# Patient Record
Sex: FEMALE | Race: BLACK OR AFRICAN AMERICAN | NOT HISPANIC OR LATINO | Employment: OTHER | URBAN - METROPOLITAN AREA
[De-identification: names, ages, dates, MRNs, and addresses within clinical notes are randomized per-mention and may not be internally consistent; named-entity substitution may affect disease eponyms.]

---

## 2023-12-01 ENCOUNTER — HOSPITAL ENCOUNTER (OUTPATIENT)
Facility: HOSPITAL | Age: 53
Setting detail: OBSERVATION
Discharge: HOME | End: 2023-12-03
Attending: EMERGENCY MEDICINE | Admitting: EMERGENCY MEDICINE
Payer: MEDICARE

## 2023-12-01 DIAGNOSIS — E11.649 HYPOGLYCEMIA ASSOCIATED WITH TYPE 2 DIABETES MELLITUS: Primary | ICD-10-CM

## 2023-12-01 DIAGNOSIS — E87.6 HYPOKALEMIA: ICD-10-CM

## 2023-12-01 PROBLEM — E16.2 HYPOGLYCEMIA: Status: ACTIVE | Noted: 2023-12-01

## 2023-12-01 LAB
ALBUMIN SERPL-MCNC: 4 G/DL (ref 3.5–5.2)
ALBUMIN/GLOB SERPL: 1.2 G/DL
ALP SERPL-CCNC: 86 U/L (ref 39–117)
ALT SERPL W P-5'-P-CCNC: 10 U/L (ref 1–33)
ANION GAP SERPL CALCULATED.3IONS-SCNC: 12 MMOL/L (ref 5–15)
AST SERPL-CCNC: 18 U/L (ref 1–32)
BASOPHILS # BLD AUTO: 0.1 10*3/MM3 (ref 0–0.2)
BASOPHILS NFR BLD AUTO: 1.3 % (ref 0–1.5)
BILIRUB SERPL-MCNC: 0.3 MG/DL (ref 0–1.2)
BUN SERPL-MCNC: 19 MG/DL (ref 6–20)
BUN/CREAT SERPL: 20.4 (ref 7–25)
CALCIUM SPEC-SCNC: 9.4 MG/DL (ref 8.6–10.5)
CHLORIDE SERPL-SCNC: 99 MMOL/L (ref 98–107)
CO2 SERPL-SCNC: 29 MMOL/L (ref 22–29)
CREAT SERPL-MCNC: 0.93 MG/DL (ref 0.57–1)
DEPRECATED RDW RBC AUTO: 44.6 FL (ref 37–54)
EGFRCR SERPLBLD CKD-EPI 2021: 72.7 ML/MIN/1.73
EOSINOPHIL # BLD AUTO: 0.4 10*3/MM3 (ref 0–0.4)
EOSINOPHIL NFR BLD AUTO: 3.6 % (ref 0.3–6.2)
ERYTHROCYTE [DISTWIDTH] IN BLOOD BY AUTOMATED COUNT: 13.8 % (ref 12.3–15.4)
GLOBULIN UR ELPH-MCNC: 3.4 GM/DL
GLUCOSE BLDC GLUCOMTR-MCNC: 100 MG/DL (ref 70–105)
GLUCOSE BLDC GLUCOMTR-MCNC: 108 MG/DL (ref 70–105)
GLUCOSE BLDC GLUCOMTR-MCNC: 90 MG/DL (ref 70–105)
GLUCOSE BLDC GLUCOMTR-MCNC: 98 MG/DL (ref 70–105)
GLUCOSE SERPL-MCNC: 95 MG/DL (ref 65–99)
HCT VFR BLD AUTO: 41.3 % (ref 34–46.6)
HGB BLD-MCNC: 13.7 G/DL (ref 12–15.9)
HOLD SPECIMEN: NORMAL
LYMPHOCYTES # BLD AUTO: 2.9 10*3/MM3 (ref 0.7–3.1)
LYMPHOCYTES NFR BLD AUTO: 27.6 % (ref 19.6–45.3)
MCH RBC QN AUTO: 29.3 PG (ref 26.6–33)
MCHC RBC AUTO-ENTMCNC: 33.2 G/DL (ref 31.5–35.7)
MCV RBC AUTO: 88.1 FL (ref 79–97)
MONOCYTES # BLD AUTO: 1 10*3/MM3 (ref 0.1–0.9)
MONOCYTES NFR BLD AUTO: 9.2 % (ref 5–12)
NEUTROPHILS NFR BLD AUTO: 58.3 % (ref 42.7–76)
NEUTROPHILS NFR BLD AUTO: 6 10*3/MM3 (ref 1.7–7)
NRBC BLD AUTO-RTO: 0.1 /100 WBC (ref 0–0.2)
PLATELET # BLD AUTO: 304 10*3/MM3 (ref 140–450)
PMV BLD AUTO: 8.3 FL (ref 6–12)
POTASSIUM SERPL-SCNC: 3.3 MMOL/L (ref 3.5–5.2)
PROT SERPL-MCNC: 7.4 G/DL (ref 6–8.5)
RBC # BLD AUTO: 4.69 10*6/MM3 (ref 3.77–5.28)
SODIUM SERPL-SCNC: 140 MMOL/L (ref 136–145)
WBC NRBC COR # BLD AUTO: 10.4 10*3/MM3 (ref 3.4–10.8)
WHOLE BLOOD HOLD COAG: NORMAL

## 2023-12-01 PROCEDURE — 85025 COMPLETE CBC W/AUTO DIFF WBC: CPT

## 2023-12-01 PROCEDURE — 99284 EMERGENCY DEPT VISIT MOD MDM: CPT

## 2023-12-01 PROCEDURE — 80053 COMPREHEN METABOLIC PANEL: CPT

## 2023-12-01 PROCEDURE — 82948 REAGENT STRIP/BLOOD GLUCOSE: CPT

## 2023-12-01 RX ORDER — SODIUM CHLORIDE 0.9 % (FLUSH) 0.9 %
10 SYRINGE (ML) INJECTION AS NEEDED
Status: DISCONTINUED | OUTPATIENT
Start: 2023-12-01 | End: 2023-12-03 | Stop reason: HOSPADM

## 2023-12-01 RX ORDER — ACETAMINOPHEN 325 MG/1
650 TABLET ORAL ONCE
Status: COMPLETED | OUTPATIENT
Start: 2023-12-01 | End: 2023-12-01

## 2023-12-01 RX ORDER — POTASSIUM CHLORIDE 20 MEQ/1
20 TABLET, EXTENDED RELEASE ORAL ONCE
Status: COMPLETED | OUTPATIENT
Start: 2023-12-01 | End: 2023-12-01

## 2023-12-01 RX ADMIN — POTASSIUM CHLORIDE 20 MEQ: 1500 TABLET, EXTENDED RELEASE ORAL at 23:10

## 2023-12-01 RX ADMIN — ACETAMINOPHEN 650 MG: 325 TABLET, FILM COATED ORAL at 22:10

## 2023-12-02 LAB
ANION GAP SERPL CALCULATED.3IONS-SCNC: 10 MMOL/L (ref 5–15)
BASOPHILS # BLD AUTO: 0.1 10*3/MM3 (ref 0–0.2)
BASOPHILS NFR BLD AUTO: 0.7 % (ref 0–1.5)
BUN SERPL-MCNC: 22 MG/DL (ref 6–20)
BUN/CREAT SERPL: 26.5 (ref 7–25)
CALCIUM SPEC-SCNC: 9.7 MG/DL (ref 8.6–10.5)
CHLORIDE SERPL-SCNC: 100 MMOL/L (ref 98–107)
CO2 SERPL-SCNC: 31 MMOL/L (ref 22–29)
CREAT SERPL-MCNC: 0.83 MG/DL (ref 0.57–1)
DEPRECATED RDW RBC AUTO: 42.4 FL (ref 37–54)
EGFRCR SERPLBLD CKD-EPI 2021: 83.4 ML/MIN/1.73
EOSINOPHIL # BLD AUTO: 0.4 10*3/MM3 (ref 0–0.4)
EOSINOPHIL NFR BLD AUTO: 3.6 % (ref 0.3–6.2)
ERYTHROCYTE [DISTWIDTH] IN BLOOD BY AUTOMATED COUNT: 13.7 % (ref 12.3–15.4)
GLUCOSE BLDC GLUCOMTR-MCNC: 103 MG/DL (ref 70–105)
GLUCOSE BLDC GLUCOMTR-MCNC: 106 MG/DL (ref 70–105)
GLUCOSE BLDC GLUCOMTR-MCNC: 117 MG/DL (ref 70–105)
GLUCOSE BLDC GLUCOMTR-MCNC: 93 MG/DL (ref 70–105)
GLUCOSE BLDC GLUCOMTR-MCNC: 98 MG/DL (ref 70–105)
GLUCOSE SERPL-MCNC: 88 MG/DL (ref 65–99)
HCT VFR BLD AUTO: 37.7 % (ref 34–46.6)
HGB BLD-MCNC: 12.5 G/DL (ref 12–15.9)
LYMPHOCYTES # BLD AUTO: 3.3 10*3/MM3 (ref 0.7–3.1)
LYMPHOCYTES NFR BLD AUTO: 31.6 % (ref 19.6–45.3)
MCH RBC QN AUTO: 29.6 PG (ref 26.6–33)
MCHC RBC AUTO-ENTMCNC: 33.1 G/DL (ref 31.5–35.7)
MCV RBC AUTO: 89.4 FL (ref 79–97)
MONOCYTES # BLD AUTO: 1.1 10*3/MM3 (ref 0.1–0.9)
MONOCYTES NFR BLD AUTO: 11 % (ref 5–12)
NEUTROPHILS NFR BLD AUTO: 5.5 10*3/MM3 (ref 1.7–7)
NEUTROPHILS NFR BLD AUTO: 53.1 % (ref 42.7–76)
NRBC BLD AUTO-RTO: 0.1 /100 WBC (ref 0–0.2)
PLATELET # BLD AUTO: 277 10*3/MM3 (ref 140–450)
PMV BLD AUTO: 8.5 FL (ref 6–12)
POTASSIUM SERPL-SCNC: 3.4 MMOL/L (ref 3.5–5.2)
POTASSIUM SERPL-SCNC: 3.6 MMOL/L (ref 3.5–5.2)
RBC # BLD AUTO: 4.22 10*6/MM3 (ref 3.77–5.28)
SODIUM SERPL-SCNC: 141 MMOL/L (ref 136–145)
T4 FREE SERPL-MCNC: 1.33 NG/DL (ref 0.93–1.7)
TSH SERPL DL<=0.05 MIU/L-ACNC: 1.82 UIU/ML (ref 0.27–4.2)
WBC NRBC COR # BLD AUTO: 10.4 10*3/MM3 (ref 3.4–10.8)

## 2023-12-02 PROCEDURE — 84443 ASSAY THYROID STIM HORMONE: CPT | Performed by: INTERNAL MEDICINE

## 2023-12-02 PROCEDURE — G0378 HOSPITAL OBSERVATION PER HR: HCPCS

## 2023-12-02 PROCEDURE — 80048 BASIC METABOLIC PNL TOTAL CA: CPT

## 2023-12-02 PROCEDURE — 84132 ASSAY OF SERUM POTASSIUM: CPT | Performed by: PHYSICIAN ASSISTANT

## 2023-12-02 PROCEDURE — 84439 ASSAY OF FREE THYROXINE: CPT | Performed by: INTERNAL MEDICINE

## 2023-12-02 PROCEDURE — 99204 OFFICE O/P NEW MOD 45 MIN: CPT | Performed by: INTERNAL MEDICINE

## 2023-12-02 PROCEDURE — 82948 REAGENT STRIP/BLOOD GLUCOSE: CPT

## 2023-12-02 PROCEDURE — 85025 COMPLETE CBC W/AUTO DIFF WBC: CPT

## 2023-12-02 RX ORDER — CETIRIZINE HYDROCHLORIDE 10 MG/1
10 TABLET ORAL DAILY
Status: DISCONTINUED | OUTPATIENT
Start: 2023-12-02 | End: 2023-12-03 | Stop reason: HOSPADM

## 2023-12-02 RX ORDER — SODIUM CHLORIDE 0.9 % (FLUSH) 0.9 %
10 SYRINGE (ML) INJECTION EVERY 12 HOURS SCHEDULED
Status: DISCONTINUED | OUTPATIENT
Start: 2023-12-02 | End: 2023-12-03 | Stop reason: HOSPADM

## 2023-12-02 RX ORDER — ATORVASTATIN CALCIUM 10 MG/1
5 TABLET, FILM COATED ORAL DAILY
COMMUNITY

## 2023-12-02 RX ORDER — BISACODYL 10 MG
10 SUPPOSITORY, RECTAL RECTAL DAILY PRN
Status: DISCONTINUED | OUTPATIENT
Start: 2023-12-02 | End: 2023-12-03 | Stop reason: HOSPADM

## 2023-12-02 RX ORDER — LOSARTAN POTASSIUM 25 MG/1
25 TABLET ORAL NIGHTLY
Status: DISCONTINUED | OUTPATIENT
Start: 2023-12-02 | End: 2023-12-03 | Stop reason: HOSPADM

## 2023-12-02 RX ORDER — OXYCODONE HYDROCHLORIDE 5 MG/1
15 TABLET ORAL 4 TIMES DAILY PRN
Status: DISCONTINUED | OUTPATIENT
Start: 2023-12-02 | End: 2023-12-03 | Stop reason: HOSPADM

## 2023-12-02 RX ORDER — OXYMETAZOLINE HYDROCHLORIDE 0.05 G/100ML
2 SPRAY NASAL 2 TIMES DAILY
Status: DISCONTINUED | OUTPATIENT
Start: 2023-12-02 | End: 2023-12-03 | Stop reason: HOSPADM

## 2023-12-02 RX ORDER — SODIUM CHLORIDE 0.9 % (FLUSH) 0.9 %
10 SYRINGE (ML) INJECTION AS NEEDED
Status: DISCONTINUED | OUTPATIENT
Start: 2023-12-02 | End: 2023-12-03 | Stop reason: HOSPADM

## 2023-12-02 RX ORDER — VENLAFAXINE 37.5 MG/1
37.5 TABLET ORAL 2 TIMES DAILY
COMMUNITY

## 2023-12-02 RX ORDER — ATORVASTATIN CALCIUM 10 MG/1
5 TABLET, FILM COATED ORAL DAILY
Status: DISCONTINUED | OUTPATIENT
Start: 2023-12-02 | End: 2023-12-03 | Stop reason: HOSPADM

## 2023-12-02 RX ORDER — AMOXICILLIN 250 MG
2 CAPSULE ORAL 2 TIMES DAILY
Status: DISCONTINUED | OUTPATIENT
Start: 2023-12-02 | End: 2023-12-03 | Stop reason: HOSPADM

## 2023-12-02 RX ORDER — ONDANSETRON 2 MG/ML
4 INJECTION INTRAMUSCULAR; INTRAVENOUS EVERY 6 HOURS PRN
Status: DISCONTINUED | OUTPATIENT
Start: 2023-12-02 | End: 2023-12-03 | Stop reason: HOSPADM

## 2023-12-02 RX ORDER — GABAPENTIN 800 MG/1
800 TABLET ORAL 3 TIMES DAILY
COMMUNITY

## 2023-12-02 RX ORDER — ASPIRIN 81 MG/1
81 TABLET ORAL DAILY
COMMUNITY

## 2023-12-02 RX ORDER — SODIUM CHLORIDE 9 MG/ML
40 INJECTION, SOLUTION INTRAVENOUS AS NEEDED
Status: DISCONTINUED | OUTPATIENT
Start: 2023-12-02 | End: 2023-12-03 | Stop reason: HOSPADM

## 2023-12-02 RX ORDER — DEXTROSE MONOHYDRATE 25 G/50ML
25 INJECTION, SOLUTION INTRAVENOUS
Status: DISCONTINUED | OUTPATIENT
Start: 2023-12-02 | End: 2023-12-03 | Stop reason: HOSPADM

## 2023-12-02 RX ORDER — COSYNTROPIN 0.25 MG/ML
0.25 INJECTION, POWDER, FOR SOLUTION INTRAMUSCULAR; INTRAVENOUS ONCE
Qty: 0.25 MG | Refills: 0 | Status: COMPLETED | OUTPATIENT
Start: 2023-12-03 | End: 2023-12-03

## 2023-12-02 RX ORDER — CELECOXIB 200 MG/1
200 CAPSULE ORAL DAILY
Status: DISCONTINUED | OUTPATIENT
Start: 2023-12-02 | End: 2023-12-03 | Stop reason: HOSPADM

## 2023-12-02 RX ORDER — IBUPROFEN 600 MG/1
1 TABLET ORAL
Status: DISCONTINUED | OUTPATIENT
Start: 2023-12-02 | End: 2023-12-03 | Stop reason: HOSPADM

## 2023-12-02 RX ORDER — CHOLECALCIFEROL (VITAMIN D3) 125 MCG
5 CAPSULE ORAL NIGHTLY PRN
Status: DISCONTINUED | OUTPATIENT
Start: 2023-12-02 | End: 2023-12-03 | Stop reason: HOSPADM

## 2023-12-02 RX ORDER — CYCLOBENZAPRINE HCL 10 MG
10 TABLET ORAL 2 TIMES DAILY PRN
COMMUNITY

## 2023-12-02 RX ORDER — HYDROXYCHLOROQUINE SULFATE 200 MG/1
400 TABLET, FILM COATED ORAL DAILY
COMMUNITY

## 2023-12-02 RX ORDER — CLOPIDOGREL BISULFATE 75 MG/1
75 TABLET ORAL DAILY
Status: DISCONTINUED | OUTPATIENT
Start: 2023-12-02 | End: 2023-12-03 | Stop reason: HOSPADM

## 2023-12-02 RX ORDER — CELECOXIB 200 MG/1
200 CAPSULE ORAL DAILY
COMMUNITY

## 2023-12-02 RX ORDER — HYDROCHLOROTHIAZIDE 25 MG/1
25 TABLET ORAL DAILY
COMMUNITY

## 2023-12-02 RX ORDER — HYDROCHLOROTHIAZIDE 25 MG/1
25 TABLET ORAL DAILY
Status: DISCONTINUED | OUTPATIENT
Start: 2023-12-02 | End: 2023-12-03 | Stop reason: HOSPADM

## 2023-12-02 RX ORDER — CLOPIDOGREL BISULFATE 75 MG/1
75 TABLET ORAL DAILY
Status: ON HOLD | COMMUNITY
End: 2023-12-03 | Stop reason: SDUPTHER

## 2023-12-02 RX ORDER — POTASSIUM CHLORIDE 20 MEQ/1
40 TABLET, EXTENDED RELEASE ORAL EVERY 4 HOURS
Status: COMPLETED | OUTPATIENT
Start: 2023-12-02 | End: 2023-12-02

## 2023-12-02 RX ORDER — ACETAMINOPHEN 325 MG/1
650 TABLET ORAL EVERY 4 HOURS PRN
Status: DISCONTINUED | OUTPATIENT
Start: 2023-12-02 | End: 2023-12-03 | Stop reason: HOSPADM

## 2023-12-02 RX ORDER — LOSARTAN POTASSIUM 25 MG/1
25 TABLET ORAL NIGHTLY
COMMUNITY

## 2023-12-02 RX ORDER — ASPIRIN 81 MG/1
81 TABLET ORAL DAILY
Status: DISCONTINUED | OUTPATIENT
Start: 2023-12-02 | End: 2023-12-03 | Stop reason: HOSPADM

## 2023-12-02 RX ORDER — NICOTINE POLACRILEX 4 MG
15 LOZENGE BUCCAL
Status: DISCONTINUED | OUTPATIENT
Start: 2023-12-02 | End: 2023-12-03 | Stop reason: HOSPADM

## 2023-12-02 RX ORDER — BISACODYL 5 MG/1
5 TABLET, DELAYED RELEASE ORAL DAILY PRN
Status: DISCONTINUED | OUTPATIENT
Start: 2023-12-02 | End: 2023-12-03 | Stop reason: HOSPADM

## 2023-12-02 RX ORDER — GABAPENTIN 400 MG/1
800 CAPSULE ORAL DAILY
Status: DISCONTINUED | OUTPATIENT
Start: 2023-12-02 | End: 2023-12-02

## 2023-12-02 RX ORDER — VENLAFAXINE 75 MG/1
37.5 TABLET ORAL 2 TIMES DAILY
Status: DISCONTINUED | OUTPATIENT
Start: 2023-12-02 | End: 2023-12-03 | Stop reason: HOSPADM

## 2023-12-02 RX ORDER — CYCLOBENZAPRINE HCL 10 MG
10 TABLET ORAL 2 TIMES DAILY PRN
Status: DISCONTINUED | OUTPATIENT
Start: 2023-12-02 | End: 2023-12-03 | Stop reason: HOSPADM

## 2023-12-02 RX ORDER — HYDROXYCHLOROQUINE SULFATE 200 MG/1
400 TABLET, FILM COATED ORAL DAILY
Status: DISCONTINUED | OUTPATIENT
Start: 2023-12-02 | End: 2023-12-03 | Stop reason: HOSPADM

## 2023-12-02 RX ORDER — GABAPENTIN 400 MG/1
800 CAPSULE ORAL EVERY 12 HOURS SCHEDULED
Status: DISCONTINUED | OUTPATIENT
Start: 2023-12-02 | End: 2023-12-02

## 2023-12-02 RX ORDER — OXYCODONE HYDROCHLORIDE 15 MG/1
15 TABLET ORAL 4 TIMES DAILY PRN
COMMUNITY

## 2023-12-02 RX ORDER — GABAPENTIN 400 MG/1
800 CAPSULE ORAL 3 TIMES DAILY
Status: DISCONTINUED | OUTPATIENT
Start: 2023-12-02 | End: 2023-12-03 | Stop reason: HOSPADM

## 2023-12-02 RX ORDER — POLYETHYLENE GLYCOL 3350 17 G/17G
17 POWDER, FOR SOLUTION ORAL DAILY PRN
Status: DISCONTINUED | OUTPATIENT
Start: 2023-12-02 | End: 2023-12-03 | Stop reason: HOSPADM

## 2023-12-02 RX ADMIN — NASAL DECONGESTANT 2 SPRAY: 0.05 SPRAY NASAL at 20:15

## 2023-12-02 RX ADMIN — GABAPENTIN 800 MG: 400 CAPSULE ORAL at 17:11

## 2023-12-02 RX ADMIN — DOCUSATE SODIUM 50 MG AND SENNOSIDES 8.6 MG 2 TABLET: 8.6; 5 TABLET, FILM COATED ORAL at 20:15

## 2023-12-02 RX ADMIN — GABAPENTIN 800 MG: 400 CAPSULE ORAL at 08:56

## 2023-12-02 RX ADMIN — OXYCODONE 15 MG: 5 TABLET ORAL at 18:12

## 2023-12-02 RX ADMIN — VENLAFAXINE 37.5 MG: 75 TABLET ORAL at 12:01

## 2023-12-02 RX ADMIN — CLOPIDOGREL BISULFATE 75 MG: 75 TABLET ORAL at 05:40

## 2023-12-02 RX ADMIN — Medication 10 ML: at 09:05

## 2023-12-02 RX ADMIN — ATORVASTATIN CALCIUM 5 MG: 10 TABLET, FILM COATED ORAL at 12:00

## 2023-12-02 RX ADMIN — POTASSIUM CHLORIDE 40 MEQ: 1500 TABLET, EXTENDED RELEASE ORAL at 14:33

## 2023-12-02 RX ADMIN — LOSARTAN POTASSIUM 25 MG: 25 TABLET, FILM COATED ORAL at 20:15

## 2023-12-02 RX ADMIN — DOCUSATE SODIUM 50 MG AND SENNOSIDES 8.6 MG 2 TABLET: 8.6; 5 TABLET, FILM COATED ORAL at 12:01

## 2023-12-02 RX ADMIN — METOPROLOL TARTRATE 25 MG: 25 TABLET, FILM COATED ORAL at 12:01

## 2023-12-02 RX ADMIN — CYCLOBENZAPRINE 10 MG: 10 TABLET, FILM COATED ORAL at 02:01

## 2023-12-02 RX ADMIN — ACETAMINOPHEN 650 MG: 325 TABLET, FILM COATED ORAL at 12:07

## 2023-12-02 RX ADMIN — VENLAFAXINE 37.5 MG: 75 TABLET ORAL at 20:15

## 2023-12-02 RX ADMIN — POTASSIUM CHLORIDE 40 MEQ: 1500 TABLET, EXTENDED RELEASE ORAL at 12:02

## 2023-12-02 RX ADMIN — HYDROXYCHLOROQUINE SULFATE 400 MG: 200 TABLET ORAL at 05:40

## 2023-12-02 RX ADMIN — METOPROLOL TARTRATE 25 MG: 25 TABLET, FILM COATED ORAL at 20:15

## 2023-12-02 RX ADMIN — OXYCODONE 15 MG: 5 TABLET ORAL at 05:41

## 2023-12-02 RX ADMIN — HYDROCHLOROTHIAZIDE 25 MG: 25 TABLET ORAL at 05:59

## 2023-12-02 RX ADMIN — GABAPENTIN 800 MG: 400 CAPSULE ORAL at 20:15

## 2023-12-02 RX ADMIN — OXYCODONE 15 MG: 5 TABLET ORAL at 12:02

## 2023-12-02 RX ADMIN — CELECOXIB 200 MG: 200 CAPSULE ORAL at 12:00

## 2023-12-02 RX ADMIN — Medication 10 ML: at 20:16

## 2023-12-02 RX ADMIN — CETIRIZINE HYDROCHLORIDE 10 MG: 10 TABLET, FILM COATED ORAL at 14:33

## 2023-12-02 RX ADMIN — CYCLOBENZAPRINE 10 MG: 10 TABLET, FILM COATED ORAL at 14:33

## 2023-12-02 RX ADMIN — ASPIRIN 81 MG: 81 TABLET, COATED ORAL at 05:40

## 2023-12-02 NOTE — H&P
[facility] H&P    Patient Name: Lisa Jay  : 1970  MRN: [id number]  Primary Care Physician: Brian Gonzalez MD  Date of admission: 2023     Patient Care Team:  Brian Gonzalez MD as PCP - General (Family Medicine)  Francisco Jamil MD as PCP - Family Medicine          Subjective   History Present Illness     Chief Complaint:   Chief Complaint   Patient presents with    Hypoglycemia     Low blood sugar    History of Present Illness    53-year-old female presents to the ED accompanied by her sister via EMS after a hypoglycemic episode while at the [facility].  Patient states that her blood sugars were running low for the last 2 days.  States that she wears a glucose monitor and notes that at home her sugars have been dropping into the 40s at night.  States that her blood sugars are normally in the 120s at home, had been in the 90s during the day.  While at swim class patient's blood sugar was 78, patient began to have some slurred speech, people around her managed to help her and prevent her from going underwater she started to feel lightheaded but no syncopal episode.  Patient was helped out of the pool and given orange juice, PB crackers, and another snack and blood sugars increased to 140 and slurred speech resolved.  Type II diabetic and takes Trulicity, last injection was yesterday.  Prior to yesterday she did not have Trulicity as they were out of it for 5 days, states that she takes it once a week. Reports having a history of fibromyalgia.  Denies any recent illness, fever, chills, nausea, vomiting, abdominal pain, and diarrhea. Patient followed by Dr. Travis (Endocrinology).     Observation 23  Pt concurs with er hpi. Endocrinology consulted. Blood glucose has been stable here.       Review of Systems   Constitutional: Negative for fever.   Cardiovascular:  Negative for chest pain, palpitations and syncope.   Respiratory:  Negative for shortness of breath.    Gastrointestinal:   Negative for abdominal pain.   Neurological:  Positive for light-headedness. Negative for vertigo.             Personal History     Past Medical History: History reviewed. No pertinent past medical history.    Surgical History:    History reviewed. No pertinent surgical history.        Family History: family history is not on file. Otherwise pertinent FHx was reviewed and unremarkable.     Social History:  reports that she has never smoked. She has never used smokeless tobacco. She reports that she does not drink alcohol and does not use drugs.      Medications:  Prior to Admission medications    Medication Sig Start Date End Date Taking? Authorizing Provider   aspirin 81 MG EC tablet Take 1 tablet by mouth Daily.   Yes Jorge Ordonez MD   atorvastatin (LIPITOR) 10 MG tablet Take 0.5 tablets by mouth Daily.   Yes Jorge Ordonez MD   celecoxib (CeleBREX) 200 MG capsule Take 1 capsule by mouth Daily.   Yes Jorge Ordonez MD   clopidogrel (PLAVIX) 75 MG tablet Take 1 tablet by mouth Daily.   Yes Jorge Ordonez MD   cyclobenzaprine (FLEXERIL) 10 MG tablet Take 1 tablet by mouth 2 (Two) Times a Day As Needed for Muscle Spasms.   Yes Jorge Ordonez MD   gabapentin (NEURONTIN) 800 MG tablet Take 1 tablet by mouth 3 (Three) Times a Day.   Yes Jorge Ordonez MD   hydroCHLOROthiazide (HYDRODIURIL) 25 MG tablet Take 1 tablet by mouth Daily.   Yes Jorge Ordonez MD   hydroxychloroquine (PLAQUENIL) 200 MG tablet Take 2 tablets by mouth Daily.   Yes Jorge Ordonez MD   losartan (COZAAR) 25 MG tablet Take 1 tablet by mouth Every Night.   Yes Jorge Ordonez MD   metoprolol tartrate (LOPRESSOR) 25 MG tablet Take 1 tablet by mouth 2 (Two) Times a Day.   Yes Jorge Ordonez MD   oxyCODONE (ROXICODONE) 15 MG immediate release tablet Take 1 tablet by mouth 4 (Four) Times a Day As Needed for Moderate Pain.   Yes Jorge Ordonez MD   venlafaxine (EFFEXOR) 37.5 MG  tablet Take 1 tablet by mouth 2 (Two) Times a Day.   Yes Provider, Jorge, MD       Allergies:  No Known Allergies    Objective   Objective     Vital Signs  Temp:  [97.3 °F (36.3 °C)-98.5 °F (36.9 °C)] 97.8 °F (36.6 °C)  Heart Rate:  [61-76] 71  Resp:  [10-20] 10  BP: (101-162)/(64-98) 119/85  SpO2:  [98 %-100 %] 100 %  on   ;   Device (Oxygen Therapy): room air  Body mass index is 47.98 kg/m².    Physical Exam  Constitutional:       Appearance: Normal appearance.   HENT:      Mouth/Throat:      Mouth: Mucous membranes are moist.   Cardiovascular:      Rate and Rhythm: Normal rate and regular rhythm.      Pulses: Normal pulses.      Heart sounds: Normal heart sounds.   Pulmonary:      Effort: Pulmonary effort is normal.      Breath sounds: Normal breath sounds.   Skin:     General: Skin is warm and dry.   Neurological:      General: No focal deficit present.      Mental Status: She is alert and oriented to person, place, and time.   Psychiatric:         Mood and Affect: Mood normal.         Behavior: Behavior normal.           Results Review:  I have personally reviewed most recent lab results and agree with findings, most notably: cbc, cmp, .    Results from last 7 days   Lab Units 12/02/23  0414   WBC 10*3/mm3 10.40   HEMOGLOBIN g/dL 12.5   HEMATOCRIT % 37.7   PLATELETS 10*3/mm3 277     Results from last 7 days   Lab Units 12/02/23  0414 12/01/23  2052   SODIUM mmol/L 141 140   POTASSIUM mmol/L 3.4* 3.3*   CHLORIDE mmol/L 100 99   CO2 mmol/L 31.0* 29.0   BUN mg/dL 22* 19   CREATININE mg/dL 0.83 0.93   GLUCOSE mg/dL 88 95   CALCIUM mg/dL 9.7 9.4   ALK PHOS U/L  --  86   ALT (SGPT) U/L  --  10   AST (SGOT) U/L  --  18     Estimated Creatinine Clearance: 112 mL/min (by C-G formula based on SCr of 0.83 mg/dL).  Brief Urine Lab Results  (Last result in the past 365 days)        Color   Clarity   Blood   Leuk Est   Nitrite   Protein   CREAT   Urine HCG        07/01/23 1729 Yellow     Negative   Negative   Negative                      Microbiology Results (last 10 days)       ** No results found for the last 240 hours. **            ECG/EMG Results (most recent)       Procedure Component Value Units Date/Time    SCANNED - TELEMETRY   [[id number]] Resulted: 12/01/23     Updated: 12/02/23 0227                    No radiology results for the last 7 days      Estimated Creatinine Clearance: 112 mL/min (by C-G formula based on SCr of 0.83 mg/dL).    Assessment & Plan   Assessment/Plan       Active Hospital Problems    Diagnosis  POA    **Hypoglycemia [E16.2]  Yes      Resolved Hospital Problems   No resolved problems to display.     Hypoglycemia  -moderately controlled   Lab Results   Component Value Date    GLUCOSE 88 12/02/2023    GLUCOSE 95 12/01/2023     - cbc and cmp unremarkable  - endocrinology consulted  - tsh, t4, cortisol level-Diabetic diet  -Monitor AC and HS     Hypokalemia  - potassium 3.4- replacement given    Hypertension  -well Controlled   BP Readings from Last 1 Encounters:   12/02/23 119/85     - Continue hctz, cozaar, metoprolol  - Monitor while admitted       VTE Prophylaxis -   Mechanical Order History:        Ordered        12/02/23 0850  Place Sequential Compression Device  Once            12/02/23 0850  Maintain Sequential Compression Device  Continuous            12/02/23 0123  Place Sequential Compression Device  Once            12/02/23 0123  Maintain Sequential Compression Device  Continuous                          Pharmalogical Order History:       None            CODE STATUS:    Code Status and Medical Interventions:   Ordered at: 12/02/23 0120     Level Of Support Discussed With:    Patient     Code Status (Patient has no pulse and is not breathing):    CPR (Attempt to Resuscitate)     Medical Interventions (Patient has pulse or is breathing):    Full Support       This patient has been examined wearing personal protective equipment.     I discussed the patient's findings and my recommendations with  patient and nursing staff.      Signature:.Electronically signed by Sima Arenas PA-C, 12/02/23, 3:08 PM EST.

## 2023-12-02 NOTE — PLAN OF CARE
Goal Outcome Evaluation:  Plan of Care Reviewed With: patient        Progress: improving  Outcome Evaluation: Pt saw endocrinology this morning and reviewed plan of care. Call light within reach.

## 2023-12-02 NOTE — ED PROVIDER NOTES
Subjective   History of Present Illness  53-year-old female presents to the ED accompanied by her sister via EMS after a hypoglycemic episode while at the [facility].  Patient states that her blood sugars were running low for the last 2 days.  States that she wears a glucose monitor and notes that at home her sugars have been dropping into the 40s at night.  States that her blood sugars are normally in the 120s at home, had been in the 90s during the day.  While at swim class patient's blood sugar was 78, patient began to have some slurred speech, people around her managed to help her and prevent her from going underwater she started to feel lightheaded but no syncopal episode.  Patient was helped out of the pool and given orange juice, PB crackers, and another snack and blood sugars increased to 140 and slurred speech resolved.  Type II diabetic and takes Trulicity, last injection was yesterday.  Prior to yesterday she did not have Trulicity as they were out of it for 5 days, states that she takes it once a week. Reports having a history of fibromyalgia.  Denies any recent illness, fever, chills, nausea, vomiting, abdominal pain, and diarrhea. Patient followed by Dr. Travis (Endocrinology).        Review of Systems   Constitutional:  Negative for chills and fever.   Respiratory:  Negative for chest tightness and shortness of breath.    Cardiovascular:  Negative for chest pain and palpitations.   Gastrointestinal:  Negative for abdominal pain, diarrhea, nausea and vomiting.   Neurological:  Positive for light-headedness.   All other systems reviewed and are negative.      No past medical history on file.    No Known Allergies    No past surgical history on file.    No family history on file.    Social History     Socioeconomic History    Marital status: Single           Objective   Physical Exam  Vitals and nursing note reviewed.   Constitutional:       General: She is not in acute distress.     Appearance: Normal  appearance. She is not ill-appearing, toxic-appearing or diaphoretic.   HENT:      Head: Normocephalic and atraumatic.      Nose: No congestion or rhinorrhea.   Eyes:      General: No scleral icterus.     Extraocular Movements: Extraocular movements intact.      Conjunctiva/sclera: Conjunctivae normal.      Pupils: Pupils are equal, round, and reactive to light.   Cardiovascular:      Rate and Rhythm: Normal rate and regular rhythm.      Heart sounds: Normal heart sounds. No murmur heard.     No friction rub. No gallop.   Pulmonary:      Effort: Pulmonary effort is normal. No respiratory distress.      Breath sounds: Normal breath sounds. No stridor. No wheezing, rhonchi or rales.   Chest:      Chest wall: No tenderness.   Abdominal:      General: Abdomen is flat. Bowel sounds are normal. There is no distension.      Palpations: Abdomen is soft. There is no mass.      Tenderness: There is no abdominal tenderness. There is no guarding or rebound.      Hernia: No hernia is present.   Musculoskeletal:         General: Normal range of motion.      Cervical back: Normal range of motion and neck supple. No tenderness.   Skin:     General: Skin is warm.   Neurological:      General: No focal deficit present.      Mental Status: She is alert and oriented to person, place, and time. Mental status is at baseline.   Psychiatric:         Mood and Affect: Mood normal.         Behavior: Behavior normal.         Thought Content: Thought content normal.         Judgment: Judgment normal.         Procedures           ED Course  ED Course as of 12/02/23 0129   Fri Dec 01, 2023   2010 Giving pt some food and will watch blood sugar levels [MV]   2132 Rechecked pt, reports having a mild headache at this time. Giving tylenol. Pt has glucose monitor on and reading in the 70's. Requested nurse give sandwich, juice, and recheck blood sugar incase monitor is not reading correctly.  [MV]   2282 Patient reports headache is improved, rechecking  "blood sugars again. [MV]   2312 Rechecked pt, pt teary eyed and yawning states that when she gets this way her sugars drop, requests blood sugars be checked. Rechecked blood glucose and 108. Discussed case with Dr. Vargas who evaluated the pt at this time.  [MV]      ED Course User Index  [MV] Brook Castro PA-C    /72   Pulse 66   Temp 98.5 °F (36.9 °C) (Oral)   Resp 20   Ht 170.2 cm (67\")   Wt (!) 141 kg (310 lb)   SpO2 100%   BMI 48.55 kg/m²   Labs Reviewed   COMPREHENSIVE METABOLIC PANEL - Abnormal; Notable for the following components:       Result Value    Potassium 3.3 (*)     All other components within normal limits    Narrative:     GFR Normal >60  Chronic Kidney Disease <60  Kidney Failure <15     CBC WITH AUTO DIFFERENTIAL - Abnormal; Notable for the following components:    Monocytes, Absolute 1.00 (*)     All other components within normal limits   POCT GLUCOSE FINGERSTICK - Abnormal; Notable for the following components:    Glucose 108 (*)     All other components within normal limits   POCT GLUCOSE FINGERSTICK - Normal   POCT GLUCOSE FINGERSTICK - Normal   POCT GLUCOSE FINGERSTICK - Normal   BASIC METABOLIC PANEL   CBC WITH AUTO DIFFERENTIAL   POCT GLUCOSE FINGERSTICK   POCT GLUCOSE FINGERSTICK   POCT GLUCOSE FINGERSTICK   POCT GLUCOSE FINGERSTICK   CBC AND DIFFERENTIAL    Narrative:     The following orders were created for panel order CBC & Differential.  Procedure                               Abnormality         Status                     ---------                               -----------         ------                     CBC Auto Differential[[id number]]        Abnormal            Final result                 Please view results for these tests on the individual orders.   EXTRA TUBES    Narrative:     The following orders were created for panel order Extra Tubes.  Procedure                               Abnormality         Status                     ---------                   "             -----------         ------                     Gold Top - SST[[id number]]                                   Final result               Light Blue Top[[id number]]                                   Final result                 Please view results for these tests on the individual orders.   GOLD TOP - SST   LIGHT BLUE TOP     Medications   sodium chloride 0.9 % flush 10 mL (has no administration in time range)   hydroxychloroquine (PLAQUENIL) tablet 400 mg (has no administration in time range)   hydroCHLOROthiazide (HYDRODIURIL) tablet 25 mg (has no administration in time range)   clopidogrel (PLAVIX) tablet 75 mg (has no administration in time range)   venlafaxine (EFFEXOR) tablet 37.5 mg (has no administration in time range)   oxyCODONE (ROXICODONE) immediate release tablet 15 mg (has no administration in time range)   aspirin EC tablet 81 mg (has no administration in time range)   metoprolol tartrate (LOPRESSOR) tablet 25 mg (has no administration in time range)   atorvastatin (LIPITOR) tablet 5 mg (has no administration in time range)   celecoxib (CeleBREX) capsule 200 mg (has no administration in time range)   losartan (COZAAR) tablet 25 mg (has no administration in time range)   gabapentin (NEURONTIN) capsule 800 mg (has no administration in time range)   sodium chloride 0.9 % flush 10 mL (has no administration in time range)   sodium chloride 0.9 % flush 10 mL (has no administration in time range)   sodium chloride 0.9 % infusion 40 mL (has no administration in time range)   acetaminophen (TYLENOL) tablet 650 mg (has no administration in time range)   sennosides-docusate (PERICOLACE) 8.6-50 MG per tablet 2 tablet (has no administration in time range)     And   polyethylene glycol (MIRALAX) packet 17 g (has no administration in time range)     And   bisacodyl (DULCOLAX) EC tablet 5 mg (has no administration in time range)     And   bisacodyl (DULCOLAX) suppository 10 mg (has no administration in time  "range)   ondansetron (ZOFRAN) injection 4 mg (has no administration in time range)   melatonin tablet 5 mg (has no administration in time range)   dextrose (GLUTOSE) oral gel 15 g (has no administration in time range)   dextrose (D50W) (25 g/50 mL) IV injection 25 g (has no administration in time range)   glucagon (GLUCAGEN) injection 1 mg (has no administration in time range)   acetaminophen (TYLENOL) tablet 650 mg (650 mg Oral Given 12/1/23 2210)   potassium chloride (K-DUR,KLOR-CON) CR tablet 20 mEq (20 mEq Oral Given 12/1/23 2310)     No radiology results for the last day                                           Medical Decision Making  Appropriate PPE worn during exam.    /86   Pulse 61   Temp 98.5 °F (36.9 °C) (Oral)   Resp 20   Ht 170.2 cm (67\")   Wt (!) 141 kg (310 lb)   SpO2 99%   BMI 48.55 kg/m²      Co-morbidities --  has no past medical history on file.    Radiology interpretation --  X-rays reviewed by me and interpreted by radiologist:  No radiology results for the last day    Plan -- 53-year-old female presents the ED via EMS after hypoglycemic episode while the [facility].  Patient states for the last 2 days her blood sugars have been dropping into the low 40s at night, during the day today she has been in the low 90s.  On exam vital signs normal, exam unremarkable.  Heart regular rate and rhythm, no murmurs.  Lungs clear to auscultation throughout with no wheezing rhonchi or rales.  No focal deficits, patient is alert, oriented and appears to be at baseline.  Upon arrival POC glucose 90, patient was given juice and peanut butter crackers and blood sugars increased to 100, after sometimes blood sugar decreased down to 98, she was given a sandwich and blood sugars increased to 108.  while in the ED, pt reported headache and reported improvement with Tylenol.  CMP with potassium of 3.3, patient was given 20 mg K-Dur. I discussed case with Dr. Vargas who evaluated the patient, recommended " admission as at this time patient stated she has been having blood sugars in the low 40s during the last two nights.  Patient was placed in observation at this time for further evaluation and management of hypoglycemia with glucose checks every 4-6 hours, and consult with endocrinology in the morning.  Stable while in the ED.    I discussed the findings and recommendations with the patient who voices understanding. Stable while in the ER.       Problems Addressed:  Hypoglycemia associated with type 2 diabetes mellitus: complicated acute illness or injury    Amount and/or Complexity of Data Reviewed  Labs: ordered.    Risk  OTC drugs.  Prescription drug management.  Decision regarding hospitalization.        Final diagnoses:   Hypoglycemia associated with type 2 diabetes mellitus   Hypokalemia       ED Disposition  ED Disposition       ED Disposition   Decision to Admit    Condition   --    Comment   --               No follow-up provider specified.       Medication List      No changes were made to your prescriptions during this visit.            Brook Castro PA-C  12/02/23 0129

## 2023-12-02 NOTE — DISCHARGE SUMMARY
[facility]    Brian Gonzalez MD    CHIEF COMPLAINT:     Low blood sugar    HISTORY OF PRESENT ILLNESS:    HPI    53-year-old female presents to the ED accompanied by her sister via EMS after a hypoglycemic episode while at the [facility].  Patient states that her blood sugars were running low for the last 2 days.  States that she wears a glucose monitor and notes that at home her sugars have been dropping into the 40s at night.  States that her blood sugars are normally in the 120s at home, had been in the 90s during the day.  While at swim class patient's blood sugar was 78, patient began to have some slurred speech, people around her managed to help her and prevent her from going underwater she started to feel lightheaded but no syncopal episode.  Patient was helped out of the pool and given orange juice, PB crackers, and another snack and blood sugars increased to 140 and slurred speech resolved.  Type II diabetic and takes Trulicity, last injection was yesterday.  Prior to yesterday she did not have Trulicity as they were out of it for 5 days, states that she takes it once a week. Reports having a history of fibromyalgia.  Denies any recent illness, fever, chills, nausea, vomiting, abdominal pain, and diarrhea. Patient followed by Dr. Travis (Endocrinology).      Observation 12/2/23  Pt concurs with er hpi. Endocrinology consulted. Blood glucose has been stable here.     History reviewed. No pertinent past medical history.  History reviewed. No pertinent surgical history.  History reviewed. No pertinent family history.  Social History     Tobacco Use    Smoking status: Never    Smokeless tobacco: Never   Vaping Use    Vaping Use: Never used   Substance Use Topics    Alcohol use: Never    Drug use: Never     Medications Prior to Admission   Medication Sig Dispense Refill Last Dose    aspirin 81 MG EC tablet Take 1 tablet by mouth Daily.   12/1/2023 at 0600    atorvastatin (LIPITOR) 10 MG tablet  Take 0.5 tablets by mouth Daily.   12/1/2023    celecoxib (CeleBREX) 200 MG capsule Take 1 capsule by mouth Daily.   12/1/2023    clopidogrel (PLAVIX) 75 MG tablet Take 1 tablet by mouth Daily.   12/1/2023 at 0600    cyclobenzaprine (FLEXERIL) 10 MG tablet Take 1 tablet by mouth 2 (Two) Times a Day As Needed for Muscle Spasms.   12/2/2023    gabapentin (NEURONTIN) 800 MG tablet Take 1 tablet by mouth 3 (Three) Times a Day.   11/30/2023    hydroCHLOROthiazide (HYDRODIURIL) 25 MG tablet Take 1 tablet by mouth Daily.   12/1/2023 at 0600    hydroxychloroquine (PLAQUENIL) 200 MG tablet Take 2 tablets by mouth Daily.   12/1/2023 at 0600    losartan (COZAAR) 25 MG tablet Take 1 tablet by mouth Every Night.   11/30/2023    metoprolol tartrate (LOPRESSOR) 25 MG tablet Take 1 tablet by mouth 2 (Two) Times a Day.   12/1/2023 at 1200    oxyCODONE (ROXICODONE) 15 MG immediate release tablet Take 1 tablet by mouth 4 (Four) Times a Day As Needed for Moderate Pain.   12/1/2023 at 2000    venlafaxine (EFFEXOR) 37.5 MG tablet Take 1 tablet by mouth 2 (Two) Times a Day.   12/1/2023 at 0600     Allergies:  Patient has no known allergies.    Immunization History   Administered Date(s) Administered    COVID-19 (PFIZER) BIVALENT 12+YRS 04/26/2023    COVID-19 (PFIZER) Purple Cap Monovalent 03/15/2021, 04/05/2021, 04/23/2021, 10/18/2021    COVID-19 F23 (PFIZER) 12YRS+ (COMIRNATY) 10/04/2023           REVIEW OF SYSTEMS:    ROS    Constitutional: Negative for fever.   Cardiovascular:  Negative for chest pain, palpitations and syncope.   Respiratory:  Negative for shortness of breath.    Gastrointestinal:  Negative for abdominal pain.   Neurological:  Positive for light-headedness. Negative for vertigo.     Vital Signs  Temp:  [97.3 °F (36.3 °C)-98.5 °F (36.9 °C)] 97.5 °F (36.4 °C)  Heart Rate:  [61-76] 71  Resp:  [10-20] 10  BP: (101-162)/(64-98) 105/70          Physical Exam:  Physical Exam  Constitutional:       Appearance: Normal  appearance.   HENT:      Mouth/Throat:      Mouth: Mucous membranes are moist.   Cardiovascular:      Rate and Rhythm: Normal rate and regular rhythm.      Pulses: Normal pulses.      Heart sounds: Normal heart sounds.   Pulmonary:      Effort: Pulmonary effort is normal.      Breath sounds: Normal breath sounds.   Skin:     General: Skin is warm and dry.   Neurological:      General: No focal deficit present.      Mental Status: She is alert and oriented to person, place, and time.   Psychiatric:         Mood and Affect: Mood normal.         Behavior: Behavior normal.     Emotional Behavior:    wnl   Debilities:   None      Results Review:    I reviewed the patient's new clinical results.  Lab Results (most recent)       Procedure Component Value Units Date/Time    POC Glucose Once [[id number]]  (Normal) Collected: 12/02/23 1115    Specimen: Blood Updated: 12/02/23 1116     Glucose 93 mg/dL      Comment: Serial Number: [id number]:  [id number]       POC Glucose Once [[id number]]  (Abnormal) Collected: 12/02/23 0817    Specimen: Blood Updated: 12/02/23 0818     Glucose 106 mg/dL      Comment: Serial Number: [id number]:  [id number]       Basic Metabolic Panel [[id number]]  (Abnormal) Collected: 12/02/23 0414    Specimen: Blood from Hand, Left Updated: 12/02/23 0616     Glucose 88 mg/dL      BUN 22 mg/dL      Creatinine 0.83 mg/dL      Sodium 141 mmol/L      Potassium 3.4 mmol/L      Chloride 100 mmol/L      CO2 31.0 mmol/L      Calcium 9.7 mg/dL      BUN/Creatinine Ratio 26.5     Anion Gap 10.0 mmol/L      eGFR 83.4 mL/min/1.73     Narrative:      GFR Normal >60  Chronic Kidney Disease <60  Kidney Failure <15      CBC Auto Differential [[id number]]  (Abnormal) Collected: 12/02/23 0414    Specimen: Blood from Hand, Left Updated: 12/02/23 0539     WBC 10.40 10*3/mm3      RBC 4.22 10*6/mm3      Hemoglobin 12.5 g/dL      Hematocrit 37.7 %      MCV 89.4 fL      MCH 29.6 pg      MCHC 33.1 g/dL      RDW 13.7 %       RDW-SD 42.4 fl      MPV 8.5 fL      Platelets 277 10*3/mm3      Neutrophil % 53.1 %      Lymphocyte % 31.6 %      Monocyte % 11.0 %      Eosinophil % 3.6 %      Basophil % 0.7 %      Neutrophils, Absolute 5.50 10*3/mm3      Lymphocytes, Absolute 3.30 10*3/mm3      Monocytes, Absolute 1.10 10*3/mm3      Eosinophils, Absolute 0.40 10*3/mm3      Basophils, Absolute 0.10 10*3/mm3      nRBC 0.1 /100 WBC     Comprehensive Metabolic Panel [[id number]]  (Abnormal) Collected: 12/01/23 2052    Specimen: Blood Updated: 12/01/23 2132     Glucose 95 mg/dL      BUN 19 mg/dL      Creatinine 0.93 mg/dL      Sodium 140 mmol/L      Potassium 3.3 mmol/L      Comment: Slight hemolysis detected by analyzer. Result may be falsely elevated.        Chloride 99 mmol/L      CO2 29.0 mmol/L      Calcium 9.4 mg/dL      Total Protein 7.4 g/dL      Albumin 4.0 g/dL      ALT (SGPT) 10 U/L      AST (SGOT) 18 U/L      Alkaline Phosphatase 86 U/L      Total Bilirubin 0.3 mg/dL      Globulin 3.4 gm/dL      A/G Ratio 1.2 g/dL      BUN/Creatinine Ratio 20.4     Anion Gap 12.0 mmol/L      eGFR 72.7 mL/min/1.73     Narrative:      GFR Normal >60  Chronic Kidney Disease <60  Kidney Failure <15      Extra Tubes [[id number]] Collected: 12/01/23 2001    Specimen: Blood, Venous Line Updated: 12/01/23 2102    Narrative:      The following orders were created for panel order Extra Tubes.  Procedure                               Abnormality         Status                     ---------                               -----------         ------                     Gold Top - SST[[id number]]                                   Final result               Light Blue Top[[id number]]                                   Final result                 Please view results for these tests on the individual orders.    Gold Top - SST [[id number]] Collected: 12/01/23 2001    Specimen: Blood Updated: 12/01/23 2102     Extra Tube Hold for add-ons.     Comment: Auto resulted.       Light  Blue Top [[id number]] Collected: 12/01/23 2001    Specimen: Blood Updated: 12/01/23 2102     Extra Tube Hold for add-ons.     Comment: Auto resulted       CBC & Differential [[id number]]  (Abnormal) Collected: 12/01/23 2001    Specimen: Blood Updated: 12/01/23 2008    Narrative:      The following orders were created for panel order CBC & Differential.  Procedure                               Abnormality         Status                     ---------                               -----------         ------                     CBC Auto Differential[[id number]]        Abnormal            Final result                 Please view results for these tests on the individual orders.    CBC Auto Differential [[id number]]  (Abnormal) Collected: 12/01/23 2001    Specimen: Blood Updated: 12/01/23 2008     WBC 10.40 10*3/mm3      RBC 4.69 10*6/mm3      Hemoglobin 13.7 g/dL      Hematocrit 41.3 %      MCV 88.1 fL      MCH 29.3 pg      MCHC 33.2 g/dL      RDW 13.8 %      RDW-SD 44.6 fl      MPV 8.3 fL      Platelets 304 10*3/mm3      Neutrophil % 58.3 %      Lymphocyte % 27.6 %      Monocyte % 9.2 %      Eosinophil % 3.6 %      Basophil % 1.3 %      Neutrophils, Absolute 6.00 10*3/mm3      Lymphocytes, Absolute 2.90 10*3/mm3      Monocytes, Absolute 1.00 10*3/mm3      Eosinophils, Absolute 0.40 10*3/mm3      Basophils, Absolute 0.10 10*3/mm3      nRBC 0.1 /100 WBC             Imaging Results (Most Recent)       None          reviewed    ECG/EMG Results (most recent)       Procedure Component Value Units Date/Time    SCANNED - TELEMETRY   [[id number]] Resulted: 12/01/23     Updated: 12/02/23 0227          reviewed            Microbiology Results (last 10 days)       ** No results found for the last 240 hours. **            Assessment & Plan     Hypoglycemia     Hypoglycemia  -moderately controlled         Lab Results   Component Value Date     GLUCOSE 88 12/02/2023     GLUCOSE 95 12/01/2023      - cbc and cmp unremarkable  - endocrinology  consulted and recommended cortisol stress test which was normal  - no antidiabetic agents  - tsh, t4, cortisol level  -Diabetic diet  -Monitor AC and HS      Hypokalemia  - potassium 3.4- replacement given     Hypertension  -well Controlled       BP Readings from Last 1 Encounters:   12/02/23 119/85      - Continue hctz, cozaar, metoprolol  - Monitor while admitted     I discussed the patients findings and my recommendations with patient and nursing staff.     Discharge Diagnosis:      Hypoglycemia      Hospital Course  Patient is a 53 y.o. female presented with low blood sugar. Er evaluated pt and admitted to observation. Labs including cbc and cmp are unremarkable. Tsh, t4 and cortisol unremarkable as well. Blood glucose stable here. Endocrinology consulted and feels pt able to be discharged. Recommends no antidiabetic agents. Follow up as outpt. Discharge discussed with pt and she is agreeable to plan. Instructed pt to return to er if symptoms reoccur or worsen.  .      Past Medical History:   History reviewed. No pertinent past medical history.    Past Surgical History:   History reviewed. No pertinent surgical history.    Social History:   Social History     Socioeconomic History    Marital status: Single   Tobacco Use    Smoking status: Never    Smokeless tobacco: Never   Vaping Use    Vaping Use: Never used   Substance and Sexual Activity    Alcohol use: Never    Drug use: Never    Sexual activity: Never       Procedures Performed         Consults:   Consults       Date and Time Order Name Status Description    12/2/2023  1:23 AM Inpatient Endocrinology Consult              Condition on Discharge:     Stable    Discharge Disposition      Discharge Medications     Discharge Medications        ASK your doctor about these medications        Instructions Start Date   aspirin 81 MG EC tablet   81 mg, Oral, Daily      atorvastatin 10 MG tablet  Commonly known as: LIPITOR   5 mg, Oral, Daily      celecoxib 200 MG  capsule  Commonly known as: CeleBREX   200 mg, Oral, Daily      clopidogrel 75 MG tablet  Commonly known as: PLAVIX   75 mg, Oral, Daily      cyclobenzaprine 10 MG tablet  Commonly known as: FLEXERIL   10 mg, Oral, 2 Times Daily PRN      gabapentin 800 MG tablet  Commonly known as: NEURONTIN   800 mg, Oral, 3 Times Daily      hydroCHLOROthiazide 25 MG tablet  Commonly known as: HYDRODIURIL   25 mg, Oral, Daily      hydroxychloroquine 200 MG tablet  Commonly known as: PLAQUENIL   400 mg, Oral, Daily      losartan 25 MG tablet  Commonly known as: COZAAR   25 mg, Oral, Nightly      metoprolol tartrate 25 MG tablet  Commonly known as: LOPRESSOR   25 mg, Oral, 2 Times Daily      oxyCODONE 15 MG immediate release tablet  Commonly known as: ROXICODONE   15 mg, Oral, 4 Times Daily PRN      venlafaxine 37.5 MG tablet  Commonly known as: EFFEXOR   37.5 mg, Oral, 2 Times Daily               Discharge Diet:     Activity at Discharge:     Follow-up Appointments  No future appointments.      Test Results Pending at Discharge       Risk for Readmission (LACE) Score: 4 (12/2/2023  6:00 AM)      Less Than 30 minutes spent in discharge activities for this patient    Sima Arenas PA-C  12/02/23  12:12 EST

## 2023-12-02 NOTE — PLAN OF CARE
Problem: Adult Inpatient Plan of Care  Goal: Plan of Care Review  Outcome: Ongoing, Progressing  Goal: Patient-Specific Goal (Individualized)  Outcome: Ongoing, Progressing  Goal: Absence of Hospital-Acquired Illness or Injury  Outcome: Ongoing, Progressing  Intervention: Identify and Manage Fall Risk  Recent Flowsheet Documentation  Taken 12/2/2023 0200 by Agusto Mckoy RN  Safety Promotion/Fall Prevention:   assistive device/personal items within reach   clutter free environment maintained  Intervention: Prevent Skin Injury  Recent Flowsheet Documentation  Taken 12/2/2023 0200 by Agusto Mckoy RN  Body Position:   30 degrees   position changed independently  Skin Protection: adhesive use limited  Intervention: Prevent and Manage VTE (Venous Thromboembolism) Risk  Recent Flowsheet Documentation  Taken 12/2/2023 0200 by Agusto Mckoy RN  Range of Motion: active ROM (range of motion) encouraged  Intervention: Prevent Infection  Recent Flowsheet Documentation  Taken 12/2/2023 0200 by Agusto Mckoy RN  Infection Prevention: environmental surveillance performed  Goal: Optimal Comfort and Wellbeing  Outcome: Ongoing, Progressing  Intervention: Provide Person-Centered Care  Recent Flowsheet Documentation  Taken 12/2/2023 0200 by Agusto Mckoy RN  Trust Relationship/Rapport: care explained  Goal: Readiness for Transition of Care  Outcome: Ongoing, Progressing  Intervention: Mutually Develop Transition Plan  Recent Flowsheet Documentation  Taken 12/2/2023 0152 by Agusto Mckoy RN  Equipment Currently Used at Home: none  Taken 12/2/2023 0148 by Agusto Mckoy RN  Transportation Anticipated: family or friend will provide  Patient/Family Anticipated Services at Transition: none  Patient/Family Anticipates Transition to:   home   home with family   Goal Outcome Evaluation:

## 2023-12-02 NOTE — ED NOTES
Nursing report ED to floor  Lisa Jay  53 y.o.  female    HPI:   Chief Complaint   Patient presents with    Hypoglycemia       Admitting doctor:   Claudy Vargas MD    Admitting diagnosis:   The encounter diagnosis was Hypoglycemia associated with type 2 diabetes mellitus.    Code status:   Current Code Status       Date Active Code Status Order ID Comments User Context       Not on file            Allergies:   Patient has no known allergies.    Isolation:  No active isolations     Fall Risk:  Fall Risk Assessment was completed, and patient is at moderate risk for falls.   Predictive Model Details         10 (Low) Factor Value    Calculated 12/2/2023 00:55 Age 53    Risk of Fall Model Musculoskeletal Assessment WDL     Active Peripheral IV Present     Respiratory Rate 20     Drug Use Not Asked     Skin Assessment WDL     Financial Class Other     Magnesium not on file     Roque Scale not on file     Diastolic BP 72     Number of Distinct Medication Classes administered 2     Peripheral Vascular Assessment WDL     Chloride 99 mmol/L     Tobacco Use Not Asked     Gastrointestinal Assessment WDL     Albumin 4 g/dL     Cardiac Assessment WDL     Creatinine 0.93 mg/dL     Days after Admission 0.216     Calcium 9.4 mg/dL     ALT 10 U/L     Total Bilirubin 0.3 mg/dL     Potassium 3.3 mmol/L         Weight:       12/01/23  1919   Weight: (!) 141 kg (310 lb)       Intake and Output  No intake or output data in the 24 hours ending 12/02/23 0055    Diet:        Most recent vitals:   Vitals:    12/01/23 2129 12/01/23 2144 12/01/23 2159 12/01/23 2219   BP: 120/67 117/71 125/77 101/72   Pulse: 66 64 74 68   Resp:       Temp:       TempSrc:       SpO2: 100% 98% 100% 99%   Weight:       Height:           Active LDAs/IV Access:   Lines, Drains & Airways       Active LDAs       Name Placement date Placement time Site Days    Peripheral IV 12/01/23 2007 Right Antecubital 12/01/23  2007  Antecubital  less than 1                     Skin Condition:   Skin Assessments (last day)       None             Labs (abnormal labs have a star):   Labs Reviewed   COMPREHENSIVE METABOLIC PANEL - Abnormal; Notable for the following components:       Result Value    Potassium 3.3 (*)     All other components within normal limits    Narrative:     GFR Normal >60  Chronic Kidney Disease <60  Kidney Failure <15     CBC WITH AUTO DIFFERENTIAL - Abnormal; Notable for the following components:    Monocytes, Absolute 1.00 (*)     All other components within normal limits   POCT GLUCOSE FINGERSTICK - Abnormal; Notable for the following components:    Glucose 108 (*)     All other components within normal limits   POCT GLUCOSE FINGERSTICK - Normal   POCT GLUCOSE FINGERSTICK - Normal   POCT GLUCOSE FINGERSTICK - Normal   CBC AND DIFFERENTIAL    Narrative:     The following orders were created for panel order CBC & Differential.  Procedure                               Abnormality         Status                     ---------                               -----------         ------                     CBC Auto Differential[[id number]]        Abnormal            Final result                 Please view results for these tests on the individual orders.   EXTRA TUBES    Narrative:     The following orders were created for panel order Extra Tubes.  Procedure                               Abnormality         Status                     ---------                               -----------         ------                     Gold Top - SST[[id number]]                                   Final result               Light Blue Top[[id number]]                                   Final result                 Please view results for these tests on the individual orders.   GOLD [facility] - SST   LIGHT BLUE TOP       LOC: Person, Place, Time, and Situation    Telemetry:  Observation Unit    Cardiac Monitoring Ordered: yes    EKG:   No orders to display       Medications Given in the ED:    Medications   sodium chloride 0.9 % flush 10 mL (has no administration in time range)   acetaminophen (TYLENOL) tablet 650 mg (650 mg Oral Given 12/1/23 2210)   potassium chloride (K-DUR,KLOR-CON) CR tablet 20 mEq (20 mEq Oral Given 12/1/23 2310)       Imaging results:  No radiology results for the last day    Social issues:   Social History     Socioeconomic History    Marital status: Single       NIH Stroke Scale:  Interval: (not recorded)  1a. Level of Consciousness: (not recorded)  1b. LOC Questions: (not recorded)  1c. LOC Commands: (not recorded)  2. Best Gaze: (not recorded)  3. Visual: (not recorded)  4. Facial Palsy: (not recorded)  5a. Motor Arm, Left: (not recorded)  5b. Motor Arm, Right: (not recorded)  6a. Motor Leg, Left: (not recorded)  6b. Motor Leg, Right: (not recorded)  7. Limb Ataxia: (not recorded)  8. Sensory: (not recorded)  9. Best Language: (not recorded)  10. Dysarthria: (not recorded)  11. Extinction and Inattention (formerly Neglect): (not recorded)    Total (NIH Stroke Scale): (not recorded)     Additional notable assessment information:     Nursing report ED to floor:      Sara Hernandez RN   12/02/23 00:55 EST

## 2023-12-02 NOTE — CONSULTS
Inpatient Endocrine Consult  Consultation requested by emergency room team for hypoglycemia/type 2 diabetes  Patient Care Team:  Brian Gonzalez MD as PCP - General (Family Medicine)  Francisco Jamil MD as PCP - Family Medicine    Chief Complaint: Hypoglycemia/type 2 diabetes    HPI: This is a 53-year-old female with history of type 2 diabetes for at least 1 year, morbid obesity apparently brought in by EMS because of hypoglycemic episode while she was at [facility].  Patient tells me that she is on Trulicity 3 mg subcu once a week for about 6 months and follows with Dr. Jamel GARRETT and apparently has been having these blood sugar fluctuations 2 to 3 days before the injection is due where she is having a lot of low blood sugars.  She came in with low blood sugars and was further admitted to the hospital for evaluation.  She denies any significant issues with nausea, abdominal pain, vomiting.  There is no documented low blood sugars as far as labs are concerned for this admission.    History reviewed. No pertinent past medical history.    Social History     Socioeconomic History    Marital status: Single   Tobacco Use    Smoking status: Never    Smokeless tobacco: Never   Vaping Use    Vaping Use: Never used   Substance and Sexual Activity    Alcohol use: Never    Drug use: Never    Sexual activity: Never       History reviewed. No pertinent family history.    No Known Allergies    ROS:   Constitutional:  Denies fatigue, tiredness.    Eyes:  Denies change in visual acuity   HENT:  Denies nasal congestion or sore throat   Respiratory: Denies cough, shortness of breath.   Cardiovascular:  Denies chest pain, edema   GI:  Denies abdominal pain, nausea, vomiting.   :  Denies polyuria and polydipsia  Musculoskeletal:  Denies back pain or joint pain   Integument:  Denies dry skin, rash   Neurologic:  Denies headache, focal weakness or sensory changes   Endocrine:  Denies polyuria or polydipsia   Psychiatric:  Denies  depression or anxiety      Vitals:    12/02/23 0554   BP: 105/70   Pulse: 71   Resp: 10   Temp: 97.5 °F (36.4 °C)   SpO2: 100%      Body mass index is 47.98 kg/m².     Physical Exam:  GEN: NAD, conversant  EYES: EOMI, PERRL  NECK: no thyromegaly  CV: RRR  LUNG: CTA  SKIN: no rashes, no acanthosis  MSK: no deformities,   NEURO: no tremors, DTR normal  PSYCH: Awake and coherent      Results Review:     I reviewed the patient's new clinical results.    Lab Results   Component Value Date    GLUCOSE 88 12/02/2023    BUN 22 (H) 12/02/2023    CREATININE 0.83 12/02/2023    EGFRIFAFRI >60 11/02/2022    BCR 26.5 (H) 12/02/2023    K 3.4 (L) 12/02/2023    CO2 31.0 (H) 12/02/2023    CALCIUM 9.7 12/02/2023    ALBUMIN 4.0 12/01/2023    LABIL2 1.1 11/02/2022    AST 18 12/01/2023    ALT 10 12/01/2023       Lab Results   Component Value Date    HGBA1C 6.1 (H) 08/11/2023    HGBA1C 5.7 (H) 06/19/2023    HGBA1C 5.9 (H) 05/10/2023     Lab Results   Component Value Date    CREATININE 0.83 12/02/2023     Results from last 7 days   Lab Units 12/02/23  1115 12/02/23  0817 12/02/23  0552 12/01/23  2322 12/01/23  2310 12/01/23  2142   GLUCOSE mg/dL 93 106* 98 108* 98 100       Medication Review: Reviewed.       Current Facility-Administered Medications:     acetaminophen (TYLENOL) tablet 650 mg, 650 mg, Oral, Q4H PRN, Valleroy, Brook, PA-C, 650 mg at 12/02/23 1207    aspirin EC tablet 81 mg, 81 mg, Oral, Daily, Valleroy, Brook, PA-C, 81 mg at 12/02/23 0540    atorvastatin (LIPITOR) tablet 5 mg, 5 mg, Oral, Daily, Valleroy, Brook, PA-C, 5 mg at 12/02/23 1200    sennosides-docusate (PERICOLACE) 8.6-50 MG per tablet 2 tablet, 2 tablet, Oral, BID, 2 tablet at 12/02/23 1201 **AND** polyethylene glycol (MIRALAX) packet 17 g, 17 g, Oral, Daily PRN **AND** bisacodyl (DULCOLAX) EC tablet 5 mg, 5 mg, Oral, Daily PRN **AND** bisacodyl (DULCOLAX) suppository 10 mg, 10 mg, Rectal, Daily PRN, Brook Castro PA-C    Calcium Replacement - Follow  Nurse / BPA Driven Protocol, , Does not apply, LUCI, Sima Arenas PA-C    celecoxib (CeleBREX) capsule 200 mg, 200 mg, Oral, Daily, Valjamir, Brook, PA-C, 200 mg at 12/02/23 1200    clopidogrel (PLAVIX) tablet 75 mg, 75 mg, Oral, Daily, Valleroy, Brook, PA-C, 75 mg at 12/02/23 0540    cyclobenzaprine (FLEXERIL) tablet 10 mg, 10 mg, Oral, BID PRN, Matthew Brook, PA-C, 10 mg at 12/02/23 0201    dextrose (D50W) (25 g/50 mL) IV injection 25 g, 25 g, Intravenous, Q15 Min PRN, Brook Castro, PA-C    dextrose (GLUTOSE) oral gel 15 g, 15 g, Oral, Q15 Min PRN, Brook Castro, PA-C    gabapentin (NEURONTIN) capsule 800 mg, 800 mg, Oral, Daily, Valleroy, Brook, PA-C, 800 mg at 12/02/23 0856    glucagon (GLUCAGEN) injection 1 mg, 1 mg, Subcutaneous, Q15 Min PRN, Matthew Brook, PA-C    hydroCHLOROthiazide (HYDRODIURIL) tablet 25 mg, 25 mg, Oral, Daily, Vallertarun, Brook, PA-C, 25 mg at 12/02/23 0559    hydroxychloroquine (PLAQUENIL) tablet 400 mg, 400 mg, Oral, Daily, Matthew, Brook, PA-C, 400 mg at 12/02/23 0540    losartan (COZAAR) tablet 25 mg, 25 mg, Oral, Nightly, Matthew, Brook, PA-C    Magnesium Standard Dose Replacement - Follow Nurse / BPA Driven Protocol, , Does not apply, LUCI, Sima Arenas PA-C    melatonin tablet 5 mg, 5 mg, Oral, Nightly PRN, Brook Castro, PA-C    metoprolol tartrate (LOPRESSOR) tablet 25 mg, 25 mg, Oral, BID, Valleroy, Brook, PA-C, 25 mg at 12/02/23 1201    ondansetron (ZOFRAN) injection 4 mg, 4 mg, Intravenous, Q6H PRN, Brook Castro PA-C    oxyCODONE (ROXICODONE) immediate release tablet 15 mg, 15 mg, Oral, 4x Daily PRN, Brook Castro PA-C, 15 mg at 12/02/23 1202    Phosphorus Replacement - Follow Nurse / BPA Driven Protocol, , Does not apply, PRN, Sima Arenas PA-C    potassium chloride (K-DUR,KLOR-CON) CR tablet 40 mEq, 40 mEq, Oral, Q4H, Sima Arenas PA-C, 40 mEq at 12/02/23 1202    Potassium Replacement - Follow Nurse / BPA Driven  Protocol, , Does not apply, LUCI, Sima Arenas PA-C    [COMPLETED] Insert Peripheral IV, , , Once **AND** sodium chloride 0.9 % flush 10 mL, 10 mL, Intravenous, PRN, Brook Castro PA-C    sodium chloride 0.9 % flush 10 mL, 10 mL, Intravenous, Q12H, Valjamir, Brook, PA-C, 10 mL at 12/02/23 0905    sodium chloride 0.9 % flush 10 mL, 10 mL, Intravenous, PRN, Matthew, Brook, PA-C    sodium chloride 0.9 % infusion 40 mL, 40 mL, Intravenous, PRN, Matthew, Brook, PA-C    venlafaxine (EFFEXOR) tablet 37.5 mg, 37.5 mg, Oral, BID, Brook Castro PA-C, 37.5 mg at 12/02/23 1201          Assessment and plan:  Hypoglycemia: Etiology unknown, no documented low blood sugars.  At this time I will keep her off any antidiabetic agents and follow blood sugars.  We will check TSH and free T4 and ACTH stimulation test as well.    Diabetes mellitus type 2 with hypoglycemia: Clinically doing well at this time, will keep her off any antidiabetic agent due to hypoglycemia and follow blood sugars and make adjustments as needed.    Morbid obesity: She will definitely need to work on diet and activity as an outpatient.    Thank you very much for the consultation.      Steffi Anglin MD FACE.

## 2023-12-03 VITALS
HEIGHT: 67 IN | HEART RATE: 68 BPM | WEIGHT: 293 LBS | DIASTOLIC BLOOD PRESSURE: 81 MMHG | RESPIRATION RATE: 18 BRPM | TEMPERATURE: 98 F | BODY MASS INDEX: 45.99 KG/M2 | OXYGEN SATURATION: 100 % | SYSTOLIC BLOOD PRESSURE: 129 MMHG

## 2023-12-03 LAB
CORTIS SERPL-MCNC: 17.58 MCG/DL
CORTIS SERPL-MCNC: 20.91 MCG/DL
CORTIS SERPL-MCNC: 22.21 MCG/DL
GLUCOSE BLDC GLUCOMTR-MCNC: 115 MG/DL (ref 70–105)
GLUCOSE BLDC GLUCOMTR-MCNC: 90 MG/DL (ref 70–105)
GLUCOSE BLDC GLUCOMTR-MCNC: 94 MG/DL (ref 70–105)

## 2023-12-03 PROCEDURE — 82948 REAGENT STRIP/BLOOD GLUCOSE: CPT

## 2023-12-03 PROCEDURE — 96376 TX/PRO/DX INJ SAME DRUG ADON: CPT

## 2023-12-03 PROCEDURE — 25010000002 COSYNTROPIN PER 0.25 MG: Performed by: INTERNAL MEDICINE

## 2023-12-03 PROCEDURE — 96374 THER/PROPH/DIAG INJ IV PUSH: CPT

## 2023-12-03 PROCEDURE — 82533 TOTAL CORTISOL: CPT | Performed by: INTERNAL MEDICINE

## 2023-12-03 PROCEDURE — 82533 TOTAL CORTISOL: CPT | Performed by: PHYSICIAN ASSISTANT

## 2023-12-03 PROCEDURE — G0378 HOSPITAL OBSERVATION PER HR: HCPCS

## 2023-12-03 PROCEDURE — 99214 OFFICE O/P EST MOD 30 MIN: CPT | Performed by: INTERNAL MEDICINE

## 2023-12-03 PROCEDURE — 25010000002 COSYNTROPIN PER 0.25 MG: Performed by: PHYSICIAN ASSISTANT

## 2023-12-03 RX ORDER — COSYNTROPIN 0.25 MG/ML
0.25 INJECTION, POWDER, FOR SOLUTION INTRAMUSCULAR; INTRAVENOUS ONCE
Status: COMPLETED | OUTPATIENT
Start: 2023-12-03 | End: 2023-12-03

## 2023-12-03 RX ORDER — MECLIZINE HCL 12.5 MG/1
12.5 TABLET ORAL 3 TIMES DAILY PRN
Qty: 12 TABLET | Refills: 0 | Status: SHIPPED | OUTPATIENT
Start: 2023-12-03

## 2023-12-03 RX ORDER — ONDANSETRON 4 MG/1
4 TABLET, ORALLY DISINTEGRATING ORAL EVERY 8 HOURS PRN
Qty: 12 TABLET | Refills: 0 | Status: SHIPPED | OUTPATIENT
Start: 2023-12-03

## 2023-12-03 RX ORDER — CLOPIDOGREL BISULFATE 75 MG/1
75 TABLET ORAL DAILY
Qty: 30 TABLET | Refills: 2 | Status: SHIPPED | OUTPATIENT
Start: 2023-12-03

## 2023-12-03 RX ADMIN — GABAPENTIN 800 MG: 400 CAPSULE ORAL at 09:30

## 2023-12-03 RX ADMIN — HYDROXYCHLOROQUINE SULFATE 400 MG: 200 TABLET ORAL at 06:18

## 2023-12-03 RX ADMIN — HYDROCHLOROTHIAZIDE 25 MG: 25 TABLET ORAL at 06:18

## 2023-12-03 RX ADMIN — COSYNTROPIN 0.25 MG: 0.25 INJECTION, POWDER, LYOPHILIZED, FOR SOLUTION INTRAMUSCULAR; INTRAVENOUS at 06:18

## 2023-12-03 RX ADMIN — Medication 10 ML: at 12:40

## 2023-12-03 RX ADMIN — VENLAFAXINE 37.5 MG: 75 TABLET ORAL at 12:37

## 2023-12-03 RX ADMIN — METOPROLOL TARTRATE 25 MG: 25 TABLET, FILM COATED ORAL at 12:37

## 2023-12-03 RX ADMIN — CETIRIZINE HYDROCHLORIDE 10 MG: 10 TABLET, FILM COATED ORAL at 12:40

## 2023-12-03 RX ADMIN — OXYCODONE 15 MG: 5 TABLET ORAL at 12:37

## 2023-12-03 RX ADMIN — COSYNTROPIN 0.25 MG: 0.25 INJECTION, POWDER, LYOPHILIZED, FOR SOLUTION INTRAMUSCULAR; INTRAVENOUS at 10:32

## 2023-12-03 RX ADMIN — CYCLOBENZAPRINE 10 MG: 10 TABLET, FILM COATED ORAL at 03:21

## 2023-12-03 RX ADMIN — OXYCODONE 15 MG: 5 TABLET ORAL at 06:18

## 2023-12-03 RX ADMIN — DOCUSATE SODIUM 50 MG AND SENNOSIDES 8.6 MG 2 TABLET: 8.6; 5 TABLET, FILM COATED ORAL at 12:37

## 2023-12-03 RX ADMIN — ATORVASTATIN CALCIUM 5 MG: 10 TABLET, FILM COATED ORAL at 12:37

## 2023-12-03 RX ADMIN — ASPIRIN 81 MG: 81 TABLET, COATED ORAL at 06:18

## 2023-12-03 RX ADMIN — CELECOXIB 200 MG: 200 CAPSULE ORAL at 12:37

## 2023-12-03 RX ADMIN — OXYCODONE 15 MG: 5 TABLET ORAL at 00:17

## 2023-12-03 RX ADMIN — CLOPIDOGREL BISULFATE 75 MG: 75 TABLET ORAL at 06:18

## 2023-12-03 NOTE — PLAN OF CARE
Problem: Adult Inpatient Plan of Care  Goal: Plan of Care Review  Outcome: Ongoing, Progressing  Goal: Patient-Specific Goal (Individualized)  Outcome: Ongoing, Progressing  Goal: Absence of Hospital-Acquired Illness or Injury  Outcome: Ongoing, Progressing  Intervention: Identify and Manage Fall Risk  Recent Flowsheet Documentation  Taken 12/2/2023 2200 by Emy Alvares RN  Safety Promotion/Fall Prevention: safety round/check completed  Taken 12/2/2023 2026 by Emy Alvares RN  Safety Promotion/Fall Prevention:   safety round/check completed   room organization consistent   nonskid shoes/slippers when out of bed   clutter free environment maintained   assistive device/personal items within reach  Intervention: Prevent Skin Injury  Recent Flowsheet Documentation  Taken 12/2/2023 2026 by Emy Alvares RN  Body Position: side-lying  Skin Protection:   adhesive use limited   transparent dressing maintained  Intervention: Prevent and Manage VTE (Venous Thromboembolism) Risk  Recent Flowsheet Documentation  Taken 12/2/2023 2026 by Emy Alvares RN  Activity Management: up ad suhas  Range of Motion: active ROM (range of motion) encouraged  Intervention: Prevent Infection  Recent Flowsheet Documentation  Taken 12/2/2023 2026 by Emy Alvares RN  Infection Prevention:   rest/sleep promoted   single patient room provided   hand hygiene promoted  Goal: Optimal Comfort and Wellbeing  Outcome: Ongoing, Progressing  Intervention: Provide Person-Centered Care  Recent Flowsheet Documentation  Taken 12/2/2023 2026 by Emy Alvares RN  Trust Relationship/Rapport:   care explained   choices provided   questions answered   questions encouraged  Goal: Readiness for Transition of Care  Outcome: Ongoing, Progressing     Problem: Skin Injury Risk Increased  Goal: Skin Health and Integrity  Outcome: Ongoing, Progressing  Intervention: Optimize Skin Protection  Recent Flowsheet  Documentation  Taken 12/2/2023 2026 by Emy Alvares RN  Pressure Reduction Techniques: frequent weight shift encouraged  Head of Bed (HOB) Positioning: HOB elevated  Skin Protection:   adhesive use limited   transparent dressing maintained     Problem: Fall Injury Risk  Goal: Absence of Fall and Fall-Related Injury  Outcome: Ongoing, Progressing  Intervention: Identify and Manage Contributors  Recent Flowsheet Documentation  Taken 12/2/2023 2026 by Emy Alvares RN  Medication Review/Management: medications reviewed  Intervention: Promote Injury-Free Environment  Recent Flowsheet Documentation  Taken 12/2/2023 2200 by Emy Alvares RN  Safety Promotion/Fall Prevention: safety round/check completed  Taken 12/2/2023 2026 by Emy Alvares RN  Safety Promotion/Fall Prevention:   safety round/check completed   room organization consistent   nonskid shoes/slippers when out of bed   clutter free environment maintained   assistive device/personal items within reach     Problem: Pain Acute  Goal: Acceptable Pain Control and Functional Ability  Outcome: Ongoing, Progressing  Intervention: Prevent or Manage Pain  Recent Flowsheet Documentation  Taken 12/2/2023 2026 by Emy Alvares RN  Medication Review/Management: medications reviewed  Intervention: Optimize Psychosocial Wellbeing  Recent Flowsheet Documentation  Taken 12/2/2023 2026 by Emy Alvares RN  Diversional Activities:   television   smartphone     Problem: Diabetes Comorbidity  Goal: Blood Glucose Level Within Targeted Range  Outcome: Ongoing, Progressing  Intervention: Monitor and Manage Glycemia  Recent Flowsheet Documentation  Taken 12/2/2023 2026 by Emy Alvares RN  Glycemic Management: blood glucose monitored     Problem: Pain Chronic (Persistent) (Comorbidity Management)  Goal: Acceptable Pain Control and Functional Ability  Outcome: Ongoing, Progressing  Intervention: Manage Persistent Pain  Recent  Flowsheet Documentation  Taken 12/2/2023 2026 by Emy Alvares, RN  Medication Review/Management: medications reviewed  Intervention: Optimize Psychosocial Wellbeing  Recent Flowsheet Documentation  Taken 12/2/2023 2026 by Emy Alvares, RN  Diversional Activities:   television   smartphone  Family/Support System Care: support provided   Goal Outcome Evaluation:      Patient resting quietly in bed at this time, safety maintained, blood sugars monitored, call light in reach

## 2023-12-03 NOTE — PLAN OF CARE
Goal Outcome Evaluation:  Plan of Care Reviewed With: patient        Progress: improving  Outcome Evaluation: Pt sugars monitored. Labs collected as ordered. Call light within reach

## 2023-12-03 NOTE — PROGRESS NOTES
Daily Progress Note    Patient Care Team:  Brian Gonzalez MD as PCP - General (Family Medicine)  Francisco Jamil MD as PCP - Family Medicine    Chief Complaint: Follow-up hypoglycemia    HPI: Patient seen and examined today.  Clinically doing well.  She is off antidiabetic agents.  No low blood sugars noted.  She was on Trulicity at home.    ROS:   Constitutional:  Denies fatigue, tiredness.    Respiratory: denies cough, shortness of breath.   Cardiovascular:  denies chest pain, edema   GI:  Denies abdominal pain, nausea, vomiting.         Vitals:    12/03/23 1009   BP: 129/81   Pulse: 68   Resp: 18   Temp: 98 °F (36.7 °C)   SpO2: 100%     Body mass index is 47.98 kg/m².    Physical Exam:  GEN: NAD, conversant  PSYCH: Awake and coherent      Results Review:     I reviewed the patient's new clinical results.    Glucose   Date Value Ref Range Status   12/02/2023 88 65 - 99 mg/dL Final     Sodium   Date Value Ref Range Status   12/02/2023 141 136 - 145 mmol/L Final     Potassium   Date Value Ref Range Status   12/02/2023 3.6 3.5 - 5.2 mmol/L Final     Comment:     Slight hemolysis detected by analyzer. Result may be falsely elevated.     CO2   Date Value Ref Range Status   12/02/2023 31.0 (H) 22.0 - 29.0 mmol/L Final     Chloride   Date Value Ref Range Status   12/02/2023 100 98 - 107 mmol/L Final     Anion Gap   Date Value Ref Range Status   12/02/2023 10.0 5.0 - 15.0 mmol/L Final     Creatinine   Date Value Ref Range Status   12/02/2023 0.83 0.57 - 1.00 mg/dL Final     BUN   Date Value Ref Range Status   12/02/2023 22 (H) 6 - 20 mg/dL Final     BUN/Creatinine Ratio   Date Value Ref Range Status   12/02/2023 26.5 (H) 7.0 - 25.0 Final     Calcium   Date Value Ref Range Status   12/02/2023 9.7 8.6 - 10.5 mg/dL Final     Alkaline Phosphatase   Date Value Ref Range Status   12/01/2023 86 39 - 117 U/L Final     Total Protein   Date Value Ref Range Status   12/01/2023 7.4 6.0 - 8.5 g/dL Final     ALT (SGPT)  "  Date Value Ref Range Status   12/01/2023 10 1 - 33 U/L Final     AST (SGOT)   Date Value Ref Range Status   12/01/2023 18 1 - 32 U/L Final     Total Bilirubin   Date Value Ref Range Status   12/01/2023 0.3 0.0 - 1.2 mg/dL Final     Albumin   Date Value Ref Range Status   12/01/2023 4.0 3.5 - 5.2 g/dL Final     Globulin   Date Value Ref Range Status   12/01/2023 3.4 gm/dL Final     Lab Results   Component Value Date    HGBA1C 6.1 (H) 08/11/2023    HGBA1C 5.7 (H) 06/19/2023    HGBA1C 5.9 (H) 05/10/2023     No results found for: \"GLUF\", \"MICROALBUR\"  Results from last 7 days   Lab Units 12/03/23  1205 12/03/23  0638 12/03/23  0041 12/02/23  2052 12/02/23  1619 12/02/23  1115   GLUCOSE mg/dL 115* 90 94 103 117* 93          Latest Reference Range & Units 12/02/23 14:48 12/03/23 09:49 12/03/23 11:15   Cortisol mcg/dL  17.58 20.91   TSH Baseline 0.270 - 4.200 uIU/mL 1.820     Free T4 0.93 - 1.70 ng/dL 1.33         Medication Review: Reviewed.     aspirin, 81 mg, Oral, Daily  atorvastatin, 5 mg, Oral, Daily  celecoxib, 200 mg, Oral, Daily  cetirizine, 10 mg, Oral, Daily  clopidogrel, 75 mg, Oral, Daily  gabapentin, 800 mg, Oral, TID  hydroCHLOROthiazide, 25 mg, Oral, Daily  hydroxychloroquine, 400 mg, Oral, Daily  losartan, 25 mg, Oral, Nightly  metoprolol tartrate, 25 mg, Oral, BID  oxymetazoline, 2 spray, Each Nare, BID  senna-docusate sodium, 2 tablet, Oral, BID  sodium chloride, 10 mL, Intravenous, Q12H  venlafaxine, 37.5 mg, Oral, BID              Assessment and plan:  Hypoglycemia: Off Trulicity.  TSH and Dex suppression test normal.  I have advised her that she needs to stay off Trulicity and follow diet.  She could consider Mounjaro as an outpatient that will help her with weight loss.  She verbalized understanding.  My recommendation will be to discharge her on no antidiabetic agents.    Hyperlipidemia: Currently on atorvastatin.    Hypertension: Well-controlled.    Steffi Anglin MD. FACE                "

## 2024-04-01 ENCOUNTER — HOSPITAL ENCOUNTER (OUTPATIENT)
Facility: HOSPITAL | Age: 54
Setting detail: OBSERVATION
Discharge: HOME | End: 2024-04-03
Attending: EMERGENCY MEDICINE | Admitting: EMERGENCY MEDICINE
Payer: MEDICARE

## 2024-04-01 ENCOUNTER — APPOINTMENT (OUTPATIENT)
Dept: GENERAL RADIOLOGY | Facility: HOSPITAL | Age: 54
End: 2024-04-01
Payer: MEDICARE

## 2024-04-01 DIAGNOSIS — J45.901 EXACERBATION OF ASTHMA, UNSPECIFIED ASTHMA SEVERITY, UNSPECIFIED WHETHER PERSISTENT: Primary | ICD-10-CM

## 2024-04-01 LAB
ALBUMIN SERPL-MCNC: 4.3 G/DL (ref 3.5–5.2)
ALBUMIN/GLOB SERPL: 1.5 G/DL
ALP SERPL-CCNC: 75 U/L (ref 39–117)
ALT SERPL W P-5'-P-CCNC: 15 U/L (ref 1–33)
ANION GAP SERPL CALCULATED.3IONS-SCNC: 10 MMOL/L (ref 5–15)
AST SERPL-CCNC: 24 U/L (ref 1–32)
BASOPHILS # BLD AUTO: 0.06 10*3/MM3 (ref 0–0.2)
BASOPHILS NFR BLD AUTO: 0.7 % (ref 0–1.5)
BILIRUB SERPL-MCNC: 0.5 MG/DL (ref 0–1.2)
BUN SERPL-MCNC: 20 MG/DL (ref 6–20)
BUN/CREAT SERPL: 19.6 (ref 7–25)
CALCIUM SPEC-SCNC: 10.2 MG/DL (ref 8.6–10.5)
CHLORIDE SERPL-SCNC: 101 MMOL/L (ref 98–107)
CO2 SERPL-SCNC: 27 MMOL/L (ref 22–29)
CREAT SERPL-MCNC: 1.02 MG/DL (ref 0.57–1)
DEPRECATED RDW RBC AUTO: 42 FL (ref 37–54)
EGFRCR SERPLBLD CKD-EPI 2021: 65.1 ML/MIN/1.73
EOSINOPHIL # BLD AUTO: 0.11 10*3/MM3 (ref 0–0.4)
EOSINOPHIL NFR BLD AUTO: 1.2 % (ref 0.3–6.2)
ERYTHROCYTE [DISTWIDTH] IN BLOOD BY AUTOMATED COUNT: 12.7 % (ref 12.3–15.4)
GLOBULIN UR ELPH-MCNC: 2.9 GM/DL
GLUCOSE SERPL-MCNC: 87 MG/DL (ref 65–99)
HCT VFR BLD AUTO: 41 % (ref 34–46.6)
HGB BLD-MCNC: 13.2 G/DL (ref 12–15.9)
IMM GRANULOCYTES # BLD AUTO: 0.03 10*3/MM3 (ref 0–0.05)
IMM GRANULOCYTES NFR BLD AUTO: 0.3 % (ref 0–0.5)
LYMPHOCYTES # BLD AUTO: 3.15 10*3/MM3 (ref 0.7–3.1)
LYMPHOCYTES NFR BLD AUTO: 34.5 % (ref 19.6–45.3)
MCH RBC QN AUTO: 29 PG (ref 26.6–33)
MCHC RBC AUTO-ENTMCNC: 32.2 G/DL (ref 31.5–35.7)
MCV RBC AUTO: 90.1 FL (ref 79–97)
MONOCYTES # BLD AUTO: 0.66 10*3/MM3 (ref 0.1–0.9)
MONOCYTES NFR BLD AUTO: 7.2 % (ref 5–12)
NEUTROPHILS NFR BLD AUTO: 5.13 10*3/MM3 (ref 1.7–7)
NEUTROPHILS NFR BLD AUTO: 56.1 % (ref 42.7–76)
NRBC BLD AUTO-RTO: 0 /100 WBC (ref 0–0.2)
NT-PROBNP SERPL-MCNC: <36 PG/ML (ref 0–900)
PLATELET # BLD AUTO: 300 10*3/MM3 (ref 140–450)
PMV BLD AUTO: 9.4 FL (ref 6–12)
POTASSIUM SERPL-SCNC: 3.5 MMOL/L (ref 3.5–5.2)
PROT SERPL-MCNC: 7.2 G/DL (ref 6–8.5)
RBC # BLD AUTO: 4.55 10*6/MM3 (ref 3.77–5.28)
SODIUM SERPL-SCNC: 138 MMOL/L (ref 136–145)
TROPONIN T SERPL HS-MCNC: 9 NG/L
WBC NRBC COR # BLD AUTO: 9.14 10*3/MM3 (ref 3.4–10.8)

## 2024-04-01 PROCEDURE — 25010000002 METHYLPREDNISOLONE PER 125 MG: Performed by: PHYSICIAN ASSISTANT

## 2024-04-01 PROCEDURE — 85025 COMPLETE CBC W/AUTO DIFF WBC: CPT | Performed by: PHYSICIAN ASSISTANT

## 2024-04-01 PROCEDURE — G0378 HOSPITAL OBSERVATION PER HR: HCPCS

## 2024-04-01 PROCEDURE — 80053 COMPREHEN METABOLIC PANEL: CPT | Performed by: PHYSICIAN ASSISTANT

## 2024-04-01 PROCEDURE — 94799 UNLISTED PULMONARY SVC/PX: CPT

## 2024-04-01 PROCEDURE — 94664 DEMO&/EVAL PT USE INHALER: CPT

## 2024-04-01 PROCEDURE — 63710000001 INSULIN LISPRO (HUMAN) PER 5 UNITS: Performed by: NURSE PRACTITIONER

## 2024-04-01 PROCEDURE — 25010000002 METHYLPREDNISOLONE PER 40 MG: Performed by: NURSE PRACTITIONER

## 2024-04-01 PROCEDURE — 83880 ASSAY OF NATRIURETIC PEPTIDE: CPT | Performed by: PHYSICIAN ASSISTANT

## 2024-04-01 PROCEDURE — 25010000002 HYDROMORPHONE 1 MG/ML SOLUTION: Performed by: EMERGENCY MEDICINE

## 2024-04-01 PROCEDURE — 93005 ELECTROCARDIOGRAM TRACING: CPT | Performed by: EMERGENCY MEDICINE

## 2024-04-01 PROCEDURE — 84484 ASSAY OF TROPONIN QUANT: CPT | Performed by: PHYSICIAN ASSISTANT

## 2024-04-01 PROCEDURE — 94761 N-INVAS EAR/PLS OXIMETRY MLT: CPT

## 2024-04-01 PROCEDURE — 94640 AIRWAY INHALATION TREATMENT: CPT

## 2024-04-01 PROCEDURE — 96375 TX/PRO/DX INJ NEW DRUG ADDON: CPT

## 2024-04-01 PROCEDURE — 99285 EMERGENCY DEPT VISIT HI MDM: CPT

## 2024-04-01 PROCEDURE — 96376 TX/PRO/DX INJ SAME DRUG ADON: CPT

## 2024-04-01 PROCEDURE — 96374 THER/PROPH/DIAG INJ IV PUSH: CPT

## 2024-04-01 PROCEDURE — 71045 X-RAY EXAM CHEST 1 VIEW: CPT

## 2024-04-01 PROCEDURE — 25010000002 ONDANSETRON PER 1 MG: Performed by: NURSE PRACTITIONER

## 2024-04-01 RX ORDER — ATORVASTATIN CALCIUM 10 MG/1
5 TABLET, FILM COATED ORAL NIGHTLY
Status: DISCONTINUED | OUTPATIENT
Start: 2024-04-01 | End: 2024-04-03 | Stop reason: HOSPADM

## 2024-04-01 RX ORDER — CELECOXIB 200 MG/1
200 CAPSULE ORAL 2 TIMES DAILY
Status: DISCONTINUED | OUTPATIENT
Start: 2024-04-01 | End: 2024-04-03 | Stop reason: HOSPADM

## 2024-04-01 RX ORDER — BISACODYL 10 MG
10 SUPPOSITORY, RECTAL RECTAL DAILY PRN
Status: DISCONTINUED | OUTPATIENT
Start: 2024-04-01 | End: 2024-04-03 | Stop reason: HOSPADM

## 2024-04-01 RX ORDER — SODIUM CHLORIDE 0.9 % (FLUSH) 0.9 %
10 SYRINGE (ML) INJECTION AS NEEDED
Status: DISCONTINUED | OUTPATIENT
Start: 2024-04-01 | End: 2024-04-03 | Stop reason: HOSPADM

## 2024-04-01 RX ORDER — TRIAMCINOLONE ACETONIDE 1 MG/G
1 CREAM TOPICAL EVERY 12 HOURS SCHEDULED
Status: DISCONTINUED | OUTPATIENT
Start: 2024-04-01 | End: 2024-04-03 | Stop reason: HOSPADM

## 2024-04-01 RX ORDER — SODIUM CHLORIDE 9 MG/ML
40 INJECTION, SOLUTION INTRAVENOUS AS NEEDED
Status: DISCONTINUED | OUTPATIENT
Start: 2024-04-01 | End: 2024-04-03 | Stop reason: HOSPADM

## 2024-04-01 RX ORDER — BISACODYL 5 MG/1
5 TABLET, DELAYED RELEASE ORAL DAILY PRN
Status: DISCONTINUED | OUTPATIENT
Start: 2024-04-01 | End: 2024-04-03 | Stop reason: HOSPADM

## 2024-04-01 RX ORDER — SODIUM CHLORIDE 0.9 % (FLUSH) 0.9 %
10 SYRINGE (ML) INJECTION EVERY 12 HOURS SCHEDULED
Status: DISCONTINUED | OUTPATIENT
Start: 2024-04-01 | End: 2024-04-03 | Stop reason: HOSPADM

## 2024-04-01 RX ORDER — HYDROXYCHLOROQUINE SULFATE 200 MG/1
200 TABLET, FILM COATED ORAL 2 TIMES DAILY
Status: DISCONTINUED | OUTPATIENT
Start: 2024-04-01 | End: 2024-04-03 | Stop reason: HOSPADM

## 2024-04-01 RX ORDER — POTASSIUM CHLORIDE 750 MG/1
10 TABLET, FILM COATED, EXTENDED RELEASE ORAL 2 TIMES DAILY WITH MEALS
Status: DISCONTINUED | OUTPATIENT
Start: 2024-04-01 | End: 2024-04-01

## 2024-04-01 RX ORDER — CYCLOBENZAPRINE HCL 10 MG
10 TABLET ORAL 3 TIMES DAILY PRN
Status: DISCONTINUED | OUTPATIENT
Start: 2024-04-01 | End: 2024-04-03 | Stop reason: HOSPADM

## 2024-04-01 RX ORDER — ASPIRIN 81 MG/1
81 TABLET ORAL DAILY
COMMUNITY

## 2024-04-01 RX ORDER — BUDESONIDE AND FORMOTEROL FUMARATE DIHYDRATE 160; 4.5 UG/1; UG/1
2 AEROSOL RESPIRATORY (INHALATION)
Status: DISCONTINUED | OUTPATIENT
Start: 2024-04-01 | End: 2024-04-03 | Stop reason: HOSPADM

## 2024-04-01 RX ORDER — IPRATROPIUM BROMIDE AND ALBUTEROL SULFATE 2.5; .5 MG/3ML; MG/3ML
3 SOLUTION RESPIRATORY (INHALATION) ONCE
Status: COMPLETED | OUTPATIENT
Start: 2024-04-01 | End: 2024-04-01

## 2024-04-01 RX ORDER — OXYCODONE HYDROCHLORIDE 5 MG/1
15 TABLET ORAL EVERY 4 HOURS
Status: DISCONTINUED | OUTPATIENT
Start: 2024-04-01 | End: 2024-04-03 | Stop reason: HOSPADM

## 2024-04-01 RX ORDER — KETOCONAZOLE 20 MG/G
1 CREAM TOPICAL NIGHTLY
Status: DISCONTINUED | OUTPATIENT
Start: 2024-04-01 | End: 2024-04-03 | Stop reason: HOSPADM

## 2024-04-01 RX ORDER — OXYCODONE HYDROCHLORIDE 5 MG/1
15 TABLET ORAL EVERY 4 HOURS PRN
Status: DISCONTINUED | OUTPATIENT
Start: 2024-04-01 | End: 2024-04-01

## 2024-04-01 RX ORDER — ONDANSETRON 2 MG/ML
4 INJECTION INTRAMUSCULAR; INTRAVENOUS EVERY 6 HOURS PRN
Status: DISCONTINUED | OUTPATIENT
Start: 2024-04-01 | End: 2024-04-03 | Stop reason: HOSPADM

## 2024-04-01 RX ORDER — HYDROXYCHLOROQUINE SULFATE 200 MG/1
200 TABLET, FILM COATED ORAL 2 TIMES DAILY
COMMUNITY

## 2024-04-01 RX ORDER — INSULIN LISPRO 100 [IU]/ML
6 INJECTION, SOLUTION INTRAVENOUS; SUBCUTANEOUS ONCE
Status: COMPLETED | OUTPATIENT
Start: 2024-04-01 | End: 2024-04-01

## 2024-04-01 RX ORDER — ALBUTEROL SULFATE 2.5 MG/3ML
2.5 SOLUTION RESPIRATORY (INHALATION) EVERY 6 HOURS PRN
Status: DISCONTINUED | OUTPATIENT
Start: 2024-04-01 | End: 2024-04-03 | Stop reason: HOSPADM

## 2024-04-01 RX ORDER — VENLAFAXINE HYDROCHLORIDE 37.5 MG/1
37.5 CAPSULE, EXTENDED RELEASE ORAL DAILY
Status: DISCONTINUED | OUTPATIENT
Start: 2024-04-01 | End: 2024-04-03 | Stop reason: HOSPADM

## 2024-04-01 RX ORDER — HYDROXYZINE HYDROCHLORIDE 25 MG/1
400 TABLET, FILM COATED ORAL 2 TIMES DAILY
Status: DISCONTINUED | OUTPATIENT
Start: 2024-04-01 | End: 2024-04-01

## 2024-04-01 RX ORDER — ACETAMINOPHEN 325 MG/1
650 TABLET ORAL EVERY 4 HOURS PRN
Status: DISCONTINUED | OUTPATIENT
Start: 2024-04-01 | End: 2024-04-03 | Stop reason: HOSPADM

## 2024-04-01 RX ORDER — POTASSIUM CHLORIDE 750 MG/1
10 CAPSULE, EXTENDED RELEASE ORAL 2 TIMES DAILY
Status: ON HOLD | COMMUNITY
End: 2024-04-01

## 2024-04-01 RX ORDER — KETOCONAZOLE 20 MG/G
1 CREAM TOPICAL 2 TIMES DAILY PRN
COMMUNITY

## 2024-04-01 RX ORDER — ASPIRIN 81 MG/1
81 TABLET ORAL DAILY
Status: DISCONTINUED | OUTPATIENT
Start: 2024-04-01 | End: 2024-04-03 | Stop reason: HOSPADM

## 2024-04-01 RX ORDER — HYDROCHLOROTHIAZIDE 25 MG/1
25 TABLET ORAL DAILY
Status: DISCONTINUED | OUTPATIENT
Start: 2024-04-01 | End: 2024-04-03 | Stop reason: HOSPADM

## 2024-04-01 RX ORDER — HYDROXYZINE 50 MG/1
400 TABLET, FILM COATED ORAL 2 TIMES DAILY
Status: ON HOLD | COMMUNITY
End: 2024-04-01

## 2024-04-01 RX ORDER — METHYLPREDNISOLONE SODIUM SUCCINATE 40 MG/ML
40 INJECTION, POWDER, LYOPHILIZED, FOR SOLUTION INTRAMUSCULAR; INTRAVENOUS EVERY 12 HOURS
Status: DISCONTINUED | OUTPATIENT
Start: 2024-04-01 | End: 2024-04-01

## 2024-04-01 RX ORDER — LOSARTAN POTASSIUM 50 MG/1
50 TABLET ORAL DAILY
Status: DISCONTINUED | OUTPATIENT
Start: 2024-04-01 | End: 2024-04-01

## 2024-04-01 RX ORDER — IPRATROPIUM BROMIDE AND ALBUTEROL SULFATE 2.5; .5 MG/3ML; MG/3ML
3 SOLUTION RESPIRATORY (INHALATION)
Status: DISCONTINUED | OUTPATIENT
Start: 2024-04-01 | End: 2024-04-02

## 2024-04-01 RX ORDER — NITROGLYCERIN 0.4 MG/1
0.4 TABLET SUBLINGUAL
Status: DISCONTINUED | OUTPATIENT
Start: 2024-04-01 | End: 2024-04-03 | Stop reason: HOSPADM

## 2024-04-01 RX ORDER — POLYETHYLENE GLYCOL 3350 17 G/17G
17 POWDER, FOR SOLUTION ORAL DAILY PRN
Status: DISCONTINUED | OUTPATIENT
Start: 2024-04-01 | End: 2024-04-03 | Stop reason: HOSPADM

## 2024-04-01 RX ORDER — INSULIN LISPRO 100 [IU]/ML
6 INJECTION, SOLUTION INTRAVENOUS; SUBCUTANEOUS EVERY 6 HOURS SCHEDULED
Status: DISCONTINUED | OUTPATIENT
Start: 2024-04-01 | End: 2024-04-01

## 2024-04-01 RX ORDER — ALBUTEROL SULFATE 90 UG/1
1 AEROSOL, METERED RESPIRATORY (INHALATION) EVERY 4 HOURS PRN
Status: ON HOLD | COMMUNITY
End: 2024-04-02

## 2024-04-01 RX ORDER — METHYLPREDNISOLONE SODIUM SUCCINATE 125 MG/2ML
125 INJECTION, POWDER, LYOPHILIZED, FOR SOLUTION INTRAMUSCULAR; INTRAVENOUS ONCE
Status: COMPLETED | OUTPATIENT
Start: 2024-04-01 | End: 2024-04-01

## 2024-04-01 RX ORDER — AMOXICILLIN 250 MG
2 CAPSULE ORAL 2 TIMES DAILY PRN
Status: DISCONTINUED | OUTPATIENT
Start: 2024-04-01 | End: 2024-04-03 | Stop reason: HOSPADM

## 2024-04-01 RX ORDER — GABAPENTIN 400 MG/1
800 CAPSULE ORAL EVERY 8 HOURS SCHEDULED
Status: DISCONTINUED | OUTPATIENT
Start: 2024-04-01 | End: 2024-04-03 | Stop reason: HOSPADM

## 2024-04-01 RX ORDER — CLOPIDOGREL BISULFATE 75 MG/1
75 TABLET ORAL DAILY
Status: DISCONTINUED | OUTPATIENT
Start: 2024-04-01 | End: 2024-04-03 | Stop reason: HOSPADM

## 2024-04-01 RX ORDER — TRIAMCINOLONE ACETONIDE 1 MG/G
1 CREAM TOPICAL 2 TIMES DAILY PRN
COMMUNITY

## 2024-04-01 RX ORDER — ONDANSETRON 4 MG/1
4 TABLET, ORALLY DISINTEGRATING ORAL EVERY 6 HOURS PRN
Status: DISCONTINUED | OUTPATIENT
Start: 2024-04-01 | End: 2024-04-03 | Stop reason: HOSPADM

## 2024-04-01 RX ADMIN — IPRATROPIUM BROMIDE AND ALBUTEROL SULFATE 3 ML: .5; 3 SOLUTION RESPIRATORY (INHALATION) at 19:23

## 2024-04-01 RX ADMIN — IPRATROPIUM BROMIDE AND ALBUTEROL SULFATE 3 ML: .5; 3 SOLUTION RESPIRATORY (INHALATION) at 16:05

## 2024-04-01 RX ADMIN — ATORVASTATIN CALCIUM 5 MG: 10 TABLET, FILM COATED ORAL at 20:30

## 2024-04-01 RX ADMIN — BUDESONIDE AND FORMOTEROL FUMARATE DIHYDRATE 2 PUFF: 160; 4.5 AEROSOL RESPIRATORY (INHALATION) at 19:25

## 2024-04-01 RX ADMIN — GABAPENTIN 800 MG: 400 CAPSULE ORAL at 16:40

## 2024-04-01 RX ADMIN — GABAPENTIN 400 MG: 400 CAPSULE ORAL at 20:28

## 2024-04-01 RX ADMIN — ACETAMINOPHEN 650 MG: 325 TABLET, FILM COATED ORAL at 18:51

## 2024-04-01 RX ADMIN — OXYCODONE 15 MG: 5 TABLET ORAL at 16:40

## 2024-04-01 RX ADMIN — METHYLPREDNISOLONE SODIUM SUCCINATE 40 MG: 40 INJECTION, POWDER, FOR SOLUTION INTRAMUSCULAR; INTRAVENOUS at 16:41

## 2024-04-01 RX ADMIN — HYDROMORPHONE HYDROCHLORIDE 0.5 MG: 1 INJECTION, SOLUTION INTRAMUSCULAR; INTRAVENOUS; SUBCUTANEOUS at 20:47

## 2024-04-01 RX ADMIN — METOPROLOL TARTRATE 25 MG: 25 TABLET, FILM COATED ORAL at 20:29

## 2024-04-01 RX ADMIN — IPRATROPIUM BROMIDE AND ALBUTEROL SULFATE 3 ML: .5; 3 SOLUTION RESPIRATORY (INHALATION) at 11:10

## 2024-04-01 RX ADMIN — CYCLOBENZAPRINE 10 MG: 10 TABLET, FILM COATED ORAL at 23:23

## 2024-04-01 RX ADMIN — HYDROXYCHLOROQUINE SULFATE 200 MG: 200 TABLET ORAL at 20:28

## 2024-04-01 RX ADMIN — IPRATROPIUM BROMIDE AND ALBUTEROL SULFATE 3 ML: .5; 3 SOLUTION RESPIRATORY (INHALATION) at 12:28

## 2024-04-01 RX ADMIN — Medication 10 ML: at 20:30

## 2024-04-01 RX ADMIN — METHYLPREDNISOLONE SODIUM SUCCINATE 125 MG: 125 INJECTION, POWDER, FOR SOLUTION INTRAMUSCULAR; INTRAVENOUS at 10:49

## 2024-04-01 RX ADMIN — OXYCODONE 15 MG: 5 TABLET ORAL at 23:23

## 2024-04-01 RX ADMIN — CYCLOBENZAPRINE 10 MG: 10 TABLET, FILM COATED ORAL at 18:51

## 2024-04-01 RX ADMIN — ONDANSETRON 4 MG: 2 INJECTION INTRAMUSCULAR; INTRAVENOUS at 20:46

## 2024-04-01 RX ADMIN — INSULIN LISPRO 6 UNITS: 100 INJECTION, SOLUTION INTRAVENOUS; SUBCUTANEOUS at 20:26

## 2024-04-01 RX ADMIN — ALBUTEROL SULFATE 2.5 MG: 2.5 SOLUTION RESPIRATORY (INHALATION) at 23:51

## 2024-04-01 RX ADMIN — CELECOXIB 200 MG: 200 CAPSULE ORAL at 20:28

## 2024-04-01 NOTE — ED PROVIDER NOTES
Subjective   History of Present Illness  Patient is a 53-year-old female PMH significant for asthma, lupus, fibromyalgia, hypertension, hyperlipidemia, diabetes, fatty liver presents to the ED with complaints of dyspnea that started about 15 minutes prior to arrival.  Patient stepped outside to check the weather became very short of breath.  She does use inhalers at home for her asthma slight wheezing noted on exam.  No reports of new cough, congestion, fever, edema.  She does report some chest tightness.  No recent travel surgeries or immobilization.        Review of Systems   Constitutional: Negative.    HENT:  Negative for congestion, ear discharge, ear pain, facial swelling, rhinorrhea and sore throat.    Respiratory:  Positive for cough, chest tightness, shortness of breath and wheezing.    Cardiovascular: Negative.    Gastrointestinal:  Negative for abdominal pain, nausea and vomiting.   Neurological:  Negative for dizziness, seizures, syncope, weakness, light-headedness and headaches.       History reviewed. No pertinent past medical history.    Allergies   Allergen Reactions    Morphine Unknown - High Severity       History reviewed. No pertinent surgical history.    History reviewed. No pertinent family history.    Social History     Socioeconomic History    Marital status: Single   Tobacco Use    Smoking status: Never    Smokeless tobacco: Never   Vaping Use    Vaping status: Never Used   Substance and Sexual Activity    Alcohol use: Never    Drug use: Never    Sexual activity: Never           Objective   Physical Exam  Vitals and nursing note reviewed.   Constitutional:       General: She is not in acute distress.     Appearance: She is well-developed. She is obese. She is not ill-appearing, toxic-appearing or diaphoretic.   HENT:      Head: Normocephalic and atraumatic.      Mouth/Throat:      Mouth: Mucous membranes are moist.      Pharynx: Oropharynx is clear.   Eyes:      General: No scleral  "icterus.     Extraocular Movements: Extraocular movements intact.      Pupils: Pupils are equal, round, and reactive to light.   Cardiovascular:      Rate and Rhythm: Normal rate and regular rhythm.      Heart sounds: No murmur heard.     No friction rub. No gallop.   Pulmonary:      Effort: Pulmonary effort is normal. No respiratory distress.      Breath sounds: Decreased air movement present. No stridor. Wheezing present. No rhonchi or rales.      Comments: Expiratory wheezes on auscultation bilaterally.  Chest:      Chest wall: No tenderness.   Abdominal:      General: Bowel sounds are normal. There is no distension. There are no signs of injury.      Palpations: Abdomen is soft.      Tenderness: There is no abdominal tenderness. There is no guarding or rebound.   Skin:     General: Skin is warm.      Capillary Refill: Capillary refill takes less than 2 seconds.      Coloration: Skin is not cyanotic, jaundiced or pale.      Findings: No rash.   Neurological:      General: No focal deficit present.      Mental Status: She is alert and oriented to person, place, and time.   Psychiatric:         Mood and Affect: Mood normal.         Behavior: Behavior normal.         Procedures           ED Course  ED Course as of 04/01/24 1330   Mon Apr 01, 2024   1258 On reassessment patient is feeling better we will try to ambulate her to see how her dyspnea is with ambulation and if better she would like to be discharged if not she will be placed in observation unit [AA]      ED Course User Index  [AA] Meron Wallace PA      /82   Pulse 78   Temp 97.8 °F (36.6 °C) (Temporal)   Resp 13   Ht 170.2 cm (67\")   Wt 133 kg (294 lb)   SpO2 100%   BMI 46.05 kg/m²   Medications   sodium chloride 0.9 % flush 10 mL (has no administration in time range)   ipratropium-albuterol (DUO-NEB) nebulizer solution 3 mL (3 mL Nebulization Given 4/1/24 1110)   methylPREDNISolone sodium succinate (SOLU-Medrol) injection 125 mg (125 mg " Intravenous Given 4/1/24 1049)   ipratropium-albuterol (DUO-NEB) nebulizer solution 3 mL (3 mL Nebulization Given 4/1/24 1228)     Labs Reviewed   COMPREHENSIVE METABOLIC PANEL - Abnormal; Notable for the following components:       Result Value    Creatinine 1.02 (*)     All other components within normal limits    Narrative:     GFR Normal >60  Chronic Kidney Disease <60  Kidney Failure <15     CBC WITH AUTO DIFFERENTIAL - Abnormal; Notable for the following components:    Lymphocytes, Absolute 3.15 (*)     All other components within normal limits   BNP (IN-HOUSE) - Normal    Narrative:     This assay is used as an aid in the diagnosis of individuals suspected of having heart failure. It can be used as an aid in the diagnosis of acute decompensated heart failure (ADHF) in patients presenting with signs and symptoms of ADHF to the emergency department (ED). In addition, NT-proBNP of <300 pg/mL indicates ADHF is not likely.    Age Range Result Interpretation  NT-proBNP Concentration (pg/mL:      <50             Positive            >450                   Gray                 300-450                    Negative             <300    50-75           Positive            >900                  Gray                300-900                  Negative            <300      >75             Positive            >1800                  Gray                300-1800                  Negative            <300   SINGLE HS TROPONIN T - Normal    Narrative:     High Sensitive Troponin T Reference Range:  <14.0 ng/L- Negative Female for AMI  <22.0 ng/L- Negative Male for AMI  >=14 - Abnormal Female indicating possible myocardial injury.  >=22 - Abnormal Male indicating possible myocardial injury.   Clinicians would have to utilize clinical acumen, EKG, Troponin, and serial changes to determine if it is an Acute Myocardial Infarction or myocardial injury due to an underlying chronic condition.        CBC AND DIFFERENTIAL    Narrative:     The  following orders were created for panel order CBC & Differential.  Procedure                               Abnormality         Status                     ---------                               -----------         ------                     CBC Auto Differential[[id number]]        Abnormal            Final result                 Please view results for these tests on the individual orders.     XR Chest 1 View   Final Result   Impression:   No acute process identified.         Electronically Signed: Edgar Sinha MD     4/1/2024 10:49 AM EDT     Workstation ID: [id number]                                               Medical Decision Making  Differentials: Asthma exacerbation, pneumonia, bronchitis, URI, viral and     ;this list is not all inclusive and does not constitute the entirety of considered causes  EKG: Interpreted by myself and independent interpretation by Dr. Ren shows sinus rhythm rate 78 problems atrial lodgment low voltage in precordial leads no previous to review  Labs: As above  Radiology: My interpretation chest x-ray shows no acute pneumothorax, independent interpretation by Dr. Ren shows acute infiltrate correlated with radiologist interpretation as below  XR Chest 1 View   Final Result    Impression:    No acute process identified.            Electronically Signed: Edgar Sinha MD      4/1/2024 10:49 AM EDT      Workstation ID: [id number]     Disposition/Treatment:  Appropriate PPE was worn during exam and throughout all encounters with the patient.  Upon arrival to the ED patient had significant wheezing presented with shortness of breath history of asthma patient had steroids while in the ED along with multiple breathing treatments with some improvement of her symptoms on reassessment however when patient ambulated she started feeling more short of breath.  EKG showed no acute STEMI labs and imaging were also obtained.    CBC showed no leukocytosis or anemia.  Metabolic panel fairly  unremarkable troponin and BNP were normal.  Chest x-ray showed no acute cardiopulmonary abnormalities.  PE was considered but thought less likely.  Patient was not tachycardic or hypoxic while in the ED. patient symptoms I do think are secondary to asthma exacerbation as she recently moved to this area.  Patient does not have a nebulizer at home anymore.  Patient was placed in observation unit for further workup of asthma exacerbation.  Spoke to MARJ Oneal who was in agreement with plan.  All questions were answered at bedside.  Shared decision making at bedside with the patient.        Problems Addressed:  Exacerbation of asthma, unspecified asthma severity, unspecified whether persistent: acute illness or injury    Amount and/or Complexity of Data Reviewed  Labs: ordered. Decision-making details documented in ED Course.  Radiology: ordered. Decision-making details documented in ED Course.  ECG/medicine tests: ordered.  Discussion of management or test interpretation with external provider(s): As above     Risk  Prescription drug management.        Final diagnoses:   Exacerbation of asthma, unspecified asthma severity, unspecified whether persistent       ED Disposition  ED Disposition       ED Disposition   Decision to Admit    Condition   --    Comment   --               No follow-up provider specified.       Medication List      No changes were made to your prescriptions during this visit.            Meron Wallace PA  04/01/24 1926

## 2024-04-01 NOTE — LETTER
EMS Transport Request  For use at [facility], [place], [place], [place], and [place] only   Patient Name: Lisa Jay : 1970   Weight:133 kg (294 lb) Pick-up Location: [facility] BLS/ALS: BLS/ALS: BLS   Insurance: [employer] MEDICARE REPLACEMENT Auth End Date: 24   Pre-Cert #: D/C Summary complete:    Destination: Home How many stairs NA, Will the patient be on the main level NA, Is there a ramp available NA, Can the patient stand and pivot yes, Address ***, and Name/contact number for who will be present ***   Contact Precautions: None   Equipment (O2, Fluids, etc.): None   Arrive By Date/Time: 24 Stretcher/WC: Wheelchair   CM Requesting: Etienne Macias RN Ext: [phone]   Notes/Medical Necessity: patient is alert and oriented and lives with her sister. Sister does not get off work until 7-8:00 pm and will not be at the home. Patient does not need assistance getting into her home, she is ambulatory and walkie-talkie.      ______________________________________________________________________    *Only 2 patient bags OR 1 carry-on size bag are permitted.  Wheelchairs and walkers CANNOT transported with the patient. Acknowledge: Yes

## 2024-04-02 LAB
ANION GAP SERPL CALCULATED.3IONS-SCNC: 10 MMOL/L (ref 5–15)
BASOPHILS # BLD AUTO: 0.01 10*3/MM3 (ref 0–0.2)
BASOPHILS NFR BLD AUTO: 0.1 % (ref 0–1.5)
BUN SERPL-MCNC: 32 MG/DL (ref 6–20)
BUN/CREAT SERPL: 26 (ref 7–25)
CALCIUM SPEC-SCNC: 9.5 MG/DL (ref 8.6–10.5)
CHLORIDE SERPL-SCNC: 104 MMOL/L (ref 98–107)
CO2 SERPL-SCNC: 28 MMOL/L (ref 22–29)
CREAT SERPL-MCNC: 1.23 MG/DL (ref 0.57–1)
DEPRECATED RDW RBC AUTO: 42.6 FL (ref 37–54)
EGFRCR SERPLBLD CKD-EPI 2021: 52 ML/MIN/1.73
EOSINOPHIL # BLD AUTO: 0 10*3/MM3 (ref 0–0.4)
EOSINOPHIL NFR BLD AUTO: 0 % (ref 0.3–6.2)
ERYTHROCYTE [DISTWIDTH] IN BLOOD BY AUTOMATED COUNT: 12.8 % (ref 12.3–15.4)
GLUCOSE BLDC GLUCOMTR-MCNC: 137 MG/DL (ref 70–105)
GLUCOSE SERPL-MCNC: 183 MG/DL (ref 65–99)
HCT VFR BLD AUTO: 42.7 % (ref 34–46.6)
HGB BLD-MCNC: 13.3 G/DL (ref 12–15.9)
IMM GRANULOCYTES # BLD AUTO: 0.06 10*3/MM3 (ref 0–0.05)
IMM GRANULOCYTES NFR BLD AUTO: 0.5 % (ref 0–0.5)
LYMPHOCYTES # BLD AUTO: 1.65 10*3/MM3 (ref 0.7–3.1)
LYMPHOCYTES NFR BLD AUTO: 12.4 % (ref 19.6–45.3)
MCH RBC QN AUTO: 28.5 PG (ref 26.6–33)
MCHC RBC AUTO-ENTMCNC: 31.1 G/DL (ref 31.5–35.7)
MCV RBC AUTO: 91.4 FL (ref 79–97)
MONOCYTES # BLD AUTO: 0.76 10*3/MM3 (ref 0.1–0.9)
MONOCYTES NFR BLD AUTO: 5.7 % (ref 5–12)
NEUTROPHILS NFR BLD AUTO: 10.8 10*3/MM3 (ref 1.7–7)
NEUTROPHILS NFR BLD AUTO: 81.3 % (ref 42.7–76)
NRBC BLD AUTO-RTO: 0 /100 WBC (ref 0–0.2)
PLATELET # BLD AUTO: 308 10*3/MM3 (ref 140–450)
PMV BLD AUTO: 9.9 FL (ref 6–12)
POTASSIUM SERPL-SCNC: 3.3 MMOL/L (ref 3.5–5.2)
QT INTERVAL: 372 MS
QTC INTERVAL: 424 MS
RBC # BLD AUTO: 4.67 10*6/MM3 (ref 3.77–5.28)
SODIUM SERPL-SCNC: 142 MMOL/L (ref 136–145)
WBC NRBC COR # BLD AUTO: 13.28 10*3/MM3 (ref 3.4–10.8)

## 2024-04-02 PROCEDURE — 82948 REAGENT STRIP/BLOOD GLUCOSE: CPT

## 2024-04-02 PROCEDURE — G0378 HOSPITAL OBSERVATION PER HR: HCPCS

## 2024-04-02 PROCEDURE — 94799 UNLISTED PULMONARY SVC/PX: CPT

## 2024-04-02 PROCEDURE — 85025 COMPLETE CBC W/AUTO DIFF WBC: CPT | Performed by: NURSE PRACTITIONER

## 2024-04-02 PROCEDURE — 80048 BASIC METABOLIC PNL TOTAL CA: CPT | Performed by: NURSE PRACTITIONER

## 2024-04-02 RX ORDER — IPRATROPIUM BROMIDE AND ALBUTEROL SULFATE 2.5; .5 MG/3ML; MG/3ML
3 SOLUTION RESPIRATORY (INHALATION) EVERY 6 HOURS PRN
Status: DISCONTINUED | OUTPATIENT
Start: 2024-04-02 | End: 2024-04-03 | Stop reason: HOSPADM

## 2024-04-02 RX ORDER — SUMATRIPTAN 50 MG/1
50 TABLET, FILM COATED ORAL ONCE
Status: COMPLETED | OUTPATIENT
Start: 2024-04-02 | End: 2024-04-02

## 2024-04-02 RX ORDER — ALBUTEROL SULFATE 90 UG/1
1 AEROSOL, METERED RESPIRATORY (INHALATION) EVERY 4 HOURS PRN
Qty: 8 G | Refills: 0 | Status: SHIPPED | OUTPATIENT
Start: 2024-04-02

## 2024-04-02 RX ORDER — POTASSIUM CHLORIDE 750 MG/1
10 TABLET, FILM COATED, EXTENDED RELEASE ORAL DAILY
Status: DISCONTINUED | OUTPATIENT
Start: 2024-04-02 | End: 2024-04-03 | Stop reason: HOSPADM

## 2024-04-02 RX ORDER — METHOCARBAMOL 500 MG/1
500 TABLET, FILM COATED ORAL 4 TIMES DAILY
Qty: 20 TABLET | Refills: 0 | Status: SHIPPED | OUTPATIENT
Start: 2024-04-02 | End: 2024-04-07

## 2024-04-02 RX ORDER — METHYLPREDNISOLONE 4 MG/1
1 TABLET ORAL DAILY
Qty: 21 TABLET | Refills: 0 | Status: SHIPPED | OUTPATIENT
Start: 2024-04-02 | End: 2024-04-08

## 2024-04-02 RX ORDER — FLUTICASONE FUROATE AND VILANTEROL 100; 25 UG/1; UG/1
1 POWDER RESPIRATORY (INHALATION)
Qty: 1 EACH | Refills: 0 | Status: SHIPPED | OUTPATIENT
Start: 2024-04-02

## 2024-04-02 RX ORDER — IPRATROPIUM BROMIDE AND ALBUTEROL SULFATE 2.5; .5 MG/3ML; MG/3ML
3 SOLUTION RESPIRATORY (INHALATION)
Qty: 360 ML | Refills: 0 | Status: SHIPPED | OUTPATIENT
Start: 2024-04-02

## 2024-04-02 RX ORDER — ONDANSETRON 4 MG/1
4 TABLET, ORALLY DISINTEGRATING ORAL EVERY 6 HOURS PRN
Qty: 30 TABLET | Refills: 0 | Status: SHIPPED | OUTPATIENT
Start: 2024-04-02

## 2024-04-02 RX ADMIN — IPRATROPIUM BROMIDE AND ALBUTEROL SULFATE 3 ML: .5; 3 SOLUTION RESPIRATORY (INHALATION) at 15:13

## 2024-04-02 RX ADMIN — KETOCONAZOLE 1 APPLICATION: 20 CREAM TOPICAL at 03:20

## 2024-04-02 RX ADMIN — OXYCODONE 15 MG: 5 TABLET ORAL at 03:20

## 2024-04-02 RX ADMIN — GABAPENTIN 800 MG: 400 CAPSULE ORAL at 06:03

## 2024-04-02 RX ADMIN — VENLAFAXINE HYDROCHLORIDE 37.5 MG: 37.5 CAPSULE, EXTENDED RELEASE ORAL at 09:12

## 2024-04-02 RX ADMIN — OXYCODONE 15 MG: 5 TABLET ORAL at 18:11

## 2024-04-02 RX ADMIN — CELECOXIB 200 MG: 200 CAPSULE ORAL at 21:05

## 2024-04-02 RX ADMIN — IPRATROPIUM BROMIDE AND ALBUTEROL SULFATE 3 ML: .5; 3 SOLUTION RESPIRATORY (INHALATION) at 08:18

## 2024-04-02 RX ADMIN — OXYCODONE 15 MG: 5 TABLET ORAL at 06:04

## 2024-04-02 RX ADMIN — OXYCODONE 15 MG: 5 TABLET ORAL at 13:51

## 2024-04-02 RX ADMIN — OXYCODONE 15 MG: 5 TABLET ORAL at 21:05

## 2024-04-02 RX ADMIN — HYDROXYCHLOROQUINE SULFATE 200 MG: 200 TABLET ORAL at 21:05

## 2024-04-02 RX ADMIN — ASPIRIN 81 MG: 81 TABLET, COATED ORAL at 09:11

## 2024-04-02 RX ADMIN — OXYCODONE 15 MG: 5 TABLET ORAL at 09:12

## 2024-04-02 RX ADMIN — IPRATROPIUM BROMIDE AND ALBUTEROL SULFATE 3 ML: .5; 3 SOLUTION RESPIRATORY (INHALATION) at 19:47

## 2024-04-02 RX ADMIN — HYDROXYCHLOROQUINE SULFATE 200 MG: 200 TABLET ORAL at 09:11

## 2024-04-02 RX ADMIN — ALBUTEROL SULFATE 2.5 MG: 2.5 SOLUTION RESPIRATORY (INHALATION) at 23:54

## 2024-04-02 RX ADMIN — CYCLOBENZAPRINE 10 MG: 10 TABLET, FILM COATED ORAL at 06:03

## 2024-04-02 RX ADMIN — Medication 10 ML: at 09:13

## 2024-04-02 RX ADMIN — CLOPIDOGREL BISULFATE 75 MG: 75 TABLET ORAL at 09:12

## 2024-04-02 RX ADMIN — TRIAMCINOLONE ACETONIDE 1 APPLICATION: 1 CREAM TOPICAL at 09:12

## 2024-04-02 RX ADMIN — BUDESONIDE AND FORMOTEROL FUMARATE DIHYDRATE 2 PUFF: 160; 4.5 AEROSOL RESPIRATORY (INHALATION) at 19:51

## 2024-04-02 RX ADMIN — METOPROLOL TARTRATE 25 MG: 25 TABLET, FILM COATED ORAL at 21:05

## 2024-04-02 RX ADMIN — POTASSIUM CHLORIDE 10 MEQ: 750 TABLET, EXTENDED RELEASE ORAL at 09:12

## 2024-04-02 RX ADMIN — METOPROLOL TARTRATE 25 MG: 25 TABLET, FILM COATED ORAL at 09:11

## 2024-04-02 RX ADMIN — GABAPENTIN 800 MG: 400 CAPSULE ORAL at 21:05

## 2024-04-02 RX ADMIN — GABAPENTIN 800 MG: 400 CAPSULE ORAL at 13:51

## 2024-04-02 RX ADMIN — TRIAMCINOLONE ACETONIDE 1 APPLICATION: 1 CREAM TOPICAL at 21:07

## 2024-04-02 RX ADMIN — BUDESONIDE AND FORMOTEROL FUMARATE DIHYDRATE 2 PUFF: 160; 4.5 AEROSOL RESPIRATORY (INHALATION) at 08:23

## 2024-04-02 RX ADMIN — ACETAMINOPHEN 650 MG: 325 TABLET, FILM COATED ORAL at 06:04

## 2024-04-02 RX ADMIN — ATORVASTATIN CALCIUM 5 MG: 10 TABLET, FILM COATED ORAL at 21:05

## 2024-04-02 RX ADMIN — SUMATRIPTAN SUCCINATE 50 MG: 50 TABLET ORAL at 13:51

## 2024-04-02 RX ADMIN — HYDROCHLOROTHIAZIDE 25 MG: 25 TABLET ORAL at 09:11

## 2024-04-02 RX ADMIN — CELECOXIB 200 MG: 200 CAPSULE ORAL at 09:12

## 2024-04-02 NOTE — DISCHARGE SUMMARY
[facility]    Brian Gonzalez MD    CHIEF COMPLAINT:     Dyspnea     HISTORY OF PRESENT ILLNESS:    HPI    Patient is a 53-year-old female PMH significant for asthma, lupus, fibromyalgia, hypertension, hyperlipidemia, diabetes, fatty liver presents to the ED with complaints of dyspnea that started about 15 minutes prior to arrival. Patient stepped outside to check the weather became very short of breath. She does use inhalers at home for her asthma slight wheezing noted on exam. No reports of new cough, congestion, fever, edema. She does report some chest tightness. No recent travel surgeries or immobilization.     History reviewed. No pertinent past medical history.  History reviewed. No pertinent surgical history.  History reviewed. No pertinent family history.  Social History     Tobacco Use    Smoking status: Never    Smokeless tobacco: Never   Vaping Use    Vaping status: Never Used   Substance Use Topics    Alcohol use: Never    Drug use: Never     Medications Prior to Admission   Medication Sig Dispense Refill Last Dose    albuterol sulfate  (90 Base) MCG/ACT inhaler Inhale 1 puff Every 4 (Four) Hours As Needed for Wheezing.   3/31/2024    aspirin 81 MG EC tablet Take 1 tablet by mouth Daily.       atorvastatin (LIPITOR) 10 MG tablet Take 0.5 tablets by mouth Every Evening.   3/31/2024    celecoxib (CeleBREX) 200 MG capsule Take 1 capsule by mouth 2 (Two) Times a Day.   3/31/2024    clopidogrel (PLAVIX) 75 MG tablet Take 1 tablet by mouth Daily. 30 tablet 2 3/31/2024    cyclobenzaprine (FLEXERIL) 10 MG tablet Take 1 tablet by mouth 3 (Three) Times a Day As Needed for Muscle Spasms.   3/31/2024    gabapentin (NEURONTIN) 800 MG tablet Take 1 tablet by mouth 3 (Three) Times a Day.   3/31/2024    hydroCHLOROthiazide (HYDRODIURIL) 25 MG tablet Take 1 tablet by mouth Daily.   3/31/2024    hydroxychloroquine (PLAQUENIL) 200 MG tablet Take 1 tablet by mouth 2 (Two) Times a Day.        ketoconazole (NIZORAL) 2 % cream Apply 1 Application topically to the appropriate area as directed 2 (Two) Times a Day As Needed.   3/31/2024    metoprolol tartrate (LOPRESSOR) 25 MG tablet Take 1 tablet by mouth 2 (Two) Times a Day.   3/31/2024    oxyCODONE (ROXICODONE) 15 MG immediate release tablet Take 1 tablet by mouth Every 4 (Four) Hours.   3/31/2024    Tirzepatide (Mounjaro) 2.5 MG/0.5ML solution pen-injector pen Inject 0.5 mL under the skin into the appropriate area as directed 1 (One) Time Per Week.   Past Week    triamcinolone (KENALOG) 0.1 % cream Apply 1 Application topically to the appropriate area as directed 2 (Two) Times a Day As Needed.   3/31/2024    venlafaxine (EFFEXOR) 37.5 MG tablet Take 1 tablet by mouth Daily.   3/31/2024     Allergies:  Morphine    Immunization History   Administered Date(s) Administered    COVID-19 (PFIZER) BIVALENT 12+YRS 04/26/2023    COVID-19 (PFIZER) Purple Cap Monovalent 03/15/2021, 04/05/2021, 04/23/2021, 10/18/2021    COVID-19 F23 (PFIZER) 12YRS+ (COMIRNATY) 10/04/2023           REVIEW OF SYSTEMS:    Review of Systems   Constitutional: Positive for malaise/fatigue.   HENT: Negative.     Eyes: Negative.    Cardiovascular:  Positive for dyspnea on exertion.   Respiratory: Negative.     Endocrine: Negative.    Hematologic/Lymphatic: Negative.    Skin: Negative.    Musculoskeletal:  Positive for back pain.   Gastrointestinal: Negative.    Genitourinary: Negative.    Neurological: Negative.    Psychiatric/Behavioral: Negative.     Allergic/Immunologic: Negative.        Vital Signs  Temp:  [97.8 °F (36.6 °C)-97.9 °F (36.6 °C)] 97.8 °F (36.6 °C)  Heart Rate:  [59-82] 60  Resp:  [11-20] 14  BP: (115-136)/(79-96) 134/91          Physical Exam:  Physical Exam  Vitals and nursing note reviewed.   Constitutional:       Appearance: Normal appearance. She is obese.   HENT:      Head: Normocephalic and atraumatic.      Right Ear: External ear normal.      Left Ear: External ear  normal.      Nose: Nose normal.      Mouth/Throat:      Pharynx: Oropharynx is clear.   Eyes:      Extraocular Movements: Extraocular movements intact.      Conjunctiva/sclera: Conjunctivae normal.      Pupils: Pupils are equal, round, and reactive to light.   Cardiovascular:      Rate and Rhythm: Normal rate and regular rhythm.      Pulses: Normal pulses.      Heart sounds: Normal heart sounds.   Pulmonary:      Effort: Pulmonary effort is normal.      Breath sounds: Normal breath sounds.   Abdominal:      General: Bowel sounds are normal.   Musculoskeletal:         General: Tenderness present.   Skin:     General: Skin is warm.      Capillary Refill: Capillary refill takes less than 2 seconds.   Neurological:      Mental Status: She is alert and oriented to person, place, and time.   Psychiatric:         Mood and Affect: Mood normal.         Behavior: Behavior normal.         Thought Content: Thought content normal.         Judgment: Judgment normal.         Emotional Behavior:    WNl   Debilities:   none  Results Review:    I reviewed the patient's new clinical results.  Lab Results (most recent)       Procedure Component Value Units Date/Time    Basic Metabolic Panel [[id number]]  (Abnormal) Collected: 04/02/24 0331    Specimen: Blood Updated: 04/02/24 0502     Glucose 183 mg/dL      BUN 32 mg/dL      Creatinine 1.23 mg/dL      Sodium 142 mmol/L      Potassium 3.3 mmol/L      Chloride 104 mmol/L      CO2 28.0 mmol/L      Calcium 9.5 mg/dL      BUN/Creatinine Ratio 26.0     Anion Gap 10.0 mmol/L      eGFR 52.0 mL/min/1.73     Narrative:      GFR Normal >60  Chronic Kidney Disease <60  Kidney Failure <15      CBC & Differential [[id number]]  (Abnormal) Collected: 04/02/24 0331    Specimen: Blood Updated: 04/02/24 0403    Narrative:      The following orders were created for panel order CBC & Differential.  Procedure                               Abnormality         Status                     ---------                                -----------         ------                     CBC Auto Differential[[id number]]        Abnormal            Final result                 Please view results for these tests on the individual orders.    CBC Auto Differential [[id number]]  (Abnormal) Collected: 04/02/24 0331    Specimen: Blood Updated: 04/02/24 0403     WBC 13.28 10*3/mm3      RBC 4.67 10*6/mm3      Hemoglobin 13.3 g/dL      Hematocrit 42.7 %      MCV 91.4 fL      MCH 28.5 pg      MCHC 31.1 g/dL      RDW 12.8 %      RDW-SD 42.6 fl      MPV 9.9 fL      Platelets 308 10*3/mm3      Neutrophil % 81.3 %      Lymphocyte % 12.4 %      Monocyte % 5.7 %      Eosinophil % 0.0 %      Basophil % 0.1 %      Immature Grans % 0.5 %      Neutrophils, Absolute 10.80 10*3/mm3      Lymphocytes, Absolute 1.65 10*3/mm3      Monocytes, Absolute 0.76 10*3/mm3      Eosinophils, Absolute 0.00 10*3/mm3      Basophils, Absolute 0.01 10*3/mm3      Immature Grans, Absolute 0.06 10*3/mm3      nRBC 0.0 /100 WBC     Single High Sensitivity Troponin T [[id number]]  (Normal) Collected: 04/01/24 1129    Specimen: Blood Updated: 04/01/24 1226     HS Troponin T 9 ng/L     Narrative:      High Sensitive Troponin T Reference Range:  <14.0 ng/L- Negative Female for AMI  <22.0 ng/L- Negative Male for AMI  >=14 - Abnormal Female indicating possible myocardial injury.  >=22 - Abnormal Male indicating possible myocardial injury.   Clinicians would have to utilize clinical acumen, EKG, Troponin, and serial changes to determine if it is an Acute Myocardial Infarction or myocardial injury due to an underlying chronic condition.         BNP [[id number]]  (Normal) Collected: 04/01/24 1129    Specimen: Blood Updated: 04/01/24 1226     proBNP <36.0 pg/mL     Narrative:      This assay is used as an aid in the diagnosis of individuals suspected of having heart failure. It can be used as an aid in the diagnosis of acute decompensated heart failure (ADHF) in patients presenting with signs and  symptoms of ADHF to the emergency department (ED). In addition, NT-proBNP of <300 pg/mL indicates ADHF is not likely.    Age Range Result Interpretation  NT-proBNP Concentration (pg/mL:      <50             Positive            >450                   Gray                 300-450                    Negative             <300    50-75           Positive            >900                  Gray                300-900                  Negative            <300      >75             Positive            >1800                  Gray                300-1800                  Negative            <300    Comprehensive Metabolic Panel [[id number]]  (Abnormal) Collected: 04/01/24 1129    Specimen: Blood Updated: 04/01/24 1220     Glucose 87 mg/dL      BUN 20 mg/dL      Creatinine 1.02 mg/dL      Sodium 138 mmol/L      Potassium 3.5 mmol/L      Comment: Slight hemolysis detected by analyzer. Result may be falsely elevated.        Chloride 101 mmol/L      CO2 27.0 mmol/L      Calcium 10.2 mg/dL      Total Protein 7.2 g/dL      Albumin 4.3 g/dL      ALT (SGPT) 15 U/L      AST (SGOT) 24 U/L      Comment: Slight hemolysis detected by analyzer. Result may be falsely elevated.        Alkaline Phosphatase 75 U/L      Total Bilirubin 0.5 mg/dL      Globulin 2.9 gm/dL      A/G Ratio 1.5 g/dL      BUN/Creatinine Ratio 19.6     Anion Gap 10.0 mmol/L      eGFR 65.1 mL/min/1.73     Narrative:      GFR Normal >60  Chronic Kidney Disease <60  Kidney Failure <15      CBC & Differential [[id number]]  (Abnormal) Collected: 04/01/24 1043    Specimen: Blood Updated: 04/01/24 1048    Narrative:      The following orders were created for panel order CBC & Differential.  Procedure                               Abnormality         Status                     ---------                               -----------         ------                     CBC Auto Differential[[id number]]        Abnormal            Final result                 Please view results for these  tests on the individual orders.    CBC Auto Differential [[id number]]  (Abnormal) Collected: 04/01/24 1043    Specimen: Blood Updated: 04/01/24 1048     WBC 9.14 10*3/mm3      RBC 4.55 10*6/mm3      Hemoglobin 13.2 g/dL      Hematocrit 41.0 %      MCV 90.1 fL      MCH 29.0 pg      MCHC 32.2 g/dL      RDW 12.7 %      RDW-SD 42.0 fl      MPV 9.4 fL      Platelets 300 10*3/mm3      Neutrophil % 56.1 %      Lymphocyte % 34.5 %      Monocyte % 7.2 %      Eosinophil % 1.2 %      Basophil % 0.7 %      Immature Grans % 0.3 %      Neutrophils, Absolute 5.13 10*3/mm3      Lymphocytes, Absolute 3.15 10*3/mm3      Monocytes, Absolute 0.66 10*3/mm3      Eosinophils, Absolute 0.11 10*3/mm3      Basophils, Absolute 0.06 10*3/mm3      Immature Grans, Absolute 0.03 10*3/mm3      nRBC 0.0 /100 WBC             Imaging Results (Most Recent)       Procedure Component Value Units Date/Time    XR Chest 1 View [[id number]] Collected: 04/01/24 1049     Updated: 04/01/24 1052    Narrative:      XR CHEST 1 VW    Date of Exam: 4/1/2024 10:48 AM EDT    Indication: CHF/COPD Protocol  CHF/COPD Protocol    Comparison: None available.    Findings:  Cardiomediastinal silhouette is unremarkable.  No airspace disease, pneumothorax, nor pleural effusion. No acute osseous abnormality identified.      Impression:      Impression:  No acute process identified.      Electronically Signed: Edgar Sinha MD    4/1/2024 10:49 AM EDT    Workstation ID: [id number]          reviewed    ECG/EMG Results (most recent)       Procedure Component Value Units Date/Time    ECG 12 Lead Dyspnea [[id number]] Collected: 04/01/24 1023     Updated: 04/02/24 0629     QT Interval 372 ms      QTC Interval 424 ms     Narrative:      HEART RATE= 78  bpm  RR Interval= 768  ms  IA Interval= 163  ms  P Horizontal Axis= -3  deg  P Front Axis= 38  deg  QRSD Interval= 80  ms  QT Interval= 372  ms  QTcB= 424  ms  QRS Axis= 14  deg  T Wave Axis= -17  deg  - BORDERLINE ECG -  Sinus  rhythm  Probable left atrial enlargement  Low voltage, precordial leads  No previous ECG available for comparison  Electronically Signed By: Semaj Ren (Raza) 02-Apr-2024 06:29:38  Date and Time of Study: 2024-04-01 10:23:49          reviewed            Microbiology Results (last 10 days)       ** No results found for the last 240 hours. **            Assessment & Plan     Asthma exacerbation     Asthma Exacerbation   Lab Results   Component Value Date    PHART 7.48 (H) 04/26/2022    DGM1YNZ 38 04/26/2022    ELY4PTY 29 (H) 04/26/2022    FIO2 21.0 04/26/2022    WBC 13.28 (H) 04/02/2024    EOSABS 0.00 04/02/2024   -Additional labs notable for negative troponin and BNP  -Chest x-ray: No acute cardiopulmonary process seen  -EKG: Sinus rhythm probable left atrial lodgment  -Continue duo nebs, IV steroids, Symbicort, PRN breathing treatments   -Incentive spirometry   -Telemetry and pulse oximetry    Hypokalemia  -Potassium 3.3, repeat and monitor  -Potassium replacement protocol     CONNOR  Lab Results   Component Value Date    CREATININE 1.23 (H) 04/02/2024    BUN 32 (H) 04/02/2024    BCR 26.0 (H) 04/02/2024    EGFR 52.0 (L) 04/02/2024    -Avoid nephrotoxic medication IV dye unless urgently needed  -Monitor BMP and I's and O's while admitted    Diabetes mellitus  Lab Results   Component Value Date    GLUCOSE 183 (H) 04/02/2024    GLUCOSE 87 04/01/2024    GLUCOSE 88 12/02/2023    GLUCOSE 95 12/01/2023   -Hold Mounjaro  -SSI   -Diabetic diet  -Monitor before meals and at bedtime    Fibromyalgia/Lupus  -Continue flexeril, Plaquenil, gabapentin, and oxycodone     Hyperlipidemia  -Continue statin and ASA     Anxiety Disorder  -Continue Effexor       I discussed the patients findings and my recommendations with patient and family.     Discharge Diagnosis:      Asthma exacerbation      Hospital Course  Patient is a 53 y.o. female presented with shortness of breath.  Patient states she used her inhaler at home but was still  experiencing wheezing.  Patient reports nonproductive adductive cough without fever, chest pain or edema.  Chest x-ray showed no acute cardiopulmonary process.  EKG showed sinus rhythm with probable left atrial enlargement.  Patient showed some improvement with breathing treatments.  Patient had doses IV steroids, DuoNebs, Symbicort and as needed breathing treatments with improvement.  Patient afebrile hemodynamically stable upon discharge.  Testing recommendations reviewed in length with patient and she agreed to treatment plan.  If symptoms worsen patient to call 911 or go to nearest ED.    Past Medical History:   History reviewed. No pertinent past medical history.    Past Surgical History:   History reviewed. No pertinent surgical history.    Social History:   Social History     Socioeconomic History    Marital status: Single   Tobacco Use    Smoking status: Never    Smokeless tobacco: Never   Vaping Use    Vaping status: Never Used   Substance and Sexual Activity    Alcohol use: Never    Drug use: Never    Sexual activity: Never       Procedures Performed         Consults:   Consults       No orders found for last 30 day(s).            Condition on Discharge:     Stable    Discharge Disposition      Discharge Medications     Discharge Medications        ASK your doctor about these medications        Instructions Start Date   albuterol sulfate  (90 Base) MCG/ACT inhaler  Commonly known as: PROVENTIL HFA;VENTOLIN HFA;PROAIR HFA   1 puff, Inhalation, Every 4 Hours PRN      aspirin 81 MG EC tablet  Ask about: Which instructions should I use?   81 mg, Oral, Daily      atorvastatin 10 MG tablet  Commonly known as: LIPITOR   5 mg, Oral, Every Evening      celecoxib 200 MG capsule  Commonly known as: CeleBREX   200 mg, Oral, 2 Times Daily      clopidogrel 75 MG tablet  Commonly known as: PLAVIX   75 mg, Oral, Daily      cyclobenzaprine 10 MG tablet  Commonly known as: FLEXERIL   10 mg, Oral, 3 Times Daily PRN       gabapentin 800 MG tablet  Commonly known as: NEURONTIN   800 mg, Oral, 3 Times Daily      hydroCHLOROthiazide 25 MG tablet   25 mg, Oral, Daily      hydroxychloroquine 200 MG tablet  Commonly known as: PLAQUENIL  Ask about: Which instructions should I use?   200 mg, Oral, 2 Times Daily      ketoconazole 2 % cream  Commonly known as: NIZORAL   1 Application, Topical, 2 Times Daily PRN      metoprolol tartrate 25 MG tablet  Commonly known as: LOPRESSOR   25 mg, Oral, 2 Times Daily      Mounjaro 2.5 MG/0.5ML solution pen-injector pen  Generic drug: Tirzepatide   2.5 mg, Subcutaneous, Weekly      oxyCODONE 15 MG immediate release tablet  Commonly known as: ROXICODONE   15 mg, Oral, Every 4 Hours      triamcinolone 0.1 % cream  Commonly known as: KENALOG   1 Application, Topical, 2 Times Daily PRN      venlafaxine 37.5 MG tablet  Commonly known as: EFFEXOR   37.5 mg, Oral, Daily               Discharge Diet:     Activity at Discharge:     Follow-up Appointments  No future appointments.      Test Results Pending at Discharge       Risk for Readmission (LACE) Score: 4 (4/2/2024  6:00 AM)          SAAD Rolle  04/02/24  10:56 EDT      I spent 35 minutes caring for Lisa on this date of service. This time includes time spent by me in the following activities: reviewing tests, obtaining and/or reviewing a separately obtained history, performing a medically appropriate examination and/or evaluation, counseling and educating the patient/family/caregiver, ordering medications, tests, or procedures, referring and communicating with other health care professionals, documenting information in the medical record, independently interpreting results and communicating that information with the patient/family/caregiver, and care coordination.

## 2024-04-02 NOTE — CASE MANAGEMENT/SOCIAL WORK
Discharge Planning Assessment  [facility]     Patient Name: Lisa Jay  MRN: [id number]  Today's Date: 4/2/2024    Admit Date: 4/1/2024    Plan: routine home   Discharge Needs Assessment       Row Name 04/02/24 1433       Living Environment    People in Home sibling(s)    Name(s) of People in Home Cintia Mcclain    Potentially Unsafe Housing Conditions --  declined to participate in assessment       Discharge Needs Assessment    Concerns to be Addressed patient refuses services                   Discharge Plan       Row Name 04/02/24 1426       Plan    Plan Comments CM met with patient and attending SAAD Wallace at the bedside. Patient states that she cannot be discharged until her sister gets off work at 7-8pm as she does not have any clothes, a walker, or a nebulizer (patient has a friend at the bedside, a rollator at the bedside she states is not hers, and shoes). CM offered to place a referral to [place] [facility] for  home nebulizer, walker, and [facility] van for DC transport. Patient declined to have CM place referrals for DME and DC transport stating that she knows her insurance will not cover DME needs and she does not have keys to get into her home. Patient declined to answer further CM assessment questions stating that she doesnt want to answer any more questions and is very upset with the care she has received throughout the night. Patient declines referrals, no further needs to address at this time as patient is unwilling to partcipate in assessment.      Row Name 04/02/24 9077       Plan    Plan routine home    Plan Comments Patient has DC orders, no CM needs identified other than transportation. CM contacted [facility]- unable to provide transport as patient lives in [place]. CM messaged [facility] to see if we could provide DC transport. Patient states her sister does not get off work until 7-8pm.                  Continued Care and Services - Admitted Since 4/1/2024    No active coordination exists for this  encounter.       Expected Discharge Date and Time       Expected Discharge Date Expected Discharge Time    Apr 2, 2024            Demographic Summary       Row Name 04/02/24 1432       General Information    Admission Type observation    Arrived From emergency department    Required Notices Provided Observation Status Notice    Referral Source admission list    Reason for Consult discharge planning    Preferred Language English       Contact Information    Permission Granted to Share Info With permission denied    Contact Information Obtained for                    Functional Status       Row Name 04/02/24 1433       Functional Status    Usual Activity Tolerance --  declined to answer questions             Etienne Macias RN    Cell number [phone]  Office number [phone]

## 2024-04-02 NOTE — CASE MANAGEMENT/SOCIAL WORK
Continued Stay Note  [facility]     Patient Name: Lisa Jay  MRN: [id number]  Today's Date: 4/2/2024    Admit Date: 4/1/2024    Plan: routine home   Discharge Plan       Row Name 04/02/24 2385       Plan    Plan Comments CM received call from Nurse stating patient would not leave without her Nebulizer. Prior CM placed referral to [facility] but pt is OON so referral was sent to [facility]. CM checked referral in system and it showed [facility] as pending. CM called [employer] at [phone], they are contacting their night  to check on the status of the delivery and will notify CM if/when Nebulizer will be delivered. CM attempted to contact nurse, left secure chat.      Row Name 04/02/24 3178       Plan    Plan Comments Patient agreeable for referral to [facility] for nebulizer. CM placed referral to [facility] and messaged lialuz gupta. Per Evie- patient is OON with HMO plan. Referral will be sent to [facility].               Expected Discharge Date and Time       Expected Discharge Date Expected Discharge Time    Apr 2, 2024         Ester Burkett RN    phone [phone]  fax [phone]

## 2024-04-02 NOTE — CASE MANAGEMENT/SOCIAL WORK
Continued Stay Note   [facility]     Patient Name: Lisa Jay  MRN: [id number]  Today's Date: 4/2/2024    Admit Date: 4/1/2024    Plan: routine home   Discharge Plan       Row Name 04/02/24 1609       Plan    Plan Comments Patient agreeable for referral to [facility] for nebulizer. CM placed referral to [facility] and messaged yuan gupta. Per Evie- patient is OON with HMO plan. Referral will be sent to [facility].                    Etienne Macias RN    Cell number [phone]  Office number [phone]

## 2024-04-02 NOTE — CONSULTS
Initial consult visit. Pt declined spiritual care visit. Follow up visit to inquire about advanced directive and ACP, found pt on phone and did not acknowledge knock on door.  checked in with nurse and she stated that pt had already been discharged and was waiting for a ride. There were no other needs at this time.

## 2024-04-02 NOTE — NURSING NOTE
Pt requesting her special cream for her legs. Nurse practitioner maggy rea gave ok to resme her legs cream

## 2024-04-02 NOTE — CASE MANAGEMENT/SOCIAL WORK
Continued Stay Note  [facility]     Patient Name: Lisa Jay  MRN: [id number]  Today's Date: 4/2/2024    Admit Date: 4/1/2024    Plan: routine home   Discharge Plan       Row Name 04/02/24 1157       Plan    Plan routine home    Plan Comments Patient has DC orders, no CM needs identified other than transportation. CM contacted [facility]- unable to provide transport as patient lives in [place]. CM messaged [facility] to see if we could provide DC transport. Patient states her sister does not get off work until 7-8pm.           Etienne Macias RN     Cell number [phone]  Office number [phone]

## 2024-04-02 NOTE — DISCHARGE PLACEMENT REQUEST
"Billy Amaro (53 y.o. Female)       Date of Birth   1970    Social Security Number       Address   [address] [place] [address]    Home Phone   [phone]    MRN   [id number]       Worship   Rastafari    Marital Status   Single                            Admission Date   4/1/24    Admission Type   Emergency    Admitting Provider   Semaj Ren MD    Attending Provider   Semaj Ren MD    Department, Room/Bed   [facility] OBSERVATION, 101/1       Discharge Date       Discharge Disposition   Home or Self Care    Discharge Destination                                 Attending Provider: Semaj Ren MD    Allergies: Morphine    Isolation: None   Infection: None   Code Status: CPR    Ht: 170.2 cm (67\")   Wt: 133 kg (294 lb)    Admission Cmt: None   Principal Problem: Asthma exacerbation [J45.901]                   Active Insurance as of 4/1/2024       Primary Coverage       Payor Plan Insurance Group Employer/Plan Group    HUMANA MEDICARE REPLACEMENT HUMANA MED ADV HMO [id number]       Payor Plan Address Payor Plan Phone Number Payor Plan Fax Number Effective Dates    [address] [address] [phone]  1/1/2023 - None Entered    [facility] [id number]         Subscriber Name Subscriber Birth Date Member ID       BILLY AMARO 1970 [id number]                     Emergency Contacts        (Rel.) Home Phone Work Phone Mobile Phone    Cintia Mcclain (Sister) -- -- [phone]                "

## 2024-04-02 NOTE — PLAN OF CARE
Problem: Adult Inpatient Plan of Care  Goal: Plan of Care Review  Outcome: Met  Goal: Patient-Specific Goal (Individualized)  Outcome: Met  Goal: Absence of Hospital-Acquired Illness or Injury  Outcome: Met  Goal: Optimal Comfort and Wellbeing  Outcome: Met  Goal: Readiness for Transition of Care  Outcome: Met     Problem: Diabetes Comorbidity  Goal: Blood Glucose Level Within Targeted Range  Outcome: Met     Problem: Skin Injury Risk Increased  Goal: Skin Health and Integrity  Outcome: Met     Problem: Fall Injury Risk  Goal: Absence of Fall and Fall-Related Injury  Outcome: Met   Goal Outcome Evaluation:

## 2024-04-03 VITALS
BODY MASS INDEX: 45.99 KG/M2 | OXYGEN SATURATION: 99 % | HEART RATE: 77 BPM | SYSTOLIC BLOOD PRESSURE: 130 MMHG | DIASTOLIC BLOOD PRESSURE: 72 MMHG | RESPIRATION RATE: 18 BRPM | TEMPERATURE: 97.8 F | HEIGHT: 67 IN | WEIGHT: 293 LBS

## 2024-04-03 LAB
ANION GAP SERPL CALCULATED.3IONS-SCNC: 11 MMOL/L (ref 5–15)
BASOPHILS # BLD AUTO: 0.1 10*3/MM3 (ref 0–0.2)
BASOPHILS NFR BLD AUTO: 0.7 % (ref 0–1.5)
BUN SERPL-MCNC: 28 MG/DL (ref 6–20)
BUN/CREAT SERPL: 26.9 (ref 7–25)
CALCIUM SPEC-SCNC: 9.6 MG/DL (ref 8.6–10.5)
CHLORIDE SERPL-SCNC: 99 MMOL/L (ref 98–107)
CO2 SERPL-SCNC: 30 MMOL/L (ref 22–29)
CREAT SERPL-MCNC: 1.04 MG/DL (ref 0.57–1)
DEPRECATED RDW RBC AUTO: 43.9 FL (ref 37–54)
EGFRCR SERPLBLD CKD-EPI 2021: 63.6 ML/MIN/1.73
EOSINOPHIL # BLD AUTO: 0.09 10*3/MM3 (ref 0–0.4)
EOSINOPHIL NFR BLD AUTO: 0.6 % (ref 0.3–6.2)
ERYTHROCYTE [DISTWIDTH] IN BLOOD BY AUTOMATED COUNT: 13.1 % (ref 12.3–15.4)
GLUCOSE SERPL-MCNC: 96 MG/DL (ref 65–99)
HCT VFR BLD AUTO: 41.8 % (ref 34–46.6)
HGB BLD-MCNC: 13 G/DL (ref 12–15.9)
IMM GRANULOCYTES # BLD AUTO: 0.05 10*3/MM3 (ref 0–0.05)
IMM GRANULOCYTES NFR BLD AUTO: 0.3 % (ref 0–0.5)
LYMPHOCYTES # BLD AUTO: 5.26 10*3/MM3 (ref 0.7–3.1)
LYMPHOCYTES NFR BLD AUTO: 36.2 % (ref 19.6–45.3)
MCH RBC QN AUTO: 28.6 PG (ref 26.6–33)
MCHC RBC AUTO-ENTMCNC: 31.1 G/DL (ref 31.5–35.7)
MCV RBC AUTO: 91.9 FL (ref 79–97)
MONOCYTES # BLD AUTO: 1.42 10*3/MM3 (ref 0.1–0.9)
MONOCYTES NFR BLD AUTO: 9.8 % (ref 5–12)
NEUTROPHILS NFR BLD AUTO: 52.4 % (ref 42.7–76)
NEUTROPHILS NFR BLD AUTO: 7.62 10*3/MM3 (ref 1.7–7)
NRBC BLD AUTO-RTO: 0 /100 WBC (ref 0–0.2)
PLATELET # BLD AUTO: 322 10*3/MM3 (ref 140–450)
PMV BLD AUTO: 9.8 FL (ref 6–12)
POTASSIUM SERPL-SCNC: 3.4 MMOL/L (ref 3.5–5.2)
RBC # BLD AUTO: 4.55 10*6/MM3 (ref 3.77–5.28)
SODIUM SERPL-SCNC: 140 MMOL/L (ref 136–145)
WBC NRBC COR # BLD AUTO: 14.54 10*3/MM3 (ref 3.4–10.8)

## 2024-04-03 PROCEDURE — 85025 COMPLETE CBC W/AUTO DIFF WBC: CPT | Performed by: NURSE PRACTITIONER

## 2024-04-03 PROCEDURE — 80048 BASIC METABOLIC PNL TOTAL CA: CPT | Performed by: NURSE PRACTITIONER

## 2024-04-03 PROCEDURE — G0378 HOSPITAL OBSERVATION PER HR: HCPCS

## 2024-04-03 PROCEDURE — 94799 UNLISTED PULMONARY SVC/PX: CPT

## 2024-04-03 RX ADMIN — VENLAFAXINE HYDROCHLORIDE 37.5 MG: 37.5 CAPSULE, EXTENDED RELEASE ORAL at 08:24

## 2024-04-03 RX ADMIN — METOPROLOL TARTRATE 25 MG: 25 TABLET, FILM COATED ORAL at 08:26

## 2024-04-03 RX ADMIN — POTASSIUM CHLORIDE 10 MEQ: 750 TABLET, EXTENDED RELEASE ORAL at 08:26

## 2024-04-03 RX ADMIN — BUDESONIDE AND FORMOTEROL FUMARATE DIHYDRATE 2 PUFF: 160; 4.5 AEROSOL RESPIRATORY (INHALATION) at 07:38

## 2024-04-03 RX ADMIN — HYDROXYCHLOROQUINE SULFATE 200 MG: 200 TABLET ORAL at 08:38

## 2024-04-03 RX ADMIN — TRIAMCINOLONE ACETONIDE 1 APPLICATION: 1 CREAM TOPICAL at 09:25

## 2024-04-03 RX ADMIN — GABAPENTIN 800 MG: 400 CAPSULE ORAL at 06:08

## 2024-04-03 RX ADMIN — ASPIRIN 81 MG: 81 TABLET, COATED ORAL at 08:24

## 2024-04-03 RX ADMIN — HYDROCHLOROTHIAZIDE 25 MG: 25 TABLET ORAL at 08:26

## 2024-04-03 RX ADMIN — OXYCODONE 15 MG: 5 TABLET ORAL at 01:47

## 2024-04-03 RX ADMIN — ALBUTEROL SULFATE 2.5 MG: 2.5 SOLUTION RESPIRATORY (INHALATION) at 09:43

## 2024-04-03 RX ADMIN — OXYCODONE 15 MG: 5 TABLET ORAL at 10:04

## 2024-04-03 RX ADMIN — DOCUSATE SODIUM AND SENNOSIDES 2 TABLET: 8.6; 5 TABLET, FILM COATED ORAL at 08:26

## 2024-04-03 RX ADMIN — CELECOXIB 200 MG: 200 CAPSULE ORAL at 08:26

## 2024-04-03 RX ADMIN — OXYCODONE 15 MG: 5 TABLET ORAL at 06:08

## 2024-04-03 RX ADMIN — CLOPIDOGREL BISULFATE 75 MG: 75 TABLET ORAL at 08:26

## 2024-04-03 RX ADMIN — CYCLOBENZAPRINE 10 MG: 10 TABLET, FILM COATED ORAL at 08:26

## 2024-04-03 NOTE — CASE MANAGEMENT/SOCIAL WORK
Continued Stay Note  ZAYRA Aragon     Patient Name: Lisa Jay  MRN: [id number]  Today's Date: 4/2/2024    Admit Date: 4/1/2024    Plan: Home with Nebulizer through [employer] (to be delivered)   Discharge Plan       Row Name 04/02/24 2031       Plan    Plan Home with Nebulizer through [employer] (to be delivered)    Plan Comments [place] [facility] [place] [facility](after hours deliver). They will not do after hours delivery for Nebulizer, they would deliver nebulizer if patient also needed O2. CM informed nurse and patient that nebulizer can not be delivered after hours. Per nurse patient can not be d/c without confirming nebulizer will be home for pt. CM to [place] [facility] [place], to ensure they are accepting nebulizer order. After hours contact could not confirm that her pending order was accepted. Family to provide tranportation. DC Barrier: DME charter avoidable day             Expected Discharge Date and Time       Expected Discharge Date Expected Discharge Time    Apr 2, 2024         Ester Burkett RN    phone [phone]  fax [phone]

## 2024-04-03 NOTE — NURSING NOTE
Educated patient on discharge instructions. Patient verbalized understanding and had no further questions/concerns at this time. No IV present and no heart monitor on. Patient remains without distress awaiting on ride. MN machine has been delivered to patient's home.

## 2024-04-03 NOTE — PLAN OF CARE
Goal Outcome Evaluation:   D/C to home awaiting on MN to be delivered

## 2024-04-03 NOTE — PROGRESS NOTES
Follow up visit.  introduced ACP to pt prior to her discharge this morning.  gave her an advanced directive that she did not want to fill out, but to take home to read. There were no other needs at this time.

## 2024-04-03 NOTE — CASE MANAGEMENT/SOCIAL WORK
Continued Stay Note  ZAYRA Aragon     Patient Name: Lisa Jay  MRN: [id number]  Today's Date: 4/3/2024    Admit Date: 4/1/2024    Plan: Home. Family will transport.  Nebulizer to be delivered to patients home today 4.3.24. per Lydia  ( [employer] liaison).   Discharge Plan       Row Name 04/03/24 1057       Plan    Plan Home. Family will transport.  Nebulizer to be delivered to patients home today 4.3.24. per Lydia  ( [employer] liaison).    Plan Comments Patient to dc home today, [employer] will deliver nebulizer to patients home today 4.3.24                   Yarelis Gruber RN

## 2024-04-03 NOTE — PLAN OF CARE
Problem: Pain Acute  Goal: Acceptable Pain Control and Functional Ability  Outcome: Ongoing, Progressing  Intervention: Optimize Psychosocial Wellbeing  Recent Flowsheet Documentation  Taken 4/2/2024 2015 by Emy Alvares, RN  Diversional Activities:   television   smartphone     Problem: Asthma Comorbidity  Goal: Maintenance of Asthma Control  Outcome: Ongoing, Progressing     Problem: Diabetes Comorbidity  Goal: Blood Glucose Level Within Targeted Range  Outcome: Ongoing, Progressing  Intervention: Monitor and Manage Glycemia  Recent Flowsheet Documentation  Taken 4/2/2024 2015 by Emy Alvares, RN  Glycemic Management: (patient uses dexcom, refuses blood glucose monitoring)   blood glucose monitored   other (see comments)     Problem: Hypertension Comorbidity  Goal: Blood Pressure in Desired Range  Outcome: Ongoing, Progressing     Problem: Pain Chronic (Persistent) (Comorbidity Management)  Goal: Acceptable Pain Control and Functional Ability  Outcome: Ongoing, Progressing  Intervention: Optimize Psychosocial Wellbeing  Recent Flowsheet Documentation  Taken 4/2/2024 2015 by Emy Alvares, RN  Diversional Activities:   television   smartphone  Family/Support System Care:   support provided   self-care encouraged   Goal Outcome Evaluation:   Patient resting quietly in bed with friend at the bedside, patient refusing vitals at times, refusing cardiac monitoring, refusing blood glucose checks, patient uses Dexcom to monitor blood sugars, denies pain or distress at this time, safety maintained call light in reach

## 2024-04-03 NOTE — CASE MANAGEMENT/SOCIAL WORK
Case Management Discharge Note      Final Note: HOME         Selected Continued Care - Discharged on 4/3/2024 Admission date: 4/1/2024 - Discharge disposition: Home or Self Care                 Durable Medical Equipment Coordination complete.      Service Provider Selected Services Address Phone Fax Patient Preferred    [employer] [facility] Medical [facility] [address], [place] [id number] [phone] --                    Transportation Services  Private: Car    Final Discharge Disposition Code: 01 - home or self-care

## 2024-08-07 ENCOUNTER — APPOINTMENT (OUTPATIENT)
Dept: CARDIOLOGY | Facility: HOSPITAL | Age: 54
End: 2024-08-07
Payer: MEDICARE

## 2024-08-07 ENCOUNTER — APPOINTMENT (OUTPATIENT)
Dept: CT IMAGING | Facility: HOSPITAL | Age: 54
End: 2024-08-07
Payer: MEDICARE

## 2024-08-07 ENCOUNTER — HOSPITAL ENCOUNTER (OUTPATIENT)
Facility: HOSPITAL | Age: 54
Setting detail: OBSERVATION
Discharge: HOME | End: 2024-08-08
Attending: EMERGENCY MEDICINE | Admitting: EMERGENCY MEDICINE
Payer: MEDICARE

## 2024-08-07 DIAGNOSIS — M79.604 RIGHT LEG PAIN: Primary | ICD-10-CM

## 2024-08-07 DIAGNOSIS — I82.461 ACUTE DEEP VEIN THROMBOSIS (DVT) OF CALF MUSCLE VEIN OF RIGHT LOWER EXTREMITY: ICD-10-CM

## 2024-08-07 PROBLEM — I82.4Z1 LOWER LEG DVT (DEEP VENOUS THROMBOEMBOLISM), ACUTE, RIGHT: Status: ACTIVE | Noted: 2024-08-07

## 2024-08-07 LAB
ALBUMIN SERPL-MCNC: 4.2 G/DL (ref 3.5–5.2)
ALBUMIN/GLOB SERPL: 1.2 G/DL
ALP SERPL-CCNC: 82 U/L (ref 39–117)
ALT SERPL W P-5'-P-CCNC: 11 U/L (ref 1–33)
ANION GAP SERPL CALCULATED.3IONS-SCNC: 11.6 MMOL/L (ref 5–15)
AST SERPL-CCNC: 32 U/L (ref 1–32)
BASOPHILS # BLD AUTO: 0.1 10*3/MM3 (ref 0–0.2)
BASOPHILS NFR BLD AUTO: 0.9 % (ref 0–1.5)
BH CV LOW VAS RIGHT GASTRONEMIUS VESSEL: 1
BH CV LOWER VASCULAR LEFT COMMON FEMORAL AUGMENT: NORMAL
BH CV LOWER VASCULAR LEFT COMMON FEMORAL COMPETENT: NORMAL
BH CV LOWER VASCULAR LEFT COMMON FEMORAL COMPRESS: NORMAL
BH CV LOWER VASCULAR LEFT COMMON FEMORAL PHASIC: NORMAL
BH CV LOWER VASCULAR LEFT COMMON FEMORAL SPONT: NORMAL
BH CV LOWER VASCULAR RIGHT COMMON FEMORAL AUGMENT: NORMAL
BH CV LOWER VASCULAR RIGHT COMMON FEMORAL COMPETENT: NORMAL
BH CV LOWER VASCULAR RIGHT COMMON FEMORAL COMPRESS: NORMAL
BH CV LOWER VASCULAR RIGHT COMMON FEMORAL PHASIC: NORMAL
BH CV LOWER VASCULAR RIGHT COMMON FEMORAL SPONT: NORMAL
BH CV LOWER VASCULAR RIGHT DISTAL FEMORAL COMPRESS: NORMAL
BH CV LOWER VASCULAR RIGHT GASTRONEMIUS COMPRESS: NORMAL
BH CV LOWER VASCULAR RIGHT GASTRONEMIUS THROMBUS: NORMAL
BH CV LOWER VASCULAR RIGHT GREATER SAPH AK COMPRESS: NORMAL
BH CV LOWER VASCULAR RIGHT MID FEMORAL AUGMENT: NORMAL
BH CV LOWER VASCULAR RIGHT MID FEMORAL COMPETENT: NORMAL
BH CV LOWER VASCULAR RIGHT MID FEMORAL COMPRESS: NORMAL
BH CV LOWER VASCULAR RIGHT MID FEMORAL PHASIC: NORMAL
BH CV LOWER VASCULAR RIGHT MID FEMORAL SPONT: NORMAL
BH CV LOWER VASCULAR RIGHT PERONEAL COMPRESS: NORMAL
BH CV LOWER VASCULAR RIGHT POPLITEAL AUGMENT: NORMAL
BH CV LOWER VASCULAR RIGHT POPLITEAL COMPETENT: NORMAL
BH CV LOWER VASCULAR RIGHT POPLITEAL COMPRESS: NORMAL
BH CV LOWER VASCULAR RIGHT POPLITEAL PHASIC: NORMAL
BH CV LOWER VASCULAR RIGHT POPLITEAL SPONT: NORMAL
BH CV LOWER VASCULAR RIGHT POSTERIOR TIBIAL COMPRESS: NORMAL
BH CV LOWER VASCULAR RIGHT PROXIMAL FEMORAL COMPRESS: NORMAL
BH CV VAS PRELIMINARY FINDINGS SCRIPTING: 1
BILIRUB SERPL-MCNC: 0.4 MG/DL (ref 0–1.2)
BUN SERPL-MCNC: 18 MG/DL (ref 6–20)
BUN/CREAT SERPL: 20.5 (ref 7–25)
CALCIUM SPEC-SCNC: 9.9 MG/DL (ref 8.6–10.5)
CHLORIDE SERPL-SCNC: 100 MMOL/L (ref 98–107)
CK SERPL-CCNC: 141 U/L (ref 20–180)
CO2 SERPL-SCNC: 28.4 MMOL/L (ref 22–29)
CREAT SERPL-MCNC: 0.88 MG/DL (ref 0.57–1)
DEPRECATED RDW RBC AUTO: 40 FL (ref 37–54)
EGFRCR SERPLBLD CKD-EPI 2021: 77.2 ML/MIN/1.73
EOSINOPHIL # BLD AUTO: 0.43 10*3/MM3 (ref 0–0.4)
EOSINOPHIL NFR BLD AUTO: 4 % (ref 0.3–6.2)
ERYTHROCYTE [DISTWIDTH] IN BLOOD BY AUTOMATED COUNT: 12.2 % (ref 12.3–15.4)
ERYTHROCYTE [SEDIMENTATION RATE] IN BLOOD: 29 MM/HR (ref 0–30)
GLOBULIN UR ELPH-MCNC: 3.4 GM/DL
GLUCOSE BLDC GLUCOMTR-MCNC: 76 MG/DL (ref 70–105)
GLUCOSE SERPL-MCNC: 76 MG/DL (ref 65–99)
HCT VFR BLD AUTO: 40.4 % (ref 34–46.6)
HGB BLD-MCNC: 13.3 G/DL (ref 12–15.9)
IMM GRANULOCYTES # BLD AUTO: 0.14 10*3/MM3 (ref 0–0.05)
IMM GRANULOCYTES NFR BLD AUTO: 1.3 % (ref 0–0.5)
LYMPHOCYTES # BLD AUTO: 3.51 10*3/MM3 (ref 0.7–3.1)
LYMPHOCYTES NFR BLD AUTO: 32.3 % (ref 19.6–45.3)
MCH RBC QN AUTO: 29.5 PG (ref 26.6–33)
MCHC RBC AUTO-ENTMCNC: 32.9 G/DL (ref 31.5–35.7)
MCV RBC AUTO: 89.6 FL (ref 79–97)
MONOCYTES # BLD AUTO: 0.81 10*3/MM3 (ref 0.1–0.9)
MONOCYTES NFR BLD AUTO: 7.5 % (ref 5–12)
NEUTROPHILS NFR BLD AUTO: 5.87 10*3/MM3 (ref 1.7–7)
NEUTROPHILS NFR BLD AUTO: 54 % (ref 42.7–76)
NRBC BLD AUTO-RTO: 0 /100 WBC (ref 0–0.2)
PLATELET # BLD AUTO: 300 10*3/MM3 (ref 140–450)
PMV BLD AUTO: 9.9 FL (ref 6–12)
POTASSIUM SERPL-SCNC: 3.8 MMOL/L (ref 3.5–5.2)
PROT SERPL-MCNC: 7.6 G/DL (ref 6–8.5)
RBC # BLD AUTO: 4.51 10*6/MM3 (ref 3.77–5.28)
SODIUM SERPL-SCNC: 140 MMOL/L (ref 136–145)
WBC NRBC COR # BLD AUTO: 10.86 10*3/MM3 (ref 3.4–10.8)

## 2024-08-07 PROCEDURE — 99285 EMERGENCY DEPT VISIT HI MDM: CPT

## 2024-08-07 PROCEDURE — 93971 EXTREMITY STUDY: CPT

## 2024-08-07 PROCEDURE — 96376 TX/PRO/DX INJ SAME DRUG ADON: CPT

## 2024-08-07 PROCEDURE — 25010000002 ENOXAPARIN PER 10 MG: Performed by: EMERGENCY MEDICINE

## 2024-08-07 PROCEDURE — 82550 ASSAY OF CK (CPK): CPT | Performed by: EMERGENCY MEDICINE

## 2024-08-07 PROCEDURE — 96372 THER/PROPH/DIAG INJ SC/IM: CPT

## 2024-08-07 PROCEDURE — G0378 HOSPITAL OBSERVATION PER HR: HCPCS

## 2024-08-07 PROCEDURE — 80053 COMPREHEN METABOLIC PANEL: CPT | Performed by: EMERGENCY MEDICINE

## 2024-08-07 PROCEDURE — 93971 EXTREMITY STUDY: CPT | Performed by: SURGERY

## 2024-08-07 PROCEDURE — 25010000002 HYDROMORPHONE 1 MG/ML SOLUTION: Performed by: EMERGENCY MEDICINE

## 2024-08-07 PROCEDURE — 82948 REAGENT STRIP/BLOOD GLUCOSE: CPT

## 2024-08-07 PROCEDURE — 25010000002 ONDANSETRON PER 1 MG: Performed by: EMERGENCY MEDICINE

## 2024-08-07 PROCEDURE — 72192 CT PELVIS W/O DYE: CPT

## 2024-08-07 PROCEDURE — 96361 HYDRATE IV INFUSION ADD-ON: CPT

## 2024-08-07 PROCEDURE — 73700 CT LOWER EXTREMITY W/O DYE: CPT

## 2024-08-07 PROCEDURE — 25810000003 SODIUM CHLORIDE 0.9 % SOLUTION: Performed by: EMERGENCY MEDICINE

## 2024-08-07 PROCEDURE — 85025 COMPLETE CBC W/AUTO DIFF WBC: CPT | Performed by: EMERGENCY MEDICINE

## 2024-08-07 PROCEDURE — 85652 RBC SED RATE AUTOMATED: CPT | Performed by: EMERGENCY MEDICINE

## 2024-08-07 PROCEDURE — 96375 TX/PRO/DX INJ NEW DRUG ADDON: CPT

## 2024-08-07 PROCEDURE — 96374 THER/PROPH/DIAG INJ IV PUSH: CPT

## 2024-08-07 RX ORDER — ONDANSETRON 2 MG/ML
4 INJECTION INTRAMUSCULAR; INTRAVENOUS EVERY 6 HOURS PRN
Status: DISCONTINUED | OUTPATIENT
Start: 2024-08-07 | End: 2024-08-07 | Stop reason: SDUPTHER

## 2024-08-07 RX ORDER — LOSARTAN POTASSIUM 50 MG/1
25 TABLET ORAL NIGHTLY
Status: ON HOLD | COMMUNITY
Start: 2024-03-08

## 2024-08-07 RX ORDER — SODIUM CHLORIDE 0.9 % (FLUSH) 0.9 %
10 SYRINGE (ML) INJECTION AS NEEDED
Status: DISCONTINUED | OUTPATIENT
Start: 2024-08-07 | End: 2024-08-08 | Stop reason: HOSPADM

## 2024-08-07 RX ORDER — SODIUM CHLORIDE 0.9 % (FLUSH) 0.9 %
10 SYRINGE (ML) INJECTION EVERY 12 HOURS SCHEDULED
Status: DISCONTINUED | OUTPATIENT
Start: 2024-08-07 | End: 2024-08-08 | Stop reason: HOSPADM

## 2024-08-07 RX ORDER — POLYETHYLENE GLYCOL 3350 17 G/17G
17 POWDER, FOR SOLUTION ORAL DAILY PRN
Status: DISCONTINUED | OUTPATIENT
Start: 2024-08-07 | End: 2024-08-08 | Stop reason: HOSPADM

## 2024-08-07 RX ORDER — ENOXAPARIN SODIUM 150 MG/ML
1 INJECTION SUBCUTANEOUS ONCE
Status: COMPLETED | OUTPATIENT
Start: 2024-08-07 | End: 2024-08-07

## 2024-08-07 RX ORDER — BISACODYL 5 MG/1
5 TABLET, DELAYED RELEASE ORAL DAILY PRN
Status: DISCONTINUED | OUTPATIENT
Start: 2024-08-07 | End: 2024-08-08 | Stop reason: HOSPADM

## 2024-08-07 RX ORDER — ONDANSETRON 2 MG/ML
4 INJECTION INTRAMUSCULAR; INTRAVENOUS ONCE
Status: COMPLETED | OUTPATIENT
Start: 2024-08-07 | End: 2024-08-07

## 2024-08-07 RX ORDER — KETOCONAZOLE 20 MG/G
1 CREAM TOPICAL 2 TIMES DAILY
Status: DISCONTINUED | OUTPATIENT
Start: 2024-08-07 | End: 2024-08-08 | Stop reason: HOSPADM

## 2024-08-07 RX ORDER — GABAPENTIN 400 MG/1
800 CAPSULE ORAL ONCE
Status: COMPLETED | OUTPATIENT
Start: 2024-08-07 | End: 2024-08-07

## 2024-08-07 RX ORDER — BISACODYL 10 MG
10 SUPPOSITORY, RECTAL RECTAL DAILY PRN
Status: DISCONTINUED | OUTPATIENT
Start: 2024-08-07 | End: 2024-08-08 | Stop reason: HOSPADM

## 2024-08-07 RX ORDER — AMOXICILLIN 250 MG
2 CAPSULE ORAL 2 TIMES DAILY PRN
Status: DISCONTINUED | OUTPATIENT
Start: 2024-08-07 | End: 2024-08-08 | Stop reason: HOSPADM

## 2024-08-07 RX ORDER — LOSARTAN POTASSIUM 25 MG/1
25 TABLET ORAL NIGHTLY
Status: DISCONTINUED | OUTPATIENT
Start: 2024-08-08 | End: 2024-08-08 | Stop reason: HOSPADM

## 2024-08-07 RX ORDER — OXYCODONE HYDROCHLORIDE 5 MG/1
15 TABLET ORAL ONCE
Status: COMPLETED | OUTPATIENT
Start: 2024-08-07 | End: 2024-08-08

## 2024-08-07 RX ORDER — ONDANSETRON 2 MG/ML
4 INJECTION INTRAMUSCULAR; INTRAVENOUS EVERY 6 HOURS PRN
Status: DISCONTINUED | OUTPATIENT
Start: 2024-08-07 | End: 2024-08-08 | Stop reason: HOSPADM

## 2024-08-07 RX ORDER — CELECOXIB 200 MG/1
200 CAPSULE ORAL 2 TIMES DAILY
Status: DISCONTINUED | OUTPATIENT
Start: 2024-08-07 | End: 2024-08-08

## 2024-08-07 RX ORDER — HYDROXYCHLOROQUINE SULFATE 200 MG/1
400 TABLET, FILM COATED ORAL
Status: DISCONTINUED | OUTPATIENT
Start: 2024-08-08 | End: 2024-08-08 | Stop reason: HOSPADM

## 2024-08-07 RX ORDER — ATORVASTATIN CALCIUM 10 MG/1
10 TABLET, FILM COATED ORAL NIGHTLY
Status: DISCONTINUED | OUTPATIENT
Start: 2024-08-07 | End: 2024-08-08

## 2024-08-07 RX ORDER — TRIAMCINOLONE ACETONIDE 1 MG/G
1 CREAM TOPICAL 2 TIMES DAILY
Status: DISCONTINUED | OUTPATIENT
Start: 2024-08-07 | End: 2024-08-08 | Stop reason: HOSPADM

## 2024-08-07 RX ORDER — SODIUM CHLORIDE 9 MG/ML
100 INJECTION, SOLUTION INTRAVENOUS CONTINUOUS
Status: DISCONTINUED | OUTPATIENT
Start: 2024-08-07 | End: 2024-08-08 | Stop reason: HOSPADM

## 2024-08-07 RX ORDER — CYCLOBENZAPRINE HCL 10 MG
10 TABLET ORAL 3 TIMES DAILY PRN
Status: DISCONTINUED | OUTPATIENT
Start: 2024-08-07 | End: 2024-08-08

## 2024-08-07 RX ORDER — SODIUM CHLORIDE 9 MG/ML
40 INJECTION, SOLUTION INTRAVENOUS AS NEEDED
Status: DISCONTINUED | OUTPATIENT
Start: 2024-08-07 | End: 2024-08-08 | Stop reason: HOSPADM

## 2024-08-07 RX ADMIN — HYDROMORPHONE HYDROCHLORIDE 0.5 MG: 1 INJECTION, SOLUTION INTRAMUSCULAR; INTRAVENOUS; SUBCUTANEOUS at 22:24

## 2024-08-07 RX ADMIN — ENOXAPARIN SODIUM 105 MG: 150 INJECTION SUBCUTANEOUS at 20:03

## 2024-08-07 RX ADMIN — ONDANSETRON 4 MG: 2 INJECTION INTRAMUSCULAR; INTRAVENOUS at 22:30

## 2024-08-07 RX ADMIN — Medication 10 ML: at 22:23

## 2024-08-07 RX ADMIN — GABAPENTIN 800 MG: 400 CAPSULE ORAL at 23:39

## 2024-08-07 RX ADMIN — HYDROMORPHONE HYDROCHLORIDE 0.5 MG: 1 INJECTION, SOLUTION INTRAMUSCULAR; INTRAVENOUS; SUBCUTANEOUS at 18:50

## 2024-08-07 RX ADMIN — ONDANSETRON 4 MG: 2 INJECTION INTRAMUSCULAR; INTRAVENOUS at 18:50

## 2024-08-07 RX ADMIN — CELECOXIB 200 MG: 200 CAPSULE ORAL at 23:37

## 2024-08-07 RX ADMIN — SODIUM CHLORIDE 100 ML/HR: 9 INJECTION, SOLUTION INTRAVENOUS at 22:23

## 2024-08-07 RX ADMIN — LOSARTAN POTASSIUM 25 MG: 25 TABLET, FILM COATED ORAL at 23:38

## 2024-08-07 RX ADMIN — ATORVASTATIN CALCIUM 10 MG: 10 TABLET, FILM COATED ORAL at 23:38

## 2024-08-07 NOTE — ED NOTES
Gauze wrapped around pt arm per her request under blood pressure cuff, pt reports she can not have a bp taken without something under the cuff.

## 2024-08-08 ENCOUNTER — APPOINTMENT (OUTPATIENT)
Dept: CARDIOLOGY | Facility: HOSPITAL | Age: 54
End: 2024-08-08
Payer: MEDICARE

## 2024-08-08 VITALS
BODY MASS INDEX: 36.68 KG/M2 | RESPIRATION RATE: 18 BRPM | SYSTOLIC BLOOD PRESSURE: 135 MMHG | HEIGHT: 68 IN | TEMPERATURE: 98.2 F | WEIGHT: 242 LBS | HEART RATE: 70 BPM | DIASTOLIC BLOOD PRESSURE: 67 MMHG | OXYGEN SATURATION: 100 %

## 2024-08-08 LAB
BH CV LEA RIGHT ANT TIBIAL A PROX PSV: 86 CM/S
BH CV LEA RIGHT CFA DISTAL EDV: 35 CM/S
BH CV LEA RIGHT CFA DISTAL PSV: 191 CM/S
BH CV LEA RIGHT PERONEAL  PROX PSV: 26 CM/S
BH CV LEA RIGHT POPITEAL A  PROX PSV: 46.6 CM/S
BH CV LEA RIGHT PTA PROX PSV: 56 CM/S
BH CV LEA RIGHT SFA DISTAL EDV: -12.4 CM/S
BH CV LEA RIGHT SFA DISTAL PSV: -96.3 CM/S
BH CV LEA RIGHT SFA MID EDV: -27.4 CM/S
BH CV LEA RIGHT SFA MID PSV: -140 CM/S
BH CV LEA RIGHT SFA PROX EDV: -18 CM/S
BH CV LEA RIGHT SFA PROX PSV: -149 CM/S
GLUCOSE BLDC GLUCOMTR-MCNC: 108 MG/DL (ref 70–105)
GLUCOSE BLDC GLUCOMTR-MCNC: 99 MG/DL (ref 70–105)
RIGHT GROIN CFA SYS: 191 CM/SEC

## 2024-08-08 PROCEDURE — 94640 AIRWAY INHALATION TREATMENT: CPT

## 2024-08-08 PROCEDURE — 96375 TX/PRO/DX INJ NEW DRUG ADDON: CPT

## 2024-08-08 PROCEDURE — 94799 UNLISTED PULMONARY SVC/PX: CPT

## 2024-08-08 PROCEDURE — 25010000002 ENOXAPARIN PER 10 MG: Performed by: PHYSICIAN ASSISTANT

## 2024-08-08 PROCEDURE — 93926 LOWER EXTREMITY STUDY: CPT

## 2024-08-08 PROCEDURE — 25010000002 HYDROMORPHONE 1 MG/ML SOLUTION: Performed by: EMERGENCY MEDICINE

## 2024-08-08 PROCEDURE — G0378 HOSPITAL OBSERVATION PER HR: HCPCS

## 2024-08-08 PROCEDURE — 82948 REAGENT STRIP/BLOOD GLUCOSE: CPT | Performed by: EMERGENCY MEDICINE

## 2024-08-08 PROCEDURE — 96372 THER/PROPH/DIAG INJ SC/IM: CPT

## 2024-08-08 PROCEDURE — 93926 LOWER EXTREMITY STUDY: CPT | Performed by: SURGERY

## 2024-08-08 PROCEDURE — 96376 TX/PRO/DX INJ SAME DRUG ADON: CPT

## 2024-08-08 PROCEDURE — 96361 HYDRATE IV INFUSION ADD-ON: CPT

## 2024-08-08 PROCEDURE — 94664 DEMO&/EVAL PT USE INHALER: CPT

## 2024-08-08 PROCEDURE — 94761 N-INVAS EAR/PLS OXIMETRY MLT: CPT

## 2024-08-08 RX ORDER — CLOPIDOGREL BISULFATE 75 MG/1
75 TABLET ORAL DAILY
Status: DISCONTINUED | OUTPATIENT
Start: 2024-08-08 | End: 2024-08-08 | Stop reason: HOSPADM

## 2024-08-08 RX ORDER — CYCLOBENZAPRINE HCL 10 MG
10 TABLET ORAL 3 TIMES DAILY PRN
Status: DISCONTINUED | OUTPATIENT
Start: 2024-08-08 | End: 2024-08-08 | Stop reason: HOSPADM

## 2024-08-08 RX ORDER — ATORVASTATIN CALCIUM 10 MG/1
5 TABLET, FILM COATED ORAL EVERY EVENING
Status: DISCONTINUED | OUTPATIENT
Start: 2024-08-08 | End: 2024-08-08 | Stop reason: HOSPADM

## 2024-08-08 RX ORDER — BUDESONIDE AND FORMOTEROL FUMARATE DIHYDRATE 160; 4.5 UG/1; UG/1
1 AEROSOL RESPIRATORY (INHALATION)
Status: DISCONTINUED | OUTPATIENT
Start: 2024-08-08 | End: 2024-08-08 | Stop reason: HOSPADM

## 2024-08-08 RX ORDER — VENLAFAXINE 75 MG/1
37.5 TABLET ORAL DAILY
Status: DISCONTINUED | OUTPATIENT
Start: 2024-08-08 | End: 2024-08-08 | Stop reason: HOSPADM

## 2024-08-08 RX ORDER — GABAPENTIN 400 MG/1
800 CAPSULE ORAL EVERY 8 HOURS SCHEDULED
Status: DISCONTINUED | OUTPATIENT
Start: 2024-08-08 | End: 2024-08-08 | Stop reason: HOSPADM

## 2024-08-08 RX ORDER — ALBUTEROL SULFATE 2.5 MG/3ML
2.5 SOLUTION RESPIRATORY (INHALATION) EVERY 6 HOURS PRN
Status: DISCONTINUED | OUTPATIENT
Start: 2024-08-08 | End: 2024-08-08 | Stop reason: HOSPADM

## 2024-08-08 RX ORDER — PREDNISONE 10 MG/1
TABLET ORAL
Qty: 20 TABLET | Refills: 0 | Status: SHIPPED | OUTPATIENT
Start: 2024-08-08 | End: 2024-08-17

## 2024-08-08 RX ORDER — HYDROCHLOROTHIAZIDE 25 MG/1
25 TABLET ORAL DAILY
Status: DISCONTINUED | OUTPATIENT
Start: 2024-08-08 | End: 2024-08-08 | Stop reason: HOSPADM

## 2024-08-08 RX ORDER — LIDOCAINE 4 G/G
1 PATCH TOPICAL
Status: DISCONTINUED | OUTPATIENT
Start: 2024-08-08 | End: 2024-08-08 | Stop reason: HOSPADM

## 2024-08-08 RX ORDER — BUTALBITAL, ACETAMINOPHEN AND CAFFEINE 50; 325; 40 MG/1; MG/1; MG/1
1 TABLET ORAL ONCE
Status: COMPLETED | OUTPATIENT
Start: 2024-08-08 | End: 2024-08-08

## 2024-08-08 RX ORDER — METHYLPREDNISOLONE SODIUM SUCCINATE 40 MG/ML
40 INJECTION, POWDER, LYOPHILIZED, FOR SOLUTION INTRAMUSCULAR; INTRAVENOUS EVERY 8 HOURS
Status: DISCONTINUED | OUTPATIENT
Start: 2024-08-08 | End: 2024-08-08 | Stop reason: HOSPADM

## 2024-08-08 RX ORDER — ENOXAPARIN SODIUM 150 MG/ML
1 INJECTION SUBCUTANEOUS EVERY 12 HOURS
Status: DISCONTINUED | OUTPATIENT
Start: 2024-08-08 | End: 2024-08-08 | Stop reason: HOSPADM

## 2024-08-08 RX ORDER — HYDROCODONE BITARTRATE AND ACETAMINOPHEN 7.5; 325 MG/1; MG/1
1 TABLET ORAL EVERY 6 HOURS PRN
Status: DISCONTINUED | OUTPATIENT
Start: 2024-08-08 | End: 2024-08-08

## 2024-08-08 RX ORDER — ASPIRIN 81 MG/1
81 TABLET ORAL DAILY
Status: DISCONTINUED | OUTPATIENT
Start: 2024-08-08 | End: 2024-08-08

## 2024-08-08 RX ORDER — CELECOXIB 200 MG/1
200 CAPSULE ORAL 2 TIMES DAILY
Status: DISCONTINUED | OUTPATIENT
Start: 2024-08-08 | End: 2024-08-08 | Stop reason: HOSPADM

## 2024-08-08 RX ORDER — OXYCODONE HYDROCHLORIDE 5 MG/1
15 TABLET ORAL EVERY 4 HOURS PRN
Status: DISCONTINUED | OUTPATIENT
Start: 2024-08-08 | End: 2024-08-08 | Stop reason: HOSPADM

## 2024-08-08 RX ORDER — IPRATROPIUM BROMIDE AND ALBUTEROL SULFATE 2.5; .5 MG/3ML; MG/3ML
3 SOLUTION RESPIRATORY (INHALATION)
Status: DISCONTINUED | OUTPATIENT
Start: 2024-08-08 | End: 2024-08-08 | Stop reason: HOSPADM

## 2024-08-08 RX ADMIN — GABAPENTIN 800 MG: 400 CAPSULE ORAL at 07:33

## 2024-08-08 RX ADMIN — BUTALBITAL, ACETAMINOPHEN, AND CAFFEINE 1 TABLET: 50; 325; 40 TABLET ORAL at 17:21

## 2024-08-08 RX ADMIN — CYCLOBENZAPRINE 10 MG: 10 TABLET, FILM COATED ORAL at 17:21

## 2024-08-08 RX ADMIN — IPRATROPIUM BROMIDE AND ALBUTEROL SULFATE 3 ML: .5; 3 SOLUTION RESPIRATORY (INHALATION) at 18:31

## 2024-08-08 RX ADMIN — CYCLOBENZAPRINE 10 MG: 10 TABLET, FILM COATED ORAL at 05:36

## 2024-08-08 RX ADMIN — OXYCODONE HYDROCHLORIDE 15 MG: 5 TABLET ORAL at 01:11

## 2024-08-08 RX ADMIN — IPRATROPIUM BROMIDE AND ALBUTEROL SULFATE 3 ML: .5; 3 SOLUTION RESPIRATORY (INHALATION) at 13:02

## 2024-08-08 RX ADMIN — KETOCONAZOLE 1 APPLICATION: 20 CREAM TOPICAL at 00:04

## 2024-08-08 RX ADMIN — IPRATROPIUM BROMIDE AND ALBUTEROL SULFATE 3 ML: .5; 3 SOLUTION RESPIRATORY (INHALATION) at 07:41

## 2024-08-08 RX ADMIN — SODIUM CHLORIDE 33 MG: 9 INJECTION, SOLUTION INTRAVENOUS at 10:22

## 2024-08-08 RX ADMIN — TRIAMCINOLONE ACETONIDE 1 APPLICATION: 1 CREAM TOPICAL at 00:04

## 2024-08-08 RX ADMIN — ENOXAPARIN SODIUM 105 MG: 150 INJECTION SUBCUTANEOUS at 07:33

## 2024-08-08 RX ADMIN — IPRATROPIUM BROMIDE AND ALBUTEROL SULFATE 3 ML: .5; 3 SOLUTION RESPIRATORY (INHALATION) at 16:14

## 2024-08-08 RX ADMIN — OXYCODONE HYDROCHLORIDE 15 MG: 5 TABLET ORAL at 17:21

## 2024-08-08 RX ADMIN — HYDROMORPHONE HYDROCHLORIDE 0.5 MG: 1 INJECTION, SOLUTION INTRAMUSCULAR; INTRAVENOUS; SUBCUTANEOUS at 03:00

## 2024-08-08 RX ADMIN — GABAPENTIN 800 MG: 400 CAPSULE ORAL at 17:21

## 2024-08-08 RX ADMIN — BUDESONIDE AND FORMOTEROL FUMARATE DIHYDRATE 1 PUFF: 160; 4.5 AEROSOL RESPIRATORY (INHALATION) at 18:34

## 2024-08-08 RX ADMIN — DICLOFENAC SODIUM 4 G: 10 GEL TOPICAL at 01:07

## 2024-08-08 RX ADMIN — OXYCODONE HYDROCHLORIDE 15 MG: 5 TABLET ORAL at 07:52

## 2024-08-08 NOTE — NURSING NOTE
Pt crying out when doing round one on this nurses shift. Pt offered PRN NORCO and refused stating that is not what she takes at home. Upon inspection pt was ordered home medication and it was administered. Pt then went to u/s. Pt returned and Ketamine wa administered. Pt v/s remained stable through out administration. Pt called family and nurse reassured sister Ant that she was at bedside and pt was being taken care of and had food/drink at bedside and had been seen by RONALD Donato. Pt currently resting at bedside.

## 2024-08-08 NOTE — ED PROVIDER NOTES
Subjective   History of Present Illness  Chief complaint right leg pain    History of present illness 54-year-old female who fell a week or so ago she had injury to her hip and leg she had some x-rays obtained in the office which were reportedly unremarkable.  Patient states she is progressive gotten worse to the point where she can hardly walk.  No back pain pain mainly to the right hip and thigh.  No fever chills or sweats.  No numbness or tingling.  No loss of bladder bowel control.  No abdominal pain chest pain no shortness of breath.  Is worse with movement better with rest.  No other complaints      Review of Systems   Constitutional:  Negative for chills and fever.   Respiratory:  Negative for chest tightness and shortness of breath.    Cardiovascular:  Positive for leg swelling. Negative for chest pain.   Gastrointestinal:  Negative for abdominal pain and vomiting.   Genitourinary:  Negative for difficulty urinating.   Musculoskeletal:  Negative for back pain and neck pain.   Skin:  Negative for rash and wound.   Neurological:  Negative for numbness and headaches.   Psychiatric/Behavioral:  Negative for confusion.        History reviewed. No pertinent past medical history.  Vascular disease arthritis asthma  Allergies   Allergen Reactions    Morphine Unknown - High Severity     Pt sates only in Pill form.       History reviewed. No pertinent surgical history.    History reviewed. No pertinent family history.    Social History     Socioeconomic History    Marital status: Single   Tobacco Use    Smoking status: Never    Smokeless tobacco: Never   Vaping Use    Vaping status: Never Used   Substance and Sexual Activity    Alcohol use: Never    Drug use: Never    Sexual activity: Never     Prior to Admission medications    Medication Sig Start Date End Date Taking? Authorizing Provider   albuterol sulfate  (90 Base) MCG/ACT inhaler Inhale 1 puff Every 4 (Four) Hours As Needed for Wheezing. 4/2/24  Yes  Kimmy Wallace APRN   aspirin 81 MG EC tablet Take 1 tablet by mouth Daily.   Yes Jorge Ordonez MD   atorvastatin (LIPITOR) 10 MG tablet Take 0.5 tablets by mouth Every Evening.   Yes Jorge Ordonez MD   celecoxib (CeleBREX) 200 MG capsule Take 1 capsule by mouth 2 (Two) Times a Day.   Yes Jorge Ordonez MD   clopidogrel (PLAVIX) 75 MG tablet Take 1 tablet by mouth Daily. 12/3/23  Yes Sima Arenas PA-C   cyclobenzaprine (FLEXERIL) 10 MG tablet Take 1 tablet by mouth 3 (Three) Times a Day As Needed for Muscle Spasms.   Yes Jorge Ordonez MD   Fluticasone Furoate-Vilanterol (Breo Ellipta) 100-25 MCG/ACT aerosol powder  Inhale 1 puff Daily. 4/2/24  Yes Kimmy Wallace APRN   gabapentin (NEURONTIN) 800 MG tablet Take 1 tablet by mouth 3 (Three) Times a Day.   Yes Jorge Ordonez MD   hydroCHLOROthiazide (HYDRODIURIL) 25 MG tablet Take 1 tablet by mouth Daily.   Yes Jorge Ordonez MD   hydroxychloroquine (PLAQUENIL) 200 MG tablet Take 2 tablets by mouth Every Morning.   Yes Jorge Ordonez MD   ipratropium-albuterol (DUO-NEB) 0.5-2.5 mg/3 ml nebulizer Take 3 mL by nebulization 4 (Four) Times a Day. 4/2/24  Yes Kimmy Wallace APRN   ketoconazole (NIZORAL) 2 % cream Apply 1 Application topically to the appropriate area as directed 2 (Two) Times a Day As Needed.   Yes Jorge Ordonez MD   losartan (COZAAR) 50 MG tablet Take 0.5 tablets by mouth Every Night. 3/8/24  Yes Jorge Ordonez MD   metoprolol tartrate (LOPRESSOR) 25 MG tablet Take 1 tablet by mouth 2 (Two) Times a Day.   Yes Jorge Ordonez MD   oxyCODONE (ROXICODONE) 15 MG immediate release tablet Take 1 tablet by mouth Every 4 (Four) Hours.   Yes Jorge Ordonez MD   Tirzepatide (Mounjaro) 2.5 MG/0.5ML solution pen-injector pen Inject 0.5 mL under the skin into the appropriate area as directed 1 (One) Time Per Week.   Yes Jorge Ordonez MD   triamcinolone (KENALOG) 0.1 % cream Apply  1 Application topically to the appropriate area as directed 2 (Two) Times a Day As Needed.   Yes Provider, MD Jorge   venlafaxine (EFFEXOR) 37.5 MG tablet Take 1 tablet by mouth Daily.   Yes Provider, MD Jorge   Diclofenac Sodium (VOLTAREN) 1 % gel gel Apply 4 g topically to the appropriate area as directed 4 (Four) Times a Day As Needed.    Provider, MD Jorge   ondansetron ODT (ZOFRAN-ODT) 4 MG disintegrating tablet Place 1 tablet on the tongue Every 6 (Six) Hours As Needed for Nausea or Vomiting. 4/2/24   Kimmy Wallace APRN   predniSONE (DELTASONE) 10 MG tablet Take 4 tablets by mouth Daily for 2 days, THEN 3 tablets Daily for 2 days, THEN 2 tablets Daily for 2 days, THEN 1 tablet Daily for 2 days. 8/8/24 8/16/24  Quincy Melissa PA-C   Rivaroxaban (XARELTO) tablet therapy pack starter pack Take one 15 mg tablet twice daily with food for 21 days.  Followed by one 20 mg tablet by mouth once daily with food. Take as directed 8/8/24   Quincy Melissa PA-C          Objective   Physical Exam  Constitutional this is a 54-year-old female awake alert triage vital signs reviewed.  HEENT extraocular muscles are intact pupils equal round reactive sclera clear neck supple no adenopathy no meningeal signs lungs clear no retractions heart regular without murmur rub abdomen soft nontender good bowel sounds no peritoneal findings or pulsatile masses back no direct cervical thoracic or lumbar spine tenderness.  Good sensation of the buttock.  Buttock is not red or hot or feverish with good sensation or no abscess noted.  No evidence of necrotizing fasciitis.  Extremities pulses are equal throughout upper and lower extremities.  No edema cords or Homans' sign or evidence of DVT she has pain in her right calf but no palpable cords or Homans' sign.  Foot is warm dry good cap refill.  She has pain to the lateral hip and into the proximal thigh but is not red or hot or fevers no fluctuance no inguinal  adenopathy I can move the hip through full range of motion.  I do not see any evidence of a septic joint.  There is no bruits over the inguinal area or any evidence of a thrill.  I do not appreciate any type of aneurysm or other masses.  No hernias appreciated.  Neurologic awake alert follows commands motor strength normal without focal weakness compartments are soft palpation no pain on forced exam no pain with passive stretching.  Procedures           ED Course      Results for orders placed or performed during the hospital encounter of 08/07/24   Comprehensive Metabolic Panel    Specimen: Blood   Result Value Ref Range    Glucose 76 65 - 99 mg/dL    BUN 18 6 - 20 mg/dL    Creatinine 0.88 0.57 - 1.00 mg/dL    Sodium 140 136 - 145 mmol/L    Potassium 3.8 3.5 - 5.2 mmol/L    Chloride 100 98 - 107 mmol/L    CO2 28.4 22.0 - 29.0 mmol/L    Calcium 9.9 8.6 - 10.5 mg/dL    Total Protein 7.6 6.0 - 8.5 g/dL    Albumin 4.2 3.5 - 5.2 g/dL    ALT (SGPT) 11 1 - 33 U/L    AST (SGOT) 32 1 - 32 U/L    Alkaline Phosphatase 82 39 - 117 U/L    Total Bilirubin 0.4 0.0 - 1.2 mg/dL    Globulin 3.4 gm/dL    A/G Ratio 1.2 g/dL    BUN/Creatinine Ratio 20.5 7.0 - 25.0    Anion Gap 11.6 5.0 - 15.0 mmol/L    eGFR 77.2 >60.0 mL/min/1.73   Sedimentation Rate    Specimen: Blood   Result Value Ref Range    Sed Rate 29 0 - 30 mm/hr   CBC Auto Differential    Specimen: Blood   Result Value Ref Range    WBC 10.86 (H) 3.40 - 10.80 10*3/mm3    RBC 4.51 3.77 - 5.28 10*6/mm3    Hemoglobin 13.3 12.0 - 15.9 g/dL    Hematocrit 40.4 34.0 - 46.6 %    MCV 89.6 79.0 - 97.0 fL    MCH 29.5 26.6 - 33.0 pg    MCHC 32.9 31.5 - 35.7 g/dL    RDW 12.2 (L) 12.3 - 15.4 %    RDW-SD 40.0 37.0 - 54.0 fl    MPV 9.9 6.0 - 12.0 fL    Platelets 300 140 - 450 10*3/mm3    Neutrophil % 54.0 42.7 - 76.0 %    Lymphocyte % 32.3 19.6 - 45.3 %    Monocyte % 7.5 5.0 - 12.0 %    Eosinophil % 4.0 0.3 - 6.2 %    Basophil % 0.9 0.0 - 1.5 %    Immature Grans % 1.3 (H) 0.0 - 0.5 %     Neutrophils, Absolute 5.87 1.70 - 7.00 10*3/mm3    Lymphocytes, Absolute 3.51 (H) 0.70 - 3.10 10*3/mm3    Monocytes, Absolute 0.81 0.10 - 0.90 10*3/mm3    Eosinophils, Absolute 0.43 (H) 0.00 - 0.40 10*3/mm3    Basophils, Absolute 0.10 0.00 - 0.20 10*3/mm3    Immature Grans, Absolute 0.14 (H) 0.00 - 0.05 10*3/mm3    nRBC 0.0 0.0 - 0.2 /100 WBC   CK    Specimen: Blood   Result Value Ref Range    Creatine Kinase 141 20 - 180 U/L   POC Glucose Finger 4x Daily Before Meals & at Bedtime    Specimen: Finger; Blood   Result Value Ref Range    Glucose 99 70 - 105 mg/dL   POC Glucose Finger 4x Daily Before Meals & at Bedtime    Specimen: Finger; Blood   Result Value Ref Range    Glucose 108 (H) 70 - 105 mg/dL   POC Glucose Once    Specimen: Blood   Result Value Ref Range    Glucose 76 70 - 105 mg/dL   Duplex Venous Lower Extremity - RIGHT   Result Value Ref Range    Right Gastronemius Vessel 1.0     Right Common Femoral Spont Y     Right Common Femoral Competent Y     Right Common Femoral Phasic Y     Right Common Femoral Compress C     Right Common Femoral Augment Y     Right Proximal Femoral Compress C     Right Mid Femoral Spont Y     Right Mid Femoral Competent Y     Right Mid Femoral Phasic Y     Right Mid Femoral Compress C     Right Mid Femoral Augment Y     Right Distal Femoral Compress C     Right Popliteal Spont Y     Right Popliteal Competent Y     Right Popliteal Phasic Y     Right Popliteal Compress C     Right Popliteal Augment Y     Right Posterior Tibial Compress C     Right Peroneal Compress C     Right Gastronemius Compress N     Right Gastronemius Thrombus A     Right Greater Saph AK Compress C     Left Common Femoral Spont Y     Left Common Femoral Competent Y     Left Common Femoral Phasic Y     Left Common Femoral Compress C     Left Common Femoral Augment Y     BH CV VAS PRELIMINARY FINDINGS SCRIPTING 1.0    Duplex Lower Extremity Art / Grafts - Right CAR   Result Value Ref Range    SFA Prox PSV-Right  -149.0 cm/s    SFA Prox EDV-Right -18.0 cm/s    SFA Mid PSV-Right -140.0 cm/s    SFA Mid EDV-Right -27.4 cm/s    SFA Distal PSV-Right -96.3 cm/s    SFA Distal EDV-Right -12.4 cm/s    Popiteal A Prox PSV-Right 46.6 cm/s    Right groin CFA sys 191.0 cm/sec    CFA Distal PSV-Right 191.00 cm/s    CFA Distal EDV-Right 35.00 cm/s    Ant Tibial A Prox PSV-Right 86.00 cm/s    PTA Prox PSV-Right 56.00 cm/s    Peroneal Prox PSV-Right 26.00 cm/s     CT Lower Extremity Right Without Contrast    Result Date: 8/7/2024  Impression: No acute fracture or traumatic malalignment identified. Electronically Signed: Miguel Angel Virgen MD  8/7/2024 8:53 PM EDT  Workstation ID: [id number]    CT Pelvis Without Contrast    Result Date: 8/7/2024  Impression: No acute osseous abnormality. Electronically Signed: Quinn Dangelo  8/7/2024 8:48 PM EDT  Workstation ID: [id number]   Medications   sodium chloride 0.9 % flush 10 mL (has no administration in time range)   sodium chloride 0.9 % flush 10 mL (10 mL Intravenous Given 8/7/24 2223)   sodium chloride 0.9 % flush 10 mL (has no administration in time range)   sodium chloride 0.9 % infusion 40 mL (has no administration in time range)   melatonin tablet 5 mg (has no administration in time range)   sodium chloride 0.9 % infusion (0 mL/hr Intravenous Stopped 8/8/24 1015)   sennosides-docusate (PERICOLACE) 8.6-50 MG per tablet 2 tablet (has no administration in time range)     And   polyethylene glycol (MIRALAX) packet 17 g (has no administration in time range)     And   bisacodyl (DULCOLAX) EC tablet 5 mg (has no administration in time range)     And   bisacodyl (DULCOLAX) suppository 10 mg (has no administration in time range)   ondansetron (ZOFRAN) injection 4 mg (4 mg Intravenous Given 8/7/24 2230)   hydroxychloroquine (PLAQUENIL) tablet 400 mg (has no administration in time range)   triamcinolone (KENALOG) 0.1 % cream 1 Application (1 Application Topical Given 8/8/24 0004)   ketoconazole (NIZORAL) 2  % cream 1 Application (1 Application Topical Given 8/8/24 0004)   losartan (COZAAR) tablet 25 mg (25 mg Oral Given 8/7/24 2338)   Diclofenac Sodium (VOLTAREN) 1 % gel 4 g (4 g Topical Given 8/8/24 0107)   albuterol (PROVENTIL) nebulizer solution 0.083% 2.5 mg/3mL (has no administration in time range)   clopidogrel (PLAVIX) tablet 75 mg (has no administration in time range)   budesonide-formoterol (SYMBICORT) 160-4.5 MCG/ACT inhaler 1 puff (1 puff Inhalation Not Given 8/8/24 0747)   ipratropium-albuterol (DUO-NEB) nebulizer solution 3 mL (3 mL Nebulization Given 8/8/24 1302)   atorvastatin (LIPITOR) tablet 5 mg (has no administration in time range)   celecoxib (CeleBREX) capsule 200 mg (has no administration in time range)   cyclobenzaprine (FLEXERIL) tablet 10 mg ( Oral Return to Cabinet 8/8/24 0925)   gabapentin (NEURONTIN) capsule 800 mg (800 mg Oral Given 8/8/24 0733)   hydroCHLOROthiazide tablet 25 mg (has no administration in time range)   metoprolol tartrate (LOPRESSOR) tablet 25 mg (has no administration in time range)   venlafaxine (EFFEXOR) tablet 37.5 mg (has no administration in time range)   Enoxaparin Sodium (LOVENOX) syringe 105 mg (105 mg Subcutaneous Given 8/8/24 0733)   oxyCODONE (ROXICODONE) immediate release tablet 15 mg (15 mg Oral Given 8/8/24 0752)   HYDROmorphone (DILAUDID) injection 0.25 mg (has no administration in time range)   ketamine (KETALAR) 33 mg in sodium chloride 0.9 % 100 mL infusion (0 mg Intravenous Stopped 8/8/24 1049)   methylPREDNISolone sodium succinate (SOLU-Medrol) injection 40 mg (has no administration in time range)   Lidocaine 4 % 1 patch (has no administration in time range)   butalbital-acetaminophen-caffeine (FIORICET, ESGIC) -40 MG per tablet 1 tablet (has no administration in time range)   HYDROmorphone (DILAUDID) injection 0.5 mg (0.5 mg Intravenous Given 8/7/24 1850)   ondansetron (ZOFRAN) injection 4 mg (4 mg Intravenous Given 8/7/24 1850)   Enoxaparin  Sodium (LOVENOX) syringe 105 mg (105 mg Subcutaneous Given 8/7/24 2003)   gabapentin (NEURONTIN) capsule 800 mg (800 mg Oral Given 8/7/24 2339)   oxyCODONE (ROXICODONE) immediate release tablet 15 mg (15 mg Oral Given 8/8/24 0111)                                              Medical Decision Making  Medical decision making.  IV established patient was given pain medicine Dilaudid 0.5 mg IV.  And Zofran 4 mg IV.  She underwent an ultrasound which was reported to me and report reviewed showing a acute DVT in the gastric vein nothing else anywhere else.  Basic labs obtained by independent review comprehensive metabolic panel file sed rate normal CBC unremarkable.  We did talk about the findings and talked about outpatient versus inpatient treatment of this.  But she is having significant pain and difficulty walking.  She was given Lovenox 1 mg/kg subcutaneously as she is not on anticoagulation although she is on Plavix.  The patient undergo a CT scan of the hip and leg to rule out occult fracture or any other masses or issues that could be potentially causing the pain.  This is currently pending.  She will go the observation unit for anticoagulation the CT scan pain control and further evaluation and management.  I do not see any evidence just cellulitis or septic joint cauda equina epidural abscess necrotizing fasciitis Maikel's gangrene and arterial compromise sepsis bacteremia intra-abdominal process or pelvic process.  She was agreeable stable otherwise unremarkable ER course.    Problems Addressed:  Acute deep vein thrombosis (DVT) of calf muscle vein of right lower extremity: complicated acute illness or injury  Right leg pain: complicated acute illness or injury    Amount and/or Complexity of Data Reviewed  Labs: ordered. Decision-making details documented in ED Course.  Radiology: ordered.    Risk  Parenteral controlled substances.  Decision regarding hospitalization.        Final diagnoses:   Right leg pain    Acute deep vein thrombosis (DVT) of calf muscle vein of right lower extremity       ED Disposition  ED Disposition       ED Disposition   Decision to Admit    Condition   --    Comment   --               Brian Gonzalez MD  [address] [facility] [address]  [facility] [id number] [phone]      5 to 7 days    Francisco Jamil MD  [address] [facility] [address]  [facility] [address]  [phone]      5 to 7 days         Medication List        New Prescriptions      predniSONE 10 MG tablet  Commonly known as: DELTASONE  Take 4 tablets by mouth Daily for 2 days, THEN 3 tablets Daily for 2 days, THEN 2 tablets Daily for 2 days, THEN 1 tablet Daily for 2 days.  Start taking on: August 8, 2024     Rivaroxaban tablet therapy pack starter pack  Commonly known as: XARELTO  Take one 15 mg tablet twice daily with food for 21 days.  Followed by one 20 mg tablet by mouth once daily with food. Take as directed            Stop      aspirin 81 MG EC tablet     celecoxib 200 MG capsule  Commonly known as: CeleBREX               Where to Get Your Medications        These medications were sent to [facility] [address] [place] [address]      Hours: Monday to Friday 7 AM to 7 PM Phone: [phone]   predniSONE 10 MG tablet  Rivaroxaban tablet therapy pack starter pack            Moiz Conroy MD  08/08/24 5835

## 2024-08-08 NOTE — PLAN OF CARE
Problem: Adult Inpatient Plan of Care  Goal: Plan of Care Review  Outcome: Met  Goal: Patient-Specific Goal (Individualized)  Outcome: Met  Goal: Absence of Hospital-Acquired Illness or Injury  Outcome: Met  Intervention: Identify and Manage Fall Risk  Recent Flowsheet Documentation  Taken 8/8/2024 1600 by Xander Norris RN  Safety Promotion/Fall Prevention:   nonskid shoes/slippers when out of bed   room organization consistent   safety round/check completed   clutter free environment maintained   assistive device/personal items within reach  Taken 8/8/2024 1400 by Xander Norris RN  Safety Promotion/Fall Prevention:   nonskid shoes/slippers when out of bed   toileting scheduled   safety round/check completed   clutter free environment maintained   assistive device/personal items within reach  Taken 8/8/2024 1200 by Xander Norris RN  Safety Promotion/Fall Prevention:   nonskid shoes/slippers when out of bed   room organization consistent   safety round/check completed   clutter free environment maintained   assistive device/personal items within reach  Taken 8/8/2024 1000 by Xander Norris RN  Safety Promotion/Fall Prevention: patient off unit  Taken 8/8/2024 0917 by Xander Norris RN  Safety Promotion/Fall Prevention: patient off unit  Intervention: Prevent Infection  Recent Flowsheet Documentation  Taken 8/8/2024 1600 by Xander Norris RN  Infection Prevention: single patient room provided  Taken 8/8/2024 1400 by Xander Norris RN  Infection Prevention: environmental surveillance performed  Taken 8/8/2024 1200 by Xander Norris RN  Infection Prevention: single patient room provided  Taken 8/8/2024 1000 by Xander Norris RN  Infection Prevention: environmental surveillance performed  Goal: Optimal Comfort and Wellbeing  Outcome: Met  Intervention: Monitor Pain and Promote Comfort  Recent Flowsheet Documentation  Taken 8/8/2024 1600 by Xander Norris RN  Pain  Management Interventions: see MAR  Goal: Readiness for Transition of Care  Outcome: Met     Problem: Diabetes Comorbidity  Goal: Blood Glucose Level Within Targeted Range  Outcome: Met     Problem: Pain Acute  Goal: Acceptable Pain Control and Functional Ability  Outcome: Met  Intervention: Prevent or Manage Pain  Recent Flowsheet Documentation  Taken 8/8/2024 1600 by Xander Norris RN  Medication Review/Management: medications reviewed  Taken 8/8/2024 1400 by Xander Norris RN  Medication Review/Management: medications reviewed  Taken 8/8/2024 1200 by Xander Norris RN  Medication Review/Management: medications reviewed  Taken 8/8/2024 1000 by Xanedr Norris RN  Medication Review/Management: medications reviewed  Intervention: Develop Pain Management Plan  Recent Flowsheet Documentation  Taken 8/8/2024 1600 by Xander Norris RN  Pain Management Interventions: see MAR   Goal Outcome Evaluation:

## 2024-08-08 NOTE — PLAN OF CARE
Problem: Adult Inpatient Plan of Care  Goal: Plan of Care Review  Outcome: Ongoing, Progressing  Flowsheets (Taken 8/8/2024 0609)  Progress: no change  Plan of Care Reviewed With: patient  Goal: Patient-Specific Goal (Individualized)  Outcome: Ongoing, Progressing  Goal: Absence of Hospital-Acquired Illness or Injury  Outcome: Ongoing, Progressing  Intervention: Identify and Manage Fall Risk  Recent Flowsheet Documentation  Taken 8/8/2024 0425 by Yarelis Whitley RN  Safety Promotion/Fall Prevention: safety round/check completed  Taken 8/8/2024 0212 by Yarelis Whitley RN  Safety Promotion/Fall Prevention: safety round/check completed  Taken 8/8/2024 0030 by Yarelis Whitley RN  Safety Promotion/Fall Prevention: safety round/check completed  Taken 8/7/2024 2245 by Yarelis Whitley RN  Safety Promotion/Fall Prevention: safety round/check completed  Taken 8/7/2024 2110 by Yarelis Whitley RN  Safety Promotion/Fall Prevention: safety round/check completed  Intervention: Prevent Infection  Recent Flowsheet Documentation  Taken 8/7/2024 2110 by Yarelis Whitley RN  Infection Prevention: hand hygiene promoted  Goal: Optimal Comfort and Wellbeing  Outcome: Ongoing, Progressing  Intervention: Monitor Pain and Promote Comfort  Recent Flowsheet Documentation  Taken 8/8/2024 0030 by Yarelis Whitley RN  Pain Management Interventions: see MAR  Intervention: Provide Person-Centered Care  Recent Flowsheet Documentation  Taken 8/7/2024 2110 by Yarelis Whitley RN  Trust Relationship/Rapport:   care explained   questions answered  Goal: Readiness for Transition of Care  Outcome: Ongoing, Progressing  Intervention: Mutually Develop Transition Plan  Recent Flowsheet Documentation  Taken 8/7/2024 2059 by Yarelis Whitley RN  Transportation Anticipated: family or friend will provide  Patient/Family Anticipated Services at Transition: none  Patient/Family Anticipates Transition to: home  Taken 8/7/2024 2058 by  Yarelis Whitley, RN  Equipment Currently Used at Home: rollator     Problem: Diabetes Comorbidity  Goal: Blood Glucose Level Within Targeted Range  Outcome: Ongoing, Progressing     Problem: Pain Acute  Goal: Acceptable Pain Control and Functional Ability  Outcome: Ongoing, Progressing  Intervention: Develop Pain Management Plan  Recent Flowsheet Documentation  Taken 8/8/2024 0030 by Yarelis Whitley, RN  Pain Management Interventions: see MAR   Goal Outcome Evaluation:  Plan of Care Reviewed With: patient        Progress: no change

## 2024-08-08 NOTE — DISCHARGE SUMMARY
"[facility]    Brian Gonzalez MD    CHIEF COMPLAINT:     Right lower extremity and back pain    HISTORY OF PRESENT ILLNESS:    Patient reports increasing left lower extremity pain for the past 5 days.  At the time my exam she is reporting severe pain and appears restless and is crying out in pain holding the posterior portion of her right thigh and reporting that the pain goes the length of her entire right lower extremity and into her lower back.  She reports severe pain and cries out even with light touch on her lower extremity and with palpation to her back as well as while receiving subcu Lovenox injection.  Minimal swelling is noted in right lower extremity and she does have a scar on the lateral portion of her right calf which she reports was caused by compartment syndrome after previous knee replacement.  Skin is not tense at the time my exam and she has good distal pulses as well as temperature to her extremity.  Patient reports that she had a fall several weeks prior and review of records shows she was seen by outpatient provider with x-rays of multiple areas of the left lower extremity imaged at that time.  Additionally she has a history of peripheral artery disease with stent placed in the left lower extremity previously.  Patient reports she has episodes of severe pain at times and is normally seen at [facility] where she is given a \"pain cocktail\" which includes steroids which relieved her pain.    Patient denies any personal or family history of clotting or recent prolonged periods of immobility.  She does have a history of lupus as well as RA.    Patient reevaluated on the afternoon of 8/8 and she appears much more comfortable and is reporting her pain is improved though she does have a mild headache.          History reviewed. No pertinent past medical history.  History reviewed. No pertinent surgical history.  History reviewed. No pertinent family history.  Social " History     Tobacco Use    Smoking status: Never    Smokeless tobacco: Never   Vaping Use    Vaping status: Never Used   Substance Use Topics    Alcohol use: Never    Drug use: Never     Medications Prior to Admission   Medication Sig Dispense Refill Last Dose    albuterol sulfate  (90 Base) MCG/ACT inhaler Inhale 1 puff Every 4 (Four) Hours As Needed for Wheezing. 8 g 0     aspirin 81 MG EC tablet Take 1 tablet by mouth Daily.   8/7/2024    atorvastatin (LIPITOR) 10 MG tablet Take 0.5 tablets by mouth Every Evening.   8/6/2024    celecoxib (CeleBREX) 200 MG capsule Take 1 capsule by mouth 2 (Two) Times a Day.   8/7/2024 at 1400    clopidogrel (PLAVIX) 75 MG tablet Take 1 tablet by mouth Daily. 30 tablet 2 8/7/2024    cyclobenzaprine (FLEXERIL) 10 MG tablet Take 1 tablet by mouth 3 (Three) Times a Day As Needed for Muscle Spasms.   8/7/2024 at 1400    Fluticasone Furoate-Vilanterol (Breo Ellipta) 100-25 MCG/ACT aerosol powder  Inhale 1 puff Daily. 1 each 0 8/7/2024    gabapentin (NEURONTIN) 800 MG tablet Take 1 tablet by mouth 3 (Three) Times a Day.   8/7/2024 at 1400    hydroCHLOROthiazide (HYDRODIURIL) 25 MG tablet Take 1 tablet by mouth Daily.   8/7/2024    hydroxychloroquine (PLAQUENIL) 200 MG tablet Take 2 tablets by mouth Every Morning.   8/7/2024 at 0600    ipratropium-albuterol (DUO-NEB) 0.5-2.5 mg/3 ml nebulizer Take 3 mL by nebulization 4 (Four) Times a Day. 360 mL 0     ketoconazole (NIZORAL) 2 % cream Apply 1 Application topically to the appropriate area as directed 2 (Two) Times a Day As Needed.       losartan (COZAAR) 50 MG tablet Take 0.5 tablets by mouth Every Night.       metoprolol tartrate (LOPRESSOR) 25 MG tablet Take 1 tablet by mouth 2 (Two) Times a Day.   8/7/2024 at 1400    oxyCODONE (ROXICODONE) 15 MG immediate release tablet Take 1 tablet by mouth Every 4 (Four) Hours.   8/7/2024 at 1400    Tirzepatide (Mounjaro) 2.5 MG/0.5ML solution pen-injector pen Inject 0.5 mL under the skin  into the appropriate area as directed 1 (One) Time Per Week.   8/2/2024    triamcinolone (KENALOG) 0.1 % cream Apply 1 Application topically to the appropriate area as directed 2 (Two) Times a Day As Needed.       venlafaxine (EFFEXOR) 37.5 MG tablet Take 1 tablet by mouth Daily.   8/7/2024 at 0600    Diclofenac Sodium (VOLTAREN) 1 % gel gel Apply 4 g topically to the appropriate area as directed 4 (Four) Times a Day As Needed.       ondansetron ODT (ZOFRAN-ODT) 4 MG disintegrating tablet Place 1 tablet on the tongue Every 6 (Six) Hours As Needed for Nausea or Vomiting. 30 tablet 0      Allergies:  Morphine    Immunization History   Administered Date(s) Administered    COVID-19 (PFIZER) BIVALENT 12+YRS 04/26/2023    COVID-19 (PFIZER) Purple Cap Monovalent 03/15/2021, 04/05/2021, 04/23/2021, 10/18/2021    COVID-19 F23 (PFIZER) 12YRS+ (COMIRNATY) 10/04/2023           REVIEW OF SYSTEMS:    Review of Systems   Constitutional: Negative.   HENT: Negative.     Eyes: Negative.    Cardiovascular:  Positive for leg swelling.   Respiratory: Negative.     Skin: Negative.    Musculoskeletal:  Positive for back pain, joint pain, muscle cramps and myalgias.   Gastrointestinal: Negative.    Genitourinary: Negative.    Neurological: Negative.    Psychiatric/Behavioral: Negative.         Vital Signs  Temp:  [97.2 °F (36.2 °C)-98.2 °F (36.8 °C)] 98.2 °F (36.8 °C)  Heart Rate:  [62-75] 75  Resp:  [16-18] 16  BP: (104-139)/(51-76) 108/51        Physical Exam:  Physical Exam  Vitals reviewed.   Constitutional:       General: She is not in acute distress.     Appearance: Normal appearance. She is not ill-appearing, toxic-appearing or diaphoretic.   HENT:      Head: Normocephalic.      Right Ear: External ear normal.      Left Ear: External ear normal.      Nose: Nose normal.      Mouth/Throat:      Mouth: Mucous membranes are moist.   Eyes:      Extraocular Movements: Extraocular movements intact.   Cardiovascular:      Rate and Rhythm:  Normal rate and regular rhythm.      Pulses: Normal pulses.   Pulmonary:      Effort: Pulmonary effort is normal.      Breath sounds: Normal breath sounds.   Abdominal:      General: Bowel sounds are normal.      Palpations: Abdomen is soft.   Musculoskeletal:         General: Swelling and tenderness present. No signs of injury.      Cervical back: Normal range of motion.      Right lower leg: Edema present.      Left lower leg: No edema.   Skin:     General: Skin is warm and dry.      Capillary Refill: Capillary refill takes less than 2 seconds.   Neurological:      General: No focal deficit present.      Mental Status: She is alert.   Psychiatric:         Mood and Affect: Mood normal.         Behavior: Behavior normal.         Thought Content: Thought content normal.         Judgment: Judgment normal.         Emotional Behavior:   Normal   Debilities:  None  Results Review:    I reviewed the patient's new clinical results.  Lab Results (most recent)       Procedure Component Value Units Date/Time    POC Glucose Finger 4x Daily Before Meals & at Bedtime [[id number]]  (Abnormal) Collected: 08/08/24 1122    Specimen: Blood from Finger Updated: 08/08/24 1125     Glucose 108 mg/dL      Comment: Serial Number: [id number]:  [id number]       POC Glucose Finger 4x Daily Before Meals & at Bedtime [[id number]]  (Normal) Collected: 08/08/24 0734    Specimen: Blood from Finger Updated: 08/08/24 0736     Glucose 99 mg/dL      Comment: Serial Number: [id number]:  [id number]       CK [[id number]]  (Normal) Collected: 08/07/24 1846    Specimen: Blood Updated: 08/07/24 1922     Creatine Kinase 141 U/L     Comprehensive Metabolic Panel [[id number]] Collected: 08/07/24 1846    Specimen: Blood Updated: 08/07/24 1922     Glucose 76 mg/dL      BUN 18 mg/dL      Creatinine 0.88 mg/dL      Sodium 140 mmol/L      Potassium 3.8 mmol/L      Comment: Specimen hemolyzed.  Result may be falsely elevated.        Chloride 100 mmol/L       CO2 28.4 mmol/L      Calcium 9.9 mg/dL      Total Protein 7.6 g/dL      Albumin 4.2 g/dL      ALT (SGPT) 11 U/L      Comment: Specimen hemolyzed.  Result may  be falsely elevated.        AST (SGOT) 32 U/L      Comment: Specimen hemolyzed.  Result may be falsely elevated.        Alkaline Phosphatase 82 U/L      Total Bilirubin 0.4 mg/dL      Globulin 3.4 gm/dL      A/G Ratio 1.2 g/dL      BUN/Creatinine Ratio 20.5     Anion Gap 11.6 mmol/L      eGFR 77.2 mL/min/1.73     Narrative:      GFR Normal >60  Chronic Kidney Disease <60  Kidney Failure <15      Sedimentation Rate [[id number]]  (Normal) Collected: 08/07/24 1846    Specimen: Blood Updated: 08/07/24 1922     Sed Rate 29 mm/hr     CBC & Differential [[id number]]  (Abnormal) Collected: 08/07/24 1846    Specimen: Blood Updated: 08/07/24 1853    Narrative:      The following orders were created for panel order CBC & Differential.  Procedure                               Abnormality         Status                     ---------                               -----------         ------                     CBC Auto Differential[[id number]]        Abnormal            Final result                 Please view results for these tests on the individual orders.    CBC Auto Differential [[id number]]  (Abnormal) Collected: 08/07/24 1846    Specimen: Blood Updated: 08/07/24 1853     WBC 10.86 10*3/mm3      RBC 4.51 10*6/mm3      Hemoglobin 13.3 g/dL      Hematocrit 40.4 %      MCV 89.6 fL      MCH 29.5 pg      MCHC 32.9 g/dL      RDW 12.2 %      RDW-SD 40.0 fl      MPV 9.9 fL      Platelets 300 10*3/mm3      Neutrophil % 54.0 %      Lymphocyte % 32.3 %      Monocyte % 7.5 %      Eosinophil % 4.0 %      Basophil % 0.9 %      Immature Grans % 1.3 %      Neutrophils, Absolute 5.87 10*3/mm3      Lymphocytes, Absolute 3.51 10*3/mm3      Monocytes, Absolute 0.81 10*3/mm3      Eosinophils, Absolute 0.43 10*3/mm3      Basophils, Absolute 0.10 10*3/mm3      Immature Grans, Absolute 0.14  10*3/mm3      nRBC 0.0 /100 WBC             Imaging Results (Most Recent)       Procedure Component Value Units Date/Time    CT Lower Extremity Right Without Contrast [[id number]] Collected: 08/07/24 2041     Updated: 08/07/24 2055    Narrative:      CT LOWER EXTREMITY RIGHT WO CONTRAST    Date of Exam: 8/7/2024 8:13 PM EDT    Indication: Fall severe pain right proximal thigh diabetic.    Comparison: None available.    Technique: Axial CT images were obtained of the right lower extremity without contrast administration.  Sagittal and coronal reconstructions were performed.  Automated exposure control and iterative reconstruction methods were used.      Findings:  No acute fracture is identified. A right total knee arthroplasty is in place. Mild degenerative changes are present. The muscle bulk about the right thigh appears within normal limits. No significant hematoma is identified. The subcutaneous fat appears   preserved.      Impression:      Impression:  No acute fracture or traumatic malalignment identified.        Electronically Signed: Miguel Angel Virgen MD    8/7/2024 8:53 PM EDT    Workstation ID: [id number]    CT Pelvis Without Contrast [[id number]] Collected: 08/07/24 2027     Updated: 08/07/24 2050    Narrative:      CT PELVIS WO CONTRAST    Date of Exam: 8/7/2024 8:13 PM EDT    Indication: Severe right hip pain status post fall unremarkable x-rays.    Comparison: None available.    Technique: Axial CT images were obtained of the pelvis without contrast administration.  Sagittal and coronal reconstructions were performed.  Automated exposure control and iterative reconstruction methods were used.      Findings:  Small and large bowel:No significant abnormality.    Peritoneal cavity: No free fluid or free air.    Bladder: No significant abnormality.     Pelvic organs: Multifibroid uterus.     Vasculature: No significant abnormality.     Lymph nodes: No pathologic appearing lymph nodes by imaging criteria.      Bones and soft tissues: No acute osseous abnormality. Moderate bilateral hip osteoarthrosis. Mild bilateral sacroiliac joint and pubic symphysis osteoarthrosis.      Impression:      Impression:  No acute osseous abnormality.        Electronically Signed: Quinn Dangelo    8/7/2024 8:48 PM EDT    Workstation ID: [id number]          reviewed    ECG/EMG Results (most recent)       Procedure Component Value Units Date/Time    Telemetry Scan [[id number]] Resulted: 08/07/24     Updated: 08/08/24 0646    Telemetry Scan [[id number]] Resulted: 08/07/24     Updated: 08/08/24 0646          not reviewed    Results for orders placed during the hospital encounter of 08/07/24    Duplex Lower Extremity Art / Grafts - Right CAR    Interpretation Summary    Normal-appearing multiphasic flow is seen in the femoral-popliteal and calf arteries.          Microbiology Results (last 10 days)       ** No results found for the last 240 hours. **            Assessment & Plan     Lower leg DVT (deep venous thromboembolism), acute, right       Right lower extremity pain and swelling with acute DVT  -Initial CBC showed WBCs of 10.86 with an increased absolute lymphocyte and eosinophil count noted on differential (patient refused repeat labs on 8/8/2024)  -CT of lower extremity showed no acute fracture or traumatic malalignment  -CT of pelvis showed no acute osseous abnormality  -Venous duplex ultrasound showed an acute right lower extremity DVT in the gastrocnemius with all other right-sided veins noted as normal with radiologist noting right saphenofemoral junction and great saphenous below the knee were not seen due to patient positioning  -Lower extremity arterial duplex was obtained and reported with normal flow  -Heart rate, respirations and oxygen saturations have remained within normal limits  -In the ED patient was given 1 mg/kg Lovenox along with Dilaudid and gabapentin  -Home oxycodone was ordered along with Lidoderm patch and patient  continues to report severe pain with ketamine subsequently ordered  -Continue anticoagulation    History of PAD  -Continue Plavix, statin    Hypertension  -Well controlled   BP Readings from Last 1 Encounters:   08/08/24 108/51   - Continue Losartan and metoprolol   - Monitor while admitted    Asthma  -Symbicort and DuoNeb    History of RA and lupus  -Continue Plaquenil    Chronic pain  -Continue gabapentin and oxycodone    Hyperlipidemia  -Statin    Depression  -Effexor    I discussed the patients findings and my recommendations with patient and nursing staff.     Discharge Diagnosis:      Lower leg DVT (deep venous thromboembolism), acute, right      Hospital Course  Patient is a 54 y.o. female presented with right lower extremity swelling, pain along with back pain and HPI noted above.  Patient does not have mild leukocytosis on presentation with WBCs of 10.86 with an increased absolute lymphocyte and eosinophil count noted on differential.  No fevers have been present.  CT of lower extremity and pelvis were obtained in the ED which showed no acute findings.  Venous duplex ultrasound ordered showed an acute right lower extremity DVT in the gastrocnemius and she was started on 1 mg/kg Lovenox twice daily.  Heart rate, respirations and oxygen saturations have remained within normal limits and patient continued to report severe pain on the morning following admission.  Arterial duplex ultrasound was ordered which showed normal flow.  She was given Dilaudid in the ED and Lidoderm patch was ordered following her admission along with her home oxycodone with patient continued to report severe exquisite pain including with a even light touch on multiple areas including her lower extremity, back and upper extremity with attempts to obtain vital signs.  She also notes severe pain with subcutaneous injection of Lovenox.  Ketamine was subsequently ordered along with IV Solu-Medrol.  Patient does not have significant  improvement in pain on the afternoon of 8/8.  At this time patient is felt to be in good condition for discharge with close follow-up with her PCP as well as hematology.  Her full testing/results and plan were discussed with patient along with concerning/alarm symptoms for which to call 911/return to the ED. A prednisone taper along with Xarelto starter pack will be prescribed in consultation with pharmacy.  All questions were answered and she verbalizes her understanding and agreement.    Past Medical History:   History reviewed. No pertinent past medical history.    Past Surgical History:   History reviewed. No pertinent surgical history.    Social History:   Social History     Socioeconomic History    Marital status: Single   Tobacco Use    Smoking status: Never    Smokeless tobacco: Never   Vaping Use    Vaping status: Never Used   Substance and Sexual Activity    Alcohol use: Never    Drug use: Never    Sexual activity: Never       Procedures Performed         Consults:   Consults       No orders found for last 30 day(s).            Condition on Discharge:     Stable    Discharge Disposition  Home or Self Care    Discharge Medications     Discharge Medications        New Medications        Instructions Start Date   predniSONE 10 MG tablet  Commonly known as: DELTASONE   Take 4 tablets by mouth Daily for 2 days, THEN 3 tablets Daily for 2 days, THEN 2 tablets Daily for 2 days, THEN 1 tablet Daily for 2 days.   Start Date: August 8, 2024     Rivaroxaban tablet therapy pack starter pack  Commonly known as: XARELTO   Take as directed             Continue These Medications        Instructions Start Date   albuterol sulfate  (90 Base) MCG/ACT inhaler  Commonly known as: PROVENTIL HFA;VENTOLIN HFA;PROAIR HFA   1 puff, Inhalation, Every 4 Hours PRN      atorvastatin 10 MG tablet  Commonly known as: LIPITOR   5 mg, Oral, Every Evening      clopidogrel 75 MG tablet  Commonly known as: PLAVIX   75 mg, Oral, Daily       cyclobenzaprine 10 MG tablet  Commonly known as: FLEXERIL   10 mg, Oral, 3 Times Daily PRN      Diclofenac Sodium 1 % gel gel  Commonly known as: VOLTAREN   4 g, Topical, 4 Times Daily PRN      Fluticasone Furoate-Vilanterol 100-25 MCG/ACT aerosol powder   Commonly known as: Breo Ellipta   1 puff, Inhalation, Daily - RT      gabapentin 800 MG tablet  Commonly known as: NEURONTIN   800 mg, Oral, 3 Times Daily      hydroCHLOROthiazide 25 MG tablet   25 mg, Oral, Daily      hydroxychloroquine 200 MG tablet  Commonly known as: PLAQUENIL   400 mg, Oral, Every Morning      ipratropium-albuterol 0.5-2.5 mg/3 ml nebulizer  Commonly known as: DUO-NEB   3 mL, Nebulization, 4 Times Daily - RT      ketoconazole 2 % cream  Commonly known as: NIZORAL   1 Application, Topical, 2 Times Daily PRN      losartan 50 MG tablet  Commonly known as: COZAAR   25 mg, Oral, Nightly      metoprolol tartrate 25 MG tablet  Commonly known as: LOPRESSOR   25 mg, Oral, 2 Times Daily      Mounjaro 2.5 MG/0.5ML solution pen-injector pen  Generic drug: Tirzepatide   2.5 mg, Subcutaneous, Weekly      ondansetron ODT 4 MG disintegrating tablet  Commonly known as: ZOFRAN-ODT   4 mg, Translingual, Every 6 Hours PRN      oxyCODONE 15 MG immediate release tablet  Commonly known as: ROXICODONE   15 mg, Oral, Every 4 Hours      triamcinolone 0.1 % cream  Commonly known as: KENALOG   1 Application, Topical, 2 Times Daily PRN      venlafaxine 37.5 MG tablet  Commonly known as: EFFEXOR   37.5 mg, Oral, Daily             Stop These Medications      aspirin 81 MG EC tablet     celecoxib 200 MG capsule  Commonly known as: CeleBREX              Discharge Diet:     Activity at Discharge:     Follow-up Appointments  No future appointments.  Additional Instructions for the Follow-ups that You Need to Schedule       Discharge Follow-up with PCP   As directed       Currently Documented PCP:    Brian Gonzalez MD    PCP Phone Number:    [phone]     Follow Up  Details: 5 to 7 days                Test Results Pending at Discharge       Risk for Readmission (LACE) Score: 4 (8/8/2024  6:00 AM)      Greater than 30 minutes spent in discharge activities for this patient    Signature:Electronically signed by Quincy Melissa PA-C, 08/08/24, 3:02 PM EDT.

## 2024-08-08 NOTE — CASE MANAGEMENT/SOCIAL WORK
Discharge Planning Assessment   Mahesh     Patient Name: Lisa Jay  MRN: [id number]  Today's Date: 8/8/2024    Admit Date: 8/7/2024    Plan: Routine home   Discharge Needs Assessment       Row Name 08/08/24 1150       Living Environment    People in Home alone    Current Living Arrangements home    Potentially Unsafe Housing Conditions none    In the past 12 months has the electric, gas, oil, or water company threatened to shut off services in your home? No    Primary Care Provided by self    Provides Primary Care For no one    Family Caregiver if Needed sibling(s)    Family Caregiver Names sister Emy    Quality of Family Relationships helpful;involved;supportive    Able to Return to Prior Arrangements yes       Resource/Environmental Concerns    Resource/Environmental Concerns none    Transportation Concerns none       Transportation Needs    In the past 12 months, has lack of transportation kept you from medical appointments or from getting medications? no    In the past 12 months, has lack of transportation kept you from meetings, work, or from getting things needed for daily living? No       Food Insecurity    Within the past 12 months, you worried that your food would run out before you got the money to buy more. Never true    Within the past 12 months, the food you bought just didn't last and you didn't have money to get more. Never true       Transition Planning    Patient/Family Anticipates Transition to home    Patient/Family Anticipated Services at Transition none    Transportation Anticipated car, drives self;family or friend will provide       Discharge Needs Assessment    Readmission Within the Last 30 Days no previous admission in last 30 days    Equipment Currently Used at Home rollator;nebulizer    Concerns to be Addressed denies needs/concerns at this time    Anticipated Changes Related to Illness none    Equipment Needed After Discharge none                   Discharge Plan       Shyla  Name 08/08/24 1151       Plan    Plan Routine home    Patient/Family in Agreement with Plan yes    Plan Comments CM met with patient at the bedside and obtained additional information from sister Emy on the phone as patient was falling asleep due to pain medicine. Confirmed PCP, insurance, and pharmacy. Patient's sister declined enrollment in [facility]. Patient denies any difficulty affording medications. Patient is not current with any HHC/OPPT/OT services. Patient lives at home alone, is IADLS, and drives short distances. Sister Emy is able to provide DC transport. DC Barriers: Venous Duplex ordered, IV steroids, IV pain medication.                  Continued Care and Services - Admitted Since 8/7/2024    No active coordination exists for this encounter.       Expected Discharge Date and Time       Expected Discharge Date Expected Discharge Time    Aug 9, 2024            Demographic Summary       Row Name 08/08/24 1142       General Information    Admission Type observation    Arrived From emergency department    Required Notices Provided Observation Status Notice    Referral Source admission list    Reason for Consult discharge planning    Preferred Language English                   Functional Status       Row Name 08/08/24 1150       Functional Status    Usual Activity Tolerance moderate    Current Activity Tolerance moderate       Functional Status, IADL    Medications independent    Meal Preparation independent    Housekeeping independent    Laundry independent    Shopping independent           Etienne Macias RN     Cell number [phone]  Office number [phone]

## 2024-08-16 ENCOUNTER — HOSPITAL ENCOUNTER (INPATIENT)
Facility: HOSPITAL | Age: 54
LOS: 13 days | Discharge: HOME | DRG: 418 | End: 2024-08-30
Attending: EMERGENCY MEDICINE | Admitting: HOSPITALIST
Payer: MEDICARE

## 2024-08-16 ENCOUNTER — TELEPHONE (OUTPATIENT)
Dept: ONCOLOGY | Facility: CLINIC | Age: 54
End: 2024-08-16
Payer: MEDICARE

## 2024-08-16 DIAGNOSIS — K81.0 ACUTE CHOLECYSTITIS: ICD-10-CM

## 2024-08-16 DIAGNOSIS — I82.4Y1 ACUTE DEEP VEIN THROMBOSIS (DVT) OF PROXIMAL VEIN OF RIGHT LOWER EXTREMITY: ICD-10-CM

## 2024-08-16 DIAGNOSIS — R10.13 ACUTE EPIGASTRIC PAIN: Primary | ICD-10-CM

## 2024-08-16 LAB
BASOPHILS # BLD AUTO: 0.08 10*3/MM3 (ref 0–0.2)
BASOPHILS NFR BLD AUTO: 0.4 % (ref 0–1.5)
DEPRECATED RDW RBC AUTO: 40.8 FL (ref 37–54)
EOSINOPHIL # BLD AUTO: 0.09 10*3/MM3 (ref 0–0.4)
EOSINOPHIL NFR BLD AUTO: 0.4 % (ref 0.3–6.2)
ERYTHROCYTE [DISTWIDTH] IN BLOOD BY AUTOMATED COUNT: 12.3 % (ref 12.3–15.4)
HCT VFR BLD AUTO: 45.7 % (ref 34–46.6)
HGB BLD-MCNC: 14.8 G/DL (ref 12–15.9)
HOLD SPECIMEN: NORMAL
HOLD SPECIMEN: NORMAL
IMM GRANULOCYTES # BLD AUTO: 0.21 10*3/MM3 (ref 0–0.05)
IMM GRANULOCYTES NFR BLD AUTO: 1 % (ref 0–0.5)
LYMPHOCYTES # BLD AUTO: 4.17 10*3/MM3 (ref 0.7–3.1)
LYMPHOCYTES NFR BLD AUTO: 19 % (ref 19.6–45.3)
MCH RBC QN AUTO: 29 PG (ref 26.6–33)
MCHC RBC AUTO-ENTMCNC: 32.4 G/DL (ref 31.5–35.7)
MCV RBC AUTO: 89.6 FL (ref 79–97)
MONOCYTES # BLD AUTO: 2.22 10*3/MM3 (ref 0.1–0.9)
MONOCYTES NFR BLD AUTO: 10.1 % (ref 5–12)
NEUTROPHILS NFR BLD AUTO: 15.22 10*3/MM3 (ref 1.7–7)
NEUTROPHILS NFR BLD AUTO: 69.1 % (ref 42.7–76)
NRBC BLD AUTO-RTO: 0 /100 WBC (ref 0–0.2)
PLATELET # BLD AUTO: 320 10*3/MM3 (ref 140–450)
PMV BLD AUTO: 9.8 FL (ref 6–12)
RBC # BLD AUTO: 5.1 10*6/MM3 (ref 3.77–5.28)
WBC NRBC COR # BLD AUTO: 21.99 10*3/MM3 (ref 3.4–10.8)
WHOLE BLOOD HOLD COAG: NORMAL
WHOLE BLOOD HOLD SPECIMEN: NORMAL

## 2024-08-16 PROCEDURE — 81001 URINALYSIS AUTO W/SCOPE: CPT | Performed by: EMERGENCY MEDICINE

## 2024-08-16 PROCEDURE — 80053 COMPREHEN METABOLIC PANEL: CPT | Performed by: EMERGENCY MEDICINE

## 2024-08-16 PROCEDURE — 85610 PROTHROMBIN TIME: CPT

## 2024-08-16 PROCEDURE — 99285 EMERGENCY DEPT VISIT HI MDM: CPT

## 2024-08-16 PROCEDURE — 85025 COMPLETE CBC W/AUTO DIFF WBC: CPT

## 2024-08-16 PROCEDURE — 83690 ASSAY OF LIPASE: CPT | Performed by: EMERGENCY MEDICINE

## 2024-08-16 PROCEDURE — 83735 ASSAY OF MAGNESIUM: CPT | Performed by: EMERGENCY MEDICINE

## 2024-08-16 PROCEDURE — 36415 COLL VENOUS BLD VENIPUNCTURE: CPT

## 2024-08-16 RX ORDER — SODIUM CHLORIDE 0.9 % (FLUSH) 0.9 %
10 SYRINGE (ML) INJECTION AS NEEDED
Status: DISCONTINUED | OUTPATIENT
Start: 2024-08-16 | End: 2024-08-30 | Stop reason: HOSPADM

## 2024-08-16 NOTE — Clinical Note
Level of Care: Med/Surg [1]   Diagnosis: Acute cholecystitis [575.0.ICD-9-CM]   Certification: I Certify That Inpatient Hospital Services Are Medically Necessary For Greater Than 2 Midnights

## 2024-08-16 NOTE — LETTER
[facility] CASE MANAGEMENT  [address] [place] [address] [place] [address]  [phone]  [phone]        August 28, 2024      Patient: Lisa Jay  YOB: 1970  Date of Visit: 8/16/2024      Detailed Notice of Discharge issued to patient on 8/27/24 follows as requested.   Records were sent to [facility] today, 8/28/24, when the request with case ID was   received.          Thank you,       Layne Wu  Utilization Review Coordinator  [address] [place]  [address]  Ph: [phone]  Fx: [phone]

## 2024-08-17 ENCOUNTER — ANESTHESIA EVENT (OUTPATIENT)
Dept: EMERGENCY DEPT | Facility: HOSPITAL | Age: 54
End: 2024-08-17
Payer: MEDICARE

## 2024-08-17 ENCOUNTER — APPOINTMENT (OUTPATIENT)
Dept: CT IMAGING | Facility: HOSPITAL | Age: 54
DRG: 418 | End: 2024-08-17
Payer: MEDICARE

## 2024-08-17 ENCOUNTER — ANESTHESIA (OUTPATIENT)
Dept: EMERGENCY DEPT | Facility: HOSPITAL | Age: 54
End: 2024-08-17
Payer: MEDICARE

## 2024-08-17 PROBLEM — K81.0 ACUTE CHOLECYSTITIS: Status: ACTIVE | Noted: 2024-08-17

## 2024-08-17 PROBLEM — M32.9 SYSTEMIC LUPUS ERYTHEMATOSUS: Status: ACTIVE | Noted: 2023-06-18

## 2024-08-17 PROBLEM — M79.7 FIBROMYALGIA: Status: ACTIVE | Noted: 2024-08-17

## 2024-08-17 LAB
ALBUMIN SERPL-MCNC: 4.4 G/DL (ref 3.5–5.2)
ALBUMIN/GLOB SERPL: 1.3 G/DL
ALP SERPL-CCNC: 90 U/L (ref 39–117)
ALT SERPL W P-5'-P-CCNC: 25 U/L (ref 1–33)
ANION GAP SERPL CALCULATED.3IONS-SCNC: 16.3 MMOL/L (ref 5–15)
APTT PPP: 46.5 SECONDS (ref 61–76.5)
APTT PPP: 57.9 SECONDS (ref 61–76.5)
AST SERPL-CCNC: 32 U/L (ref 1–32)
BACTERIA UR QL AUTO: ABNORMAL /HPF
BILIRUB SERPL-MCNC: 0.9 MG/DL (ref 0–1.2)
BILIRUB UR QL STRIP: NEGATIVE
BUN SERPL-MCNC: 16 MG/DL (ref 6–20)
BUN/CREAT SERPL: 18.2 (ref 7–25)
CALCIUM SPEC-SCNC: 9.6 MG/DL (ref 8.6–10.5)
CHLORIDE SERPL-SCNC: 96 MMOL/L (ref 98–107)
CLARITY UR: CLEAR
CO2 SERPL-SCNC: 22.7 MMOL/L (ref 22–29)
COLOR UR: YELLOW
CREAT SERPL-MCNC: 0.88 MG/DL (ref 0.57–1)
EGFRCR SERPLBLD CKD-EPI 2021: 77.2 ML/MIN/1.73
GLOBULIN UR ELPH-MCNC: 3.4 GM/DL
GLUCOSE BLDC GLUCOMTR-MCNC: 100 MG/DL (ref 70–105)
GLUCOSE BLDC GLUCOMTR-MCNC: 113 MG/DL (ref 70–105)
GLUCOSE BLDC GLUCOMTR-MCNC: 114 MG/DL (ref 70–105)
GLUCOSE BLDC GLUCOMTR-MCNC: 115 MG/DL (ref 70–105)
GLUCOSE SERPL-MCNC: 106 MG/DL (ref 65–99)
GLUCOSE UR STRIP-MCNC: NEGATIVE MG/DL
HGB UR QL STRIP.AUTO: NEGATIVE
HYALINE CASTS UR QL AUTO: ABNORMAL /LPF
INR PPP: 1.09 (ref 0.93–1.1)
KETONES UR QL STRIP: ABNORMAL
LEUKOCYTE ESTERASE UR QL STRIP.AUTO: ABNORMAL
LIPASE SERPL-CCNC: 33 U/L (ref 13–60)
MAGNESIUM SERPL-MCNC: 2.1 MG/DL (ref 1.6–2.6)
NITRITE UR QL STRIP: NEGATIVE
PH UR STRIP.AUTO: 7 [PH] (ref 5–8)
POTASSIUM SERPL-SCNC: 3 MMOL/L (ref 3.5–5.2)
POTASSIUM SERPL-SCNC: 3.4 MMOL/L (ref 3.5–5.2)
POTASSIUM SERPL-SCNC: 4.2 MMOL/L (ref 3.5–5.2)
PROT SERPL-MCNC: 7.8 G/DL (ref 6–8.5)
PROT UR QL STRIP: ABNORMAL
PROTHROMBIN TIME: 11.8 SECONDS (ref 9.6–11.7)
RBC # UR STRIP: ABNORMAL /HPF
REF LAB TEST METHOD: ABNORMAL
SODIUM SERPL-SCNC: 135 MMOL/L (ref 136–145)
SP GR UR STRIP: 1.02 (ref 1–1.03)
SQUAMOUS #/AREA URNS HPF: ABNORMAL /HPF
UROBILINOGEN UR QL STRIP: ABNORMAL
WBC # UR STRIP: ABNORMAL /HPF

## 2024-08-17 PROCEDURE — 94761 N-INVAS EAR/PLS OXIMETRY MLT: CPT

## 2024-08-17 PROCEDURE — 25010000002 ONDANSETRON PER 1 MG: Performed by: EMERGENCY MEDICINE

## 2024-08-17 PROCEDURE — 25010000002 HYDROMORPHONE 1 MG/ML SOLUTION

## 2024-08-17 PROCEDURE — 99222 1ST HOSP IP/OBS MODERATE 55: CPT | Performed by: SURGERY

## 2024-08-17 PROCEDURE — 84132 ASSAY OF SERUM POTASSIUM: CPT | Performed by: STUDENT IN AN ORGANIZED HEALTH CARE EDUCATION/TRAINING PROGRAM

## 2024-08-17 PROCEDURE — 25510000001 IOPAMIDOL PER 1 ML: Performed by: EMERGENCY MEDICINE

## 2024-08-17 PROCEDURE — 94640 AIRWAY INHALATION TREATMENT: CPT

## 2024-08-17 PROCEDURE — 25010000002 PROCHLORPERAZINE 10 MG/2ML SOLUTION: Performed by: STUDENT IN AN ORGANIZED HEALTH CARE EDUCATION/TRAINING PROGRAM

## 2024-08-17 PROCEDURE — 25010000002 ONDANSETRON PER 1 MG

## 2024-08-17 PROCEDURE — 85730 THROMBOPLASTIN TIME PARTIAL: CPT | Performed by: STUDENT IN AN ORGANIZED HEALTH CARE EDUCATION/TRAINING PROGRAM

## 2024-08-17 PROCEDURE — 25810000003 SODIUM CHLORIDE 0.9 % SOLUTION: Performed by: EMERGENCY MEDICINE

## 2024-08-17 PROCEDURE — 25010000002 HYDROMORPHONE 1 MG/ML SOLUTION: Performed by: EMERGENCY MEDICINE

## 2024-08-17 PROCEDURE — 25810000003 DEXTROSE 5% IN LACTATED RINGERS PER 1000 ML: Performed by: STUDENT IN AN ORGANIZED HEALTH CARE EDUCATION/TRAINING PROGRAM

## 2024-08-17 PROCEDURE — 94799 UNLISTED PULMONARY SVC/PX: CPT

## 2024-08-17 PROCEDURE — 74177 CT ABD & PELVIS W/CONTRAST: CPT

## 2024-08-17 PROCEDURE — 82948 REAGENT STRIP/BLOOD GLUCOSE: CPT

## 2024-08-17 PROCEDURE — 25010000002 AMPICILLIN-SULBACTAM PER 1.5 G

## 2024-08-17 PROCEDURE — 25010000002 HEPARIN (PORCINE) 25000-0.45 UT/250ML-% SOLUTION

## 2024-08-17 PROCEDURE — 82948 REAGENT STRIP/BLOOD GLUCOSE: CPT | Performed by: STUDENT IN AN ORGANIZED HEALTH CARE EDUCATION/TRAINING PROGRAM

## 2024-08-17 RX ORDER — ATORVASTATIN CALCIUM 10 MG/1
5 TABLET, FILM COATED ORAL EVERY EVENING
Status: DISCONTINUED | OUTPATIENT
Start: 2024-08-17 | End: 2024-08-30 | Stop reason: HOSPADM

## 2024-08-17 RX ORDER — NICOTINE POLACRILEX 4 MG
15 LOZENGE BUCCAL
Status: DISCONTINUED | OUTPATIENT
Start: 2024-08-17 | End: 2024-08-30 | Stop reason: HOSPADM

## 2024-08-17 RX ORDER — ONDANSETRON 2 MG/ML
4 INJECTION INTRAMUSCULAR; INTRAVENOUS ONCE
Status: COMPLETED | OUTPATIENT
Start: 2024-08-17 | End: 2024-08-17

## 2024-08-17 RX ORDER — CLOPIDOGREL BISULFATE 75 MG/1
75 TABLET ORAL DAILY
Status: DISCONTINUED | OUTPATIENT
Start: 2024-08-17 | End: 2024-08-30 | Stop reason: HOSPADM

## 2024-08-17 RX ORDER — HEPARIN SODIUM 5000 [USP'U]/ML
5000 INJECTION, SOLUTION INTRAVENOUS; SUBCUTANEOUS EVERY 8 HOURS SCHEDULED
Status: DISCONTINUED | OUTPATIENT
Start: 2024-08-17 | End: 2024-08-17

## 2024-08-17 RX ORDER — BISACODYL 5 MG/1
5 TABLET, DELAYED RELEASE ORAL DAILY PRN
Status: DISCONTINUED | OUTPATIENT
Start: 2024-08-17 | End: 2024-08-30 | Stop reason: HOSPADM

## 2024-08-17 RX ORDER — DEXTROSE, SODIUM CHLORIDE, SODIUM LACTATE, POTASSIUM CHLORIDE, AND CALCIUM CHLORIDE 5; .6; .31; .03; .02 G/100ML; G/100ML; G/100ML; G/100ML; G/100ML
75 INJECTION, SOLUTION INTRAVENOUS CONTINUOUS
Status: DISCONTINUED | OUTPATIENT
Start: 2024-08-17 | End: 2024-08-20

## 2024-08-17 RX ORDER — PANTOPRAZOLE SODIUM 40 MG/10ML
40 INJECTION, POWDER, LYOPHILIZED, FOR SOLUTION INTRAVENOUS ONCE
Status: COMPLETED | OUTPATIENT
Start: 2024-08-17 | End: 2024-08-17

## 2024-08-17 RX ORDER — PROCHLORPERAZINE EDISYLATE 5 MG/ML
5 INJECTION INTRAMUSCULAR; INTRAVENOUS EVERY 6 HOURS PRN
Status: DISCONTINUED | OUTPATIENT
Start: 2024-08-17 | End: 2024-08-17

## 2024-08-17 RX ORDER — SODIUM CHLORIDE 9 MG/ML
40 INJECTION, SOLUTION INTRAVENOUS AS NEEDED
Status: DISCONTINUED | OUTPATIENT
Start: 2024-08-17 | End: 2024-08-30 | Stop reason: HOSPADM

## 2024-08-17 RX ORDER — NICOTINE 21 MG/24HR
1 PATCH, TRANSDERMAL 24 HOURS TRANSDERMAL
Status: DISCONTINUED | OUTPATIENT
Start: 2024-08-17 | End: 2024-08-22

## 2024-08-17 RX ORDER — SODIUM CHLORIDE 0.9 % (FLUSH) 0.9 %
10 SYRINGE (ML) INJECTION EVERY 12 HOURS SCHEDULED
Status: DISCONTINUED | OUTPATIENT
Start: 2024-08-17 | End: 2024-08-30 | Stop reason: HOSPADM

## 2024-08-17 RX ORDER — ONDANSETRON 2 MG/ML
4 INJECTION INTRAMUSCULAR; INTRAVENOUS EVERY 6 HOURS PRN
Status: DISCONTINUED | OUTPATIENT
Start: 2024-08-17 | End: 2024-08-23 | Stop reason: SDUPTHER

## 2024-08-17 RX ORDER — BISACODYL 10 MG
10 SUPPOSITORY, RECTAL RECTAL DAILY PRN
Status: DISCONTINUED | OUTPATIENT
Start: 2024-08-17 | End: 2024-08-30 | Stop reason: HOSPADM

## 2024-08-17 RX ORDER — OXYCODONE HYDROCHLORIDE 5 MG/1
5 TABLET ORAL EVERY 4 HOURS PRN
Status: DISPENSED | OUTPATIENT
Start: 2024-08-17 | End: 2024-08-22

## 2024-08-17 RX ORDER — DEXTROSE MONOHYDRATE 25 G/50ML
25 INJECTION, SOLUTION INTRAVENOUS
Status: DISCONTINUED | OUTPATIENT
Start: 2024-08-17 | End: 2024-08-30 | Stop reason: HOSPADM

## 2024-08-17 RX ORDER — ACETAMINOPHEN 160 MG/5ML
650 SOLUTION ORAL EVERY 4 HOURS PRN
Status: DISCONTINUED | OUTPATIENT
Start: 2024-08-17 | End: 2024-08-30 | Stop reason: HOSPADM

## 2024-08-17 RX ORDER — AMLODIPINE BESYLATE 5 MG/1
5 TABLET ORAL ONCE
Status: COMPLETED | OUTPATIENT
Start: 2024-08-17 | End: 2024-08-17

## 2024-08-17 RX ORDER — BUDESONIDE AND FORMOTEROL FUMARATE DIHYDRATE 160; 4.5 UG/1; UG/1
1 AEROSOL RESPIRATORY (INHALATION)
Status: DISCONTINUED | OUTPATIENT
Start: 2024-08-17 | End: 2024-08-30 | Stop reason: HOSPADM

## 2024-08-17 RX ORDER — ALBUTEROL SULFATE 90 UG/1
1 AEROSOL, METERED RESPIRATORY (INHALATION) EVERY 4 HOURS PRN
Status: DISCONTINUED | OUTPATIENT
Start: 2024-08-17 | End: 2024-08-30 | Stop reason: HOSPADM

## 2024-08-17 RX ORDER — INSULIN LISPRO 100 [IU]/ML
2-9 INJECTION, SOLUTION INTRAVENOUS; SUBCUTANEOUS
Status: DISCONTINUED | OUTPATIENT
Start: 2024-08-17 | End: 2024-08-30 | Stop reason: HOSPADM

## 2024-08-17 RX ORDER — ACETAMINOPHEN 650 MG/1
650 SUPPOSITORY RECTAL EVERY 4 HOURS PRN
Status: DISCONTINUED | OUTPATIENT
Start: 2024-08-17 | End: 2024-08-30 | Stop reason: HOSPADM

## 2024-08-17 RX ORDER — ACETAMINOPHEN 325 MG/1
650 TABLET ORAL EVERY 4 HOURS PRN
Status: DISCONTINUED | OUTPATIENT
Start: 2024-08-17 | End: 2024-08-30 | Stop reason: HOSPADM

## 2024-08-17 RX ORDER — VENLAFAXINE 75 MG/1
37.5 TABLET ORAL DAILY
Status: DISCONTINUED | OUTPATIENT
Start: 2024-08-17 | End: 2024-08-21

## 2024-08-17 RX ORDER — SODIUM CHLORIDE 0.9 % (FLUSH) 0.9 %
10 SYRINGE (ML) INJECTION AS NEEDED
Status: DISCONTINUED | OUTPATIENT
Start: 2024-08-17 | End: 2024-08-30 | Stop reason: HOSPADM

## 2024-08-17 RX ORDER — PROCHLORPERAZINE EDISYLATE 5 MG/ML
5 INJECTION INTRAMUSCULAR; INTRAVENOUS EVERY 8 HOURS PRN
Status: DISCONTINUED | OUTPATIENT
Start: 2024-08-17 | End: 2024-08-30 | Stop reason: HOSPADM

## 2024-08-17 RX ORDER — AMOXICILLIN 250 MG
2 CAPSULE ORAL 2 TIMES DAILY PRN
Status: DISCONTINUED | OUTPATIENT
Start: 2024-08-17 | End: 2024-08-30 | Stop reason: HOSPADM

## 2024-08-17 RX ORDER — IBUPROFEN 600 MG/1
1 TABLET ORAL
Status: DISCONTINUED | OUTPATIENT
Start: 2024-08-17 | End: 2024-08-30 | Stop reason: HOSPADM

## 2024-08-17 RX ORDER — INSULIN LISPRO 100 [IU]/ML
2-9 INJECTION, SOLUTION INTRAVENOUS; SUBCUTANEOUS
Status: DISCONTINUED | OUTPATIENT
Start: 2024-08-17 | End: 2024-08-17

## 2024-08-17 RX ORDER — POTASSIUM CHLORIDE 1500 MG/1
40 TABLET, EXTENDED RELEASE ORAL EVERY 4 HOURS
Status: COMPLETED | OUTPATIENT
Start: 2024-08-17 | End: 2024-08-17

## 2024-08-17 RX ORDER — HEPARIN SODIUM 10000 [USP'U]/100ML
13.6 INJECTION, SOLUTION INTRAVENOUS
Status: DISCONTINUED | OUTPATIENT
Start: 2024-08-17 | End: 2024-08-19

## 2024-08-17 RX ORDER — IOPAMIDOL 755 MG/ML
100 INJECTION, SOLUTION INTRAVASCULAR
Status: COMPLETED | OUTPATIENT
Start: 2024-08-17 | End: 2024-08-17

## 2024-08-17 RX ORDER — IPRATROPIUM BROMIDE AND ALBUTEROL SULFATE 2.5; .5 MG/3ML; MG/3ML
3 SOLUTION RESPIRATORY (INHALATION)
Status: DISCONTINUED | OUTPATIENT
Start: 2024-08-17 | End: 2024-08-24

## 2024-08-17 RX ORDER — POLYETHYLENE GLYCOL 3350 17 G/17G
17 POWDER, FOR SOLUTION ORAL DAILY PRN
Status: DISCONTINUED | OUTPATIENT
Start: 2024-08-17 | End: 2024-08-30 | Stop reason: HOSPADM

## 2024-08-17 RX ADMIN — SODIUM CHLORIDE 1000 ML: 9 INJECTION, SOLUTION INTRAVENOUS at 00:31

## 2024-08-17 RX ADMIN — HYDROMORPHONE HYDROCHLORIDE 0.5 MG: 1 INJECTION, SOLUTION INTRAMUSCULAR; INTRAVENOUS; SUBCUTANEOUS at 00:32

## 2024-08-17 RX ADMIN — HYDROMORPHONE HYDROCHLORIDE 1 MG: 1 INJECTION, SOLUTION INTRAMUSCULAR; INTRAVENOUS; SUBCUTANEOUS at 22:36

## 2024-08-17 RX ADMIN — PANTOPRAZOLE SODIUM 40 MG: 40 INJECTION, POWDER, FOR SOLUTION INTRAVENOUS at 00:31

## 2024-08-17 RX ADMIN — HYDROMORPHONE HYDROCHLORIDE 1 MG: 1 INJECTION, SOLUTION INTRAMUSCULAR; INTRAVENOUS; SUBCUTANEOUS at 06:51

## 2024-08-17 RX ADMIN — HYDROMORPHONE HYDROCHLORIDE 0.5 MG: 1 INJECTION, SOLUTION INTRAMUSCULAR; INTRAVENOUS; SUBCUTANEOUS at 04:34

## 2024-08-17 RX ADMIN — HYDROMORPHONE HYDROCHLORIDE 1 MG: 1 INJECTION, SOLUTION INTRAMUSCULAR; INTRAVENOUS; SUBCUTANEOUS at 13:15

## 2024-08-17 RX ADMIN — POTASSIUM CHLORIDE 40 MEQ: 1500 TABLET, EXTENDED RELEASE ORAL at 15:29

## 2024-08-17 RX ADMIN — ONDANSETRON 4 MG: 2 INJECTION INTRAMUSCULAR; INTRAVENOUS at 04:34

## 2024-08-17 RX ADMIN — HEPARIN SODIUM 13.6 UNITS/KG/HR: 10000 INJECTION, SOLUTION INTRAVENOUS at 08:25

## 2024-08-17 RX ADMIN — HYDROMORPHONE HYDROCHLORIDE 1 MG: 1 INJECTION, SOLUTION INTRAMUSCULAR; INTRAVENOUS; SUBCUTANEOUS at 10:08

## 2024-08-17 RX ADMIN — OXYCODONE HYDROCHLORIDE 5 MG: 5 TABLET ORAL at 19:33

## 2024-08-17 RX ADMIN — HYDROMORPHONE HYDROCHLORIDE 1 MG: 1 INJECTION, SOLUTION INTRAMUSCULAR; INTRAVENOUS; SUBCUTANEOUS at 17:55

## 2024-08-17 RX ADMIN — ONDANSETRON 4 MG: 2 INJECTION INTRAMUSCULAR; INTRAVENOUS at 10:17

## 2024-08-17 RX ADMIN — IPRATROPIUM BROMIDE AND ALBUTEROL SULFATE 3 ML: .5; 3 SOLUTION RESPIRATORY (INHALATION) at 19:41

## 2024-08-17 RX ADMIN — HYDROMORPHONE HYDROCHLORIDE 1 MG: 1 INJECTION, SOLUTION INTRAMUSCULAR; INTRAVENOUS; SUBCUTANEOUS at 15:29

## 2024-08-17 RX ADMIN — PROCHLORPERAZINE EDISYLATE 5 MG: 5 INJECTION INTRAMUSCULAR; INTRAVENOUS at 15:29

## 2024-08-17 RX ADMIN — AMLODIPINE BESYLATE 5 MG: 5 TABLET ORAL at 11:01

## 2024-08-17 RX ADMIN — ONDANSETRON 4 MG: 2 INJECTION INTRAMUSCULAR; INTRAVENOUS at 00:32

## 2024-08-17 RX ADMIN — AMPICILLIN SODIUM AND SULBACTAM SODIUM 3 G: 2; 1 INJECTION, POWDER, FOR SOLUTION INTRAMUSCULAR; INTRAVENOUS at 17:56

## 2024-08-17 RX ADMIN — POTASSIUM CHLORIDE 40 MEQ: 1500 TABLET, EXTENDED RELEASE ORAL at 07:49

## 2024-08-17 RX ADMIN — AMPICILLIN SODIUM AND SULBACTAM SODIUM 3 G: 2; 1 INJECTION, POWDER, FOR SOLUTION INTRAMUSCULAR; INTRAVENOUS at 06:51

## 2024-08-17 RX ADMIN — ATORVASTATIN CALCIUM 5 MG: 10 TABLET, FILM COATED ORAL at 17:55

## 2024-08-17 RX ADMIN — AMPICILLIN SODIUM AND SULBACTAM SODIUM 3 G: 2; 1 INJECTION, POWDER, FOR SOLUTION INTRAMUSCULAR; INTRAVENOUS at 11:02

## 2024-08-17 RX ADMIN — BUDESONIDE AND FORMOTEROL FUMARATE DIHYDRATE 1 PUFF: 160; 4.5 AEROSOL RESPIRATORY (INHALATION) at 19:34

## 2024-08-17 RX ADMIN — VENLAFAXINE 37.5 MG: 75 TABLET ORAL at 11:02

## 2024-08-17 RX ADMIN — Medication 10 ML: at 21:00

## 2024-08-17 RX ADMIN — SODIUM CHLORIDE, SODIUM LACTATE, POTASSIUM CHLORIDE, CALCIUM CHLORIDE AND DEXTROSE MONOHYDRATE 75 ML/HR: 5; 600; 310; 30; 20 INJECTION, SOLUTION INTRAVENOUS at 23:31

## 2024-08-17 RX ADMIN — IOPAMIDOL 100 ML: 755 INJECTION, SOLUTION INTRAVENOUS at 03:56

## 2024-08-17 RX ADMIN — POTASSIUM CHLORIDE 40 MEQ: 1500 TABLET, EXTENDED RELEASE ORAL at 11:59

## 2024-08-17 RX ADMIN — OXYCODONE HYDROCHLORIDE 5 MG: 5 TABLET ORAL at 11:48

## 2024-08-17 RX ADMIN — AMPICILLIN SODIUM AND SULBACTAM SODIUM 3 G: 2; 1 INJECTION, POWDER, FOR SOLUTION INTRAMUSCULAR; INTRAVENOUS at 23:31

## 2024-08-17 RX ADMIN — HEPARIN SODIUM 15.6 UNITS/KG/HR: 10000 INJECTION, SOLUTION INTRAVENOUS at 23:31

## 2024-08-17 RX ADMIN — Medication 10 ML: at 11:01

## 2024-08-17 NOTE — ANESTHESIA PREPROCEDURE EVALUATION
Anesthesia Evaluation     Patient summary reviewed and Nursing notes reviewed   no history of anesthetic complications:   NPO Solid Status: > 8 hours  NPO Liquid Status: > 8 hours           Airway   Mallampati: II  TM distance: >3 FB  Neck ROM: full  No difficulty expected  Dental      Pulmonary - normal exam   (+) asthma,  Cardiovascular - normal exam    (+) DVT      Neuro/Psych- negative ROS  GI/Hepatic/Renal/Endo    (+) obesity    Musculoskeletal (-) negative ROS    Abdominal   (+) obese   Substance History - negative use     OB/GYN negative ob/gyn ROS         Other                          Anesthesia Plan    ASA 3     general     intravenous induction     Anesthetic plan, risks, benefits, and alternatives have been provided, discussed and informed consent has been obtained with: patient.    Plan discussed with CRNA.        CODE STATUS:    Code Status (Patient has no pulse and is not breathing): CPR (Attempt to Resuscitate)  Medical Interventions (Patient has pulse or is breathing): Full Support

## 2024-08-17 NOTE — CONSULTS
GENERAL SURGERY CONSULT    Referring Provider: ER  Reason for Consultation: abdominal pain, cholecystitis    Patient Care Team:  Brian Gonzalez MD as PCP - General (Family Medicine)  Francisco Jamil MD as PCP - Family Medicine    Chief complaint right upper quadrant pain    Subjective .     History of present illness: 54-year-old lady who presented to the ER on 8/16/2022 with abdominal pain and dry heaves for at least 2 days.  In the emergency department a CT scan was performed showing cholecystitis.  Her white count was elevated though she has been on steroids recently.  Her LFTs were essentially normal.  The patient was admitted and placed on antibiotics and I was asked to see her in consultation.    The patient was recently discharged from the hospital with a diagnosis of right lower extremity DVT she was on Xarelto and prednisone for this.  The patient notes that she has not taken Xarelto since Thursday night.  In discussion with she and her sister via telephone the patient is noted to have taken Plavix on Thursday morning.    At the time of my visit with her she continues to have sharp, fairly severe right upper quadrant abdominal pain.    Review of Systems    Review of Systems   Constitutional:  Positive for appetite change.   Gastrointestinal:  Positive for abdominal pain.         History  No past medical history on file., No past surgical history on file., No family history on file.,   Social History     Tobacco Use    Smoking status: Never    Smokeless tobacco: Never   Vaping Use    Vaping status: Never Used   Substance Use Topics    Alcohol use: Never    Drug use: Never   , (Not in a hospital admission)  , Scheduled Meds:  ampicillin-sulbactam, 3 g, Intravenous, Q6H  atorvastatin, 5 mg, Oral, Q PM  budesonide-formoterol, 1 puff, Inhalation, BID - RT  [Held by provider] clopidogrel, 75 mg, Oral, Daily  insulin lispro, 2-9 Units, Subcutaneous, 4x Daily AC & at Bedtime  ipratropium-albuterol, 3 mL,  Nebulization, 4x Daily - RT  nicotine, 1 patch, Transdermal, Q24H  potassium chloride ER, 40 mEq, Oral, Q4H  sodium chloride, 10 mL, Intravenous, Q12H  venlafaxine, 37.5 mg, Oral, Daily    , Continuous Infusions:  heparin, 13.6 Units/kg/hr, Last Rate: 13.6 Units/kg/hr (08/17/24 0825)    , PRN Meds:    acetaminophen **OR** acetaminophen **OR** acetaminophen    albuterol sulfate HFA    senna-docusate sodium **AND** polyethylene glycol **AND** bisacodyl **AND** bisacodyl    dextrose    dextrose    glucagon (human recombinant)    HYDROmorphone    melatonin    ondansetron    oxyCODONE    Potassium Replacement - Follow Nurse / BPA Driven Protocol    sodium chloride    sodium chloride    sodium chloride and Allergies:  Morphine    Objective     Vital Signs   Temp:  [98.1 °F (36.7 °C)-99.8 °F (37.7 °C)] 98.1 °F (36.7 °C)  Heart Rate:  [] 98  Resp:  [18-20] 18  BP: (124-160)/() 133/97    Physical Exam:       General Appearance:    Alert, cooperative, in no acute distress   Head:    Normocephalic, without obvious abnormality, atraumatic   Eyes:            Lids and lashes normal, conjunctivae and sclerae normal, no   icterus, no pallor, corneas clear   Ears:    Ears appear intact with no abnormalities noted   Lungs:     Breathing unlabored   Abdomen:     Soft, tender in RUQ   Extremities:   Moves all extremities    Neurologic:   Cranial nerves 2 - 12 grossly intact       Results Review:  Lab Results (last 72 hours)       Procedure Component Value Units Date/Time    POC Glucose Once [[id number]]  (Abnormal) Collected: 08/17/24 1113    Specimen: Blood Updated: 08/17/24 1115     Glucose 115 mg/dL      Comment: Serial Number: [id number]:  [id number]       Magnesium [[id number]]  (Normal) Collected: 08/16/24 2324    Specimen: Blood Updated: 08/17/24 0814     Magnesium 2.1 mg/dL     POC Glucose Once [[id number]]  (Abnormal) Collected: 08/17/24 0756    Specimen: Blood Updated: 08/17/24 0758     Glucose 113 mg/dL       Comment: Serial Number: [id number]:  [id number]       Protime-INR [[id number]]  (Abnormal) Collected: 08/16/24 2324    Specimen: Blood Updated: 08/17/24 0613     Protime 11.8 Seconds      INR 1.09    Urinalysis With Microscopic If Indicated (No Culture) - Urine, Clean Catch [[id number]]  (Abnormal) Collected: 08/16/24 2358    Specimen: Urine, Clean Catch Updated: 08/17/24 0029     Color, UA Yellow     Appearance, UA Clear     pH, UA 7.0     Specific Gravity, UA 1.021     Glucose, UA Negative     Ketones, UA Trace     Bilirubin, UA Negative     Blood, UA Negative     Protein, UA 30 mg/dL (1+)     Leuk Esterase, UA Trace     Nitrite, UA Negative     Urobilinogen, UA 1.0 E.U./dL    Urinalysis, Microscopic Only - Urine, Clean Catch [[id number]]  (Abnormal) Collected: 08/16/24 2358    Specimen: Urine, Clean Catch Updated: 08/17/24 0029     RBC, UA 3-5 /HPF      WBC, UA 0-2 /HPF      Bacteria, UA None Seen /HPF      Squamous Epithelial Cells, UA 3-6 /HPF      Hyaline Casts, UA None Seen /LPF      Methodology Automated Microscopy    Comprehensive Metabolic Panel [[id number]]  (Abnormal) Collected: 08/16/24 2324    Specimen: Blood Updated: 08/17/24 0006     Glucose 106 mg/dL      BUN 16 mg/dL      Creatinine 0.88 mg/dL      Sodium 135 mmol/L      Potassium 3.0 mmol/L      Comment: Slight hemolysis detected by analyzer. Result may be falsely elevated.        Chloride 96 mmol/L      CO2 22.7 mmol/L      Calcium 9.6 mg/dL      Total Protein 7.8 g/dL      Albumin 4.4 g/dL      ALT (SGPT) 25 U/L      AST (SGOT) 32 U/L      Alkaline Phosphatase 90 U/L      Total Bilirubin 0.9 mg/dL      Globulin 3.4 gm/dL      A/G Ratio 1.3 g/dL      BUN/Creatinine Ratio 18.2     Anion Gap 16.3 mmol/L      eGFR 77.2 mL/min/1.73     Narrative:      GFR Normal >60  Chronic Kidney Disease <60  Kidney Failure <15      Lipase [[id number]]  (Normal) Collected: 08/16/24 2324    Specimen: Blood Updated: 08/17/24 0006     Lipase 33 U/L     [place] Draw  [[id number]] Collected: 08/16/24 2324    Specimen: Blood Updated: 08/16/24 2345    Narrative:      The following orders were created for panel order [place] Draw.  Procedure                               Abnormality         Status                     ---------                               -----------         ------                     Green Top (Gel)[[id number]]                                  Final result               Lavender Top[[id number]]                                     Final result               Gold Top - SST[[id number]]                                   Final result               Light Blue Top[[id number]]                                   Final result                 Please view results for these tests on the individual orders.    Lavender Top [[id number]] Collected: 08/16/24 2324    Specimen: Blood Updated: 08/16/24 2345     Extra Tube hold for add-on     Comment: Auto resulted       Green Top (Gel) [[id number]] Collected: 08/16/24 2324    Specimen: Blood Updated: 08/16/24 2345     Extra Tube Hold for add-ons.     Comment: Auto resulted.       Gold Top - SST [[id number]] Collected: 08/16/24 2324    Specimen: Blood Updated: 08/16/24 2345     Extra Tube Hold for add-ons.     Comment: Auto resulted.       Light Blue Top [[id number]] Collected: 08/16/24 2324    Specimen: Blood Updated: 08/16/24 2345     Extra Tube Hold for add-ons.     Comment: Auto resulted       CBC & Differential [[id number]]  (Abnormal) Collected: 08/16/24 2324    Specimen: Blood Updated: 08/16/24 2334    Narrative:      The following orders were created for panel order CBC & Differential.  Procedure                               Abnormality         Status                     ---------                               -----------         ------                     CBC Auto Differential[[id number]]        Abnormal            Final result                 Please view results for these tests on the individual orders.    CBC Auto Differential  [[id number]]  (Abnormal) Collected: 08/16/24 2324    Specimen: Blood Updated: 08/16/24 2334     WBC 21.99 10*3/mm3      RBC 5.10 10*6/mm3      Hemoglobin 14.8 g/dL      Hematocrit 45.7 %      MCV 89.6 fL      MCH 29.0 pg      MCHC 32.4 g/dL      RDW 12.3 %      RDW-SD 40.8 fl      MPV 9.8 fL      Platelets 320 10*3/mm3      Neutrophil % 69.1 %      Lymphocyte % 19.0 %      Monocyte % 10.1 %      Eosinophil % 0.4 %      Basophil % 0.4 %      Immature Grans % 1.0 %      Neutrophils, Absolute 15.22 10*3/mm3      Lymphocytes, Absolute 4.17 10*3/mm3      Monocytes, Absolute 2.22 10*3/mm3      Eosinophils, Absolute 0.09 10*3/mm3      Basophils, Absolute 0.08 10*3/mm3      Immature Grans, Absolute 0.21 10*3/mm3      nRBC 0.0 /100 WBC           Imaging Results (Last 72 Hours)       Procedure Component Value Units Date/Time    CT Abdomen Pelvis With Contrast [[id number]] Collected: 08/17/24 0427     Updated: 08/17/24 0431    Narrative:      CT ABDOMEN PELVIS W CONTRAST    Date of Exam: 8/17/2024 3:55 AM EDT    Indication: Brenda umbilical pain, leukocytosis.    Comparison: None available.    Technique: Axial CT images were obtained of the abdomen and pelvis following the uneventful intravenous administration of iodinated contrast. Sagittal and coronal reconstructions were performed.  Automated exposure control and iterative reconstruction   methods were used.        Findings:  Heart size is normal. Distal esophagus is unremarkable. Lung bases are clear. There is a simple appearing lipoma in the inferior aspect of the right latissimus dorsi muscle measuring up to 37 mm diameter. There are no acute findings in the superficial   soft tissues. No acute osseous abnormality or destructive bone lesion. There is mild lumbosacral spondylosis. Mild osteoarthritis is present at both hips.    The liver is homogeneous. The gallbladder is distended measuring up to 12 cm. There is extensive inflammatory fat stranding surrounding the  gallbladder compatible with acute cholecystitis. There is no visible gallstone. Common bile duct is nondistended.   The pancreas, stomach, spleen and adrenal glands appear normal. Kidneys enhance homogeneously. No hydronephrosis or urolithiasis. Urinary bladder is nondistended. There is a fibroid uterus. The appendix is not discretely identified. Colon is   unremarkable. No small bowel distention. The abdominal and pelvic vasculature appear within normal limits. No ascites, pneumoperitoneum or lymphadenopathy.      Impression:      Impression:  1.Findings compatible with acute cholecystitis.  2.Fibroid uterus.  3.Mild degenerative changes.        Electronically Signed: Alfredo Rollins MD    8/17/2024 4:29 AM EDT    Workstation ID: [id number]            I reviewed the patient's new clinical results.  I reviewed the patient's new imaging results and agree with the interpretation.  I reviewed the patient's other test results and agree with the interpretation      Assessment & Plan       Acute cholecystitis    Lower leg DVT (deep venous thromboembolism), acute, right    Systemic lupus erythematosus    Fibromyalgia      54-year-old lady with recent DVT currently on anticoagulation, history of lupus, history of recent Plavix use with acute cholecystitis    The patient does appear to have acute cholecystitis.  She continues to be symptomatic.  Would recommend n.p.o., fluids, antibiotics.  Unfortunately given her recent use of Plavix this precludes cholecystectomy at this time in my opinion.  The patient is currently being maintained on a heparin drip due to her recent DVT.  Recommend to continue holding Plavix and Xarelto and continue heparin drip.  Given the severity of the patient's symptoms I have recommended and requested that she undergo percutaneous cholecystostomy placement.  She would not be a candidate for cholecystectomy until late next week.    I discussed the patient's findings and my recommendations with patient  and family              This document has been electronically signed by Bryce Huizar MD on August 17, 2024 13:29 EDT      Bryce Huizar MD  08/17/24  13:29 EDT

## 2024-08-17 NOTE — PROGRESS NOTES
[facility] MEDICINE SERVICE  DAILY PROGRESS NOTE    NAME: Lisa Jay  : 1970  MRN: [id number]      LOS: 0 days     PROVIDER OF SERVICE: Dario Jackson MD    Chief Complaint: Acute cholecystitis    Subjective:     Interval History:  History taken from: patient and patient's chart  Patient Complaints: abdominal pain and nausea   Patient Denies:  chest pain or shortness of breath     Review of Systems:   All negative except above     Objective:     Vital Signs  Temp:  [98.1 °F (36.7 °C)-99.8 °F (37.7 °C)] 98.1 °F (36.7 °C)  Heart Rate:  [] 98  Resp:  [18-20] 18  BP: (124-160)/() 133/97   Body mass index is 37.42 kg/m².    Physical Exam  General: Alert and oriented, no acute distress.  HENT: Normocephalic, moist oral mucosa, no scleral icterus.  Neck: Supple, non-tender, no carotid bruits, no JVD, no LAD.  Lungs: Clear to auscultation, non-labored respiration.  Heart: RRR, no murmur, gallop or edema.  Abdomen: Soft, RUQ and epigastric tenderness, non-distended, + bowel sounds.  Musculoskeletal: Normal range of motion and strength, no tenderness or swelling.  Neuro: alert and awake, moving all 4 extremities   Skin: Skin is warm, dry and pink, no rashes or lesions.  Psychiatric: Cooperative, appropriate mood and affect.        Scheduled Meds   ampicillin-sulbactam, 3 g, Intravenous, Q6H  atorvastatin, 5 mg, Oral, Q PM  budesonide-formoterol, 1 puff, Inhalation, BID - RT  [Held by provider] clopidogrel, 75 mg, Oral, Daily  insulin lispro, 2-9 Units, Subcutaneous, 4x Daily AC & at Bedtime  ipratropium-albuterol, 3 mL, Nebulization, 4x Daily - RT  nicotine, 1 patch, Transdermal, Q24H  potassium chloride ER, 40 mEq, Oral, Q4H  sodium chloride, 10 mL, Intravenous, Q12H  venlafaxine, 37.5 mg, Oral, Daily       PRN Meds     acetaminophen **OR** acetaminophen **OR** acetaminophen    albuterol sulfate HFA    senna-docusate sodium **AND** polyethylene glycol **AND** bisacodyl **AND**  bisacodyl    dextrose    dextrose    glucagon (human recombinant)    HYDROmorphone    melatonin    ondansetron    oxyCODONE    Potassium Replacement - Follow Nurse / BPA Driven Protocol    sodium chloride    sodium chloride    sodium chloride   Infusions  heparin, 13.6 Units/kg/hr, Last Rate: 13.6 Units/kg/hr (08/17/24 0825)          Diagnostic Data    Results from last 7 days   Lab Units 08/16/24  2324   WBC 10*3/mm3 21.99*   HEMOGLOBIN g/dL 14.8   HEMATOCRIT % 45.7   PLATELETS 10*3/mm3 320   GLUCOSE mg/dL 106*   CREATININE mg/dL 0.88   BUN mg/dL 16   SODIUM mmol/L 135*   POTASSIUM mmol/L 3.0*   AST (SGOT) U/L 32   ALT (SGPT) U/L 25   ALK PHOS U/L 90   BILIRUBIN mg/dL 0.9   ANION GAP mmol/L 16.3*       CT Abdomen Pelvis With Contrast    Result Date: 8/17/2024  Impression: 1.Findings compatible with acute cholecystitis. 2.Fibroid uterus. 3.Mild degenerative changes. Electronically Signed: Alfredo Rollins MD  8/17/2024 4:29 AM EDT  Workstation ID: [id number]     I have reviewed patient labs and imaging     Assessment/Plan:     Active and Resolved Problems  Acute Cholecystitis  Leukocytosis, likely multifactorial due to cholecystitis and current prednisone taper  -CT of the abdomen and pelvis reviewed, acute cholecystitis   -WBC 21.9  -Unasyn ordered  -IV fluids for hydration  -General Surgery consulted- noted plan percutaneous cholecystostomy tube to alleviate her infection and symptoms while they await a safe time to do surgery after her Plavix is worn off at some point next week.      DVT  -Diagnosed with RLE DVT last week  -Last dose of Xarelto Thursday at 11pm  -Heparin drip ordered     Fibromyalgia  Hypertension  Lupus    -Chronic medications will be restarted when verified    VTE Prophylaxis:  Pharmacologic VTE prophylaxis orders are present.         Code status is   Code Status and Medical Interventions: CPR (Attempt to Resuscitate); Full Support   Ordered at: 08/17/24 0508     Code Status (Patient has no pulse  and is not breathing):    CPR (Attempt to Resuscitate)     Medical Interventions (Patient has pulse or is breathing):    Full Support       Plan for disposition:TBD     Time: 30 minutes    Signature: Electronically signed by Dario Jackson MD, 08/17/24, 12:12 EDT.  [facility] Hospitalist Team

## 2024-08-17 NOTE — PROGRESS NOTES
Full consult note to follow.  Briefly, this is a 54-year-old lady with apparent acute cholecystitis with onset of symptoms abruptly yesterday afternoon.  She was recently diagnosed with a DVT and has been on aspirin, Plavix, Xarelto.  She notes that her last dose of Plavix and Xarelto were Thursday a.m. and p.m. respectively.  She is currently on a heparin drip.  Given her recent use of Plavix I believe that her bleeding risk is prohibitive for cholecystectomy at this time.  I would recommend that she be continued on antibiotics and receive a percutaneous cholecystostomy tube to alleviate her infection and symptoms while we await a safe time to do surgery after her Plavix is worn off at some point next week.          This document has been electronically signed by Bryce Huizar MD on August 17, 2024 11:12 EDT

## 2024-08-17 NOTE — ED PROVIDER NOTES
Subjective   History of Present Illness  54-year-old female reports a 2-day history of increasing upper abdominal discomfort.  She states she was recently started on anticoagulants secondary to lower extremity DVT.  The patient reports that in the last 24 hours she has developed subjective fever as well as chills.  The patient reports that she has had nausea and has been unable to eat for 24 hours.  She states she has not had her Xarelto starter pack in 24 hours.  Patient has previously been on Plavix.  The patient reports no melena hematemesis hematochezia.  She denies dysuria or hematuria.  She reports no resting shortness of breath and denies mopped assist      Review of Systems   Constitutional:  Positive for chills, fatigue and fever.   Gastrointestinal:  Positive for abdominal pain and nausea. Negative for abdominal distention, anal bleeding, blood in stool, diarrhea and vomiting.   Musculoskeletal:  Negative for back pain.   Hematological:  Does not bruise/bleed easily.   All other systems reviewed and are negative.      No past medical history on file.  History of DVT patient has a history of a low back pain with sciatic component.  Patient states she has history of previous compartment syndrome lower extremity after knee replacement.  History of vascular disease, degenerative joint disease, asthma  Allergies   Allergen Reactions    Morphine Unknown - High Severity     Pt sates only in Pill form.       No past surgical history on file.    No family history on file.    Social History     Socioeconomic History    Marital status: Single   Tobacco Use    Smoking status: Never    Smokeless tobacco: Never   Vaping Use    Vaping status: Never Used   Substance and Sexual Activity    Alcohol use: Never    Drug use: Never    Sexual activity: Never       States that she has had some pain recently with upper abdominal component cramping after large meals    Objective   Physical Exam  Alert [place] Coma Scale 15   HEENT:  Pupils equal and reactive to light. Conjunctivae are not injected. Normal tympanic membranes. Oropharynx and nares are normal.   Neck: Supple. Midline trachea. No JVD. No goiter.   Chest: Clear and equal breath sounds bilaterally, regular rate and rhythm without murmur or rub.   Abdomen: Positive bowel sounds, tender epigastrium and right upper quadrant, positive Bear sign, nondistended. No rebound or peritoneal signs. No CVA tenderness.   Extremities no clubbing. cyanosis or edema. Motor sensory exam is normal. The full range of motion is intact there is a positive Homans' sign and calf tenderness noted on the right   Skin: Warm and dry, no rashes or petechia.   Lymphatic: No regional lymphadenopathy.   Procedures           ED Course  ED Course as of 08/17/24 0521   Sat Aug 17, 2024   0455 ED overflow/overcrowding conditions [TH]      ED Course User Index  [TH] Dagoberto Singer MD                                 Labs Reviewed   COMPREHENSIVE METABOLIC PANEL - Abnormal; Notable for the following components:       Result Value    Glucose 106 (*)     Sodium 135 (*)     Potassium 3.0 (*)     Chloride 96 (*)     Anion Gap 16.3 (*)     All other components within normal limits    Narrative:     GFR Normal >60  Chronic Kidney Disease <60  Kidney Failure <15     URINALYSIS W/ MICROSCOPIC IF INDICATED (NO CULTURE) - Abnormal; Notable for the following components:    Ketones, UA Trace (*)     Protein, UA 30 mg/dL (1+) (*)     Leuk Esterase, UA Trace (*)     All other components within normal limits   CBC WITH AUTO DIFFERENTIAL - Abnormal; Notable for the following components:    WBC 21.99 (*)     Lymphocyte % 19.0 (*)     Immature Grans % 1.0 (*)     Neutrophils, Absolute 15.22 (*)     Lymphocytes, Absolute 4.17 (*)     Monocytes, Absolute 2.22 (*)     Immature Grans, Absolute 0.21 (*)     All other components within normal limits   URINALYSIS, MICROSCOPIC ONLY - Abnormal; Notable for the following components:    RBC, UA  3-5 (*)     Squamous Epithelial Cells, UA 3-6 (*)     All other components within normal limits   LIPASE - Normal   RAINBOW DRAW    Narrative:     The following orders were created for panel order [place] Draw.  Procedure                               Abnormality         Status                     ---------                               -----------         ------                     Green Top (Gel)[[id number]]                                  Final result               Lavender Top[[id number]]                                     Final result               Gold Top - SST[[id number]]                                   Final result               Light Blue Top[[id number]]                                   Final result                 Please view results for these tests on the individual orders.   CBC AND DIFFERENTIAL    Narrative:     The following orders were created for panel order CBC & Differential.  Procedure                               Abnormality         Status                     ---------                               -----------         ------                     CBC Auto Differential[[id number]]        Abnormal            Final result                 Please view results for these tests on the individual orders.   GREEN TOP   LAVENDER TOP   GOLD TOP - SST   LIGHT BLUE TOP     Medications   sodium chloride 0.9 % flush 10 mL (has no administration in time range)   sodium chloride 0.9 % flush 10 mL (has no administration in time range)   sodium chloride 0.9 % flush 10 mL (has no administration in time range)   sodium chloride 0.9 % infusion 40 mL (has no administration in time range)   acetaminophen (TYLENOL) tablet 650 mg (has no administration in time range)     Or   acetaminophen (TYLENOL) 160 MG/5ML oral solution 650 mg (has no administration in time range)     Or   acetaminophen (TYLENOL) suppository 650 mg (has no administration in time range)   sennosides-docusate (PERICOLACE) 8.6-50 MG per tablet 2 tablet  (has no administration in time range)     And   polyethylene glycol (MIRALAX) packet 17 g (has no administration in time range)     And   bisacodyl (DULCOLAX) EC tablet 5 mg (has no administration in time range)     And   bisacodyl (DULCOLAX) suppository 10 mg (has no administration in time range)   ondansetron (ZOFRAN) injection 4 mg (has no administration in time range)   melatonin tablet 5 mg (has no administration in time range)   sodium chloride 0.9 % bolus 1,000 mL (0 mL Intravenous Stopped 8/17/24 0321)   ondansetron (ZOFRAN) injection 4 mg (4 mg Intravenous Given 8/17/24 0032)   pantoprazole (PROTONIX) injection 40 mg (40 mg Intravenous Given 8/17/24 0031)   HYDROmorphone (DILAUDID) injection 0.5 mg (0.5 mg Intravenous Given 8/17/24 0032)   iopamidol (ISOVUE-370) 76 % injection 100 mL (100 mL Intravenous Given 8/17/24 0356)   ondansetron (ZOFRAN) injection 4 mg (4 mg Intravenous Given 8/17/24 0434)   HYDROmorphone (DILAUDID) injection 0.5 mg (0.5 mg Intravenous Given 8/17/24 0434)     CT Abdomen Pelvis With Contrast    Result Date: 8/17/2024  Impression: 1.Findings compatible with acute cholecystitis. 2.Fibroid uterus. 3.Mild degenerative changes. Electronically Signed: Alfredo Rollins MD  8/17/2024 4:29 AM EDT  Workstation ID: [id number]                 Medical Decision Making  The patient had pain relief Emergency Department.  She was started on IV Unasyn.  The patient had a protracted ER stay in overcrowding conditions.  The patient will need surgical consultation.  The patient was started on heparin.  She was agreeable with this plan of treatment.  NOAC therapy will be held    Amount and/or Complexity of Data Reviewed  Labs: ordered.  Radiology: ordered.    Risk  Prescription drug management.  Decision regarding hospitalization.        Final diagnoses:   Acute epigastric pain   Acute cholecystitis   Acute deep vein thrombosis (DVT) of proximal vein of right lower extremity       ED Disposition  ED  Disposition       ED Disposition   Decision to Admit    Condition   --    Comment   Level of Care: Telemetry [5]   Diagnosis: Acute cholecystitis [575.0.ICD-9-CM]   Admitting Physician: RAYSHAWN FERNÁNDEZ [[id number]]   Attending Physician: RAYSHAWN FERNÁNDEZ [[id number]]   Isolate for COVID?: No [0]   Certification: I Certify That Inpatient Hospital Services Are Medically Necessary For Greater Than 2 Midnights                 No follow-up provider specified.       Medication List        ASK your doctor about these medications      predniSONE 10 MG tablet  Commonly known as: DELTASONE  Take 4 tablets by mouth Daily for 2 days, THEN 3 tablets Daily for 2 days, THEN 2 tablets Daily for 2 days, THEN 1 tablet Daily for 2 days.  Start taking on: August 8, 2024  Ask about: Should I take this medication?                 Dagoberto Singer MD  08/17/24 0529

## 2024-08-17 NOTE — H&P
[facility] Medicine Services  History & Physical    Patient Name: Lisa Jay  : 1970  MRN: [id number]  Primary Care Physician:  Brian Gonzalez MD  Date of admission: 2024  Date and Time of Service: 2024 at 0510      Assessment & Plan      Chief Complaint: abdominal pain    Plan:    Acute Cholecystitis  Leukocytosis, likely multifactorial due to cholecystitis and current prednisone taper  -CT of the abdomen and pelvis reviewed, acute cholecystitis   -WBC 21.9  -Unasyn ordered  -IV fluids for hydration  -General Surgery consult    DVT  -Diagnosed with RLE DVT last week  -Last dose of Xarelto Thursday at 11pm  -Heparin drip ordered    Fibromyalgia  Hypertension  Lupus  -Chronic medications will be restarted when verified    History of Present Illness     History of Present Illness: Lisa Jay is a 54 y.o. female with a previous medical history of Fibromyalgia, HTN, Lupus who presented to [facility] on 2024 with abdominal pain and dry heaves for the past 2 days.  She also reports nausea and dry heaves  She denies melena, hematemesis, hematochezia.  She has not taken her Xarelto since Thursday evening at 11pm due to her symptoms.      She was previously discharged from [facility] on 2024 after being diagnosed with a right lower extremity DVT.  She was sent home with a Xarelto starter pack and a prednisone taper.    In the ED, CT of the abdomen and pelvis showed acute cholecystitis.  WBC is 21.9, potassium 3.0, sodium 135, chloride 96.  Otherwise, labs are unremarkable.  UA showed no sign of infection.  She was afebrile, , RR 20, /83 on exam.  Hospitalist was consulted for further management.    12 point ROS reviewed and negative except as mentioned above    Objective      Vitals:   Temp:  [99.8 °F (37.7 °C)] 99.8 °F (37.7 °C)  Heart Rate:  [100-126] 100  Resp:  [20] 20  Body mass index is 37.42 kg/m².    Physical Exam  Vitals and nursing note reviewed.    Constitutional:       Appearance: Normal appearance. She is obese.   HENT:      Mouth/Throat:      Mouth: Mucous membranes are moist.   Cardiovascular:      Rate and Rhythm: Normal rate and regular rhythm.   Pulmonary:      Effort: Pulmonary effort is normal.      Breath sounds: Normal breath sounds.   Abdominal:      General: Bowel sounds are normal.      Palpations: Abdomen is soft.      Tenderness: There is abdominal tenderness in the right upper quadrant and left upper quadrant.   Musculoskeletal:         General: Normal range of motion.   Skin:     General: Skin is warm and dry.   Neurological:      General: No focal deficit present.      Mental Status: She is alert and oriented to person, place, and time. Mental status is at baseline.   Psychiatric:         Mood and Affect: Mood is anxious.         Behavior: Behavior normal.        Personal History     This is a 54 y.o. female with:    No past medical history on file.    No past surgical history on file.    Active and Resolved Problems  Active Hospital Problems    Diagnosis  POA    **Acute cholecystitis [K81.0]  Yes    Fibromyalgia [M79.7]  Yes    Lower leg DVT (deep venous thromboembolism), acute, right [I82.4Z1]  Yes    Systemic lupus erythematosus [M32.9]  Yes      Resolved Hospital Problems   No resolved problems to display.       Family History: family history is not on file. Otherwise pertinent FHx was reviewed and not pertinent to current issue.    Social History:  reports that she has never smoked. She has never used smokeless tobacco. She reports that she does not drink alcohol and does not use drugs.    Home Medications:  Prior to Admission Medications       Prescriptions Last Dose Informant Patient Reported? Taking?    albuterol sulfate  (90 Base) MCG/ACT inhaler   No No    Inhale 1 puff Every 4 (Four) Hours As Needed for Wheezing.    atorvastatin (LIPITOR) 10 MG tablet   Yes No    Take 0.5 tablets by mouth Every Evening.    clopidogrel  (PLAVIX) 75 MG tablet   No No    Take 1 tablet by mouth Daily.    cyclobenzaprine (FLEXERIL) 10 MG tablet  Pharmacy Yes No    Take 1 tablet by mouth 3 (Three) Times a Day As Needed for Muscle Spasms.    Diclofenac Sodium (VOLTAREN) 1 % gel gel   Yes No    Apply 4 g topically to the appropriate area as directed 4 (Four) Times a Day As Needed.    Fluticasone Furoate-Vilanterol (Breo Ellipta) 100-25 MCG/ACT aerosol powder    No No    Inhale 1 puff Daily.    gabapentin (NEURONTIN) 800 MG tablet  Self Yes No    Take 1 tablet by mouth 3 (Three) Times a Day.    hydroCHLOROthiazide (HYDRODIURIL) 25 MG tablet   Yes No    Take 1 tablet by mouth Daily.    hydroxychloroquine (PLAQUENIL) 200 MG tablet   Yes No    Take 2 tablets by mouth Every Morning.    ipratropium-albuterol (DUO-NEB) 0.5-2.5 mg/3 ml nebulizer   No No    Take 3 mL by nebulization 4 (Four) Times a Day.    ketoconazole (NIZORAL) 2 % cream   Yes No    Apply 1 Application topically to the appropriate area as directed 2 (Two) Times a Day As Needed.    losartan (COZAAR) 50 MG tablet   Yes No    Take 0.5 tablets by mouth Every Night.    metoprolol tartrate (LOPRESSOR) 25 MG tablet   Yes No    Take 1 tablet by mouth 2 (Two) Times a Day.    ondansetron ODT (ZOFRAN-ODT) 4 MG disintegrating tablet   No No    Place 1 tablet on the tongue Every 6 (Six) Hours As Needed for Nausea or Vomiting.    oxyCODONE (ROXICODONE) 15 MG immediate release tablet  Pharmacy Yes No    Take 1 tablet by mouth Every 4 (Four) Hours.    predniSONE (DELTASONE) 10 MG tablet   No No    Take 4 tablets by mouth Daily for 2 days, THEN 3 tablets Daily for 2 days, THEN 2 tablets Daily for 2 days, THEN 1 tablet Daily for 2 days.    Rivaroxaban (XARELTO) tablet therapy pack starter pack   No No    Take one 15 mg tablet twice daily with food for 21 days.  Followed by one 20 mg tablet by mouth once daily with food. Take as directed    Tirzepatide (Mounjaro) 2.5 MG/0.5ML solution pen-injector pen  Pharmacy  Yes No    Inject 0.5 mL under the skin into the appropriate area as directed 1 (One) Time Per Week.    triamcinolone (KENALOG) 0.1 % cream   Yes No    Apply 1 Application topically to the appropriate area as directed 2 (Two) Times a Day As Needed.    venlafaxine (EFFEXOR) 37.5 MG tablet   Yes No    Take 1 tablet by mouth Daily.              Allergies:  Allergies   Allergen Reactions    Morphine Unknown - High Severity     Pt sates only in Pill form.           VTE Prophylaxis:  Pharmacologic VTE prophylaxis orders are present.        CODE STATUS:    Code Status (Patient has no pulse and is not breathing): CPR (Attempt to Resuscitate)  Medical Interventions (Patient has pulse or is breathing): Full Support        Admission Status:  I believe this patient meets inpatient status.    I discussed the patient's findings and my recommendations with patient.    Signature:     This document has been electronically signed by Elissa Adam, FELICITAS, APRN, AGACNP-BC on August 17, 2024 05:51 EDT   [facility], [place], [facility]

## 2024-08-18 ENCOUNTER — APPOINTMENT (OUTPATIENT)
Dept: CT IMAGING | Facility: HOSPITAL | Age: 54
DRG: 418 | End: 2024-08-18
Payer: MEDICARE

## 2024-08-18 LAB
ANION GAP SERPL CALCULATED.3IONS-SCNC: 9.4 MMOL/L (ref 5–15)
APTT PPP: 59.1 SECONDS (ref 61–76.5)
BASOPHILS # BLD AUTO: 0.13 10*3/MM3 (ref 0–0.2)
BASOPHILS NFR BLD AUTO: 0.7 % (ref 0–1.5)
BUN SERPL-MCNC: 12 MG/DL (ref 6–20)
BUN/CREAT SERPL: 13.3 (ref 7–25)
CALCIUM SPEC-SCNC: 9.2 MG/DL (ref 8.6–10.5)
CHLORIDE SERPL-SCNC: 100 MMOL/L (ref 98–107)
CO2 SERPL-SCNC: 26.6 MMOL/L (ref 22–29)
CREAT SERPL-MCNC: 0.9 MG/DL (ref 0.57–1)
DEPRECATED RDW RBC AUTO: 44.1 FL (ref 37–54)
EGFRCR SERPLBLD CKD-EPI 2021: 75.2 ML/MIN/1.73
EOSINOPHIL # BLD AUTO: 0.45 10*3/MM3 (ref 0–0.4)
EOSINOPHIL NFR BLD AUTO: 2.3 % (ref 0.3–6.2)
ERYTHROCYTE [DISTWIDTH] IN BLOOD BY AUTOMATED COUNT: 12.8 % (ref 12.3–15.4)
GLUCOSE BLDC GLUCOMTR-MCNC: 114 MG/DL (ref 70–105)
GLUCOSE BLDC GLUCOMTR-MCNC: 116 MG/DL (ref 70–105)
GLUCOSE BLDC GLUCOMTR-MCNC: 133 MG/DL (ref 70–105)
GLUCOSE BLDC GLUCOMTR-MCNC: 94 MG/DL (ref 70–105)
GLUCOSE SERPL-MCNC: 116 MG/DL (ref 65–99)
HCT VFR BLD AUTO: 37.7 % (ref 34–46.6)
HGB BLD-MCNC: 11.9 G/DL (ref 12–15.9)
IMM GRANULOCYTES # BLD AUTO: 0.18 10*3/MM3 (ref 0–0.05)
IMM GRANULOCYTES NFR BLD AUTO: 0.9 % (ref 0–0.5)
LYMPHOCYTES # BLD AUTO: 4.63 10*3/MM3 (ref 0.7–3.1)
LYMPHOCYTES NFR BLD AUTO: 24.1 % (ref 19.6–45.3)
MCH RBC QN AUTO: 29.7 PG (ref 26.6–33)
MCHC RBC AUTO-ENTMCNC: 31.6 G/DL (ref 31.5–35.7)
MCV RBC AUTO: 94 FL (ref 79–97)
MONOCYTES # BLD AUTO: 2.47 10*3/MM3 (ref 0.1–0.9)
MONOCYTES NFR BLD AUTO: 12.9 % (ref 5–12)
NEUTROPHILS NFR BLD AUTO: 11.35 10*3/MM3 (ref 1.7–7)
NEUTROPHILS NFR BLD AUTO: 59.1 % (ref 42.7–76)
NRBC BLD AUTO-RTO: 0 /100 WBC (ref 0–0.2)
PLATELET # BLD AUTO: 246 10*3/MM3 (ref 140–450)
PMV BLD AUTO: 9.8 FL (ref 6–12)
POTASSIUM SERPL-SCNC: 4 MMOL/L (ref 3.5–5.2)
RBC # BLD AUTO: 4.01 10*6/MM3 (ref 3.77–5.28)
SODIUM SERPL-SCNC: 136 MMOL/L (ref 136–145)
WBC NRBC COR # BLD AUTO: 19.21 10*3/MM3 (ref 3.4–10.8)

## 2024-08-18 PROCEDURE — 82948 REAGENT STRIP/BLOOD GLUCOSE: CPT

## 2024-08-18 PROCEDURE — 80048 BASIC METABOLIC PNL TOTAL CA: CPT

## 2024-08-18 PROCEDURE — C1729 CATH, DRAINAGE: HCPCS

## 2024-08-18 PROCEDURE — 94664 DEMO&/EVAL PT USE INHALER: CPT

## 2024-08-18 PROCEDURE — 63710000001 DIPHENHYDRAMINE PER 50 MG

## 2024-08-18 PROCEDURE — 94799 UNLISTED PULMONARY SVC/PX: CPT

## 2024-08-18 PROCEDURE — 25010000002 MIDAZOLAM PER 1 MG: Performed by: RADIOLOGY

## 2024-08-18 PROCEDURE — 99152 MOD SED SAME PHYS/QHP 5/>YRS: CPT

## 2024-08-18 PROCEDURE — 87070 CULTURE OTHR SPECIMN AEROBIC: CPT | Performed by: SURGERY

## 2024-08-18 PROCEDURE — 85730 THROMBOPLASTIN TIME PARTIAL: CPT | Performed by: SURGERY

## 2024-08-18 PROCEDURE — 94761 N-INVAS EAR/PLS OXIMETRY MLT: CPT

## 2024-08-18 PROCEDURE — 85025 COMPLETE CBC W/AUTO DIFF WBC: CPT

## 2024-08-18 PROCEDURE — 25010000002 LIDOCAINE 1 % SOLUTION: Performed by: RADIOLOGY

## 2024-08-18 PROCEDURE — 99153 MOD SED SAME PHYS/QHP EA: CPT

## 2024-08-18 PROCEDURE — C1819 TISSUE LOCALIZATION-EXCISION: HCPCS

## 2024-08-18 PROCEDURE — 25010000002 HYDROMORPHONE 1 MG/ML SOLUTION: Performed by: RADIOLOGY

## 2024-08-18 PROCEDURE — 87205 SMEAR GRAM STAIN: CPT | Performed by: SURGERY

## 2024-08-18 PROCEDURE — 25010000002 ONDANSETRON PER 1 MG: Performed by: RADIOLOGY

## 2024-08-18 PROCEDURE — 25010000002 AMPICILLIN-SULBACTAM PER 1.5 G

## 2024-08-18 PROCEDURE — 25010000002 HYDROMORPHONE 1 MG/ML SOLUTION

## 2024-08-18 PROCEDURE — 25010000002 FENTANYL CITRATE (PF) 50 MCG/ML SOLUTION: Performed by: RADIOLOGY

## 2024-08-18 PROCEDURE — 99232 SBSQ HOSP IP/OBS MODERATE 35: CPT | Performed by: SURGERY

## 2024-08-18 PROCEDURE — 25010000002 ONDANSETRON PER 1 MG

## 2024-08-18 PROCEDURE — 25810000003 DEXTROSE 5% IN LACTATED RINGERS PER 1000 ML: Performed by: STUDENT IN AN ORGANIZED HEALTH CARE EDUCATION/TRAINING PROGRAM

## 2024-08-18 RX ORDER — GABAPENTIN 400 MG/1
800 CAPSULE ORAL DAILY
Status: DISCONTINUED | OUTPATIENT
Start: 2024-08-18 | End: 2024-08-30 | Stop reason: HOSPADM

## 2024-08-18 RX ORDER — LOSARTAN POTASSIUM 25 MG/1
25 TABLET ORAL NIGHTLY
Status: DISCONTINUED | OUTPATIENT
Start: 2024-08-18 | End: 2024-08-30 | Stop reason: HOSPADM

## 2024-08-18 RX ORDER — MIDAZOLAM HYDROCHLORIDE 1 MG/ML
INJECTION INTRAMUSCULAR; INTRAVENOUS AS NEEDED
Status: COMPLETED | OUTPATIENT
Start: 2024-08-18 | End: 2024-08-18

## 2024-08-18 RX ORDER — FENTANYL CITRATE 50 UG/ML
INJECTION, SOLUTION INTRAMUSCULAR; INTRAVENOUS
Status: ACTIVE
Start: 2024-08-18 | End: 2024-08-19

## 2024-08-18 RX ORDER — DIPHENHYDRAMINE HCL 25 MG
25 CAPSULE ORAL EVERY 6 HOURS PRN
Status: DISCONTINUED | OUTPATIENT
Start: 2024-08-18 | End: 2024-08-19

## 2024-08-18 RX ORDER — ONDANSETRON 2 MG/ML
INJECTION INTRAMUSCULAR; INTRAVENOUS AS NEEDED
Status: COMPLETED | OUTPATIENT
Start: 2024-08-18 | End: 2024-08-18

## 2024-08-18 RX ORDER — MIDAZOLAM HYDROCHLORIDE 1 MG/ML
INJECTION INTRAMUSCULAR; INTRAVENOUS
Status: ACTIVE
Start: 2024-08-18 | End: 2024-08-19

## 2024-08-18 RX ORDER — METOPROLOL TARTRATE 25 MG/1
25 TABLET, FILM COATED ORAL 2 TIMES DAILY
Status: DISCONTINUED | OUTPATIENT
Start: 2024-08-18 | End: 2024-08-30 | Stop reason: HOSPADM

## 2024-08-18 RX ORDER — LIDOCAINE HYDROCHLORIDE 10 MG/ML
INJECTION, SOLUTION INFILTRATION; PERINEURAL AS NEEDED
Status: COMPLETED | OUTPATIENT
Start: 2024-08-18 | End: 2024-08-18

## 2024-08-18 RX ORDER — FENTANYL CITRATE 50 UG/ML
INJECTION, SOLUTION INTRAMUSCULAR; INTRAVENOUS AS NEEDED
Status: COMPLETED | OUTPATIENT
Start: 2024-08-18 | End: 2024-08-18

## 2024-08-18 RX ADMIN — OXYCODONE HYDROCHLORIDE 5 MG: 5 TABLET ORAL at 15:56

## 2024-08-18 RX ADMIN — VENLAFAXINE 37.5 MG: 75 TABLET ORAL at 09:54

## 2024-08-18 RX ADMIN — MIDAZOLAM 1 MG: 1 INJECTION INTRAMUSCULAR; INTRAVENOUS at 13:39

## 2024-08-18 RX ADMIN — DIPHENHYDRAMINE HYDROCHLORIDE 25 MG: 25 CAPSULE ORAL at 17:29

## 2024-08-18 RX ADMIN — ONDANSETRON 4 MG: 2 INJECTION INTRAMUSCULAR; INTRAVENOUS at 13:36

## 2024-08-18 RX ADMIN — METOPROLOL TARTRATE 25 MG: 25 TABLET, FILM COATED ORAL at 20:48

## 2024-08-18 RX ADMIN — ONDANSETRON 4 MG: 2 INJECTION INTRAMUSCULAR; INTRAVENOUS at 22:17

## 2024-08-18 RX ADMIN — HYDROMORPHONE HYDROCHLORIDE 1 MG: 1 INJECTION, SOLUTION INTRAMUSCULAR; INTRAVENOUS; SUBCUTANEOUS at 17:29

## 2024-08-18 RX ADMIN — HYDROMORPHONE HYDROCHLORIDE 1 MG: 1 INJECTION, SOLUTION INTRAMUSCULAR; INTRAVENOUS; SUBCUTANEOUS at 07:32

## 2024-08-18 RX ADMIN — BUDESONIDE AND FORMOTEROL FUMARATE DIHYDRATE 1 PUFF: 160; 4.5 AEROSOL RESPIRATORY (INHALATION) at 19:41

## 2024-08-18 RX ADMIN — LIDOCAINE HYDROCHLORIDE 10 ML: 10 INJECTION, SOLUTION INFILTRATION; PERINEURAL at 13:47

## 2024-08-18 RX ADMIN — LOSARTAN POTASSIUM 25 MG: 25 TABLET, FILM COATED ORAL at 20:47

## 2024-08-18 RX ADMIN — IPRATROPIUM BROMIDE AND ALBUTEROL SULFATE 3 ML: .5; 3 SOLUTION RESPIRATORY (INHALATION) at 07:00

## 2024-08-18 RX ADMIN — IPRATROPIUM BROMIDE AND ALBUTEROL SULFATE 3 ML: .5; 3 SOLUTION RESPIRATORY (INHALATION) at 19:37

## 2024-08-18 RX ADMIN — OXYCODONE HYDROCHLORIDE 5 MG: 5 TABLET ORAL at 20:47

## 2024-08-18 RX ADMIN — HYDROMORPHONE HYDROCHLORIDE 1 MG: 1 INJECTION, SOLUTION INTRAMUSCULAR; INTRAVENOUS; SUBCUTANEOUS at 22:10

## 2024-08-18 RX ADMIN — FENTANYL CITRATE 50 MCG: 50 INJECTION, SOLUTION INTRAMUSCULAR; INTRAVENOUS at 13:53

## 2024-08-18 RX ADMIN — FENTANYL CITRATE 50 MCG: 50 INJECTION, SOLUTION INTRAMUSCULAR; INTRAVENOUS at 13:50

## 2024-08-18 RX ADMIN — IPRATROPIUM BROMIDE AND ALBUTEROL SULFATE 3 ML: .5; 3 SOLUTION RESPIRATORY (INHALATION) at 11:18

## 2024-08-18 RX ADMIN — AMPICILLIN SODIUM AND SULBACTAM SODIUM 3 G: 2; 1 INJECTION, POWDER, FOR SOLUTION INTRAMUSCULAR; INTRAVENOUS at 23:26

## 2024-08-18 RX ADMIN — HYDROMORPHONE HYDROCHLORIDE 1 MG: 1 INJECTION, SOLUTION INTRAMUSCULAR; INTRAVENOUS; SUBCUTANEOUS at 11:26

## 2024-08-18 RX ADMIN — OXYCODONE HYDROCHLORIDE 5 MG: 5 TABLET ORAL at 09:54

## 2024-08-18 RX ADMIN — AMPICILLIN SODIUM AND SULBACTAM SODIUM 3 G: 2; 1 INJECTION, POWDER, FOR SOLUTION INTRAMUSCULAR; INTRAVENOUS at 17:30

## 2024-08-18 RX ADMIN — OXYCODONE HYDROCHLORIDE 5 MG: 5 TABLET ORAL at 00:54

## 2024-08-18 RX ADMIN — FENTANYL CITRATE 100 MCG: 50 INJECTION, SOLUTION INTRAMUSCULAR; INTRAVENOUS at 14:04

## 2024-08-18 RX ADMIN — MIDAZOLAM 1 MG: 1 INJECTION INTRAMUSCULAR; INTRAVENOUS at 14:00

## 2024-08-18 RX ADMIN — FENTANYL CITRATE 100 MCG: 50 INJECTION, SOLUTION INTRAMUSCULAR; INTRAVENOUS at 13:39

## 2024-08-18 RX ADMIN — MIDAZOLAM 1 MG: 1 INJECTION INTRAMUSCULAR; INTRAVENOUS at 13:45

## 2024-08-18 RX ADMIN — DICLOFENAC SODIUM 4 G: 10 GEL TOPICAL at 20:47

## 2024-08-18 RX ADMIN — FENTANYL CITRATE 100 MCG: 50 INJECTION, SOLUTION INTRAMUSCULAR; INTRAVENOUS at 13:45

## 2024-08-18 RX ADMIN — AMPICILLIN SODIUM AND SULBACTAM SODIUM 3 G: 2; 1 INJECTION, POWDER, FOR SOLUTION INTRAMUSCULAR; INTRAVENOUS at 05:45

## 2024-08-18 RX ADMIN — AMPICILLIN SODIUM AND SULBACTAM SODIUM 3 G: 2; 1 INJECTION, POWDER, FOR SOLUTION INTRAMUSCULAR; INTRAVENOUS at 11:26

## 2024-08-18 RX ADMIN — Medication 10 ML: at 09:54

## 2024-08-18 RX ADMIN — BUDESONIDE AND FORMOTEROL FUMARATE DIHYDRATE 1 PUFF: 160; 4.5 AEROSOL RESPIRATORY (INHALATION) at 07:06

## 2024-08-18 RX ADMIN — HYDROMORPHONE HYDROCHLORIDE 1 MG: 1 INJECTION, SOLUTION INTRAMUSCULAR; INTRAVENOUS; SUBCUTANEOUS at 03:47

## 2024-08-18 RX ADMIN — Medication 10 ML: at 20:51

## 2024-08-18 RX ADMIN — HYDROMORPHONE HYDROCHLORIDE 1 MG: 1 INJECTION, SOLUTION INTRAMUSCULAR; INTRAVENOUS; SUBCUTANEOUS at 15:17

## 2024-08-18 RX ADMIN — SODIUM CHLORIDE, SODIUM LACTATE, POTASSIUM CHLORIDE, CALCIUM CHLORIDE AND DEXTROSE MONOHYDRATE 75 ML/HR: 5; 600; 310; 30; 20 INJECTION, SOLUTION INTRAVENOUS at 11:26

## 2024-08-18 RX ADMIN — ATORVASTATIN CALCIUM 5 MG: 10 TABLET, FILM COATED ORAL at 17:29

## 2024-08-18 RX ADMIN — HYDROMORPHONE HYDROCHLORIDE 1 MG: 1 INJECTION, SOLUTION INTRAMUSCULAR; INTRAVENOUS; SUBCUTANEOUS at 14:12

## 2024-08-18 RX ADMIN — OXYCODONE HYDROCHLORIDE 5 MG: 5 TABLET ORAL at 05:51

## 2024-08-18 NOTE — PLAN OF CARE
Goal Outcome Evaluation:         Patient able to make needs known. Pain managed per MAR. Patient remains NPO. VSS. Personal items and call light in reach. Plan of care on going.

## 2024-08-18 NOTE — PROGRESS NOTES
General Surgery Progress Note    Name: Lisa Jay ADMIT: 2024   : 1970  PCP: Brian Gonzalez MD    MRN: [id number] LOS: 1 days   AGE/SEX: 54 y.o. female  ROOM: [address] [place] [address]    Chief Complaint   Patient presents with    Abdominal Pain     Pt reports mid abdominal pain since last night, nausea. Pt denies any v/d. Pt reports was seen here last week and dx with blood clot to rt leg and was started on xarelto     Subjective     Patient seen and examined.  Vital signs stable, afebrile.  Reports 10 out of 10 abdominal pain that has slightly improved after receiving IV Dilaudid.  Reports nausea requiring Zofran.  Denies vomiting.    Objective     Scheduled Medications:   ampicillin-sulbactam, 3 g, Intravenous, Q6H  atorvastatin, 5 mg, Oral, Q PM  budesonide-formoterol, 1 puff, Inhalation, BID - RT  [Held by provider] clopidogrel, 75 mg, Oral, Daily  insulin lispro, 2-9 Units, Subcutaneous, 4x Daily AC & at Bedtime  ipratropium-albuterol, 3 mL, Nebulization, 4x Daily - RT  nicotine, 1 patch, Transdermal, Q24H  sodium chloride, 10 mL, Intravenous, Q12H  venlafaxine, 37.5 mg, Oral, Daily        Active Infusions:  dextrose 5 % and lactated Ringer's, 75 mL/hr, Last Rate: 75 mL/hr (24 1000)  heparin, 13.6 Units/kg/hr, Last Rate: Stopped (24 0755)        As Needed Medications:    acetaminophen **OR** acetaminophen **OR** acetaminophen    albuterol sulfate HFA    senna-docusate sodium **AND** polyethylene glycol **AND** bisacodyl **AND** bisacodyl    dextrose    dextrose    glucagon (human recombinant)    HYDROmorphone    melatonin    ondansetron    oxyCODONE    Potassium Replacement - Follow Nurse / BPA Driven Protocol    prochlorperazine    sodium chloride    sodium chloride    sodium chloride    Vital Signs  Vital Signs Patient Vitals for the past 24 hrs:   BP Temp Temp src Pulse Resp SpO2   24 0837 -- 98.5 °F (36.9 °C) Oral -- 23 --   24 0707 -- -- -- 99 18 97 %   08/18/24 0706 -- --  -- 100 18 99 %   08/18/24 0705 -- -- -- 102 18 98 %   08/18/24 0700 -- -- -- 98 18 95 %   08/18/24 0500 138/91 98 °F (36.7 °C) Oral -- 15 --   08/18/24 0054 136/82 98.3 °F (36.8 °C) Oral 90 16 100 %   08/17/24 1947 -- -- -- 92 14 100 %   08/17/24 1941 -- -- -- 93 20 98 %   08/17/24 1938 -- -- -- 93 17 100 %   08/17/24 1934 -- -- -- 97 18 100 %   08/17/24 1900 134/87 98.1 °F (36.7 °C) Oral 97 20 100 %   08/17/24 1526 139/80 99.4 °F (37.4 °C) Oral 95 16 100 %   08/17/24 1520 -- -- -- -- -- 100 %   08/17/24 1146 -- -- -- -- -- 100 %     I/O:  I/O last 3 completed shifts:  In: 100 [IV Piggyback:100]  Out: 200 [Urine:200]    Physical Exam:  Physical Exam  Constitutional:       General: She is not in acute distress.  Cardiovascular:      Rate and Rhythm: Normal rate.   Pulmonary:      Effort: Pulmonary effort is normal. No respiratory distress.   Abdominal:      Palpations: Abdomen is soft.      Tenderness: There is abdominal tenderness.      Comments: Tenderness to palpation in right upper quadrant and epigastric area   Neurological:      Mental Status: She is alert. Mental status is at baseline.   Psychiatric:         Mood and Affect: Mood normal.         Behavior: Behavior normal.         Results Review:     CBC    Results from last 7 days   Lab Units 08/18/24  0746 08/16/24  2324   WBC 10*3/mm3 19.21* 21.99*   HEMOGLOBIN g/dL 11.9* 14.8   PLATELETS 10*3/mm3 246 320     BMP   Results from last 7 days   Lab Units 08/18/24  0746 08/17/24  2304 08/17/24  1512 08/16/24  2324   SODIUM mmol/L 136  --   --  135*   POTASSIUM mmol/L 4.0 4.2 3.4* 3.0*   CHLORIDE mmol/L 100  --   --  96*   CO2 mmol/L 26.6  --   --  22.7   BUN mg/dL 12  --   --  16   CREATININE mg/dL 0.90  --   --  0.88   GLUCOSE mg/dL 116*  --   --  106*   MAGNESIUM mg/dL  --   --   --  2.1     Radiology(recent) CT Abdomen Pelvis With Contrast    Result Date: 8/17/2024  Impression: 1.Findings compatible with acute cholecystitis. 2.Fibroid uterus. 3.Mild  degenerative changes. Electronically Signed: Alfredo Rollins MD  8/17/2024 4:29 AM EDT  Workstation ID: [id number]     I reviewed the patient's new clinical results.    Assessment & Plan       Acute cholecystitis    Lower leg DVT (deep venous thromboembolism), acute, right    Systemic lupus erythematosus    Fibromyalgia      54 y.o. female admitted 8/16/2024 with acute cholecystitis    Antiemetics and pain medication as needed  Continue to hold Plavix  Continue IV Unasyn  Planning on percutaneous cholecystostomy tube placement with IR this afternoon        This note was created using Dragon Voice Recognition software.    RONALD Mayfield  08/18/24  11:19 EDT

## 2024-08-18 NOTE — H&P
[facility]   Interventional Radiology H&P    Patient Name: Lisa Jay  : 1970  MRN: [id number]  Primary Care Physician:  Brian Gonzalez MD  Referring Physician: Brian Gonzalez MD  Date of admission: 2024    Subjective   Subjective     HPI:  Lisa Jay is a 54 y.o. female with acute cholecystitis, unable to undergo cholecystectomy due to ASA and Plavix. Cholecystostomy tube requested by surgery for temporizing.     Review of Systems:   Constitutional no fever,  no weight loss       Otolaryngeal no difficulty swallowing   Cardiovascular no chest pain   Pulmonary no cough, no sputum production   Gastrointestinal no constipation, no diarrhea                         Personal History       Past Medical/Surgical History: History reviewed. No pertinent past medical history.  History reviewed. No pertinent surgical history.    Social History:  reports that she has never smoked. She has never used smokeless tobacco. She reports that she does not drink alcohol and does not use drugs.    Medications:  Medications Prior to Admission   Medication Sig Dispense Refill Last Dose    albuterol sulfate  (90 Base) MCG/ACT inhaler Inhale 1 puff Every 4 (Four) Hours As Needed for Wheezing. 8 g 0 Past Week    atorvastatin (LIPITOR) 10 MG tablet Take 0.5 tablets by mouth Every Evening.   Past Week    clopidogrel (PLAVIX) 75 MG tablet Take 1 tablet by mouth Daily. 30 tablet 2 Past Week    cyclobenzaprine (FLEXERIL) 10 MG tablet Take 1 tablet by mouth 3 (Three) Times a Day As Needed for Muscle Spasms.   Past Week    Diclofenac Sodium (VOLTAREN) 1 % gel gel Apply 4 g topically to the appropriate area as directed 4 (Four) Times a Day As Needed.   Past Week    Fluticasone Furoate-Vilanterol (Breo Ellipta) 100-25 MCG/ACT aerosol powder  Inhale 1 puff Daily. 1 each 0 Past Week    gabapentin (NEURONTIN) 800 MG tablet Take 1 tablet by mouth 3 (Three) Times a Day.   Past Week    hydroCHLOROthiazide (HYDRODIURIL) 25 MG  tablet Take 1 tablet by mouth Daily.   Past Week    hydroxychloroquine (PLAQUENIL) 200 MG tablet Take 2 tablets by mouth Every Morning.   Past Week    ipratropium-albuterol (DUO-NEB) 0.5-2.5 mg/3 ml nebulizer Take 3 mL by nebulization 4 (Four) Times a Day. 360 mL 0 Past Week    ketoconazole (NIZORAL) 2 % cream Apply 1 Application topically to the appropriate area as directed 2 (Two) Times a Day As Needed.   Past Week    losartan (COZAAR) 50 MG tablet Take 0.5 tablets by mouth Every Night.   Past Week    metoprolol tartrate (LOPRESSOR) 25 MG tablet Take 1 tablet by mouth 2 (Two) Times a Day.   Past Week    ondansetron ODT (ZOFRAN-ODT) 4 MG disintegrating tablet Place 1 tablet on the tongue Every 6 (Six) Hours As Needed for Nausea or Vomiting. 30 tablet 0 Past Week    oxyCODONE (ROXICODONE) 15 MG immediate release tablet Take 1 tablet by mouth Every 4 (Four) Hours.   Past Week    Tirzepatide (Mounjaro) 2.5 MG/0.5ML solution pen-injector pen Inject 0.5 mL under the skin into the appropriate area as directed 1 (One) Time Per Week.   8/9/2024    triamcinolone (KENALOG) 0.1 % cream Apply 1 Application topically to the appropriate area as directed 2 (Two) Times a Day As Needed.   Past Week    venlafaxine (EFFEXOR) 37.5 MG tablet Take 1 tablet by mouth Daily.   Past Week    Rivaroxaban (XARELTO) tablet therapy pack starter pack Take one 15 mg tablet twice daily with food for 21 days.  Followed by one 20 mg tablet by mouth once daily with food. Take as directed 51 tablet 0 8/15/2024     Current medications:  ampicillin-sulbactam, 3 g, Intravenous, Q6H  atorvastatin, 5 mg, Oral, Q PM  budesonide-formoterol, 1 puff, Inhalation, BID - RT  [Held by provider] clopidogrel, 75 mg, Oral, Daily  fentaNYL citrate (PF), , ,   gabapentin, 800 mg, Oral, Daily  insulin lispro, 2-9 Units, Subcutaneous, 4x Daily AC & at Bedtime  ipratropium-albuterol, 3 mL, Nebulization, 4x Daily - RT  losartan, 25 mg, Oral, Nightly  metoprolol tartrate, 25  "mg, Oral, BID  midazolam, , ,   nicotine, 1 patch, Transdermal, Q24H  sodium chloride, 10 mL, Intravenous, Q12H  venlafaxine, 37.5 mg, Oral, Daily      Current IV drips:  dextrose 5 % and lactated Ringer's, 75 mL/hr, Last Rate: 75 mL/hr (08/18/24 1126)  heparin, 13.6 Units/kg/hr, Last Rate: Stopped (08/18/24 0755)        Allergies:  Allergies   Allergen Reactions    Morphine Unknown - High Severity     Pt sates only in Pill form.       Objective    Objective     Vitals:   Temp:  [98 °F (36.7 °C)-99.4 °F (37.4 °C)] 98.1 °F (36.7 °C)  Heart Rate:  [] 87  Resp:  [0-23] 16  BP: (134-139)/(80-91) 138/91      Physical Exam:   Constitutional: Awake, alert, No acute distress    Respiratory: Clear to auscultation bilaterally, nonlabored respirations    Cardiovascular: RRR, no murmurs, rubs, or gallops,  Gastrointestinal:  soft, nontender, nondistended        ASA SCALE ASSESSMENT:  2-Mild to moderate systemic disease, medically well controlled, with no functional limitation    MALLAMPATI CLASSIFICATION:  3-Only the soft palate and base of uvula are visible       Result Review        Result Review:     Sodium   Date Value Ref Range Status   08/18/2024 136 136 - 145 mmol/L Final   08/16/2024 135 (L) 136 - 145 mmol/L Final       Potassium   Date Value Ref Range Status   08/18/2024 4.0 3.5 - 5.2 mmol/L Final   08/17/2024 4.2 3.5 - 5.2 mmol/L Final   08/17/2024 3.4 (L) 3.5 - 5.2 mmol/L Final   08/16/2024 3.0 (L) 3.5 - 5.2 mmol/L Final     Comment:     Slight hemolysis detected by analyzer. Result may be falsely elevated.       Chloride   Date Value Ref Range Status   08/18/2024 100 98 - 107 mmol/L Final   08/16/2024 96 (L) 98 - 107 mmol/L Final       No results found for: \"PLASMABICARB\"    BUN   Date Value Ref Range Status   08/18/2024 12 6 - 20 mg/dL Final   08/16/2024 16 6 - 20 mg/dL Final       Creatinine   Date Value Ref Range Status   08/18/2024 0.90 0.57 - 1.00 mg/dL Final   08/16/2024 0.88 0.57 - 1.00 mg/dL Final    " "   Calcium   Date Value Ref Range Status   08/18/2024 9.2 8.6 - 10.5 mg/dL Final   08/16/2024 9.6 8.6 - 10.5 mg/dL Final           No components found for: \"GLUCOSE.*\"  Results from last 7 days   Lab Units 08/18/24  0746   WBC 10*3/mm3 19.21*   HEMOGLOBIN g/dL 11.9*   HEMATOCRIT % 37.7   PLATELETS 10*3/mm3 246      Results from last 7 days   Lab Units 08/16/24  2324   INR  1.09           Assessment / Plan     Assesment:   Acute cholecystitis. CT guided cholecystostomy tube, for temporizing due to ASA/Plavix.       Plan:   See above.     The risks and benefits of the procedure were discussed with the patient.    Electronically signed by John Mendoza MD, 08/18/24, 1:28 PM EDT.   "

## 2024-08-18 NOTE — POST-PROCEDURE NOTE
IR POST OP NOTE    Procedure:CT guided cholecystostomy drain.      Pre Op DX:Acute cholecystitis, pt not currently candidate for cholecystectomy due to ASA/Plavix.       Post Op DX:Same      Anesthesia: Local, CS      Findings:Large amount of thick black bile. Sample sent for  Cx.       Complications:None immediate.       Provider Signature: Dr. John Mendoza

## 2024-08-18 NOTE — PROGRESS NOTES
[facility] MEDICINE SERVICE  DAILY PROGRESS NOTE    NAME: Lisa Jay  : 1970  MRN: [id number]      LOS: 1 day     PROVIDER OF SERVICE: Timothy Duane Brammell, MD    Chief Complaint: Acute cholecystitis    Subjective:     Interval History:  History taken from: patient  Patient Complaints: Patient still with right upper quadrant pain.  She has some nausea.  There has been no vomiting.  She is awaiting placement of biliary drain.  She denies any chest pain or shortness of breath.  Denies any other additional acute issues.      Review of Systems:   Review of Systems   All other systems reviewed and are negative.      Objective:     Vital Signs  Temp:  [98 °F (36.7 °C)-99.4 °F (37.4 °C)] 98.5 °F (36.9 °C)  Heart Rate:  [] 87  Resp:  [0-23] 0  BP: (134-139)/(80-91) 138/91   Body mass index is 37.42 kg/m².    Physical Exam  Physical Exam  Constitutional:       General: She is not in acute distress.     Appearance: Normal appearance. She is obese.   HENT:      Head: Normocephalic.   Cardiovascular:      Rate and Rhythm: Normal rate and regular rhythm.   Pulmonary:      Effort: Pulmonary effort is normal.      Breath sounds: Normal breath sounds.   Abdominal:      General: Bowel sounds are normal.      Palpations: Abdomen is soft.      Tenderness: There is abdominal tenderness.      Comments: Tender right upper quadrant.   Musculoskeletal:         General: No swelling.   Neurological:      Mental Status: She is alert. Mental status is at baseline.         Scheduled Meds   ampicillin-sulbactam, 3 g, Intravenous, Q6H  atorvastatin, 5 mg, Oral, Q PM  budesonide-formoterol, 1 puff, Inhalation, BID - RT  [Held by provider] clopidogrel, 75 mg, Oral, Daily  insulin lispro, 2-9 Units, Subcutaneous, 4x Daily AC & at Bedtime  ipratropium-albuterol, 3 mL, Nebulization, 4x Daily - RT  nicotine, 1 patch, Transdermal, Q24H  sodium chloride, 10 mL, Intravenous, Q12H  venlafaxine, 37.5 mg, Oral, Daily       PRN  Meds     acetaminophen **OR** acetaminophen **OR** acetaminophen    albuterol sulfate HFA    senna-docusate sodium **AND** polyethylene glycol **AND** bisacodyl **AND** bisacodyl    dextrose    dextrose    glucagon (human recombinant)    HYDROmorphone    melatonin    ondansetron    oxyCODONE    Potassium Replacement - Follow Nurse / BPA Driven Protocol    prochlorperazine    sodium chloride    sodium chloride    sodium chloride   Infusions  dextrose 5 % and lactated Ringer's, 75 mL/hr, Last Rate: 75 mL/hr (08/18/24 1126)  heparin, 13.6 Units/kg/hr, Last Rate: Stopped (08/18/24 2175)          Diagnostic Data    Results from last 7 days   Lab Units 08/18/24  0746 08/17/24  1512 08/16/24  2324   WBC 10*3/mm3 19.21*  --  21.99*   HEMOGLOBIN g/dL 11.9*  --  14.8   HEMATOCRIT % 37.7  --  45.7   PLATELETS 10*3/mm3 246  --  320   GLUCOSE mg/dL 116*  --  106*   CREATININE mg/dL 0.90  --  0.88   BUN mg/dL 12  --  16   SODIUM mmol/L 136  --  135*   POTASSIUM mmol/L 4.0   < > 3.0*   AST (SGOT) U/L  --   --  32   ALT (SGPT) U/L  --   --  25   ALK PHOS U/L  --   --  90   BILIRUBIN mg/dL  --   --  0.9   ANION GAP mmol/L 9.4  --  16.3*    < > = values in this interval not displayed.       CT Abdomen Pelvis With Contrast    Result Date: 8/17/2024  Impression: 1.Findings compatible with acute cholecystitis. 2.Fibroid uterus. 3.Mild degenerative changes. Electronically Signed: Alfredo Rollins MD  8/17/2024 4:29 AM EDT  Workstation ID: [id number]           Assessment:    Acute cholecystitis  History of DVT, recent  Obesity  Lupus  Hypertension  Uterine fibroids  Anemia  Chronic immunosuppression with Plaquenil currently on hold   Plan: Percutaneous cholecystectomy tube.  Awaiting reversal of anticoagulant therapy.  Active and Resolved Problems  Active Hospital Problems    Diagnosis  POA    **Acute cholecystitis [K81.0]  Yes    Fibromyalgia [M79.7]  Yes    Lower leg DVT (deep venous thromboembolism), acute, right [I82.4Z1]  Yes    Systemic  lupus erythematosus [M32.9]  Yes      Resolved Hospital Problems   No resolved problems to display.           VTE Prophylaxis:  Pharmacologic VTE prophylaxis orders are present.         Code status is   Code Status and Medical Interventions: CPR (Attempt to Resuscitate); Full Support   Ordered at: 08/17/24 0507     Code Status (Patient has no pulse and is not breathing):    CPR (Attempt to Resuscitate)     Medical Interventions (Patient has pulse or is breathing):    Full Support       Plan for disposition:home in 5 days    Time: 30 minutes    Signature: Electronically signed by Timothy Duane Brammell, MD, 08/18/24, 11:57 EDT.  [facility] Hospitalist Team

## 2024-08-18 NOTE — PLAN OF CARE
Goal Outcome Evaluation:        Problem: Adult Inpatient Plan of Care  Goal: Plan of Care Review  Outcome: Ongoing, Progressing  Goal: Patient-Specific Goal (Individualized)  Outcome: Ongoing, Progressing  Goal: Absence of Hospital-Acquired Illness or Injury  Outcome: Ongoing, Progressing  Intervention: Identify and Manage Fall Risk  Recent Flowsheet Documentation  Taken 8/18/2024 1803 by Kiley Pandya LPN  Safety Promotion/Fall Prevention:   safety round/check completed   room organization consistent   nonskid shoes/slippers when out of bed   fall prevention program maintained   clutter free environment maintained   activity supervised  Taken 8/18/2024 1626 by Kiley Pandya LPN  Safety Promotion/Fall Prevention:   safety round/check completed   room organization consistent   nonskid shoes/slippers when out of bed   clutter free environment maintained   activity supervised  Taken 8/18/2024 1432 by Kiley Pandya LPN  Safety Promotion/Fall Prevention: patient off unit  Taken 8/18/2024 1301 by Kiley Pandya LPN  Safety Promotion/Fall Prevention: patient off unit  Taken 8/18/2024 1200 by Kiley Pandya LPN  Safety Promotion/Fall Prevention:   safety round/check completed   room organization consistent   nonskid shoes/slippers when out of bed   fall prevention program maintained   clutter free environment maintained   activity supervised  Taken 8/18/2024 1031 by Kiley Pandya LPN  Safety Promotion/Fall Prevention:   safety round/check completed   room organization consistent   nonskid shoes/slippers when out of bed   fall prevention program maintained   clutter free environment maintained   activity supervised  Taken 8/18/2024 0837 by Kiley Pandya LPN  Safety Promotion/Fall Prevention:   safety round/check completed   room organization consistent   nonskid shoes/slippers when out of bed   fall prevention program maintained   clutter free environment maintained   activity supervised  Taken 8/18/2024 0732 by Kiley Pandya  LPN  Safety Promotion/Fall Prevention:   safety round/check completed   room organization consistent   nonskid shoes/slippers when out of bed   fall prevention program maintained   clutter free environment maintained   activity supervised  Intervention: Prevent Skin Injury  Recent Flowsheet Documentation  Taken 8/18/2024 0732 by Kiley Pandya LPN  Body Position: weight shifting  Skin Protection:   adhesive use limited   incontinence pads utilized   skin-to-device areas padded   skin-to-skin areas padded   tubing/devices free from skin contact  Intervention: Prevent and Manage VTE (Venous Thromboembolism) Risk  Recent Flowsheet Documentation  Taken 8/18/2024 1031 by Kiley Pandya LPN  Activity Management:   ambulated outside room   back to bed  Taken 8/18/2024 0732 by Kiley Pandya LPN  VTE Prevention/Management: (DVT - heparin therapy)   bilateral   sequential compression devices off  Range of Motion: active ROM (range of motion) encouraged  Intervention: Prevent Infection  Recent Flowsheet Documentation  Taken 8/18/2024 0732 by Kiley Pandya LPN  Infection Prevention:   visitors restricted/screened   single patient room provided   rest/sleep promoted   personal protective equipment utilized   hand hygiene promoted  Goal: Optimal Comfort and Wellbeing  Outcome: Ongoing, Progressing  Intervention: Monitor Pain and Promote Comfort  Recent Flowsheet Documentation  Taken 8/18/2024 1729 by Kiley Pandya LPN  Pain Management Interventions:   quiet environment facilitated   position adjusted   pillow support provided   care clustered   breathing exercises  Taken 8/18/2024 1626 by Kiley Pandya LPN  Pain Management Interventions:   quiet environment facilitated   position adjusted   pillow support provided   breathing exercises   care clustered  Taken 8/18/2024 1556 by Kiley Pandya LPN  Pain Management Interventions:   quiet environment facilitated   position adjusted   pillow support provided   breathing exercises   care  clustered   see MAR  Taken 8/18/2024 1517 by Kiley Pandya LPN  Pain Management Interventions:   quiet environment facilitated   position adjusted   pillow support provided   care clustered   breathing exercises  Taken 8/18/2024 1200 by Kiley Pandya LPN  Pain Management Interventions:   quiet environment facilitated   pillow support provided   position adjusted   care clustered   breathing exercises  Taken 8/18/2024 1126 by Kiley Pandya LPN  Pain Management Interventions:   quiet environment facilitated   see MAR   pillow support provided   position adjusted   care clustered   breathing exercises  Taken 8/18/2024 1024 by Kiley Pandya LPN  Pain Management Interventions:   quiet environment facilitated   position adjusted   pillow support provided   care clustered   breathing exercises  Taken 8/18/2024 0954 by Kiley Pandya LPN  Pain Management Interventions:   quiet environment facilitated   see MAR   position adjusted   pillow support provided   care clustered   breathing exercises  Taken 8/18/2024 0802 by Kiley Pandya LPN  Pain Management Interventions:   quiet environment facilitated   position adjusted   pillow support provided   care clustered   breathing exercises  Taken 8/18/2024 0732 by Kiley Pandya LPN  Pain Management Interventions:   care clustered   breathing exercises   position adjusted   pillow support provided   see MAR   quiet environment facilitated  Intervention: Provide Person-Centered Care  Recent Flowsheet Documentation  Taken 8/18/2024 0732 by Kiley Pandya LPN  Trust Relationship/Rapport:   care explained   thoughts/feelings acknowledged   questions answered  Goal: Readiness for Transition of Care  Outcome: Ongoing, Progressing     Problem: Fall Injury Risk  Goal: Absence of Fall and Fall-Related Injury  Outcome: Ongoing, Progressing  Intervention: Identify and Manage Contributors  Recent Flowsheet Documentation  Taken 8/18/2024 1626 by Kiley Pandya LPN  Medication Review/Management:  medications reviewed  Taken 8/18/2024 1200 by Kiley Pandya LPN  Medication Review/Management: dosing adjusted  Taken 8/18/2024 0732 by Kiley Pandya LPN  Medication Review/Management: medications reviewed  Self-Care Promotion: independence encouraged  Intervention: Promote Injury-Free Environment  Recent Flowsheet Documentation  Taken 8/18/2024 1803 by Kiley Pandya LPN  Safety Promotion/Fall Prevention:   safety round/check completed   room organization consistent   nonskid shoes/slippers when out of bed   fall prevention program maintained   clutter free environment maintained   activity supervised  Taken 8/18/2024 1626 by Kiley Pandya LPN  Safety Promotion/Fall Prevention:   safety round/check completed   room organization consistent   nonskid shoes/slippers when out of bed   clutter free environment maintained   activity supervised  Taken 8/18/2024 1432 by Kiley Pandya LPN  Safety Promotion/Fall Prevention: patient off unit  Taken 8/18/2024 1301 by Kiley Pandya LPN  Safety Promotion/Fall Prevention: patient off unit  Taken 8/18/2024 1200 by Kiley Pandya LPN  Safety Promotion/Fall Prevention:   safety round/check completed   room organization consistent   nonskid shoes/slippers when out of bed   fall prevention program maintained   clutter free environment maintained   activity supervised  Taken 8/18/2024 1031 by Kiley Pandya LPN  Safety Promotion/Fall Prevention:   safety round/check completed   room organization consistent   nonskid shoes/slippers when out of bed   fall prevention program maintained   clutter free environment maintained   activity supervised  Taken 8/18/2024 0837 by Kiley Pandya LPN  Safety Promotion/Fall Prevention:   safety round/check completed   room organization consistent   nonskid shoes/slippers when out of bed   fall prevention program maintained   clutter free environment maintained   activity supervised  Taken 8/18/2024 0732 by Kiley Pandya LPN  Safety Promotion/Fall  Prevention:   safety round/check completed   room organization consistent   nonskid shoes/slippers when out of bed   fall prevention program maintained   clutter free environment maintained   activity supervised     Problem: Pain Acute  Goal: Acceptable Pain Control and Functional Ability  Outcome: Ongoing, Progressing  Intervention: Prevent or Manage Pain  Recent Flowsheet Documentation  Taken 8/18/2024 1626 by Kiley Pandya LPN  Medication Review/Management: medications reviewed  Taken 8/18/2024 1200 by Kiley Pandya LPN  Medication Review/Management: dosing adjusted  Taken 8/18/2024 0732 by Kiley Pandya LPN  Medication Review/Management: medications reviewed  Intervention: Develop Pain Management Plan  Recent Flowsheet Documentation  Taken 8/18/2024 1729 by Kiley Pandya LPN  Pain Management Interventions:   quiet environment facilitated   position adjusted   pillow support provided   care clustered   breathing exercises  Taken 8/18/2024 1626 by Kiley Pandya LPN  Pain Management Interventions:   quiet environment facilitated   position adjusted   pillow support provided   breathing exercises   care clustered  Taken 8/18/2024 1556 by Kiley Pandya LPN  Pain Management Interventions:   quiet environment facilitated   position adjusted   pillow support provided   breathing exercises   care clustered   see MAR  Taken 8/18/2024 1517 by Kiley Pandya LPN  Pain Management Interventions:   quiet environment facilitated   position adjusted   pillow support provided   care clustered   breathing exercises  Taken 8/18/2024 1200 by Kiley Pandya LPN  Pain Management Interventions:   quiet environment facilitated   pillow support provided   position adjusted   care clustered   breathing exercises  Taken 8/18/2024 1126 by Kiley Pandya LPN  Pain Management Interventions:   quiet environment facilitated   see MAR   pillow support provided   position adjusted   care clustered   breathing exercises  Taken 8/18/2024 1024 by Mumtaz  JUANCARLOS Cao  Pain Management Interventions:   quiet environment facilitated   position adjusted   pillow support provided   care clustered   breathing exercises  Taken 8/18/2024 0954 by Kiley Pandya LPN  Pain Management Interventions:   quiet environment facilitated   see MAR   position adjusted   pillow support provided   care clustered   breathing exercises  Taken 8/18/2024 0802 by Kiley Pandya LPN  Pain Management Interventions:   quiet environment facilitated   position adjusted   pillow support provided   care clustered   breathing exercises  Taken 8/18/2024 0732 by Kiley Pandya LPN  Pain Management Interventions:   care clustered   breathing exercises   position adjusted   pillow support provided   see MAR   quiet environment facilitated  Intervention: Optimize Psychosocial Wellbeing  Recent Flowsheet Documentation  Taken 8/18/2024 0732 by Kiley Pandya LPN  Supportive Measures: active listening utilized  Diversional Activities:   television   smartphone

## 2024-08-19 LAB
ANION GAP SERPL CALCULATED.3IONS-SCNC: 9.7 MMOL/L (ref 5–15)
BASOPHILS # BLD AUTO: 0.08 10*3/MM3 (ref 0–0.2)
BASOPHILS NFR BLD AUTO: 0.6 % (ref 0–1.5)
BUN SERPL-MCNC: 11 MG/DL (ref 6–20)
BUN/CREAT SERPL: 13.8 (ref 7–25)
CALCIUM SPEC-SCNC: 9.1 MG/DL (ref 8.6–10.5)
CHLORIDE SERPL-SCNC: 100 MMOL/L (ref 98–107)
CO2 SERPL-SCNC: 25.3 MMOL/L (ref 22–29)
CREAT SERPL-MCNC: 0.8 MG/DL (ref 0.57–1)
DEPRECATED RDW RBC AUTO: 42.2 FL (ref 37–54)
EGFRCR SERPLBLD CKD-EPI 2021: 86.6 ML/MIN/1.73
EOSINOPHIL # BLD AUTO: 0.47 10*3/MM3 (ref 0–0.4)
EOSINOPHIL NFR BLD AUTO: 3.2 % (ref 0.3–6.2)
ERYTHROCYTE [DISTWIDTH] IN BLOOD BY AUTOMATED COUNT: 12.7 % (ref 12.3–15.4)
GLUCOSE BLDC GLUCOMTR-MCNC: 114 MG/DL (ref 70–105)
GLUCOSE BLDC GLUCOMTR-MCNC: 150 MG/DL (ref 70–105)
GLUCOSE BLDC GLUCOMTR-MCNC: 91 MG/DL (ref 70–105)
GLUCOSE BLDC GLUCOMTR-MCNC: 92 MG/DL (ref 70–105)
GLUCOSE SERPL-MCNC: 102 MG/DL (ref 65–99)
HCT VFR BLD AUTO: 34.2 % (ref 34–46.6)
HGB BLD-MCNC: 10.9 G/DL (ref 12–15.9)
IMM GRANULOCYTES # BLD AUTO: 0.17 10*3/MM3 (ref 0–0.05)
IMM GRANULOCYTES NFR BLD AUTO: 1.2 % (ref 0–0.5)
LYMPHOCYTES # BLD AUTO: 2.84 10*3/MM3 (ref 0.7–3.1)
LYMPHOCYTES NFR BLD AUTO: 19.6 % (ref 19.6–45.3)
MCH RBC QN AUTO: 28.9 PG (ref 26.6–33)
MCHC RBC AUTO-ENTMCNC: 31.9 G/DL (ref 31.5–35.7)
MCV RBC AUTO: 90.7 FL (ref 79–97)
MONOCYTES # BLD AUTO: 1.88 10*3/MM3 (ref 0.1–0.9)
MONOCYTES NFR BLD AUTO: 12.9 % (ref 5–12)
NEUTROPHILS NFR BLD AUTO: 62.5 % (ref 42.7–76)
NEUTROPHILS NFR BLD AUTO: 9.08 10*3/MM3 (ref 1.7–7)
NRBC BLD AUTO-RTO: 0 /100 WBC (ref 0–0.2)
PLATELET # BLD AUTO: 253 10*3/MM3 (ref 140–450)
PMV BLD AUTO: 9.6 FL (ref 6–12)
POTASSIUM SERPL-SCNC: 3.9 MMOL/L (ref 3.5–5.2)
RBC # BLD AUTO: 3.77 10*6/MM3 (ref 3.77–5.28)
SODIUM SERPL-SCNC: 135 MMOL/L (ref 136–145)
WBC NRBC COR # BLD AUTO: 14.52 10*3/MM3 (ref 3.4–10.8)

## 2024-08-19 PROCEDURE — 85025 COMPLETE CBC W/AUTO DIFF WBC: CPT

## 2024-08-19 PROCEDURE — 94664 DEMO&/EVAL PT USE INHALER: CPT

## 2024-08-19 PROCEDURE — 25010000002 ENOXAPARIN PER 10 MG: Performed by: STUDENT IN AN ORGANIZED HEALTH CARE EDUCATION/TRAINING PROGRAM

## 2024-08-19 PROCEDURE — 94761 N-INVAS EAR/PLS OXIMETRY MLT: CPT

## 2024-08-19 PROCEDURE — 80048 BASIC METABOLIC PNL TOTAL CA: CPT

## 2024-08-19 PROCEDURE — 63710000001 DIPHENHYDRAMINE PER 50 MG

## 2024-08-19 PROCEDURE — 25010000002 HYDROMORPHONE 1 MG/ML SOLUTION

## 2024-08-19 PROCEDURE — 99232 SBSQ HOSP IP/OBS MODERATE 35: CPT | Performed by: SURGERY

## 2024-08-19 PROCEDURE — 94799 UNLISTED PULMONARY SVC/PX: CPT

## 2024-08-19 PROCEDURE — 82948 REAGENT STRIP/BLOOD GLUCOSE: CPT

## 2024-08-19 PROCEDURE — 82948 REAGENT STRIP/BLOOD GLUCOSE: CPT | Performed by: STUDENT IN AN ORGANIZED HEALTH CARE EDUCATION/TRAINING PROGRAM

## 2024-08-19 PROCEDURE — 25810000003 DEXTROSE 5% IN LACTATED RINGERS PER 1000 ML: Performed by: STUDENT IN AN ORGANIZED HEALTH CARE EDUCATION/TRAINING PROGRAM

## 2024-08-19 PROCEDURE — 25010000002 AMPICILLIN-SULBACTAM PER 1.5 G

## 2024-08-19 RX ORDER — HYDROXYZINE HYDROCHLORIDE 25 MG/1
25 TABLET, FILM COATED ORAL 3 TIMES DAILY PRN
Status: DISCONTINUED | OUTPATIENT
Start: 2024-08-19 | End: 2024-08-30 | Stop reason: HOSPADM

## 2024-08-19 RX ORDER — HYDROXYZINE HYDROCHLORIDE 25 MG/1
25 TABLET, FILM COATED ORAL 3 TIMES DAILY PRN
Status: COMPLETED | OUTPATIENT
Start: 2024-08-19 | End: 2024-08-19

## 2024-08-19 RX ORDER — ENOXAPARIN SODIUM 150 MG/ML
1 INJECTION SUBCUTANEOUS EVERY 12 HOURS
Status: COMPLETED | OUTPATIENT
Start: 2024-08-19 | End: 2024-08-22

## 2024-08-19 RX ORDER — HYDROXYZINE HYDROCHLORIDE 25 MG/1
25 TABLET, FILM COATED ORAL ONCE AS NEEDED
Status: COMPLETED | OUTPATIENT
Start: 2024-08-19 | End: 2024-08-19

## 2024-08-19 RX ADMIN — OXYCODONE HYDROCHLORIDE 5 MG: 5 TABLET ORAL at 00:32

## 2024-08-19 RX ADMIN — HYDROXYZINE HYDROCHLORIDE 25 MG: 25 TABLET, FILM COATED ORAL at 16:43

## 2024-08-19 RX ADMIN — BUDESONIDE AND FORMOTEROL FUMARATE DIHYDRATE 1 PUFF: 160; 4.5 AEROSOL RESPIRATORY (INHALATION) at 07:15

## 2024-08-19 RX ADMIN — OXYCODONE HYDROCHLORIDE 5 MG: 5 TABLET ORAL at 07:34

## 2024-08-19 RX ADMIN — DIPHENHYDRAMINE HYDROCHLORIDE 25 MG: 25 CAPSULE ORAL at 02:16

## 2024-08-19 RX ADMIN — ATORVASTATIN CALCIUM 5 MG: 10 TABLET, FILM COATED ORAL at 16:42

## 2024-08-19 RX ADMIN — HYDROMORPHONE HYDROCHLORIDE 1 MG: 1 INJECTION, SOLUTION INTRAMUSCULAR; INTRAVENOUS; SUBCUTANEOUS at 19:55

## 2024-08-19 RX ADMIN — ENOXAPARIN SODIUM 135 MG: 150 INJECTION SUBCUTANEOUS at 16:42

## 2024-08-19 RX ADMIN — GABAPENTIN 800 MG: 400 CAPSULE ORAL at 10:21

## 2024-08-19 RX ADMIN — DICLOFENAC SODIUM 4 G: 10 GEL TOPICAL at 21:57

## 2024-08-19 RX ADMIN — LOSARTAN POTASSIUM 25 MG: 25 TABLET, FILM COATED ORAL at 21:49

## 2024-08-19 RX ADMIN — HYDROMORPHONE HYDROCHLORIDE 1 MG: 1 INJECTION, SOLUTION INTRAMUSCULAR; INTRAVENOUS; SUBCUTANEOUS at 10:17

## 2024-08-19 RX ADMIN — HYDROMORPHONE HYDROCHLORIDE 1 MG: 1 INJECTION, SOLUTION INTRAMUSCULAR; INTRAVENOUS; SUBCUTANEOUS at 01:50

## 2024-08-19 RX ADMIN — OXYCODONE HYDROCHLORIDE 5 MG: 5 TABLET ORAL at 21:49

## 2024-08-19 RX ADMIN — OXYCODONE HYDROCHLORIDE 5 MG: 5 TABLET ORAL at 16:43

## 2024-08-19 RX ADMIN — BUDESONIDE AND FORMOTEROL FUMARATE DIHYDRATE 1 PUFF: 160; 4.5 AEROSOL RESPIRATORY (INHALATION) at 18:50

## 2024-08-19 RX ADMIN — IPRATROPIUM BROMIDE AND ALBUTEROL SULFATE 3 ML: .5; 3 SOLUTION RESPIRATORY (INHALATION) at 18:50

## 2024-08-19 RX ADMIN — DICLOFENAC SODIUM 4 G: 10 GEL TOPICAL at 13:23

## 2024-08-19 RX ADMIN — HYDROXYZINE HYDROCHLORIDE 25 MG: 25 TABLET, FILM COATED ORAL at 05:15

## 2024-08-19 RX ADMIN — AMPICILLIN SODIUM AND SULBACTAM SODIUM 3 G: 2; 1 INJECTION, POWDER, FOR SOLUTION INTRAMUSCULAR; INTRAVENOUS at 05:15

## 2024-08-19 RX ADMIN — Medication 10 ML: at 21:49

## 2024-08-19 RX ADMIN — METOPROLOL TARTRATE 25 MG: 25 TABLET, FILM COATED ORAL at 10:20

## 2024-08-19 RX ADMIN — METOPROLOL TARTRATE 25 MG: 25 TABLET, FILM COATED ORAL at 21:49

## 2024-08-19 RX ADMIN — AMPICILLIN SODIUM AND SULBACTAM SODIUM 3 G: 2; 1 INJECTION, POWDER, FOR SOLUTION INTRAMUSCULAR; INTRAVENOUS at 17:55

## 2024-08-19 RX ADMIN — AMPICILLIN SODIUM AND SULBACTAM SODIUM 3 G: 2; 1 INJECTION, POWDER, FOR SOLUTION INTRAMUSCULAR; INTRAVENOUS at 13:14

## 2024-08-19 RX ADMIN — IPRATROPIUM BROMIDE AND ALBUTEROL SULFATE 3 ML: .5; 3 SOLUTION RESPIRATORY (INHALATION) at 11:09

## 2024-08-19 RX ADMIN — SODIUM CHLORIDE, SODIUM LACTATE, POTASSIUM CHLORIDE, CALCIUM CHLORIDE AND DEXTROSE MONOHYDRATE 75 ML/HR: 5; 600; 310; 30; 20 INJECTION, SOLUTION INTRAVENOUS at 17:52

## 2024-08-19 RX ADMIN — HYDROMORPHONE HYDROCHLORIDE 1 MG: 1 INJECTION, SOLUTION INTRAMUSCULAR; INTRAVENOUS; SUBCUTANEOUS at 13:14

## 2024-08-19 RX ADMIN — OXYCODONE HYDROCHLORIDE 5 MG: 5 TABLET ORAL at 11:36

## 2024-08-19 RX ADMIN — IPRATROPIUM BROMIDE AND ALBUTEROL SULFATE 3 ML: .5; 3 SOLUTION RESPIRATORY (INHALATION) at 07:10

## 2024-08-19 RX ADMIN — DIPHENHYDRAMINE HYDROCHLORIDE 25 MG: 25 CAPSULE ORAL at 13:13

## 2024-08-19 RX ADMIN — VENLAFAXINE 37.5 MG: 75 TABLET ORAL at 10:20

## 2024-08-19 RX ADMIN — DICLOFENAC SODIUM 4 G: 10 GEL TOPICAL at 10:21

## 2024-08-19 NOTE — CASE MANAGEMENT/SOCIAL WORK
Discharge Planning Assessment   Mahesh     Patient Name: Lisa Jay  MRN: [id number]  Today's Date: 8/19/2024    Admit Date: 8/16/2024    Plan: Home. Sister can transport at discharge.  Pending gallbladder surgery later in the week.   Discharge Needs Assessment       Row Name 08/19/24 1655       Living Environment    People in Home alone    Current Living Arrangements home    Potentially Unsafe Housing Conditions none    In the past 12 months has the electric, gas, oil, or water company threatened to shut off services in your home? No    Primary Care Provided by self    Provides Primary Care For no one    Quality of Family Relationships helpful;involved;supportive    Able to Return to Prior Arrangements yes       Resource/Environmental Concerns    Resource/Environmental Concerns none    Transportation Concerns none       Transportation Needs    In the past 12 months, has lack of transportation kept you from medical appointments or from getting medications? no    In the past 12 months, has lack of transportation kept you from meetings, work, or from getting things needed for daily living? No       Food Insecurity    Within the past 12 months, you worried that your food would run out before you got the money to buy more. Never true    Within the past 12 months, the food you bought just didn't last and you didn't have money to get more. Never true       Transition Planning    Patient/Family Anticipates Transition to home    Patient/Family Anticipated Services at Transition none    Transportation Anticipated family or friend will provide       Discharge Needs Assessment    Readmission Within the Last 30 Days no previous admission in last 30 days    Equipment Currently Used at Home shower chair;glucometer;rollator;grab bar    Concerns to be Addressed care coordination/care conferences;discharge planning    Anticipated Changes Related to Illness none    Equipment Needed After Discharge none    Provided Post Acute  Provider List? N/A    N/A Provider List Comment denies dc needs                   Discharge Plan       Row Name 08/19/24 1659       Plan    Plan Home. Sister can transport at discharge.  Pending gallbladder surgery later in the week.    Patient/Family in Agreement with Plan yes    Plan Comments Patient lives at home alone. Patient does not drive, sister  will transport at discharge. Patient performs ADL. PCP and pharmacy confirmed. Denies financial assistance needs for medication and/or food. Denies HH and/or rehab needs.   8/18/24 had CT guided cholecystostomy drain place, cultures are pending.  Plans are to do cholecystectomy later in the week.                  Continued Care and Services - Admitted Since 8/16/2024    No active coordination exists for this encounter.       Expected Discharge Date and Time       Expected Discharge Date Expected Discharge Time    Aug 20, 2024            Demographic Summary       Row Name 08/19/24 1651       General Information    Admission Type inpatient    Arrived From emergency department    Required Notices Provided Important Message from Medicare    Referral Source admission list    Reason for Consult discharge planning    Preferred Language English       Contact Information    Permission Granted to Share Info With                    Functional Status       Row Name 08/19/24 3924       Functional Status    Usual Activity Tolerance moderate    Current Activity Tolerance moderate       Functional Status, IADL    Medications assistive person    Meal Preparation independent    Housekeeping independent    Laundry independent    Shopping assistive person       Mental Status    General Appearance WDL WDL       Mental Status Summary    Recent Changes in Mental Status/Cognitive Functioning no changes                Joanie Gruber RN    SIPS 1  [email or web address]  Office [phone]  Cell [phone]

## 2024-08-19 NOTE — PROGRESS NOTES
GENERAL SURGERY PROGRESS NOTE  Chief Complaint:  Surgery Follow up   LOS: 2 days       Subjective     Interval History:     Had drain placed yesterday.  Continues to have some right upper quadrant pain but it has improved.  She is tolerated some diet.    Objective     Vital Signs  Temp:  [97.3 °F (36.3 °C)-98.4 °F (36.9 °C)] 97.8 °F (36.6 °C)  Heart Rate:  [] 71  Resp:  [0-25] 16  BP: (109-178)/() 116/60    Physical Exam:   Abdomen soft, right upper quadrant tender, drain in place  Labs:  Lab Results (last 24 hours)       Procedure Component Value Units Date/Time    POC Glucose 4x Daily Before Meals & at Bedtime [[id number]]  (Normal) Collected: 08/19/24 0734    Specimen: Blood Updated: 08/19/24 0736     Glucose 92 mg/dL      Comment: Serial Number: [id number]:  [id number]       Body Fluid Culture - Body Fluid, Gallbladder [[id number]] Collected: 08/18/24 1408    Specimen: Body Fluid from Gallbladder Updated: 08/19/24 0640     Body Fluid Culture No growth at less than 24 hours     Gram Stain Rare (1+) WBCs per low power field      No organisms seen    Basic Metabolic Panel [[id number]]  (Abnormal) Collected: 08/19/24 0015    Specimen: Blood from Arm, Right Updated: 08/19/24 0045     Glucose 102 mg/dL      BUN 11 mg/dL      Creatinine 0.80 mg/dL      Sodium 135 mmol/L      Potassium 3.9 mmol/L      Comment: Specimen hemolyzed.  Result may be falsely elevated.        Chloride 100 mmol/L      CO2 25.3 mmol/L      Calcium 9.1 mg/dL      BUN/Creatinine Ratio 13.8     Anion Gap 9.7 mmol/L      eGFR 86.6 mL/min/1.73     Narrative:      GFR Normal >60  Chronic Kidney Disease <60  Kidney Failure <15      CBC & Differential [[id number]]  (Abnormal) Collected: 08/19/24 0015    Specimen: Blood from Arm, Right Updated: 08/19/24 0022    Narrative:      The following orders were created for panel order CBC & Differential.  Procedure                               Abnormality         Status                      ---------                               -----------         ------                     CBC Auto Differential[[id number]]        Abnormal            Final result                 Please view results for these tests on the individual orders.    CBC Auto Differential [[id number]]  (Abnormal) Collected: 08/19/24 0015    Specimen: Blood from Arm, Right Updated: 08/19/24 0022     WBC 14.52 10*3/mm3      RBC 3.77 10*6/mm3      Hemoglobin 10.9 g/dL      Hematocrit 34.2 %      MCV 90.7 fL      MCH 28.9 pg      MCHC 31.9 g/dL      RDW 12.7 %      RDW-SD 42.2 fl      MPV 9.6 fL      Platelets 253 10*3/mm3      Neutrophil % 62.5 %      Lymphocyte % 19.6 %      Monocyte % 12.9 %      Eosinophil % 3.2 %      Basophil % 0.6 %      Immature Grans % 1.2 %      Neutrophils, Absolute 9.08 10*3/mm3      Lymphocytes, Absolute 2.84 10*3/mm3      Monocytes, Absolute 1.88 10*3/mm3      Eosinophils, Absolute 0.47 10*3/mm3      Basophils, Absolute 0.08 10*3/mm3      Immature Grans, Absolute 0.17 10*3/mm3      nRBC 0.0 /100 WBC     POC Glucose Once [[id number]]  (Normal) Collected: 08/18/24 2201    Specimen: Blood Updated: 08/18/24 2203     Glucose 94 mg/dL      Comment: Serial Number: [id number]:  [id number]       POC Glucose Once [[id number]]  (Abnormal) Collected: 08/18/24 1656    Specimen: Blood Updated: 08/18/24 1658     Glucose 133 mg/dL      Comment: Serial Number: [id number]:  [id number]       POC Glucose Once [[id number]]  (Abnormal) Collected: 08/18/24 1109    Specimen: Blood Updated: 08/18/24 1111     Glucose 114 mg/dL      Comment: Serial Number: [id number]:  [id number]                Results Review:     Labs and imaging for today were reviewed.    Assessment & Plan     Lisa Jay is a 54 y.o. female who is status post percutaneous cholecystostomy tube for cholecystitis      Continue drain.  Antibiotics for cholecystitis.  Potentially could have surgery later this week once Plavix has had time to wear  off.          This document has been electronically signed by Bryce Huizar MD on August 19, 2024 09:06 EDT        Bryce Huizar MD  08/19/24  09:06 EDT

## 2024-08-19 NOTE — PLAN OF CARE
Goal Outcome Evaluation:         Patient remains painful, treated per MAR, patient reluctant to ambulate, patient educated. Patient care plan ongoing

## 2024-08-19 NOTE — PLAN OF CARE
Goal Outcome Evaluation:      Patient able to make needs known. Tolerating clear liquid diet. VSS on room air. Pain managed per MAR. Personal items and call light in reach. Plan of care on going.

## 2024-08-19 NOTE — PROGRESS NOTES
[facility] MEDICINE SERVICE  DAILY PROGRESS NOTE    NAME: Lisa Jay  : 1970  MRN: [id number]      LOS: 2 days     PROVIDER OF SERVICE: Agustín Oneil MD    Chief Complaint: Acute cholecystitis  Lisa Jay is a 54 y.o. female with a previous medical history of Fibromyalgia, HTN, Lupus who presented to [facility] on 2024 with abdominal pain and dry heaves for the past 2 days.   Subjective:     Interval History:  History taken from: patient  Patient Complaints: abd pain , passing gas   Patient Denies: Any fever chills or rigors    Review of Systems:   Review of Systems  14 point review of system unremarkable except mentioned above  Objective:     Vital Signs  Temp:  [97.3 °F (36.3 °C)-98.5 °F (36.9 °C)] 97.8 °F (36.6 °C)  Heart Rate:  [] 71  Resp:  [0-25] 16  BP: (109-178)/() 116/60  Flow (L/min):  [3] 3   Body mass index is 37.42 kg/m².    Physical Exam  Physical Exam  HENT:      Head: Atraumatic.      Mouth/Throat:      Mouth: Mucous membranes are moist.   Eyes:      Pupils: Pupils are equal, round, and reactive to light.   Cardiovascular:      Rate and Rhythm: Normal rate and regular rhythm.   Pulmonary:      Effort: Pulmonary effort is normal.      Breath sounds: Normal breath sounds.   Abdominal:      General: Bowel sounds are normal.      Palpations: Abdomen is soft.      Comments: + RUQ drain in place    Musculoskeletal:         General: Normal range of motion.      Cervical back: Neck supple.   Skin:     General: Skin is warm.   Neurological:      General: No focal deficit present.      Mental Status: She is alert and oriented to person, place, and time.   Psychiatric:         Mood and Affect: Mood normal.         Scheduled Meds   ampicillin-sulbactam, 3 g, Intravenous, Q6H  atorvastatin, 5 mg, Oral, Q PM  budesonide-formoterol, 1 puff, Inhalation, BID - RT  [Held by provider] clopidogrel, 75 mg, Oral, Daily  Diclofenac Sodium, 4 g, Topical, 4x  Daily  gabapentin, 800 mg, Oral, Daily  insulin lispro, 2-9 Units, Subcutaneous, 4x Daily AC & at Bedtime  ipratropium-albuterol, 3 mL, Nebulization, 4x Daily - RT  losartan, 25 mg, Oral, Nightly  metoprolol tartrate, 25 mg, Oral, BID  nicotine, 1 patch, Transdermal, Q24H  sodium chloride, 10 mL, Intravenous, Q12H  venlafaxine, 37.5 mg, Oral, Daily       PRN Meds     acetaminophen **OR** acetaminophen **OR** acetaminophen    albuterol sulfate HFA    senna-docusate sodium **AND** polyethylene glycol **AND** bisacodyl **AND** bisacodyl    dextrose    dextrose    diphenhydrAMINE    glucagon (human recombinant)    HYDROmorphone    melatonin    ondansetron    oxyCODONE    Potassium Replacement - Follow Nurse / BPA Driven Protocol    prochlorperazine    sodium chloride    sodium chloride    sodium chloride   Infusions  dextrose 5 % and lactated Ringer's, 75 mL/hr, Last Rate: 75 mL/hr (08/18/24 1126)  heparin, 13.6 Units/kg/hr, Last Rate: Stopped (08/18/24 1312)          Diagnostic Data    Results from last 7 days   Lab Units 08/19/24  0015 08/17/24  1512 08/16/24  2324   WBC 10*3/mm3 14.52*   < > 21.99*   HEMOGLOBIN g/dL 10.9*   < > 14.8   HEMATOCRIT % 34.2   < > 45.7   PLATELETS 10*3/mm3 253   < > 320   GLUCOSE mg/dL 102*   < > 106*   CREATININE mg/dL 0.80   < > 0.88   BUN mg/dL 11   < > 16   SODIUM mmol/L 135*   < > 135*   POTASSIUM mmol/L 3.9   < > 3.0*   AST (SGOT) U/L  --   --  32   ALT (SGPT) U/L  --   --  25   ALK PHOS U/L  --   --  90   BILIRUBIN mg/dL  --   --  0.9   ANION GAP mmol/L 9.7   < > 16.3*    < > = values in this interval not displayed.       CT Percutaneous Cholecystostomy    Result Date: 8/18/2024  CT-guided cholecystostomy tube placement, as described in detail above. Tube will need to stay in place for at least 4 to 6 weeks, unless it is removed at the time of interval cholecystectomy. Electronically Signed: John Mendoza MD  8/18/2024 3:06 PM EDT  Workstation ID: [id number]       I reviewed the  patient's new clinical results.    Assessment/Plan:     Active and Resolved Problems  #Acute cholecystitis status post CT-guided cholecystostomy drain placement  - With abdominal pain  - IR consult appreciated  - Sample sent for culture-patient on IV Unasyn  - Surgical team consult appreciated recommended IR placement given patient on Plavix; and surgery after 5 days    DVT  -Diagnosed with RLE DVT last week  -Last dose of Xarelto Thursday at 11pm  - on enoxaparin therapeutic dose    Fibromyalgia  Hypertension  Lupus      VTE Prophylaxis:  Pharmacologic VTE prophylaxis orders are present.         Code status is   Code Status and Medical Interventions: CPR (Attempt to Resuscitate); Full Support   Ordered at: 08/17/24 0507     Code Status (Patient has no pulse and is not breathing):    CPR (Attempt to Resuscitate)     Medical Interventions (Patient has pulse or is breathing):    Full Support       Plan for disposition: home vs snf  after cholecystectomy per surgical team  days    Time: 35  minutes    Signature: Electronically signed by Agustín Oneil MD, 08/19/24, 08:03 EDT.  [facility] Hospitalist Team

## 2024-08-20 LAB
ANION GAP SERPL CALCULATED.3IONS-SCNC: 7 MMOL/L (ref 5–15)
BASOPHILS # BLD AUTO: 0.07 10*3/MM3 (ref 0–0.2)
BASOPHILS NFR BLD AUTO: 0.7 % (ref 0–1.5)
BUN SERPL-MCNC: 11 MG/DL (ref 6–20)
BUN/CREAT SERPL: 12.8 (ref 7–25)
CALCIUM SPEC-SCNC: 8.5 MG/DL (ref 8.6–10.5)
CHLORIDE SERPL-SCNC: 104 MMOL/L (ref 98–107)
CO2 SERPL-SCNC: 28 MMOL/L (ref 22–29)
CREAT SERPL-MCNC: 0.86 MG/DL (ref 0.57–1)
DEPRECATED RDW RBC AUTO: 42.1 FL (ref 37–54)
EGFRCR SERPLBLD CKD-EPI 2021: 79.4 ML/MIN/1.73
EOSINOPHIL # BLD AUTO: 0.54 10*3/MM3 (ref 0–0.4)
EOSINOPHIL NFR BLD AUTO: 5.6 % (ref 0.3–6.2)
ERYTHROCYTE [DISTWIDTH] IN BLOOD BY AUTOMATED COUNT: 12.5 % (ref 12.3–15.4)
GLUCOSE BLDC GLUCOMTR-MCNC: 119 MG/DL (ref 70–105)
GLUCOSE BLDC GLUCOMTR-MCNC: 132 MG/DL (ref 70–105)
GLUCOSE BLDC GLUCOMTR-MCNC: 86 MG/DL (ref 70–105)
GLUCOSE BLDC GLUCOMTR-MCNC: 89 MG/DL (ref 70–105)
GLUCOSE BLDC GLUCOMTR-MCNC: 96 MG/DL (ref 70–105)
GLUCOSE SERPL-MCNC: 128 MG/DL (ref 65–99)
HCT VFR BLD AUTO: 31.7 % (ref 34–46.6)
HGB BLD-MCNC: 10 G/DL (ref 12–15.9)
IMM GRANULOCYTES # BLD AUTO: 0.13 10*3/MM3 (ref 0–0.05)
IMM GRANULOCYTES NFR BLD AUTO: 1.3 % (ref 0–0.5)
LYMPHOCYTES # BLD AUTO: 3.29 10*3/MM3 (ref 0.7–3.1)
LYMPHOCYTES NFR BLD AUTO: 34 % (ref 19.6–45.3)
MCH RBC QN AUTO: 29.1 PG (ref 26.6–33)
MCHC RBC AUTO-ENTMCNC: 31.5 G/DL (ref 31.5–35.7)
MCV RBC AUTO: 92.2 FL (ref 79–97)
MONOCYTES # BLD AUTO: 1.05 10*3/MM3 (ref 0.1–0.9)
MONOCYTES NFR BLD AUTO: 10.8 % (ref 5–12)
NEUTROPHILS NFR BLD AUTO: 4.6 10*3/MM3 (ref 1.7–7)
NEUTROPHILS NFR BLD AUTO: 47.6 % (ref 42.7–76)
NRBC BLD AUTO-RTO: 0 /100 WBC (ref 0–0.2)
PLATELET # BLD AUTO: 266 10*3/MM3 (ref 140–450)
PMV BLD AUTO: 9.9 FL (ref 6–12)
POTASSIUM SERPL-SCNC: 3.7 MMOL/L (ref 3.5–5.2)
RBC # BLD AUTO: 3.44 10*6/MM3 (ref 3.77–5.28)
SODIUM SERPL-SCNC: 139 MMOL/L (ref 136–145)
WBC NRBC COR # BLD AUTO: 9.68 10*3/MM3 (ref 3.4–10.8)

## 2024-08-20 PROCEDURE — 85025 COMPLETE CBC W/AUTO DIFF WBC: CPT

## 2024-08-20 PROCEDURE — 25010000002 HYDROMORPHONE 1 MG/ML SOLUTION

## 2024-08-20 PROCEDURE — 80048 BASIC METABOLIC PNL TOTAL CA: CPT

## 2024-08-20 PROCEDURE — 94799 UNLISTED PULMONARY SVC/PX: CPT

## 2024-08-20 PROCEDURE — 82948 REAGENT STRIP/BLOOD GLUCOSE: CPT | Performed by: STUDENT IN AN ORGANIZED HEALTH CARE EDUCATION/TRAINING PROGRAM

## 2024-08-20 PROCEDURE — 25010000002 AMPICILLIN-SULBACTAM PER 1.5 G

## 2024-08-20 PROCEDURE — 25010000002 ENOXAPARIN PER 10 MG: Performed by: STUDENT IN AN ORGANIZED HEALTH CARE EDUCATION/TRAINING PROGRAM

## 2024-08-20 PROCEDURE — 25010000002 ONDANSETRON PER 1 MG

## 2024-08-20 PROCEDURE — 94761 N-INVAS EAR/PLS OXIMETRY MLT: CPT

## 2024-08-20 PROCEDURE — 94664 DEMO&/EVAL PT USE INHALER: CPT

## 2024-08-20 PROCEDURE — 36415 COLL VENOUS BLD VENIPUNCTURE: CPT

## 2024-08-20 PROCEDURE — 82948 REAGENT STRIP/BLOOD GLUCOSE: CPT

## 2024-08-20 PROCEDURE — 99024 POSTOP FOLLOW-UP VISIT: CPT | Performed by: SURGERY

## 2024-08-20 RX ORDER — VENLAFAXINE HYDROCHLORIDE 37.5 MG/1
37.5 CAPSULE, EXTENDED RELEASE ORAL DAILY
COMMUNITY

## 2024-08-20 RX ADMIN — BUDESONIDE AND FORMOTEROL FUMARATE DIHYDRATE 1 PUFF: 160; 4.5 AEROSOL RESPIRATORY (INHALATION) at 18:59

## 2024-08-20 RX ADMIN — HYDROMORPHONE HYDROCHLORIDE 1 MG: 1 INJECTION, SOLUTION INTRAMUSCULAR; INTRAVENOUS; SUBCUTANEOUS at 00:25

## 2024-08-20 RX ADMIN — AMPICILLIN SODIUM AND SULBACTAM SODIUM 3 G: 2; 1 INJECTION, POWDER, FOR SOLUTION INTRAMUSCULAR; INTRAVENOUS at 05:17

## 2024-08-20 RX ADMIN — AMOXICILLIN AND CLAVULANATE POTASSIUM 1 TABLET: 875; 125 TABLET, FILM COATED ORAL at 19:59

## 2024-08-20 RX ADMIN — ONDANSETRON 4 MG: 2 INJECTION INTRAMUSCULAR; INTRAVENOUS at 19:59

## 2024-08-20 RX ADMIN — HYDROMORPHONE HYDROCHLORIDE 1 MG: 1 INJECTION, SOLUTION INTRAMUSCULAR; INTRAVENOUS; SUBCUTANEOUS at 09:13

## 2024-08-20 RX ADMIN — IPRATROPIUM BROMIDE AND ALBUTEROL SULFATE 3 ML: .5; 3 SOLUTION RESPIRATORY (INHALATION) at 19:03

## 2024-08-20 RX ADMIN — OXYCODONE HYDROCHLORIDE 5 MG: 5 TABLET ORAL at 21:29

## 2024-08-20 RX ADMIN — HYDROMORPHONE HYDROCHLORIDE 1 MG: 1 INJECTION, SOLUTION INTRAMUSCULAR; INTRAVENOUS; SUBCUTANEOUS at 22:30

## 2024-08-20 RX ADMIN — IPRATROPIUM BROMIDE AND ALBUTEROL SULFATE 3 ML: .5; 3 SOLUTION RESPIRATORY (INHALATION) at 07:23

## 2024-08-20 RX ADMIN — VENLAFAXINE 37.5 MG: 75 TABLET ORAL at 09:34

## 2024-08-20 RX ADMIN — HYDROMORPHONE HYDROCHLORIDE 1 MG: 1 INJECTION, SOLUTION INTRAMUSCULAR; INTRAVENOUS; SUBCUTANEOUS at 05:18

## 2024-08-20 RX ADMIN — GABAPENTIN 800 MG: 400 CAPSULE ORAL at 09:33

## 2024-08-20 RX ADMIN — HYDROMORPHONE HYDROCHLORIDE 1 MG: 1 INJECTION, SOLUTION INTRAMUSCULAR; INTRAVENOUS; SUBCUTANEOUS at 14:52

## 2024-08-20 RX ADMIN — HYDROMORPHONE HYDROCHLORIDE 1 MG: 1 INJECTION, SOLUTION INTRAMUSCULAR; INTRAVENOUS; SUBCUTANEOUS at 19:09

## 2024-08-20 RX ADMIN — METOPROLOL TARTRATE 25 MG: 25 TABLET, FILM COATED ORAL at 09:34

## 2024-08-20 RX ADMIN — Medication 10 ML: at 20:00

## 2024-08-20 RX ADMIN — IPRATROPIUM BROMIDE AND ALBUTEROL SULFATE 3 ML: .5; 3 SOLUTION RESPIRATORY (INHALATION) at 15:19

## 2024-08-20 RX ADMIN — HYDROXYZINE HYDROCHLORIDE 25 MG: 25 TABLET ORAL at 00:27

## 2024-08-20 RX ADMIN — BUDESONIDE AND FORMOTEROL FUMARATE DIHYDRATE 1 PUFF: 160; 4.5 AEROSOL RESPIRATORY (INHALATION) at 07:20

## 2024-08-20 RX ADMIN — ENOXAPARIN SODIUM 135 MG: 150 INJECTION SUBCUTANEOUS at 04:16

## 2024-08-20 RX ADMIN — METOPROLOL TARTRATE 25 MG: 25 TABLET, FILM COATED ORAL at 19:59

## 2024-08-20 RX ADMIN — ATORVASTATIN CALCIUM 5 MG: 10 TABLET, FILM COATED ORAL at 16:57

## 2024-08-20 RX ADMIN — LOSARTAN POTASSIUM 25 MG: 25 TABLET, FILM COATED ORAL at 19:59

## 2024-08-20 RX ADMIN — AMOXICILLIN AND CLAVULANATE POTASSIUM 1 TABLET: 875; 125 TABLET, FILM COATED ORAL at 09:34

## 2024-08-20 RX ADMIN — IPRATROPIUM BROMIDE AND ALBUTEROL SULFATE 3 ML: .5; 3 SOLUTION RESPIRATORY (INHALATION) at 11:15

## 2024-08-20 RX ADMIN — HYDROXYZINE HYDROCHLORIDE 25 MG: 25 TABLET ORAL at 19:59

## 2024-08-20 RX ADMIN — OXYCODONE HYDROCHLORIDE 5 MG: 5 TABLET ORAL at 16:57

## 2024-08-20 RX ADMIN — AMPICILLIN SODIUM AND SULBACTAM SODIUM 3 G: 2; 1 INJECTION, POWDER, FOR SOLUTION INTRAMUSCULAR; INTRAVENOUS at 00:26

## 2024-08-20 RX ADMIN — ENOXAPARIN SODIUM 135 MG: 150 INJECTION SUBCUTANEOUS at 17:13

## 2024-08-20 RX ADMIN — HYDROMORPHONE HYDROCHLORIDE 1 MG: 1 INJECTION, SOLUTION INTRAMUSCULAR; INTRAVENOUS; SUBCUTANEOUS at 12:39

## 2024-08-20 NOTE — PLAN OF CARE
Goal Outcome Evaluation:              Outcome Evaluation: Pt has had c/o pain this shift treated per MAR, IV abx continue, up to bsc with one assist, possible surgery on Thursday, vss, call light in reach, plan of care ongoing

## 2024-08-20 NOTE — PROGRESS NOTES
[facility] MEDICINE SERVICE  DAILY PROGRESS NOTE    NAME: Lisa Jay  : 1970  MRN: [id number]      LOS: 3 days     PROVIDER OF SERVICE: Agustín Oneil MD    Chief Complaint: Acute cholecystitis  Lisa Jay is a 54 y.o. female with a previous medical history of Fibromyalgia, HTN, Lupus who presented to [facility] on 2024 with abdominal pain and dry heaves for the past 2 days.   Subjective:     Interval History:  History taken from: patient  Patient Complaints: feels better,wbc trended down ,changed to po antibiotics   Patient Denies: Any fever chills or rigors    Review of Systems:   Review of Systems  14 point review of system unremarkable except mentioned above  Objective:     Vital Signs  Temp:  [97.8 °F (36.6 °C)-98 °F (36.7 °C)] 98 °F (36.7 °C)  Heart Rate:  [62-71] 62  Resp:  [13-22] 22  BP: ()/(52-85) 96/52  Flow (L/min):  [3] 3   Body mass index is 46.17 kg/m².    Physical Exam  Physical Exam  HENT:      Head: Atraumatic.      Mouth/Throat:      Mouth: Mucous membranes are moist.   Eyes:      Pupils: Pupils are equal, round, and reactive to light.   Cardiovascular:      Rate and Rhythm: Normal rate and regular rhythm.   Pulmonary:      Effort: Pulmonary effort is normal.      Breath sounds: Normal breath sounds.   Abdominal:      General: Bowel sounds are normal.      Palpations: Abdomen is soft.      Comments: + RUQ drain in place    Musculoskeletal:         General: Normal range of motion.      Cervical back: Neck supple.   Skin:     General: Skin is warm.   Neurological:      General: No focal deficit present.      Mental Status: She is alert and oriented to person, place, and time.   Psychiatric:         Mood and Affect: Mood normal.         Scheduled Meds   amoxicillin-clavulanate, 1 tablet, Oral, Q12H  atorvastatin, 5 mg, Oral, Q PM  budesonide-formoterol, 1 puff, Inhalation, BID - RT  [Held by provider] clopidogrel, 75 mg, Oral, Daily  Diclofenac Sodium, 4 g,  Topical, 4x Daily  enoxaparin, 1 mg/kg, Subcutaneous, Q12H  gabapentin, 800 mg, Oral, Daily  insulin lispro, 2-9 Units, Subcutaneous, 4x Daily AC & at Bedtime  ipratropium-albuterol, 3 mL, Nebulization, 4x Daily - RT  losartan, 25 mg, Oral, Nightly  metoprolol tartrate, 25 mg, Oral, BID  nicotine, 1 patch, Transdermal, Q24H  sodium chloride, 10 mL, Intravenous, Q12H  venlafaxine, 37.5 mg, Oral, Daily       PRN Meds     acetaminophen **OR** acetaminophen **OR** acetaminophen    albuterol sulfate HFA    senna-docusate sodium **AND** polyethylene glycol **AND** bisacodyl **AND** bisacodyl    dextrose    dextrose    glucagon (human recombinant)    HYDROmorphone    hydrOXYzine    melatonin    ondansetron    oxyCODONE    Potassium Replacement - Follow Nurse / BPA Driven Protocol    prochlorperazine    sodium chloride    sodium chloride    sodium chloride   Infusions           Diagnostic Data    Results from last 7 days   Lab Units 08/20/24  0528 08/17/24  1512 08/16/24  2324   WBC 10*3/mm3 9.68   < > 21.99*   HEMOGLOBIN g/dL 10.0*   < > 14.8   HEMATOCRIT % 31.7*   < > 45.7   PLATELETS 10*3/mm3 266   < > 320   GLUCOSE mg/dL 128*   < > 106*   CREATININE mg/dL 0.86   < > 0.88   BUN mg/dL 11   < > 16   SODIUM mmol/L 139   < > 135*   POTASSIUM mmol/L 3.7   < > 3.0*   AST (SGOT) U/L  --   --  32   ALT (SGPT) U/L  --   --  25   ALK PHOS U/L  --   --  90   BILIRUBIN mg/dL  --   --  0.9   ANION GAP mmol/L 7.0   < > 16.3*    < > = values in this interval not displayed.       CT Percutaneous Cholecystostomy    Result Date: 8/18/2024  CT-guided cholecystostomy tube placement, as described in detail above. Tube will need to stay in place for at least 4 to 6 weeks, unless it is removed at the time of interval cholecystectomy. Electronically Signed: John Mendoza MD  8/18/2024 3:06 PM EDT  Workstation ID: [id number]       I reviewed the patient's new clinical results.    Assessment/Plan:     Active and Resolved Problems  #Acute  cholecystitis status post CT-guided cholecystostomy drain placement  - With abdominal pain  - IR consult appreciated  - Sample sent for culture-patient on IV Unasyn  - Surgical team consult appreciated recommended IR placement given patient on Plavix; and surgery after 5 days    DVT  -Diagnosed with RLE DVT last week  -on home  Xarelto given upcoming surgery   - on enoxaparin therapeutic dose    Fibromyalgia  Hypertension  Lupus      VTE Prophylaxis:  Pharmacologic VTE prophylaxis orders are present.         Code status is   Code Status and Medical Interventions: CPR (Attempt to Resuscitate); Full Support   Ordered at: 08/17/24 0507     Code Status (Patient has no pulse and is not breathing):    CPR (Attempt to Resuscitate)     Medical Interventions (Patient has pulse or is breathing):    Full Support       Plan for disposition: home vs snf  after cholecystectomy per surgical team  days    Time: 35  minutes    Signature: Electronically signed by Agustín Oneil MD, 08/20/24, 13:25 EDT.  [facility] Hospitalist Team

## 2024-08-20 NOTE — PLAN OF CARE
Goal Outcome Evaluation:      Patient resting most of day, did walk once around unit, pain treated per MAR. Able to make needs known, care plan ongoing

## 2024-08-20 NOTE — PROGRESS NOTES
GENERAL SURGERY PROGRESS NOTE  Chief Complaint:  Surgery Follow up   LOS: 3 days       Subjective     Interval History:     Pain improved but still present. Overall feels better. Tolerating diet.    Objective     Vital Signs  Temp:  [97.8 °F (36.6 °C)-98 °F (36.7 °C)] 98 °F (36.7 °C)  Heart Rate:  [62-71] 66  Resp:  [11-22] 12  BP: ()/(52-85) 96/52    Physical Exam:   Abdomen soft with RUQ soreness, drain in place with dark output  Labs:  Lab Results (last 24 hours)       Procedure Component Value Units Date/Time    POC Glucose 4x Daily Before Meals & at Bedtime [[id number]]  (Abnormal) Collected: 08/20/24 1120    Specimen: Blood Updated: 08/20/24 1122     Glucose 119 mg/dL      Comment: Serial Number: [id number]:  [id number]       POC Glucose 4x Daily Before Meals & at Bedtime [[id number]]  (Abnormal) Collected: 08/20/24 0716    Specimen: Blood Updated: 08/20/24 0718     Glucose 132 mg/dL      Comment: Serial Number: [id number]:  [id number]       Body Fluid Culture - Body Fluid, Gallbladder [[id number]] Collected: 08/18/24 1408    Specimen: Body Fluid from Gallbladder Updated: 08/20/24 0707     Body Fluid Culture No growth at 2 days     Gram Stain Rare (1+) WBCs per low power field      No organisms seen    Basic Metabolic Panel [[id number]]  (Abnormal) Collected: 08/20/24 0528    Specimen: Blood Updated: 08/20/24 0616     Glucose 128 mg/dL      BUN 11 mg/dL      Creatinine 0.86 mg/dL      Sodium 139 mmol/L      Potassium 3.7 mmol/L      Chloride 104 mmol/L      CO2 28.0 mmol/L      Calcium 8.5 mg/dL      BUN/Creatinine Ratio 12.8     Anion Gap 7.0 mmol/L      eGFR 79.4 mL/min/1.73     Narrative:      GFR Normal >60  Chronic Kidney Disease <60  Kidney Failure <15      CBC & Differential [[id number]]  (Abnormal) Collected: 08/20/24 0528    Specimen: Blood Updated: 08/20/24 0553    Narrative:      The following orders were created for panel order CBC & Differential.  Procedure                                Abnormality         Status                     ---------                               -----------         ------                     CBC Auto Differential[[id number]]        Abnormal            Final result                 Please view results for these tests on the individual orders.    CBC Auto Differential [[id number]]  (Abnormal) Collected: 08/20/24 0528    Specimen: Blood Updated: 08/20/24 0553     WBC 9.68 10*3/mm3      RBC 3.44 10*6/mm3      Hemoglobin 10.0 g/dL      Hematocrit 31.7 %      MCV 92.2 fL      MCH 29.1 pg      MCHC 31.5 g/dL      RDW 12.5 %      RDW-SD 42.1 fl      MPV 9.9 fL      Platelets 266 10*3/mm3      Neutrophil % 47.6 %      Lymphocyte % 34.0 %      Monocyte % 10.8 %      Eosinophil % 5.6 %      Basophil % 0.7 %      Immature Grans % 1.3 %      Neutrophils, Absolute 4.60 10*3/mm3      Lymphocytes, Absolute 3.29 10*3/mm3      Monocytes, Absolute 1.05 10*3/mm3      Eosinophils, Absolute 0.54 10*3/mm3      Basophils, Absolute 0.07 10*3/mm3      Immature Grans, Absolute 0.13 10*3/mm3      nRBC 0.0 /100 WBC     POC Glucose Once [[id number]]  (Abnormal) Collected: 08/19/24 2056    Specimen: Blood Updated: 08/19/24 2058     Glucose 114 mg/dL      Comment: Serial Number: [id number]:  [id number]       POC Glucose Once [[id number]]  (Normal) Collected: 08/19/24 1646    Specimen: Blood Updated: 08/19/24 1648     Glucose 91 mg/dL      Comment: Serial Number: [id number]:  [id number]                Results Review:     Labs and imaging for today were reviewed.    Assessment & Plan     Lisa Jay is a 54 y.o. female who is s/p perc drain of gallbladder      Continue drain, ABx, anticipate OR Friday.          This document has been electronically signed by Bryce Huizar MD on August 20, 2024 16:17 EDT        Bryce Huizar MD  08/20/24  16:17 EDT

## 2024-08-21 LAB
ANION GAP SERPL CALCULATED.3IONS-SCNC: 8.4 MMOL/L (ref 5–15)
BASOPHILS # BLD AUTO: 0.1 10*3/MM3 (ref 0–0.2)
BASOPHILS NFR BLD AUTO: 1 % (ref 0–1.5)
BUN SERPL-MCNC: 10 MG/DL (ref 6–20)
BUN/CREAT SERPL: 12.7 (ref 7–25)
CALCIUM SPEC-SCNC: 8.9 MG/DL (ref 8.6–10.5)
CHLORIDE SERPL-SCNC: 105 MMOL/L (ref 98–107)
CO2 SERPL-SCNC: 24.6 MMOL/L (ref 22–29)
CREAT SERPL-MCNC: 0.79 MG/DL (ref 0.57–1)
DEPRECATED RDW RBC AUTO: 43.5 FL (ref 37–54)
EGFRCR SERPLBLD CKD-EPI 2021: 87.9 ML/MIN/1.73
EOSINOPHIL # BLD AUTO: 0.68 10*3/MM3 (ref 0–0.4)
EOSINOPHIL NFR BLD AUTO: 7.1 % (ref 0.3–6.2)
ERYTHROCYTE [DISTWIDTH] IN BLOOD BY AUTOMATED COUNT: 12.2 % (ref 12.3–15.4)
GLUCOSE BLDC GLUCOMTR-MCNC: 114 MG/DL (ref 70–105)
GLUCOSE BLDC GLUCOMTR-MCNC: 82 MG/DL (ref 70–105)
GLUCOSE BLDC GLUCOMTR-MCNC: 91 MG/DL (ref 70–105)
GLUCOSE BLDC GLUCOMTR-MCNC: 91 MG/DL (ref 70–105)
GLUCOSE BLDC GLUCOMTR-MCNC: 92 MG/DL (ref 70–105)
GLUCOSE SERPL-MCNC: 98 MG/DL (ref 65–99)
HCT VFR BLD AUTO: 35.4 % (ref 34–46.6)
HGB BLD-MCNC: 10.8 G/DL (ref 12–15.9)
IMM GRANULOCYTES # BLD AUTO: 0.17 10*3/MM3 (ref 0–0.05)
IMM GRANULOCYTES NFR BLD AUTO: 1.8 % (ref 0–0.5)
LYMPHOCYTES # BLD AUTO: 2.85 10*3/MM3 (ref 0.7–3.1)
LYMPHOCYTES NFR BLD AUTO: 29.8 % (ref 19.6–45.3)
MAGNESIUM SERPL-MCNC: 2.2 MG/DL (ref 1.6–2.6)
MCH RBC QN AUTO: 29.4 PG (ref 26.6–33)
MCHC RBC AUTO-ENTMCNC: 30.5 G/DL (ref 31.5–35.7)
MCV RBC AUTO: 96.5 FL (ref 79–97)
MONOCYTES # BLD AUTO: 0.78 10*3/MM3 (ref 0.1–0.9)
MONOCYTES NFR BLD AUTO: 8.2 % (ref 5–12)
NEUTROPHILS NFR BLD AUTO: 4.98 10*3/MM3 (ref 1.7–7)
NEUTROPHILS NFR BLD AUTO: 52.1 % (ref 42.7–76)
NRBC BLD AUTO-RTO: 0 /100 WBC (ref 0–0.2)
PLATELET # BLD AUTO: 251 10*3/MM3 (ref 140–450)
PMV BLD AUTO: 9.2 FL (ref 6–12)
POTASSIUM SERPL-SCNC: 4 MMOL/L (ref 3.5–5.2)
RBC # BLD AUTO: 3.67 10*6/MM3 (ref 3.77–5.28)
SODIUM SERPL-SCNC: 138 MMOL/L (ref 136–145)
WBC NRBC COR # BLD AUTO: 9.56 10*3/MM3 (ref 3.4–10.8)

## 2024-08-21 PROCEDURE — 94799 UNLISTED PULMONARY SVC/PX: CPT

## 2024-08-21 PROCEDURE — 85025 COMPLETE CBC W/AUTO DIFF WBC: CPT | Performed by: STUDENT IN AN ORGANIZED HEALTH CARE EDUCATION/TRAINING PROGRAM

## 2024-08-21 PROCEDURE — 82948 REAGENT STRIP/BLOOD GLUCOSE: CPT

## 2024-08-21 PROCEDURE — 25010000002 ENOXAPARIN PER 10 MG: Performed by: STUDENT IN AN ORGANIZED HEALTH CARE EDUCATION/TRAINING PROGRAM

## 2024-08-21 PROCEDURE — 80048 BASIC METABOLIC PNL TOTAL CA: CPT | Performed by: STUDENT IN AN ORGANIZED HEALTH CARE EDUCATION/TRAINING PROGRAM

## 2024-08-21 PROCEDURE — 83735 ASSAY OF MAGNESIUM: CPT | Performed by: STUDENT IN AN ORGANIZED HEALTH CARE EDUCATION/TRAINING PROGRAM

## 2024-08-21 PROCEDURE — 25010000002 HYDROMORPHONE 1 MG/ML SOLUTION

## 2024-08-21 PROCEDURE — 94664 DEMO&/EVAL PT USE INHALER: CPT

## 2024-08-21 PROCEDURE — 82948 REAGENT STRIP/BLOOD GLUCOSE: CPT | Performed by: STUDENT IN AN ORGANIZED HEALTH CARE EDUCATION/TRAINING PROGRAM

## 2024-08-21 RX ORDER — VENLAFAXINE HYDROCHLORIDE 37.5 MG/1
37.5 CAPSULE, EXTENDED RELEASE ORAL
Status: DISCONTINUED | OUTPATIENT
Start: 2024-08-22 | End: 2024-08-30 | Stop reason: HOSPADM

## 2024-08-21 RX ADMIN — METOPROLOL TARTRATE 25 MG: 25 TABLET, FILM COATED ORAL at 21:09

## 2024-08-21 RX ADMIN — AMOXICILLIN AND CLAVULANATE POTASSIUM 1 TABLET: 875; 125 TABLET, FILM COATED ORAL at 21:09

## 2024-08-21 RX ADMIN — HYDROMORPHONE HYDROCHLORIDE 1 MG: 1 INJECTION, SOLUTION INTRAMUSCULAR; INTRAVENOUS; SUBCUTANEOUS at 23:11

## 2024-08-21 RX ADMIN — HYDROMORPHONE HYDROCHLORIDE 1 MG: 1 INJECTION, SOLUTION INTRAMUSCULAR; INTRAVENOUS; SUBCUTANEOUS at 04:18

## 2024-08-21 RX ADMIN — IPRATROPIUM BROMIDE AND ALBUTEROL SULFATE 3 ML: .5; 3 SOLUTION RESPIRATORY (INHALATION) at 11:05

## 2024-08-21 RX ADMIN — DICLOFENAC SODIUM 4 G: 10 GEL TOPICAL at 07:17

## 2024-08-21 RX ADMIN — DICLOFENAC SODIUM 4 G: 10 GEL TOPICAL at 18:00

## 2024-08-21 RX ADMIN — HYDROXYZINE HYDROCHLORIDE 25 MG: 25 TABLET ORAL at 09:28

## 2024-08-21 RX ADMIN — HYDROMORPHONE HYDROCHLORIDE 1 MG: 1 INJECTION, SOLUTION INTRAMUSCULAR; INTRAVENOUS; SUBCUTANEOUS at 21:09

## 2024-08-21 RX ADMIN — ENOXAPARIN SODIUM 135 MG: 150 INJECTION SUBCUTANEOUS at 04:19

## 2024-08-21 RX ADMIN — Medication 10 ML: at 09:34

## 2024-08-21 RX ADMIN — HYDROMORPHONE HYDROCHLORIDE 1 MG: 1 INJECTION, SOLUTION INTRAMUSCULAR; INTRAVENOUS; SUBCUTANEOUS at 00:57

## 2024-08-21 RX ADMIN — HYDROMORPHONE HYDROCHLORIDE 1 MG: 1 INJECTION, SOLUTION INTRAMUSCULAR; INTRAVENOUS; SUBCUTANEOUS at 07:17

## 2024-08-21 RX ADMIN — LOSARTAN POTASSIUM 25 MG: 25 TABLET, FILM COATED ORAL at 21:09

## 2024-08-21 RX ADMIN — BUDESONIDE AND FORMOTEROL FUMARATE DIHYDRATE 1 PUFF: 160; 4.5 AEROSOL RESPIRATORY (INHALATION) at 18:38

## 2024-08-21 RX ADMIN — HYDROMORPHONE HYDROCHLORIDE 1 MG: 1 INJECTION, SOLUTION INTRAMUSCULAR; INTRAVENOUS; SUBCUTANEOUS at 16:30

## 2024-08-21 RX ADMIN — ATORVASTATIN CALCIUM 5 MG: 10 TABLET, FILM COATED ORAL at 16:30

## 2024-08-21 RX ADMIN — ENOXAPARIN SODIUM 135 MG: 150 INJECTION SUBCUTANEOUS at 16:30

## 2024-08-21 RX ADMIN — AMOXICILLIN AND CLAVULANATE POTASSIUM 1 TABLET: 875; 125 TABLET, FILM COATED ORAL at 09:34

## 2024-08-21 RX ADMIN — ACETAMINOPHEN 650 MG: 325 TABLET, FILM COATED ORAL at 00:57

## 2024-08-21 RX ADMIN — Medication 10 ML: at 21:09

## 2024-08-21 RX ADMIN — HYDROXYZINE HYDROCHLORIDE 25 MG: 25 TABLET ORAL at 21:09

## 2024-08-21 RX ADMIN — IPRATROPIUM BROMIDE AND ALBUTEROL SULFATE 3 ML: .5; 3 SOLUTION RESPIRATORY (INHALATION) at 18:43

## 2024-08-21 RX ADMIN — VENLAFAXINE 37.5 MG: 75 TABLET ORAL at 09:28

## 2024-08-21 RX ADMIN — GABAPENTIN 800 MG: 400 CAPSULE ORAL at 09:28

## 2024-08-21 NOTE — CASE MANAGEMENT/SOCIAL WORK
Continued Stay Note   Mahesh     Patient Name: Lisa Jay  MRN: [id number]  Today's Date: 8/21/2024    Admit Date: 8/16/2024    Plan: Home. Sister can transport at discharge. Pending surgery later in the week.   Discharge Plan       Row Name 08/21/24 1610       Plan    Plan Home. Sister can transport at discharge. Pending surgery later in the week.    Plan Comments dc BARRIERS:  continues with cholecystostomy drain, cultures pending, continued IV antibiotics, tolerating diet,  pain control.  Per Dr. Huizar anticipate surgery Friday.               Expected Discharge Date and Time       Expected Discharge Date Expected Discharge Time    Aug 23, 2024           Joanie Gruber RN    SIPS 1  [email or web address]  Office [phone]  Cell [phone]

## 2024-08-21 NOTE — PROGRESS NOTES
[facility] MEDICINE SERVICE  DAILY PROGRESS NOTE    NAME: Lisa Jay  : 1970  MRN: [id number]      LOS: 4 days     PROVIDER OF SERVICE: Agustín Oneil MD    Chief Complaint: Acute cholecystitis  Lisa Jay is a 54 y.o. female with a previous medical history of Fibromyalgia, HTN, Lupus who presented to [facility] on 2024 with abdominal pain and dry heaves for the past 2 days.   Subjective:     Interval History:  History taken from: patient  Patient Complaints: no new events   Patient Denies: Any fever chills or rigors    Review of Systems:   Review of Systems  14 point review of system unremarkable except mentioned above  Objective:     Vital Signs  Temp:  [97.7 °F (36.5 °C)-98.3 °F (36.8 °C)] 98.2 °F (36.8 °C)  Heart Rate:  [59-69] 61  Resp:  [10-22] 12  BP: (104-137)/(54-67) 106/63   Body mass index is 46.17 kg/m².    Physical Exam  Physical Exam  HENT:      Head: Atraumatic.      Mouth/Throat:      Mouth: Mucous membranes are moist.   Eyes:      Pupils: Pupils are equal, round, and reactive to light.   Cardiovascular:      Rate and Rhythm: Normal rate and regular rhythm.   Pulmonary:      Effort: Pulmonary effort is normal.      Breath sounds: Normal breath sounds.   Abdominal:      General: Bowel sounds are normal.      Palpations: Abdomen is soft.      Comments: + RUQ drain in place    Musculoskeletal:         General: Normal range of motion.      Cervical back: Neck supple.   Skin:     General: Skin is warm.   Neurological:      General: No focal deficit present.      Mental Status: She is alert and oriented to person, place, and time.   Psychiatric:         Mood and Affect: Mood normal.         Scheduled Meds   amoxicillin-clavulanate, 1 tablet, Oral, Q12H  atorvastatin, 5 mg, Oral, Q PM  budesonide-formoterol, 1 puff, Inhalation, BID - RT  [Held by provider] clopidogrel, 75 mg, Oral, Daily  Diclofenac Sodium, 4 g, Topical, 4x Daily  enoxaparin, 1 mg/kg, Subcutaneous,  Q12H  gabapentin, 800 mg, Oral, Daily  insulin lispro, 2-9 Units, Subcutaneous, 4x Daily AC & at Bedtime  ipratropium-albuterol, 3 mL, Nebulization, 4x Daily - RT  losartan, 25 mg, Oral, Nightly  metoprolol tartrate, 25 mg, Oral, BID  nicotine, 1 patch, Transdermal, Q24H  sodium chloride, 10 mL, Intravenous, Q12H  venlafaxine, 37.5 mg, Oral, Daily       PRN Meds     acetaminophen **OR** acetaminophen **OR** acetaminophen    albuterol sulfate HFA    senna-docusate sodium **AND** polyethylene glycol **AND** bisacodyl **AND** bisacodyl    dextrose    dextrose    glucagon (human recombinant)    HYDROmorphone    hydrOXYzine    melatonin    ondansetron    oxyCODONE    Potassium Replacement - Follow Nurse / BPA Driven Protocol    prochlorperazine    sodium chloride    sodium chloride    sodium chloride   Infusions           Diagnostic Data    Results from last 7 days   Lab Units 08/20/24  0528 08/17/24  1512 08/16/24  2324   WBC 10*3/mm3 9.68   < > 21.99*   HEMOGLOBIN g/dL 10.0*   < > 14.8   HEMATOCRIT % 31.7*   < > 45.7   PLATELETS 10*3/mm3 266   < > 320   GLUCOSE mg/dL 128*   < > 106*   CREATININE mg/dL 0.86   < > 0.88   BUN mg/dL 11   < > 16   SODIUM mmol/L 139   < > 135*   POTASSIUM mmol/L 3.7   < > 3.0*   AST (SGOT) U/L  --   --  32   ALT (SGPT) U/L  --   --  25   ALK PHOS U/L  --   --  90   BILIRUBIN mg/dL  --   --  0.9   ANION GAP mmol/L 7.0   < > 16.3*    < > = values in this interval not displayed.       No radiology results for the last day      I reviewed the patient's new clinical results.    Assessment/Plan:     Active and Resolved Problems  #Acute cholecystitis status post CT-guided cholecystostomy drain placement  - With abdominal pain  - IR consult appreciated  - Sample sent for culture-patient on IV Unasyn  - Surgical team consult appreciated recommended IR placement given patient on Plavix; and surgery after 5 days    DVT  -Diagnosed with RLE DVT last week  -on home  Xarelto given upcoming surgery   - on  enoxaparin therapeutic dose    Fibromyalgia  Hypertension  Lupus      VTE Prophylaxis:  Pharmacologic VTE prophylaxis orders are present.         Code status is   Code Status and Medical Interventions: CPR (Attempt to Resuscitate); Full Support   Ordered at: 08/17/24 0507     Code Status (Patient has no pulse and is not breathing):    CPR (Attempt to Resuscitate)     Medical Interventions (Patient has pulse or is breathing):    Full Support       Plan for disposition: home  after  cholecystectomy  on Friday   Time: 35  minutes    Signature: Electronically signed by Agustín Oneil MD, 08/21/24, 12:48 EDT.  [facility] Hospitalist Team

## 2024-08-21 NOTE — DISCHARGE INSTR - OTHER ORDERS
Follow up with [facility] [employer] re: utility payment assistance at [phone]. A referral was made on your behalf via [employer] on 08.21.2024.

## 2024-08-21 NOTE — CASE MANAGEMENT/SOCIAL WORK
Social Work Assessment  [facility]     Patient Name: Lisa Jay  MRN: [id number]  Today's Date: 8/21/2024    Admit Date: 8/16/2024     Psychosocial       Row Name 08/21/24 4512       Values/Beliefs    Spiritual, Cultural Beliefs, Anglican Practices, Values that Affect Care no    Values/Beliefs Comment Jehovah's witness       Mental Health    Little Interest or Pleasure in Doing Things 0-->not at all    Feeling Down, Depressed or Hopeless 0-->not at all       Speech WDL    Speech WDL WDL       Thought Process WDL    Thought Process WDL WDL       Intellectual Performance WDL    Intellectual Performance WDL WDL       Stress    Do you feel stress - tense, restless, nervous, or anxious, or unable to sleep at night because your mind is troubled all the time - these days? Not at all       Coping/Stress    Major Change/Loss/Stressor medical condition/diagnosis    Patient Personal Strengths expressive of emotions;expressive of needs;positive attitude;future/goal oriented    Sources of Support sibling(s);friend(s)    Techniques to [place] with Loss/Stress/Change diversional activities    Reaction to Health Status adjusting    Understanding of Condition and Treatment adequate understanding of medical condition       Developmental Stage (Eriksson's)    Developmental Stage Stage 7 (35-65 years/Middle Adulthood) Generativity vs. Stagnation       C-SSRS (Recent)    Q1 Wished to be Dead (Past Month) no    Q2 Suicidal Thoughts (Past Month) no    Q6 Suicide Behavior (Lifetime) no       Violence Risk    Feels Like Hurting Others no    Previous Attempt to Harm Others no                   Abuse/Neglect       Row Name 08/21/24 8630       Personal Safety    Feels Unsafe at Home or Work/School no    Feels Threatened by Someone no    Does Anyone Try to Keep You From Having Contact with Others or Doing Things Outside Your Home? no    Physical Signs of Abuse Present no    Personal Safety Comments Pt resides at home alone.                   Legal        Row Name 08/21/24 1558       Financial Resource Strain    How hard is it for you to pay for the very basics like food, housing, medical care, and heating? Somewhat       Financial/Legal    Source of Income disability    Finance Comments Pt reports being behind in electric and requesting assistance -  placed referral via [facility].       Legal    Criminal Activity/Legal Involvement none                   Substance Abuse       Row Name 08/21/24 1600       Substance Use    Substance Use Comment Pt denies ever smoking tob, etoh use or illicit substance use.       AUDIT-C (Alcohol Use Disorders ID Test)    Q1: How often do you have a drink containing alcohol? Never    Q2: How many drinks containing alcohol do you have on a typical day when you are drinking? None    Q3: How often do you have six or more drinks on one occasion? Never    Audit-C Score 0             KATELYN Howard, LSW  Medical Social Worker  Ph [phone]  Fax [phone]  [email or web address]

## 2024-08-21 NOTE — PLAN OF CARE
Goal Outcome Evaluation:              Outcome Evaluation: pt stil complaining of pain and drain is in place. Pt is going to have surgery later this week. She states that with her Lupus and pain that she is not wanting to get up and move around. She understands the importance of getting up and moving around but states she is too painful.

## 2024-08-21 NOTE — PLAN OF CARE
Goal Outcome Evaluation:      Pain controlled alternating po and IV pain meds. Pt sister involved in care. Call light within reach, plan of care on going.

## 2024-08-21 NOTE — SIGNIFICANT NOTE
08/21/24 1442   OTHER   Discipline physical therapist   Rehab Time/Intention   Session Not Performed other (see comments)  (RN reports pt refusing mobility currently d/t pain and lupus flare up. Prior note mentioned ambulating around the unit. PT will f/u tomorrow for evaluation to assess safety.)   Therapy Assessment/Plan (PT)   Criteria for Skilled Interventions Met (PT) yes;meets criteria   Recommendation   PT - Next Appointment 08/22/24

## 2024-08-22 LAB
GLUCOSE BLDC GLUCOMTR-MCNC: 101 MG/DL (ref 70–105)
GLUCOSE BLDC GLUCOMTR-MCNC: 106 MG/DL (ref 70–105)
GLUCOSE BLDC GLUCOMTR-MCNC: 108 MG/DL (ref 70–105)
GLUCOSE BLDC GLUCOMTR-MCNC: 119 MG/DL (ref 70–105)

## 2024-08-22 PROCEDURE — 82948 REAGENT STRIP/BLOOD GLUCOSE: CPT

## 2024-08-22 PROCEDURE — 97162 PT EVAL MOD COMPLEX 30 MIN: CPT

## 2024-08-22 PROCEDURE — 97116 GAIT TRAINING THERAPY: CPT

## 2024-08-22 PROCEDURE — 82948 REAGENT STRIP/BLOOD GLUCOSE: CPT | Performed by: STUDENT IN AN ORGANIZED HEALTH CARE EDUCATION/TRAINING PROGRAM

## 2024-08-22 PROCEDURE — 25010000002 ENOXAPARIN PER 10 MG: Performed by: STUDENT IN AN ORGANIZED HEALTH CARE EDUCATION/TRAINING PROGRAM

## 2024-08-22 PROCEDURE — 99024 POSTOP FOLLOW-UP VISIT: CPT | Performed by: SURGERY

## 2024-08-22 PROCEDURE — 94664 DEMO&/EVAL PT USE INHALER: CPT

## 2024-08-22 PROCEDURE — 94799 UNLISTED PULMONARY SVC/PX: CPT

## 2024-08-22 PROCEDURE — 25010000002 HYDROMORPHONE 1 MG/ML SOLUTION

## 2024-08-22 RX ORDER — INDOCYANINE GREEN AND WATER 25 MG
5 KIT INJECTION ONCE
Status: COMPLETED | OUTPATIENT
Start: 2024-08-23 | End: 2024-08-23

## 2024-08-22 RX ORDER — OXYCODONE HYDROCHLORIDE 5 MG/1
5 TABLET ORAL EVERY 4 HOURS PRN
Status: DISCONTINUED | OUTPATIENT
Start: 2024-08-22 | End: 2024-08-23

## 2024-08-22 RX ADMIN — METOPROLOL TARTRATE 25 MG: 25 TABLET, FILM COATED ORAL at 20:52

## 2024-08-22 RX ADMIN — HYDROMORPHONE HYDROCHLORIDE 1 MG: 1 INJECTION, SOLUTION INTRAMUSCULAR; INTRAVENOUS; SUBCUTANEOUS at 22:30

## 2024-08-22 RX ADMIN — OXYCODONE HYDROCHLORIDE 5 MG: 5 TABLET ORAL at 21:05

## 2024-08-22 RX ADMIN — GABAPENTIN 800 MG: 400 CAPSULE ORAL at 08:52

## 2024-08-22 RX ADMIN — IPRATROPIUM BROMIDE AND ALBUTEROL SULFATE 3 ML: .5; 3 SOLUTION RESPIRATORY (INHALATION) at 07:15

## 2024-08-22 RX ADMIN — ENOXAPARIN SODIUM 135 MG: 150 INJECTION SUBCUTANEOUS at 17:49

## 2024-08-22 RX ADMIN — DICLOFENAC SODIUM 4 G: 10 GEL TOPICAL at 08:52

## 2024-08-22 RX ADMIN — BUDESONIDE AND FORMOTEROL FUMARATE DIHYDRATE 1 PUFF: 160; 4.5 AEROSOL RESPIRATORY (INHALATION) at 20:37

## 2024-08-22 RX ADMIN — HYDROMORPHONE HYDROCHLORIDE 1 MG: 1 INJECTION, SOLUTION INTRAMUSCULAR; INTRAVENOUS; SUBCUTANEOUS at 17:49

## 2024-08-22 RX ADMIN — DICLOFENAC SODIUM 4 G: 10 GEL TOPICAL at 21:13

## 2024-08-22 RX ADMIN — BUDESONIDE AND FORMOTEROL FUMARATE DIHYDRATE 1 PUFF: 160; 4.5 AEROSOL RESPIRATORY (INHALATION) at 07:15

## 2024-08-22 RX ADMIN — DICLOFENAC SODIUM 4 G: 10 GEL TOPICAL at 17:53

## 2024-08-22 RX ADMIN — DICLOFENAC SODIUM 4 G: 10 GEL TOPICAL at 12:20

## 2024-08-22 RX ADMIN — VENLAFAXINE HYDROCHLORIDE 37.5 MG: 37.5 CAPSULE, EXTENDED RELEASE ORAL at 08:52

## 2024-08-22 RX ADMIN — LOSARTAN POTASSIUM 25 MG: 25 TABLET, FILM COATED ORAL at 20:52

## 2024-08-22 RX ADMIN — IPRATROPIUM BROMIDE AND ALBUTEROL SULFATE 3 ML: .5; 3 SOLUTION RESPIRATORY (INHALATION) at 15:45

## 2024-08-22 RX ADMIN — HYDROMORPHONE HYDROCHLORIDE 1 MG: 1 INJECTION, SOLUTION INTRAMUSCULAR; INTRAVENOUS; SUBCUTANEOUS at 12:20

## 2024-08-22 RX ADMIN — OXYCODONE HYDROCHLORIDE 5 MG: 5 TABLET ORAL at 08:52

## 2024-08-22 RX ADMIN — IPRATROPIUM BROMIDE AND ALBUTEROL SULFATE 3 ML: .5; 3 SOLUTION RESPIRATORY (INHALATION) at 20:33

## 2024-08-22 RX ADMIN — HYDROMORPHONE HYDROCHLORIDE 1 MG: 1 INJECTION, SOLUTION INTRAMUSCULAR; INTRAVENOUS; SUBCUTANEOUS at 01:13

## 2024-08-22 RX ADMIN — Medication 10 ML: at 20:52

## 2024-08-22 RX ADMIN — ENOXAPARIN SODIUM 135 MG: 150 INJECTION SUBCUTANEOUS at 04:44

## 2024-08-22 RX ADMIN — AMOXICILLIN AND CLAVULANATE POTASSIUM 1 TABLET: 875; 125 TABLET, FILM COATED ORAL at 08:52

## 2024-08-22 RX ADMIN — OXYCODONE HYDROCHLORIDE 5 MG: 5 TABLET ORAL at 15:39

## 2024-08-22 RX ADMIN — OXYCODONE HYDROCHLORIDE 5 MG: 5 TABLET ORAL at 04:45

## 2024-08-22 RX ADMIN — IPRATROPIUM BROMIDE AND ALBUTEROL SULFATE 3 ML: .5; 3 SOLUTION RESPIRATORY (INHALATION) at 12:24

## 2024-08-22 RX ADMIN — ATORVASTATIN CALCIUM 5 MG: 10 TABLET, FILM COATED ORAL at 17:49

## 2024-08-22 RX ADMIN — AMOXICILLIN AND CLAVULANATE POTASSIUM 1 TABLET: 875; 125 TABLET, FILM COATED ORAL at 20:52

## 2024-08-22 RX ADMIN — Medication 10 ML: at 08:52

## 2024-08-22 NOTE — PROGRESS NOTES
[facility] MEDICINE SERVICE  DAILY PROGRESS NOTE    NAME: Lisa Jay  : 1970  MRN: [id number]      LOS: 5 days     PROVIDER OF SERVICE: Agustín Oneil MD    Chief Complaint: Acute cholecystitis  Lisa Jay is a 54 y.o. female with a previous medical history of Fibromyalgia, HTN, Lupus who presented to [facility] on 2024 with abdominal pain and dry heaves for the past 2 days.   Subjective:     Interval History:  History taken from: patient  Patient Complaints: no new events . Plan for surgery am   Patient Denies: Any fever chills or rigors    Review of Systems:   Review of Systems  14 point review of system unremarkable except mentioned above  Objective:     Vital Signs  Temp:  [98.1 °F (36.7 °C)-98.4 °F (36.9 °C)] 98.1 °F (36.7 °C)  Heart Rate:  [61-69] 65  Resp:  [10-18] 16  BP: (103-126)/(58-79) 103/58   Body mass index is 46.17 kg/m².    Physical Exam  Physical Exam  HENT:      Head: Atraumatic.      Mouth/Throat:      Mouth: Mucous membranes are moist.   Eyes:      Pupils: Pupils are equal, round, and reactive to light.   Cardiovascular:      Rate and Rhythm: Normal rate and regular rhythm.   Pulmonary:      Effort: Pulmonary effort is normal.      Breath sounds: Normal breath sounds.   Abdominal:      General: Bowel sounds are normal.      Palpations: Abdomen is soft.      Comments: + RUQ drain in place    Musculoskeletal:         General: Normal range of motion.      Cervical back: Neck supple.   Skin:     General: Skin is warm.   Neurological:      General: No focal deficit present.      Mental Status: She is alert and oriented to person, place, and time.   Psychiatric:         Mood and Affect: Mood normal.         Scheduled Meds   amoxicillin-clavulanate, 1 tablet, Oral, Q12H  atorvastatin, 5 mg, Oral, Q PM  budesonide-formoterol, 1 puff, Inhalation, BID - RT  [Held by provider] clopidogrel, 75 mg, Oral, Daily  Diclofenac Sodium, 4 g, Topical, 4x Daily  enoxaparin, 1  mg/kg, Subcutaneous, Q12H  gabapentin, 800 mg, Oral, Daily  insulin lispro, 2-9 Units, Subcutaneous, 4x Daily AC & at Bedtime  ipratropium-albuterol, 3 mL, Nebulization, 4x Daily - RT  losartan, 25 mg, Oral, Nightly  metoprolol tartrate, 25 mg, Oral, BID  sodium chloride, 10 mL, Intravenous, Q12H  venlafaxine XR, 37.5 mg, Oral, Daily With Breakfast       PRN Meds     acetaminophen **OR** acetaminophen **OR** acetaminophen    albuterol sulfate HFA    senna-docusate sodium **AND** polyethylene glycol **AND** bisacodyl **AND** bisacodyl    dextrose    dextrose    glucagon (human recombinant)    HYDROmorphone    hydrOXYzine    melatonin    ondansetron    oxyCODONE    Potassium Replacement - Follow Nurse / BPA Driven Protocol    prochlorperazine    sodium chloride    sodium chloride    sodium chloride   Infusions           Diagnostic Data    Results from last 7 days   Lab Units 08/21/24  2327 08/17/24  1512 08/16/24  2324   WBC 10*3/mm3 9.56   < > 21.99*   HEMOGLOBIN g/dL 10.8*   < > 14.8   HEMATOCRIT % 35.4   < > 45.7   PLATELETS 10*3/mm3 251   < > 320   GLUCOSE mg/dL 98   < > 106*   CREATININE mg/dL 0.79   < > 0.88   BUN mg/dL 10   < > 16   SODIUM mmol/L 138   < > 135*   POTASSIUM mmol/L 4.0   < > 3.0*   AST (SGOT) U/L  --   --  32   ALT (SGPT) U/L  --   --  25   ALK PHOS U/L  --   --  90   BILIRUBIN mg/dL  --   --  0.9   ANION GAP mmol/L 8.4   < > 16.3*    < > = values in this interval not displayed.       No radiology results for the last day      I reviewed the patient's new clinical results.    Assessment/Plan:     Active and Resolved Problems  #Acute cholecystitis status post CT-guided cholecystostomy drain placement  - With abdominal pain  - IR consult appreciated  - Sample sent for culture-patient on IV Unasyn  - Surgical team consult appreciated recommended IR placement given patient on Plavix; and surgery after 5 days    DVT  -Diagnosed with RLE DVT last week  -on home  Xarelto given upcoming surgery   - on  enoxaparin therapeutic dose    Fibromyalgia  Hypertension  Lupus      VTE Prophylaxis:  Pharmacologic VTE prophylaxis orders are present.         Code status is   Code Status and Medical Interventions: CPR (Attempt to Resuscitate); Full Support   Ordered at: 08/17/24 0507     Code Status (Patient has no pulse and is not breathing):    CPR (Attempt to Resuscitate)     Medical Interventions (Patient has pulse or is breathing):    Full Support       Plan for disposition: home  after  cholecystectomy  on Friday   Time: 35  minutes    Signature: Electronically signed by gAustín Oneil MD, 08/22/24, 11:34 EDT.  [facility] Hospitalist Team

## 2024-08-22 NOTE — PLAN OF CARE
Goal Outcome Evaluation:    Pt only requesting IV pain medications. Walked around unit with sister and the aid. Pt hopeful for potential surgery Friday. Call light within reach, plan of care on going.

## 2024-08-22 NOTE — PLAN OF CARE
"Goal Outcome Evaluation:  Plan of Care Reviewed With: patient  Pt presents as a 55 y/o F admitted to [facility] on 8/16/24 with with acute cholecystitis, unable to undergo cholecystectomy due to ASA and Plavix. Cholecystostomy tube requested by surgery for temporizing. Pt had right lower leg DVT diagnosed 8/7/24. Pt has hx lupus and fibromyalgia. Pt A and O x 4. Pt and PT set up eval time in PM as pt likes to \"prepare.\" At baseline, pt lives alone in [facility] that is handicap accessible with 0 AYDEE and she uses a rollator. This date, pt performed bed mobility with set-up and transfers from elevated bed height with SBA of 1 . Pt ambulated 400 feet with rollator with one standing rest breaks. Pt ambulated with slow stanley but that is her baseline. Pt was very proud of her achievement in getting OOB, ambulating the unit and sitting up in recliner.  PT will sign -off and complete PT orders as pt is at her baseline. Pt OK to obilize with set-up from nursing. PT recommendation is Home with Assist as pt has caregiver 2 days/ week and her sister checks on her daily.                Anticipated Discharge Disposition (PT): home with assist                        "

## 2024-08-22 NOTE — PLAN OF CARE
Goal Outcome Evaluation:              Outcome Evaluation: Pt able to walk the floor with PT today. Pain controlled per MAR. Drain with bloody output. Surgery scheduled for tomorrow. Plan of care on going.

## 2024-08-22 NOTE — SIGNIFICANT NOTE
08/22/24 1047   OTHER   Discipline physical therapist   Rehab Time/Intention   Session Not Performed other (see comments)  (Pt not agreeable to PT eval this AM but was agreeable to PT coming back at 14:00 so that she had time to prepare and have pain managed.)   Therapy Assessment/Plan (PT)   Criteria for Skilled Interventions Met (PT) yes   Recommendation   PT - Next Appointment 08/22/24

## 2024-08-22 NOTE — PROGRESS NOTES
GENERAL SURGERY PROGRESS NOTE  Chief Complaint:  Surgery Follow up   LOS: 5 days       Subjective     Interval History:     Feels ok. Still with some pain. Tolerating diet.    Objective     Vital Signs  Temp:  [98.1 °F (36.7 °C)-98.4 °F (36.9 °C)] 98.1 °F (36.7 °C)  Heart Rate:  [61-70] 68  Resp:  [10-20] 11  BP: (103-126)/(58-79) 103/58    Physical Exam:   Abdomen soft. Drain in place.  Labs:  Lab Results (last 24 hours)       Procedure Component Value Units Date/Time    POC Glucose 4x Daily Before Meals & at Bedtime [[id number]]  (Normal) Collected: 08/22/24 1156    Specimen: Blood Updated: 08/22/24 1200     Glucose 101 mg/dL      Comment: Serial Number: [id number]:  [id number]       POC Glucose 4x Daily Before Meals & at Bedtime [[id number]]  (Abnormal) Collected: 08/22/24 0745    Specimen: Blood Updated: 08/22/24 0746     Glucose 106 mg/dL      Comment: Serial Number: [id number]:  [id number]       Body Fluid Culture - Body Fluid, Gallbladder [[id number]] Collected: 08/18/24 1408    Specimen: Body Fluid from Gallbladder Updated: 08/22/24 0640     Body Fluid Culture No growth at 4 days     Gram Stain Rare (1+) WBCs per low power field      No organisms seen    Magnesium [[id number]]  (Normal) Collected: 08/21/24 2327    Specimen: Blood Updated: 08/21/24 2359     Magnesium 2.2 mg/dL     Basic Metabolic Panel [[id number]]  (Normal) Collected: 08/21/24 2327    Specimen: Blood Updated: 08/21/24 2359     Glucose 98 mg/dL      BUN 10 mg/dL      Creatinine 0.79 mg/dL      Sodium 138 mmol/L      Potassium 4.0 mmol/L      Chloride 105 mmol/L      CO2 24.6 mmol/L      Calcium 8.9 mg/dL      BUN/Creatinine Ratio 12.7     Anion Gap 8.4 mmol/L      eGFR 87.9 mL/min/1.73     Narrative:      GFR Normal >60  Chronic Kidney Disease <60  Kidney Failure <15      CBC & Differential [[id number]]  (Abnormal) Collected: 08/21/24 2327    Specimen: Blood Updated: 08/21/24 0241    Narrative:      The following orders were created for  panel order CBC & Differential.  Procedure                               Abnormality         Status                     ---------                               -----------         ------                     CBC Auto Differential[[id number]]        Abnormal            Final result                 Please view results for these tests on the individual orders.    CBC Auto Differential [[id number]]  (Abnormal) Collected: 08/21/24 2327    Specimen: Blood Updated: 08/21/24 2343     WBC 9.56 10*3/mm3      RBC 3.67 10*6/mm3      Hemoglobin 10.8 g/dL      Hematocrit 35.4 %      MCV 96.5 fL      MCH 29.4 pg      MCHC 30.5 g/dL      RDW 12.2 %      RDW-SD 43.5 fl      MPV 9.2 fL      Platelets 251 10*3/mm3      Neutrophil % 52.1 %      Lymphocyte % 29.8 %      Monocyte % 8.2 %      Eosinophil % 7.1 %      Basophil % 1.0 %      Immature Grans % 1.8 %      Neutrophils, Absolute 4.98 10*3/mm3      Lymphocytes, Absolute 2.85 10*3/mm3      Monocytes, Absolute 0.78 10*3/mm3      Eosinophils, Absolute 0.68 10*3/mm3      Basophils, Absolute 0.10 10*3/mm3      Immature Grans, Absolute 0.17 10*3/mm3      nRBC 0.0 /100 WBC     POC Glucose Once [[id number]]  (Normal) Collected: 08/21/24 2046    Specimen: Blood Updated: 08/21/24 2047     Glucose 91 mg/dL      Comment: Serial Number: [id number]:  [id number]       POC Glucose Once [[id number]]  (Normal) Collected: 08/21/24 1638    Specimen: Blood Updated: 08/21/24 1640     Glucose 82 mg/dL      Comment: Serial Number: [id number]:  [id number]                Results Review:     Labs and imaging for today were reviewed.    Assessment & Plan     Lisa Jay is a 54 y.o. female who has cholecystitis.      Will plan for cholecystectomy tomorrow.  No Lovenox after evening dose. Risks and benefits of robotic assisted laparoscopic cholecystectomy have been reviewed.          This document has been electronically signed by Bryce Huizar MD on August 22, 2024 14:26 REI Wu  Milton Huizar MD  08/22/24  14:26 EDT

## 2024-08-22 NOTE — THERAPY EVALUATION
Patient Name: Lisa Jay  : 1970    MRN: [id number]                              Today's Date: 2024       Admit Date: 2024    Visit Dx:     ICD-10-CM ICD-9-CM   1. Acute epigastric pain  R10.13 789.06     338.19   2. Acute cholecystitis  K81.0 575.0   3. Acute deep vein thrombosis (DVT) of proximal vein of right lower extremity  I82.4Y1 453.41     Patient Active Problem List   Diagnosis    Hypoglycemia    Asthma exacerbation    Lower leg DVT (deep venous thromboembolism), acute, right    Acute cholecystitis    Systemic lupus erythematosus    Fibromyalgia     History reviewed. No pertinent past medical history.  History reviewed. No pertinent surgical history.   General Information       Row Name 24 1436          Physical Therapy Time and Intention    Document Type evaluation  -BR     Mode of Treatment physical therapy  -BR       Row Name 24 1436          General Information    Patient Profile Reviewed yes  -BR     Prior Level of Function independent:;all household mobility;gait;transfer  Pt uses a rollator at baseline.  -BR     Barriers to Rehab medically complex  -BR       Row Name 24 1436          Living Environment    People in Home alone  -BR       Row Name 24 1436          Home Main Entrance    Number of Stairs, Main Entrance none  -BR       Row Name 24 1436          Stairs Within Home, Primary    Number of Stairs, Within Home, Primary none  -BR       Row Name 24 1436          Cognition    Orientation Status (Cognition) oriented x 4  -BR       Row Name 24 1436          Safety Issues, Functional Mobility    Impairments Affecting Function (Mobility) endurance/activity tolerance;pain  -BR               User Key  (r) = Recorded By, (t) = Taken By, (c) = Cosigned By      Initials Name Provider Type    BR Monica Hernandez PT Physical Therapist                   Mobility       Row Name 24 1437          Bed Mobility    Bed Mobility supine-sit  -BR      Supine-Sit [place] (Bed Mobility) set up  -BR     Assistive Device (Bed Mobility) head of bed elevated;bed rails  -BR       Row Name 08/22/24 1437          Sit-Stand Transfer    Sit-Stand [place] (Transfers) supervision  -BR     Assistive Device (Sit-Stand Transfers) walker, 4-wheeled  -BR       Row Name 08/22/24 1437          Gait/Stairs (Locomotion)    [place] Level (Gait) standby assist  -BR     Assistive Device (Gait) walker, 4-wheeled  -BR     Patient was able to Ambulate yes  -BR     Distance in Feet (Gait) 400  -BR     Deviations/Abnormal Patterns (Gait) stanley decreased  -BR     Bilateral Gait Deviations forward flexed posture;heel strike decreased  -BR               User Key  (r) = Recorded By, (t) = Taken By, (c) = Cosigned By      Initials Name Provider Type    Monica Tena PT Physical Therapist                   Obj/Interventions       Row Name 08/22/24 1438          Range of Motion Comprehensive    General Range of Motion bilateral lower extremity ROM WFL  -BR       Row Name 08/22/24 1438          Strength Comprehensive (MMT)    Comment, General Manual Muscle Testing (MMT) Assessment BLE strength grossly 3+/5  -BR       Row Name 08/22/24 1438          Balance    Balance Assessment sitting static balance;standing static balance  -BR     Static Sitting Balance modified independence  -BR     Position, Sitting Balance unsupported;sitting edge of bed  -BR     Static Standing Balance supervision  -BR     Position/Device Used, Standing Balance walker, 4-wheeled;supported  -BR               User Key  (r) = Recorded By, (t) = Taken By, (c) = Cosigned By      Initials Name Provider Type    Monica Tena PT Physical Therapist                   Goals/Plan    No documentation.                  Clinical Impression       Row Name 08/22/24 1439          Pain    Pretreatment Pain Rating 2/10  -BR     Posttreatment Pain Rating 2/10  -BR     Pain Location generalized  -BR       Row Name  "08/22/24 1439          Plan of Care Review    Plan of Care Reviewed With patient  -BR     Outcome Evaluation Pt presents as a 53 y/o F admitted to [facility] on 8/16/24 with with acute cholecystitis, unable to undergo cholecystectomy due to ASA and Plavix. Cholecystostomy tube requested by surgery for temporizing. Pt had right lower leg DVT diagnosed 8/7/24. Pt has hx lupus and fibromyalgia. Pt A and O x 4. Pt and PT set up eval time in PM as pt likes to \"prepare.\" At baseline, pt lives alone in [facility] that is handicap accessible with 0 AYDEE and she uses a rollator. This date, pt performed bed mobility with set-up and transfers from elevated bed height with SBA of 1 . Pt ambulated 400 feet with rollator with one standing rest breaks. Pt ambulated with slow stanley but that is her baseline. Pt was very proud of her achievement in getting OOB, ambulating the unit and sitting up in recliner.  PT will sign -off and complete PT orders as pt is at her baseline. Pt OK to obilize with set-up from nursing. PT recommendation is Home with Assist as pt has caregiver 2 days/ week and her sister checks on her daily.  -BR       Row Name 08/22/24 1439          Therapy Assessment/Plan (PT)    Criteria for Skilled Interventions Met (PT) no;does not meet criteria for skilled intervention  -BR     Therapy Frequency (PT) evaluation only  -BR       Row Name 08/22/24 1439          Vital Signs    O2 Delivery Pre Treatment room air  -BR     Pre Patient Position Supine  -BR     Intra Patient Position Standing  -BR     Post Patient Position Sitting  -BR       Row Name 08/22/24 1439          Positioning and Restraints    Pre-Treatment Position in bed  -BR     Post Treatment Position chair  -BR     In Chair reclined;notified nsg;call light within reach;encouraged to call for assist;exit alarm on;with nsg  -BR               User Key  (r) = Recorded By, (t) = Taken By, (c) = Cosigned By      Initials Name Provider Type    BR Monica Hernandez, PT Physical " Therapist                   Outcome Measures       Row Name 08/22/24 1448 08/22/24 0852       How much help from another person do you currently need...    Turning from your back to your side while in flat bed without using bedrails? 4  -BR 4  -CC    Moving from lying on back to sitting on the side of a flat bed without bedrails? 4  -BR 3  -CC    Moving to and from a bed to a chair (including a wheelchair)? 3  -BR 3  -CC    Standing up from a chair using your arms (e.g., wheelchair, bedside chair)? 4  -BR 3  -CC    Climbing 3-5 steps with a railing? 3  -BR 3  -CC    To walk in hospital room? 3  -BR 3  -CC    AM-PAC 6 Clicks Score (PT) 21  -BR 19  -CC    Highest Level of Mobility Goal 6 --> Walk 10 steps or more  -BR 6 --> Walk 10 steps or more  -CC      Row Name 08/22/24 1448          Functional Assessment    Outcome Measure Options AM-PAC 6 Clicks Basic Mobility (PT)  -BR               User Key  (r) = Recorded By, (t) = Taken By, (c) = Cosigned By      Initials Name Provider Type    BR Monica Hernandez, PT Physical Therapist    CC Filemon Moses LPN Licensed Nurse                                 Physical Therapy Education       Title: PT OT SLP Therapies (Done)       Topic: Physical Therapy (Done)       Point: Mobility training (Done)       Learning Progress Summary             Patient Acceptance, E,D, VU,DU by BR at 8/22/2024 1448                         Point: Body mechanics (Done)       Learning Progress Summary             Patient Acceptance, E,D, VU,DU by BR at 8/22/2024 1448                         Point: Precautions (Done)       Learning Progress Summary             Patient Acceptance, E,D, VU,DU by BR at 8/22/2024 1448                                         User Key       Initials Effective Dates Name Provider Type Discipline     02/01/22 -  Monica Hernandez, MK Physical Therapist PT                  PT Recommendation and Plan     Plan of Care Reviewed With: patient  Outcome Evaluation: Pt presents  "as a 53 y/o F admitted to [facility] on 8/16/24 with with acute cholecystitis, unable to undergo cholecystectomy due to ASA and Plavix. Cholecystostomy tube requested by surgery for temporizing. Pt had right lower leg DVT diagnosed 8/7/24. Pt has hx lupus and fibromyalgia. Pt A and O x 4. Pt and PT set up eval time in PM as pt likes to \"prepare.\" At baseline, pt lives alone in [facility] that is handicap accessible with 0 AYDEE and she uses a rollator. This date, pt performed bed mobility with set-up and transfers from elevated bed height with SBA of 1 . Pt ambulated 400 feet with rollator with one standing rest breaks. Pt ambulated with slow stanley but that is her baseline. Pt was very proud of her achievement in getting OOB, ambulating the unit and sitting up in recliner.  PT will sign -off and complete PT orders as pt is at her baseline. Pt OK to obilize with set-up from nursing. PT recommendation is Home with Assist as pt has caregiver 2 days/ week and her sister checks on her daily.     Time Calculation:         PT Charges       Row Name 08/22/24 1449 08/22/24 1047          Time Calculation    Start Time 1402  -BR --     Stop Time 1441  -BR --     Time Calculation (min) 39 min  -BR --     PT Received On 08/22/24  -BR --     PT - Next Appointment -- 08/22/24  -BR        Time Calculation- PT    Total Timed Code Minutes- PT 12 minute(s)  -BR --               User Key  (r) = Recorded By, (t) = Taken By, (c) = Cosigned By      Initials Name Provider Type    BR Monica Hernandez PT Physical Therapist                  Therapy Charges for Today       Code Description Service Date Service Provider Modifiers Qty    20584942065 HC PT EVAL MOD COMPLEXITY 3 8/22/2024 Monica Hernandez, PT GP 1    08144244655 HC GAIT TRAINING EA 15 MIN 8/22/2024 Monica Hernandez, PT GP 1            PT G-Codes  Outcome Measure Options: AM-PAC 6 Clicks Basic Mobility (PT)  AM-PAC 6 Clicks Score (PT): 21  PT Discharge Summary  Anticipated Discharge " Disposition (PT): home with assist    Monica Hernandez, PT  8/22/2024

## 2024-08-23 ENCOUNTER — ANESTHESIA (OUTPATIENT)
Dept: PERIOP | Facility: HOSPITAL | Age: 54
End: 2024-08-23
Payer: MEDICARE

## 2024-08-23 ENCOUNTER — ANESTHESIA EVENT (OUTPATIENT)
Dept: PERIOP | Facility: HOSPITAL | Age: 54
End: 2024-08-23
Payer: MEDICARE

## 2024-08-23 ENCOUNTER — APPOINTMENT (OUTPATIENT)
Dept: GENERAL RADIOLOGY | Facility: HOSPITAL | Age: 54
DRG: 418 | End: 2024-08-23
Payer: MEDICARE

## 2024-08-23 PROBLEM — K81.0 ACUTE CHOLECYSTITIS: Status: RESOLVED | Noted: 2024-08-17 | Resolved: 2024-08-23

## 2024-08-23 LAB
ANION GAP SERPL CALCULATED.3IONS-SCNC: 9.3 MMOL/L (ref 5–15)
BACTERIA FLD CULT: NORMAL
BASOPHILS # BLD AUTO: 0.07 10*3/MM3 (ref 0–0.2)
BASOPHILS NFR BLD AUTO: 0.6 % (ref 0–1.5)
BUN SERPL-MCNC: 10 MG/DL (ref 6–20)
BUN/CREAT SERPL: 13.3 (ref 7–25)
CALCIUM SPEC-SCNC: 8.9 MG/DL (ref 8.6–10.5)
CHLORIDE SERPL-SCNC: 106 MMOL/L (ref 98–107)
CO2 SERPL-SCNC: 25.7 MMOL/L (ref 22–29)
CREAT SERPL-MCNC: 0.75 MG/DL (ref 0.57–1)
DEPRECATED RDW RBC AUTO: 41.4 FL (ref 37–54)
EGFRCR SERPLBLD CKD-EPI 2021: 93.6 ML/MIN/1.73
EOSINOPHIL # BLD AUTO: 0.7 10*3/MM3 (ref 0–0.4)
EOSINOPHIL NFR BLD AUTO: 5.5 % (ref 0.3–6.2)
ERYTHROCYTE [DISTWIDTH] IN BLOOD BY AUTOMATED COUNT: 12.6 % (ref 12.3–15.4)
GLUCOSE BLDC GLUCOMTR-MCNC: 102 MG/DL (ref 70–105)
GLUCOSE BLDC GLUCOMTR-MCNC: 128 MG/DL (ref 70–105)
GLUCOSE BLDC GLUCOMTR-MCNC: 158 MG/DL (ref 70–105)
GLUCOSE BLDC GLUCOMTR-MCNC: 89 MG/DL (ref 70–105)
GLUCOSE BLDC GLUCOMTR-MCNC: 94 MG/DL (ref 70–105)
GLUCOSE SERPL-MCNC: 114 MG/DL (ref 65–99)
GRAM STN SPEC: NORMAL
GRAM STN SPEC: NORMAL
HCT VFR BLD AUTO: 33.5 % (ref 34–46.6)
HGB BLD-MCNC: 10.8 G/DL (ref 12–15.9)
IMM GRANULOCYTES # BLD AUTO: 0.21 10*3/MM3 (ref 0–0.05)
IMM GRANULOCYTES NFR BLD AUTO: 1.7 % (ref 0–0.5)
INR PPP: 0.94 (ref 0.93–1.1)
LYMPHOCYTES # BLD AUTO: 3.6 10*3/MM3 (ref 0.7–3.1)
LYMPHOCYTES NFR BLD AUTO: 28.4 % (ref 19.6–45.3)
MAGNESIUM SERPL-MCNC: 2.2 MG/DL (ref 1.6–2.6)
MCH RBC QN AUTO: 29.3 PG (ref 26.6–33)
MCHC RBC AUTO-ENTMCNC: 32.2 G/DL (ref 31.5–35.7)
MCV RBC AUTO: 91 FL (ref 79–97)
MONOCYTES # BLD AUTO: 1.09 10*3/MM3 (ref 0.1–0.9)
MONOCYTES NFR BLD AUTO: 8.6 % (ref 5–12)
NEUTROPHILS NFR BLD AUTO: 55.2 % (ref 42.7–76)
NEUTROPHILS NFR BLD AUTO: 7.01 10*3/MM3 (ref 1.7–7)
NRBC BLD AUTO-RTO: 0 /100 WBC (ref 0–0.2)
PLATELET # BLD AUTO: 314 10*3/MM3 (ref 140–450)
PMV BLD AUTO: 9.3 FL (ref 6–12)
POTASSIUM SERPL-SCNC: 4 MMOL/L (ref 3.5–5.2)
PROTHROMBIN TIME: 10.3 SECONDS (ref 9.6–11.7)
RBC # BLD AUTO: 3.68 10*6/MM3 (ref 3.77–5.28)
SODIUM SERPL-SCNC: 141 MMOL/L (ref 136–145)
WBC NRBC COR # BLD AUTO: 12.68 10*3/MM3 (ref 3.4–10.8)

## 2024-08-23 PROCEDURE — 85610 PROTHROMBIN TIME: CPT | Performed by: STUDENT IN AN ORGANIZED HEALTH CARE EDUCATION/TRAINING PROGRAM

## 2024-08-23 PROCEDURE — S2900 ROBOTIC SURGICAL SYSTEM: HCPCS | Performed by: SURGERY

## 2024-08-23 PROCEDURE — 25010000002 FENTANYL CITRATE (PF) 50 MCG/ML SOLUTION: Performed by: NURSE ANESTHETIST, CERTIFIED REGISTERED

## 2024-08-23 PROCEDURE — 93010 ELECTROCARDIOGRAM REPORT: CPT | Performed by: INTERNAL MEDICINE

## 2024-08-23 PROCEDURE — 25010000002 INDOCYANINE GREEN 25 MG RECONSTITUTED SOLUTION: Performed by: SURGERY

## 2024-08-23 PROCEDURE — 82948 REAGENT STRIP/BLOOD GLUCOSE: CPT | Performed by: STUDENT IN AN ORGANIZED HEALTH CARE EDUCATION/TRAINING PROGRAM

## 2024-08-23 PROCEDURE — 47562 LAPAROSCOPIC CHOLECYSTECTOMY: CPT | Performed by: SURGERY

## 2024-08-23 PROCEDURE — 25010000002 FENTANYL CITRATE (PF) 100 MCG/2ML SOLUTION: Performed by: NURSE ANESTHETIST, CERTIFIED REGISTERED

## 2024-08-23 PROCEDURE — 25010000002 HYDROMORPHONE 1 MG/ML SOLUTION

## 2024-08-23 PROCEDURE — 25010000002 ONDANSETRON PER 1 MG

## 2024-08-23 PROCEDURE — 82948 REAGENT STRIP/BLOOD GLUCOSE: CPT

## 2024-08-23 PROCEDURE — 25010000002 ONDANSETRON PER 1 MG: Performed by: NURSE ANESTHETIST, CERTIFIED REGISTERED

## 2024-08-23 PROCEDURE — 25810000003 SODIUM CHLORIDE 0.9 % SOLUTION: Performed by: SURGERY

## 2024-08-23 PROCEDURE — 94799 UNLISTED PULMONARY SVC/PX: CPT

## 2024-08-23 PROCEDURE — 25010000002 HYDROMORPHONE 1 MG/ML SOLUTION: Performed by: SURGERY

## 2024-08-23 PROCEDURE — 8E0W4CZ ROBOTIC ASSISTED PROCEDURE OF TRUNK REGION, PERCUTANEOUS ENDOSCOPIC APPROACH: ICD-10-PCS | Performed by: SURGERY

## 2024-08-23 PROCEDURE — 25010000002 SUCCINYLCHOLINE PER 20 MG: Performed by: NURSE ANESTHETIST, CERTIFIED REGISTERED

## 2024-08-23 PROCEDURE — 25010000002 KETOROLAC TROMETHAMINE PER 15 MG: Performed by: NURSE ANESTHETIST, CERTIFIED REGISTERED

## 2024-08-23 PROCEDURE — 85025 COMPLETE CBC W/AUTO DIFF WBC: CPT | Performed by: STUDENT IN AN ORGANIZED HEALTH CARE EDUCATION/TRAINING PROGRAM

## 2024-08-23 PROCEDURE — 71045 X-RAY EXAM CHEST 1 VIEW: CPT

## 2024-08-23 PROCEDURE — 25010000002 HYDROMORPHONE 1 MG/ML SOLUTION: Performed by: NURSE ANESTHETIST, CERTIFIED REGISTERED

## 2024-08-23 PROCEDURE — 25010000002 METOCLOPRAMIDE PER 10 MG: Performed by: NURSE ANESTHETIST, CERTIFIED REGISTERED

## 2024-08-23 PROCEDURE — 94664 DEMO&/EVAL PT USE INHALER: CPT

## 2024-08-23 PROCEDURE — 47562 LAPAROSCOPIC CHOLECYSTECTOMY: CPT | Performed by: NURSE PRACTITIONER

## 2024-08-23 PROCEDURE — 25010000002 CEFAZOLIN 3 G RECONSTITUTED SOLUTION 1 EACH VIAL: Performed by: SURGERY

## 2024-08-23 PROCEDURE — 94761 N-INVAS EAR/PLS OXIMETRY MLT: CPT

## 2024-08-23 PROCEDURE — 25810000003 LACTATED RINGERS PER 1000 ML: Performed by: NURSE ANESTHETIST, CERTIFIED REGISTERED

## 2024-08-23 PROCEDURE — 25010000002 CEFAZOLIN PER 500 MG: Performed by: SURGERY

## 2024-08-23 PROCEDURE — 25010000002 BUPIVACAINE (PF) 0.25 % SOLUTION: Performed by: SURGERY

## 2024-08-23 PROCEDURE — 93005 ELECTROCARDIOGRAM TRACING: CPT | Performed by: STUDENT IN AN ORGANIZED HEALTH CARE EDUCATION/TRAINING PROGRAM

## 2024-08-23 PROCEDURE — 80048 BASIC METABOLIC PNL TOTAL CA: CPT | Performed by: STUDENT IN AN ORGANIZED HEALTH CARE EDUCATION/TRAINING PROGRAM

## 2024-08-23 PROCEDURE — 83735 ASSAY OF MAGNESIUM: CPT | Performed by: STUDENT IN AN ORGANIZED HEALTH CARE EDUCATION/TRAINING PROGRAM

## 2024-08-23 PROCEDURE — 25010000002 SUGAMMADEX 200 MG/2ML SOLUTION: Performed by: NURSE ANESTHETIST, CERTIFIED REGISTERED

## 2024-08-23 PROCEDURE — 25010000002 PROPOFOL 200 MG/20ML EMULSION: Performed by: NURSE ANESTHETIST, CERTIFIED REGISTERED

## 2024-08-23 PROCEDURE — 88304 TISSUE EXAM BY PATHOLOGIST: CPT | Performed by: SURGERY

## 2024-08-23 PROCEDURE — 0FT44ZZ RESECTION OF GALLBLADDER, PERCUTANEOUS ENDOSCOPIC APPROACH: ICD-10-PCS | Performed by: SURGERY

## 2024-08-23 DEVICE — HEMOST ABS SURGICEL PWDR 3GM: Type: IMPLANTABLE DEVICE | Site: ABDOMEN | Status: FUNCTIONAL

## 2024-08-23 DEVICE — LARGE LIGATION CLIPS 6 CLIPS/CART
Type: IMPLANTABLE DEVICE | Site: ABDOMEN | Status: FUNCTIONAL
Brand: VAS-Q-CLIP

## 2024-08-23 RX ORDER — ACETAMINOPHEN 650 MG/1
650 SUPPOSITORY RECTAL EVERY 4 HOURS PRN
Status: DISCONTINUED | OUTPATIENT
Start: 2024-08-23 | End: 2024-08-23 | Stop reason: HOSPADM

## 2024-08-23 RX ORDER — ROCURONIUM BROMIDE 10 MG/ML
INJECTION, SOLUTION INTRAVENOUS AS NEEDED
Status: DISCONTINUED | OUTPATIENT
Start: 2024-08-23 | End: 2024-08-23 | Stop reason: SURG

## 2024-08-23 RX ORDER — BUPIVACAINE HYDROCHLORIDE 2.5 MG/ML
INJECTION, SOLUTION EPIDURAL; INFILTRATION; INTRACAUDAL AS NEEDED
Status: DISCONTINUED | OUTPATIENT
Start: 2024-08-23 | End: 2024-08-23 | Stop reason: HOSPADM

## 2024-08-23 RX ORDER — SUCCINYLCHOLINE CHLORIDE 20 MG/ML
INJECTION INTRAMUSCULAR; INTRAVENOUS AS NEEDED
Status: DISCONTINUED | OUTPATIENT
Start: 2024-08-23 | End: 2024-08-23 | Stop reason: SURG

## 2024-08-23 RX ORDER — LABETALOL HYDROCHLORIDE 5 MG/ML
5 INJECTION, SOLUTION INTRAVENOUS
Status: DISCONTINUED | OUTPATIENT
Start: 2024-08-23 | End: 2024-08-23 | Stop reason: HOSPADM

## 2024-08-23 RX ORDER — PROMETHAZINE HYDROCHLORIDE 25 MG/1
25 TABLET ORAL ONCE AS NEEDED
Status: DISCONTINUED | OUTPATIENT
Start: 2024-08-23 | End: 2024-08-23 | Stop reason: HOSPADM

## 2024-08-23 RX ORDER — ONDANSETRON 2 MG/ML
INJECTION INTRAMUSCULAR; INTRAVENOUS AS NEEDED
Status: DISCONTINUED | OUTPATIENT
Start: 2024-08-23 | End: 2024-08-23 | Stop reason: SURG

## 2024-08-23 RX ORDER — ONDANSETRON 2 MG/ML
4 INJECTION INTRAMUSCULAR; INTRAVENOUS EVERY 6 HOURS PRN
Status: DISCONTINUED | OUTPATIENT
Start: 2024-08-23 | End: 2024-08-30 | Stop reason: HOSPADM

## 2024-08-23 RX ORDER — HYDRALAZINE HYDROCHLORIDE 20 MG/ML
5 INJECTION INTRAMUSCULAR; INTRAVENOUS
Status: DISCONTINUED | OUTPATIENT
Start: 2024-08-23 | End: 2024-08-23 | Stop reason: HOSPADM

## 2024-08-23 RX ORDER — OXYCODONE HYDROCHLORIDE 5 MG/1
10 TABLET ORAL ONCE AS NEEDED
Status: COMPLETED | OUTPATIENT
Start: 2024-08-23 | End: 2024-08-23

## 2024-08-23 RX ORDER — OXYCODONE HYDROCHLORIDE 5 MG/1
15 TABLET ORAL EVERY 4 HOURS
Status: DISCONTINUED | OUTPATIENT
Start: 2024-08-23 | End: 2024-08-24

## 2024-08-23 RX ORDER — LIDOCAINE HYDROCHLORIDE 20 MG/ML
INJECTION, SOLUTION EPIDURAL; INFILTRATION; INTRACAUDAL; PERINEURAL AS NEEDED
Status: DISCONTINUED | OUTPATIENT
Start: 2024-08-23 | End: 2024-08-23 | Stop reason: SURG

## 2024-08-23 RX ORDER — DROPERIDOL 2.5 MG/ML
0.62 INJECTION, SOLUTION INTRAMUSCULAR; INTRAVENOUS ONCE AS NEEDED
Status: DISCONTINUED | OUTPATIENT
Start: 2024-08-23 | End: 2024-08-23 | Stop reason: HOSPADM

## 2024-08-23 RX ORDER — ENOXAPARIN SODIUM 100 MG/ML
40 INJECTION SUBCUTANEOUS EVERY 12 HOURS
Status: DISCONTINUED | OUTPATIENT
Start: 2024-08-24 | End: 2024-08-25

## 2024-08-23 RX ORDER — FENTANYL CITRATE 50 UG/ML
100 INJECTION, SOLUTION INTRAMUSCULAR; INTRAVENOUS
Status: DISCONTINUED | OUTPATIENT
Start: 2024-08-23 | End: 2024-08-23 | Stop reason: HOSPADM

## 2024-08-23 RX ORDER — EPHEDRINE SULFATE 5 MG/ML
INJECTION INTRAVENOUS AS NEEDED
Status: DISCONTINUED | OUTPATIENT
Start: 2024-08-23 | End: 2024-08-23 | Stop reason: SURG

## 2024-08-23 RX ORDER — PROMETHAZINE HYDROCHLORIDE 25 MG/1
25 SUPPOSITORY RECTAL ONCE AS NEEDED
Status: DISCONTINUED | OUTPATIENT
Start: 2024-08-23 | End: 2024-08-23 | Stop reason: HOSPADM

## 2024-08-23 RX ORDER — SODIUM CHLORIDE, SODIUM LACTATE, POTASSIUM CHLORIDE, CALCIUM CHLORIDE 600; 310; 30; 20 MG/100ML; MG/100ML; MG/100ML; MG/100ML
INJECTION, SOLUTION INTRAVENOUS CONTINUOUS PRN
Status: DISCONTINUED | OUTPATIENT
Start: 2024-08-23 | End: 2024-08-23 | Stop reason: SURG

## 2024-08-23 RX ORDER — FENTANYL CITRATE 50 UG/ML
INJECTION, SOLUTION INTRAMUSCULAR; INTRAVENOUS AS NEEDED
Status: DISCONTINUED | OUTPATIENT
Start: 2024-08-23 | End: 2024-08-23 | Stop reason: SURG

## 2024-08-23 RX ORDER — SODIUM CHLORIDE 9 MG/ML
125 INJECTION, SOLUTION INTRAVENOUS CONTINUOUS
Status: DISCONTINUED | OUTPATIENT
Start: 2024-08-23 | End: 2024-08-24

## 2024-08-23 RX ORDER — KETOROLAC TROMETHAMINE 30 MG/ML
INJECTION, SOLUTION INTRAMUSCULAR; INTRAVENOUS AS NEEDED
Status: DISCONTINUED | OUTPATIENT
Start: 2024-08-23 | End: 2024-08-23 | Stop reason: SURG

## 2024-08-23 RX ORDER — ONDANSETRON 4 MG/1
4 TABLET, ORALLY DISINTEGRATING ORAL EVERY 6 HOURS PRN
Status: DISCONTINUED | OUTPATIENT
Start: 2024-08-23 | End: 2024-08-30 | Stop reason: HOSPADM

## 2024-08-23 RX ORDER — PROPOFOL 10 MG/ML
INJECTION, EMULSION INTRAVENOUS AS NEEDED
Status: DISCONTINUED | OUTPATIENT
Start: 2024-08-23 | End: 2024-08-23 | Stop reason: SURG

## 2024-08-23 RX ORDER — ACETAMINOPHEN 325 MG/1
650 TABLET ORAL ONCE AS NEEDED
Status: DISCONTINUED | OUTPATIENT
Start: 2024-08-23 | End: 2024-08-23 | Stop reason: HOSPADM

## 2024-08-23 RX ORDER — METOCLOPRAMIDE HYDROCHLORIDE 5 MG/ML
INJECTION INTRAMUSCULAR; INTRAVENOUS AS NEEDED
Status: DISCONTINUED | OUTPATIENT
Start: 2024-08-23 | End: 2024-08-23 | Stop reason: SURG

## 2024-08-23 RX ADMIN — ROCURONIUM BROMIDE 45 MG: 10 INJECTION, SOLUTION INTRAVENOUS at 11:46

## 2024-08-23 RX ADMIN — SUGAMMADEX 400 MG: 100 INJECTION, SOLUTION INTRAVENOUS at 13:16

## 2024-08-23 RX ADMIN — SODIUM CHLORIDE 2000 MG: 900 INJECTION INTRAVENOUS at 19:48

## 2024-08-23 RX ADMIN — METOCLOPRAMIDE 10 MG: 5 INJECTION, SOLUTION INTRAMUSCULAR; INTRAVENOUS at 11:30

## 2024-08-23 RX ADMIN — FENTANYL CITRATE 100 MCG: 50 INJECTION, SOLUTION INTRAMUSCULAR; INTRAVENOUS at 11:39

## 2024-08-23 RX ADMIN — OXYCODONE HYDROCHLORIDE 10 MG: 5 TABLET ORAL at 14:03

## 2024-08-23 RX ADMIN — IPRATROPIUM BROMIDE AND ALBUTEROL SULFATE 3 ML: .5; 3 SOLUTION RESPIRATORY (INHALATION) at 07:41

## 2024-08-23 RX ADMIN — HYDROMORPHONE HYDROCHLORIDE 0.5 MG: 1 INJECTION, SOLUTION INTRAMUSCULAR; INTRAVENOUS; SUBCUTANEOUS at 14:22

## 2024-08-23 RX ADMIN — OXYCODONE HYDROCHLORIDE 15 MG: 5 TABLET ORAL at 17:35

## 2024-08-23 RX ADMIN — HYDROMORPHONE HYDROCHLORIDE 1 MG: 1 INJECTION, SOLUTION INTRAMUSCULAR; INTRAVENOUS; SUBCUTANEOUS at 12:05

## 2024-08-23 RX ADMIN — Medication 50 MG: at 11:52

## 2024-08-23 RX ADMIN — PROPOFOL 200 MG: 10 INJECTION, EMULSION INTRAVENOUS at 11:39

## 2024-08-23 RX ADMIN — Medication 10 ML: at 08:35

## 2024-08-23 RX ADMIN — HYDROMORPHONE HYDROCHLORIDE 0.5 MG: 1 INJECTION, SOLUTION INTRAMUSCULAR; INTRAVENOUS; SUBCUTANEOUS at 14:04

## 2024-08-23 RX ADMIN — SODIUM CHLORIDE, SODIUM LACTATE, POTASSIUM CHLORIDE, AND CALCIUM CHLORIDE: .6; .31; .03; .02 INJECTION, SOLUTION INTRAVENOUS at 11:28

## 2024-08-23 RX ADMIN — ROCURONIUM BROMIDE 5 MG: 10 INJECTION, SOLUTION INTRAVENOUS at 11:39

## 2024-08-23 RX ADMIN — LOSARTAN POTASSIUM 25 MG: 25 TABLET, FILM COATED ORAL at 21:10

## 2024-08-23 RX ADMIN — HYDROMORPHONE HYDROCHLORIDE 0.5 MG: 1 INJECTION, SOLUTION INTRAMUSCULAR; INTRAVENOUS; SUBCUTANEOUS at 14:41

## 2024-08-23 RX ADMIN — IPRATROPIUM BROMIDE AND ALBUTEROL SULFATE 3 ML: .5; 3 SOLUTION RESPIRATORY (INHALATION) at 19:15

## 2024-08-23 RX ADMIN — SODIUM CHLORIDE 3000 MG: 900 INJECTION INTRAVENOUS at 11:21

## 2024-08-23 RX ADMIN — ROCURONIUM BROMIDE 10 MG: 10 INJECTION, SOLUTION INTRAVENOUS at 13:01

## 2024-08-23 RX ADMIN — METOPROLOL TARTRATE 25 MG: 25 TABLET, FILM COATED ORAL at 21:10

## 2024-08-23 RX ADMIN — BUDESONIDE AND FORMOTEROL FUMARATE DIHYDRATE 1 PUFF: 160; 4.5 AEROSOL RESPIRATORY (INHALATION) at 07:45

## 2024-08-23 RX ADMIN — Medication 10 ML: at 21:10

## 2024-08-23 RX ADMIN — HYDROXYZINE HYDROCHLORIDE 25 MG: 25 TABLET ORAL at 22:28

## 2024-08-23 RX ADMIN — DICLOFENAC SODIUM 4 G: 10 GEL TOPICAL at 21:11

## 2024-08-23 RX ADMIN — EPHEDRINE SULFATE 10 MG: 5 INJECTION INTRAVENOUS at 12:20

## 2024-08-23 RX ADMIN — ROCURONIUM BROMIDE 10 MG: 10 INJECTION, SOLUTION INTRAVENOUS at 12:39

## 2024-08-23 RX ADMIN — DICLOFENAC SODIUM 4 G: 10 GEL TOPICAL at 17:16

## 2024-08-23 RX ADMIN — EPHEDRINE SULFATE 10 MG: 5 INJECTION INTRAVENOUS at 12:17

## 2024-08-23 RX ADMIN — ONDANSETRON 8 MG: 2 INJECTION INTRAMUSCULAR; INTRAVENOUS at 13:09

## 2024-08-23 RX ADMIN — INDOCYANINE GREEN AND WATER 5 MG: KIT at 10:42

## 2024-08-23 RX ADMIN — SUCCINYLCHOLINE CHLORIDE 100 MG: 20 INJECTION, SOLUTION INTRAMUSCULAR; INTRAVENOUS at 11:39

## 2024-08-23 RX ADMIN — AMOXICILLIN AND CLAVULANATE POTASSIUM 1 TABLET: 875; 125 TABLET, FILM COATED ORAL at 21:10

## 2024-08-23 RX ADMIN — HYDROMORPHONE HYDROCHLORIDE 1 MG: 1 INJECTION, SOLUTION INTRAMUSCULAR; INTRAVENOUS; SUBCUTANEOUS at 21:10

## 2024-08-23 RX ADMIN — ONDANSETRON 4 MG: 2 INJECTION INTRAMUSCULAR; INTRAVENOUS at 00:46

## 2024-08-23 RX ADMIN — LIDOCAINE HYDROCHLORIDE 100 MG: 20 INJECTION, SOLUTION EPIDURAL; INFILTRATION; INTRACAUDAL; PERINEURAL at 11:39

## 2024-08-23 RX ADMIN — KETOROLAC TROMETHAMINE 30 MG: 30 INJECTION, SOLUTION INTRAMUSCULAR at 12:42

## 2024-08-23 RX ADMIN — HYDROXYZINE HYDROCHLORIDE 25 MG: 25 TABLET ORAL at 01:06

## 2024-08-23 RX ADMIN — HYDROMORPHONE HYDROCHLORIDE 1 MG: 1 INJECTION, SOLUTION INTRAMUSCULAR; INTRAVENOUS; SUBCUTANEOUS at 04:47

## 2024-08-23 RX ADMIN — OXYCODONE HYDROCHLORIDE 15 MG: 5 TABLET ORAL at 21:10

## 2024-08-23 RX ADMIN — HYDROMORPHONE HYDROCHLORIDE 1 MG: 1 INJECTION, SOLUTION INTRAMUSCULAR; INTRAVENOUS; SUBCUTANEOUS at 18:28

## 2024-08-23 RX ADMIN — ATORVASTATIN CALCIUM 5 MG: 10 TABLET, FILM COATED ORAL at 16:35

## 2024-08-23 RX ADMIN — HYDROMORPHONE HYDROCHLORIDE 1 MG: 1 INJECTION, SOLUTION INTRAMUSCULAR; INTRAVENOUS; SUBCUTANEOUS at 00:38

## 2024-08-23 RX ADMIN — FENTANYL CITRATE 100 MCG: 50 INJECTION, SOLUTION INTRAMUSCULAR; INTRAVENOUS at 13:44

## 2024-08-23 RX ADMIN — SODIUM CHLORIDE 125 ML/HR: 9 INJECTION, SOLUTION INTRAVENOUS at 17:15

## 2024-08-23 RX ADMIN — IPRATROPIUM BROMIDE AND ALBUTEROL SULFATE 3 ML: .5; 3 SOLUTION RESPIRATORY (INHALATION) at 16:07

## 2024-08-23 RX ADMIN — HYDROMORPHONE HYDROCHLORIDE 1 MG: 1 INJECTION, SOLUTION INTRAMUSCULAR; INTRAVENOUS; SUBCUTANEOUS at 15:46

## 2024-08-23 RX ADMIN — EPHEDRINE SULFATE 10 MG: 5 INJECTION INTRAVENOUS at 12:58

## 2024-08-23 RX ADMIN — BUDESONIDE AND FORMOTEROL FUMARATE DIHYDRATE 1 PUFF: 160; 4.5 AEROSOL RESPIRATORY (INHALATION) at 19:20

## 2024-08-23 NOTE — PROGRESS NOTES
[facility] MEDICINE SERVICE  DAILY PROGRESS NOTE    NAME: Lisa Jay  : 1970  MRN: [id number]      LOS: 6 days     PROVIDER OF SERVICE: Agustín Oneil MD    Chief Complaint: Acute cholecystitis  Lisa Jay is a 54 y.o. female with a previous medical history of Fibromyalgia, HTN, Lupus who presented to [facility] on 2024 with abdominal pain and dry heaves for the past 2 days.   Subjective:     Interval History:  History taken from: patient  Patient Complaints: no new events . Plan for surgery today   Patient Denies: Any fever chills or rigors    Review of Systems:   Review of Systems  14 point review of system unremarkable except mentioned above  Objective:     Vital Signs  Temp:  [98 °F (36.7 °C)-98.5 °F (36.9 °C)] 98 °F (36.7 °C)  Heart Rate:  [67-84] 71  Resp:  [11-20] 15  BP: (104-133)/(65-70) 120/70   Body mass index is 46.17 kg/m².    Physical Exam  Physical Exam  HENT:      Head: Atraumatic.      Mouth/Throat:      Mouth: Mucous membranes are moist.   Eyes:      Pupils: Pupils are equal, round, and reactive to light.   Cardiovascular:      Rate and Rhythm: Normal rate and regular rhythm.   Pulmonary:      Effort: Pulmonary effort is normal.      Breath sounds: Normal breath sounds.   Abdominal:      General: Bowel sounds are normal.      Palpations: Abdomen is soft.      Comments: + RUQ drain in place    Musculoskeletal:         General: Normal range of motion.      Cervical back: Neck supple.   Skin:     General: Skin is warm.   Neurological:      General: No focal deficit present.      Mental Status: She is alert and oriented to person, place, and time.   Psychiatric:         Mood and Affect: Mood normal.         Scheduled Meds   amoxicillin-clavulanate, 1 tablet, Oral, Q12H  [Transfer Hold] atorvastatin, 5 mg, Oral, Q PM  [Transfer Hold] budesonide-formoterol, 1 puff, Inhalation, BID - RT  ceFAZolin 3000 mg IVPB in 100 mL NS (MBP), 3,000 mg, Intravenous, Once  [Held by  provider] clopidogrel, 75 mg, Oral, Daily  [Transfer Hold] Diclofenac Sodium, 4 g, Topical, 4x Daily  gabapentin, 800 mg, Oral, Daily  [Transfer Hold] insulin lispro, 2-9 Units, Subcutaneous, 4x Daily AC & at Bedtime  [Transfer Hold] ipratropium-albuterol, 3 mL, Nebulization, 4x Daily - RT  losartan, 25 mg, Oral, Nightly  metoprolol tartrate, 25 mg, Oral, BID  [Transfer Hold] sodium chloride, 10 mL, Intravenous, Q12H  [Transfer Hold] venlafaxine XR, 37.5 mg, Oral, Daily With Breakfast       PRN Meds     [Transfer Hold] acetaminophen **OR** [Transfer Hold] acetaminophen **OR** [Transfer Hold] acetaminophen    [Transfer Hold] albuterol sulfate HFA    [Transfer Hold] senna-docusate sodium **AND** [Transfer Hold] polyethylene glycol **AND** [Transfer Hold] bisacodyl **AND** [Transfer Hold] bisacodyl    [Transfer Hold] dextrose    [Transfer Hold] dextrose    [Transfer Hold] glucagon (human recombinant)    [Transfer Hold] HYDROmorphone    [Transfer Hold] hydrOXYzine    [Transfer Hold] melatonin    [Transfer Hold] ondansetron    [Transfer Hold] oxyCODONE    Potassium Replacement - Follow Nurse / BPA Driven Protocol    [Transfer Hold] prochlorperazine    [Transfer Hold] sodium chloride    [Transfer Hold] sodium chloride    [Transfer Hold] sodium chloride   Infusions           Diagnostic Data    Results from last 7 days   Lab Units 08/23/24  0056 08/17/24  1512 08/16/24  2324   WBC 10*3/mm3 12.68*   < > 21.99*   HEMOGLOBIN g/dL 10.8*   < > 14.8   HEMATOCRIT % 33.5*   < > 45.7   PLATELETS 10*3/mm3 314   < > 320   GLUCOSE mg/dL 114*   < > 106*   CREATININE mg/dL 0.75   < > 0.88   BUN mg/dL 10   < > 16   SODIUM mmol/L 141   < > 135*   POTASSIUM mmol/L 4.0   < > 3.0*   AST (SGOT) U/L  --   --  32   ALT (SGPT) U/L  --   --  25   ALK PHOS U/L  --   --  90   BILIRUBIN mg/dL  --   --  0.9   ANION GAP mmol/L 9.3   < > 16.3*    < > = values in this interval not displayed.       XR Chest 1 View    Result Date: 8/23/2024  Impression: No  acute process. Electronically Signed: Abdulkadir Alonso MD  8/23/2024 7:08 AM EDT  Workstation ID: [id number]       I reviewed the patient's new clinical results.    Assessment/Plan:     Active and Resolved Problems  #Acute cholecystitis status post CT-guided cholecystostomy drain placement  - With abdominal pain  - IR consult appreciated  - Sample sent for culture-patient on IV Unasyn  - Surgical team consult appreciated recommended IR placement given patient on Plavix; and surgery after 5 days    DVT  -Diagnosed with RLE DVT last week  -on home  Xarelto given upcoming surgery   - on enoxaparin therapeutic dose    Fibromyalgia  Hypertension  Lupus      VTE Prophylaxis:  No VTE prophylaxis order currently exists.         Code status is   Code Status and Medical Interventions: CPR (Attempt to Resuscitate); Full Support   Ordered at: 08/17/24 0507     Code Status (Patient has no pulse and is not breathing):    CPR (Attempt to Resuscitate)     Medical Interventions (Patient has pulse or is breathing):    Full Support       Plan for disposition: home  after  cholecystectomy  on Friday   Time: 35  minutes    Signature: Electronically signed by Agustín Oneil MD, 08/23/24, 11:19 EDT.  [facility] Hospitalist Team

## 2024-08-23 NOTE — ANESTHESIA PROCEDURE NOTES
Airway  Urgency: elective    Date/Time: 8/23/2024 11:42 AM  Airway not difficult    General Information and Staff    Patient location during procedure: OR  CRNA/CAA: Cintia Constantino CRNA    Indications and Patient Condition  Indications for airway management: airway protection    Preoxygenated: yes  MILS maintained throughout  Mask difficulty assessment: 0 - not attempted    Final Airway Details  Final airway type: endotracheal airway      Successful airway: ETT  Cuffed: yes   Successful intubation technique: direct laryngoscopy, video laryngoscopy and RSI  Facilitating devices/methods: intubating stylet  Endotracheal tube insertion site: oral  Blade: Gonzalez  Blade size: 3  ETT size (mm): 7.0  Cormack-Lehane Classification: grade IIa - partial view of glottis  Placement verified by: chest auscultation and capnometry   Measured from: lips  ETT/EBT  to lips (cm): 22  Number of attempts at approach: 2  Assessment: lips, teeth, and gum same as pre-op and atraumatic intubation    Additional Comments  Grade 4 view with Melissa 2

## 2024-08-23 NOTE — PLAN OF CARE
Goal Outcome Evaluation:   Patient is alert and oriented x 4. Family has been at bedside. Complaints of nausea, pain and itching controlled per medication on the MAR. CHG bath completed for surgery. Linens changed. Personal items and call light in reach. Plan of care is ongoing.

## 2024-08-23 NOTE — PLAN OF CARE
Goal Outcome Evaluation:      A&ox4. Able to communicate needs. Pain uncontrolled. POD 0. Plan of care ongoing.     Problem: Fall Injury Risk  Goal: Absence of Fall and Fall-Related Injury  8/23/2024 1657 by Cora Smith RN  Outcome: Ongoing, Progressing  8/23/2024 1657 by Cora Smith, RN  Outcome: Ongoing, Progressing  Intervention: Identify and Manage Contributors  Description: Develop a fall prevention plan with the patient and caregiver/family.  Provide reorientation, appropriate sensory stimulation and routines with changes in mental status to decrease risk of fall.  Promote use of personal vision and auditory aids.  Assess assistance level required for safe and effective self-care; provide support as needed, such as toileting, mobilization. For age 65 and older, implement timed toileting with assistance.  Encourage physical activity, such as performance of mobility and self-care at highest level of patient ability, multicomponent exercise program and provision of appropriate assistive devices.  If fall occurs, assess the severity of injury; implement fall injury protocol. Determine the cause and revise fall injury prevention plan.  Regularly review medication contribution to fall risk; adjust medication administration times to minimize risk of falling.  Consider risk related to polypharmacy and age.  Balance adequate pain management with potential for oversedation.  Recent Flowsheet Documentation  Taken 8/23/2024 1546 by Cora Smith, RN  Medication Review/Management: medications reviewed  Taken 8/23/2024 0852 by Cora Smith, RN  Medication Review/Management: medications reviewed  Intervention: Promote Injury-Free Environment  Description: Provide a safe, barrier-free environment that encourages independent activity.  Keep care area uncluttered and well-lighted.  Determine need for increased observation or monitoring.  Avoid use of devices that minimize mobility, such as restraints or indwelling  urinary catheter.  Recent Flowsheet Documentation  Taken 8/23/2024 1546 by Cora Smith RN  Safety Promotion/Fall Prevention: safety round/check completed  Taken 8/23/2024 1400 by Cora Smith RN  Safety Promotion/Fall Prevention: patient off unit  Taken 8/23/2024 1200 by Cora Smith RN  Safety Promotion/Fall Prevention: patient off unit  Taken 8/23/2024 1000 by Cora Smith RN  Safety Promotion/Fall Prevention: safety round/check completed  Taken 8/23/2024 0852 by Cora Smith RN  Safety Promotion/Fall Prevention: safety round/check completed     Problem: Bleeding (Surgery Nonspecified)  Goal: Absence of Bleeding  Outcome: Ongoing, Progressing  Goal: Absence of Bleeding  Outcome: Ongoing, Progressing     Problem: Bowel Motility Impaired (Surgery Nonspecified)  Goal: Effective Bowel Elimination  Outcome: Ongoing, Progressing  Goal: Effective Bowel Elimination  Outcome: Ongoing, Progressing     Problem: Fluid and Electrolyte Imbalance (Surgery Nonspecified)  Goal: Fluid and Electrolyte Balance  Outcome: Ongoing, Progressing  Goal: Fluid and Electrolyte Balance  Outcome: Ongoing, Progressing     Problem: Glycemic Control Impaired (Surgery Nonspecified)  Goal: Blood Glucose Level Within Targeted Range  Outcome: Ongoing, Progressing  Goal: Blood Glucose Level Within Targeted Range  Outcome: Ongoing, Progressing     Problem: Infection (Surgery Nonspecified)  Goal: Absence of Infection Signs and Symptoms  Outcome: Ongoing, Progressing  Goal: Absence of Infection Signs and Symptoms  Outcome: Ongoing, Progressing     Problem: Ongoing Anesthesia Effects (Surgery Nonspecified)  Goal: Anesthesia/Sedation Recovery  Outcome: Ongoing, Progressing  Goal: Anesthesia/Sedation Recovery  Outcome: Ongoing, Progressing     Problem: Pain (Surgery Nonspecified)  Goal: Acceptable Pain Control  Outcome: Ongoing, Progressing  Goal: Acceptable Pain Control  Outcome: Ongoing, Progressing     Problem: Postoperative Nausea  and Vomiting (Surgery Nonspecified)  Goal: Nausea and Vomiting Relief  Outcome: Ongoing, Progressing  Goal: Nausea and Vomiting Relief  Outcome: Ongoing, Progressing     Problem: Postoperative Urinary Retention (Surgery Nonspecified)  Goal: Effective Urinary Elimination  Outcome: Ongoing, Progressing  Goal: Effective Urinary Elimination  Outcome: Ongoing, Progressing     Problem: Respiratory Compromise (Surgery Nonspecified)  Goal: Effective Oxygenation and Ventilation  Outcome: Ongoing, Progressing  Goal: Effective Oxygenation and Ventilation  Outcome: Ongoing, Progressing

## 2024-08-23 NOTE — ANESTHESIA POSTPROCEDURE EVALUATION
Patient: Lisa Jay    Procedure Summary       Date: 08/23/24 Room / Location: [facility] OR  / [facility] MAIN OR    Anesthesia Start: 1128 Anesthesia Stop: 1337    Procedure: CHOLECYSTECTOMY LAPAROSCOPIC WITH DAVINCI ROBOT (Abdomen) Diagnosis:       Acute cholecystitis      (Acute cholecystitis [K81.0])    Surgeons: Bryce Huizar MD Provider: Clifton Durham MD    Anesthesia Type: general ASA Status: 3            Anesthesia Type: general    Vitals  Vitals Value Taken Time   /88 08/23/24 1501   Temp 98.9 °F (37.2 °C) 08/23/24 1501   Pulse 73 08/23/24 1502   Resp 18 08/23/24 1501   SpO2 98 % 08/23/24 1502   Vitals shown include unfiled device data.        Post Anesthesia Care and Evaluation    Patient location during evaluation: PACU  Patient participation: complete - patient participated  Level of consciousness: awake  Pain score: 0  Pain management: adequate  Anesthetic complications: No anesthetic complications  PONV Status: none  Cardiovascular status: acceptable  Respiratory status: acceptable  Hydration status: acceptable

## 2024-08-23 NOTE — CASE MANAGEMENT/SOCIAL WORK
Continued Stay Note  ZAYRA Aragon     Patient Name: Lisa Jay  MRN: [id number]  Today's Date: 8/23/2024    Admit Date: 8/16/2024    Plan: Home. Sister can transport at discharge. Surgery today 8/23/24 for cholecystecomy   Discharge Plan       Row Name 08/23/24 1703       Plan    Plan Home. Sister can transport at discharge. Surgery today 8/23/24 for cholecystecomy    Plan Comments DC barriers: surgery today for cholecystectomy, pain control, cultures pending               Joanie Gruber RN    SIPS 1  [email or web address]  Office [phone]  Cell [phone]

## 2024-08-23 NOTE — OP NOTE
Operative Note    Lisa Jay  8/23/2024    Pre-op Diagnosis:   Acute cholecystitis [K81.0]    Post-op Diagnosis:     Post-Op Diagnosis Codes:     * Acute cholecystitis [K81.0]    Procedure/CPT® Codes:      Procedure(s):  CHOLECYSTECTOMY LAPAROSCOPIC WITH DAVINCI ROBOT    Surgeon(s):  Bryce Huizar MD    Anesthesia: General    Staff:   Circulator: Paul Ivey RN; Abena Tse RN  Scrub Person: Norah Casanova; Miguel Angel Davidson RN  Assistant: Juliet Paulson APRN    Estimated Blood Loss: minimal    Specimens:                ID Type Source Tests Collected by Time   A (Not marked as sent) : Gallbladder Tissue Gallbladder TISSUE PATHOLOGY EXAM Bryce Huizar MD 8/23/2024 1222         Drains:   External Urinary Catheter (Active)   Daily Indications Daily output 08/23/24 0852   Site Assessment Clean;Skin intact 08/23/24 0852   Application/Removal skin care provided 08/22/24 2038   Collection Container Wall suction 08/23/24 0852   Wall suction (mmHG) 100 mmHG 08/23/24 0852   Securement Method Securing device 08/23/24 0852   Catheter care complete Yes 08/22/24 2038   Output (mL) 500 mL 08/22/24 1740       [REMOVED] Closed/Suction Drain 1 Lateral;Right RUQ Other (Comment) 10 Fr. (Removed)   Site Description Unable to view 08/23/24 0852   Dressing Status Clean;Dry;Intact 08/23/24 0852   Drainage Appearance Bloody 08/23/24 0852   Status Open to gravity drainage 08/23/24 0852   Output (mL) 20 mL 08/22/24 0100       Findings: cholecystitis with cholelithiasis, drain going through right lobe of liver into galbladder-appropriately    Complications: none    Indication: Cholecystitis    Operative Note:    The patient was seen and consented preoperatively.  Following this she was brought to the operating room and placed in supine position on the OR table.  General anesthetic was administered and the patient was orotracheally intubated without incident.  A preoperative briefing was performed.  The abdomen  including the patient's disconnected percutaneous drain was then prepped and draped in normal sterile fashion.  A timeout was performed.    Following timeout local anesthetic was injected in left upper quadrant of the abdomen where a small skin incision was made to allow optical entry using an 8 mm robotic trocar.  The abdomen was entered without difficulty and insufflated without issue.  The area below trocar insertion was found to be without injury.  Additional ports were then placed under direct visualization after injection of local anesthetic a 12 mm port placed through the left rectus musculature in the periumbilical position followed by an 8 mm port through the right rectus musculature in the periumbilical position and a final 8 mm port in the right lower quadrant.    The patient was then positioned in reverse Trendelenburg slight right side up position.  The robot was docked and instrumentation was inserted.    On initial inspection of the right upper quadrant the gallbladder was identified.  It appeared to be partially decompressed.  Further up on the right lobe of the liver I was able to see the percutaneous drain entering the abdomen and going through the liver appropriately.  The fundus of the gallbladder was elevated in the cephalad direction.  Dense inflammatory adhesions of the omentum to the gallbladder were then taken down using a combination of blunt dissection as well as sharp dissection with hook electrocautery.  The infundibulum was identified.  The infundibulum was difficult to grasp owing to a large stone impacted in the gallbladder neck.  Therefore a cholecystotomy was created on the upper body portion of the gallbladder.  The remaining gallbladder contents were suctioned free.  A large stone was then displaced up out of the neck of the gallbladder and extracted from the gallbladder.  It was retrieved later in the same bag the gallbladder was placed in for retrieval.  Once the infundibulum  had been decompressed of the large stone it was able to be grasped and manipulated out further away from the liver.  The peritoneum at the medial lateral gallbladder neck was opened and the neck the gallbladder was dissected out and away from the liver.  In doing so a single artery was seen going up to the gallbladder.  Was cauterized with bipolar cautery and divided with monopolar cautery.  Further dissection was then undertaken until a single duct was able to be seen emanating from the gallbladder.  2 large clips were placed at the base of the gallbladder to close the cystic duct which was then divided with hook electrocautery.  The remaining attachments of the gallbladder to the liver were then divided using hook electrocautery.  In doing so I was able to see the percutaneous drain going into the back wall of the gallbladder through the liver.  Once the drain had been encountered the drain was removed from outside of the patient and appeared to be intact.  The plane between the gallbladder and liver appeared to have been obliterated by chronic inflammation.  The operative field was then inspected.  Blood on the field was suctioned away.  The operative field was cauterized at points of bleeding.  Topical hemostatic agent was then applied to the gallbladder fossa.  The gallbladder and stone were then placed into a bag and retrieved through the 12 mm port site.  The port site fascial defect was then closed using a 0 Vicryl on a suture passer.  The remaining instrumentation was removed.  The abdomen was desufflated.  The remaining ports were removed.  The skin incisions were closed with 4-0 Vicryl and covered with skin glue.  A Band-Aid was placed over the drain site.  The patient was then awakened and returned to recovery.    Assistant: Juliet Paulson APRN was responsible for performing the following activities:  Insertion and exchange of robotic instrumentation, retrieval of gallbladder and stone in bag, removal of  prior percutaneous drain, assistance with fascial closure, closure of skin, placement of dressings  and their skilled assistance was necessary for the success of this case.          This document has been electronically signed by Bryce Huizar MD on August 23, 2024 13:31 EDT      Bryce Huizar MD     Date: 8/23/2024  Time: 13:27 EDT

## 2024-08-24 LAB
ALBUMIN SERPL-MCNC: 3.2 G/DL (ref 3.5–5.2)
ALBUMIN/GLOB SERPL: 1.2 G/DL
ALP SERPL-CCNC: 74 U/L (ref 39–117)
ALT SERPL W P-5'-P-CCNC: 64 U/L (ref 1–33)
ANION GAP SERPL CALCULATED.3IONS-SCNC: 9.2 MMOL/L (ref 5–15)
AST SERPL-CCNC: 90 U/L (ref 1–32)
BASOPHILS # BLD AUTO: 0.06 10*3/MM3 (ref 0–0.2)
BASOPHILS NFR BLD AUTO: 0.4 % (ref 0–1.5)
BILIRUB SERPL-MCNC: 0.2 MG/DL (ref 0–1.2)
BUN SERPL-MCNC: 8 MG/DL (ref 6–20)
BUN/CREAT SERPL: 9.6 (ref 7–25)
CALCIUM SPEC-SCNC: 8.3 MG/DL (ref 8.6–10.5)
CHLORIDE SERPL-SCNC: 106 MMOL/L (ref 98–107)
CO2 SERPL-SCNC: 23.8 MMOL/L (ref 22–29)
CREAT SERPL-MCNC: 0.83 MG/DL (ref 0.57–1)
DEPRECATED RDW RBC AUTO: 44.2 FL (ref 37–54)
EGFRCR SERPLBLD CKD-EPI 2021: 82.9 ML/MIN/1.73
EOSINOPHIL # BLD AUTO: 0.39 10*3/MM3 (ref 0–0.4)
EOSINOPHIL NFR BLD AUTO: 2.5 % (ref 0.3–6.2)
ERYTHROCYTE [DISTWIDTH] IN BLOOD BY AUTOMATED COUNT: 12.9 % (ref 12.3–15.4)
GLOBULIN UR ELPH-MCNC: 2.7 GM/DL
GLUCOSE BLDC GLUCOMTR-MCNC: 103 MG/DL (ref 70–105)
GLUCOSE BLDC GLUCOMTR-MCNC: 106 MG/DL (ref 70–105)
GLUCOSE BLDC GLUCOMTR-MCNC: 113 MG/DL (ref 70–105)
GLUCOSE BLDC GLUCOMTR-MCNC: 132 MG/DL (ref 70–105)
GLUCOSE SERPL-MCNC: 103 MG/DL (ref 65–99)
HCT VFR BLD AUTO: 33.3 % (ref 34–46.6)
HGB BLD-MCNC: 10.5 G/DL (ref 12–15.9)
IMM GRANULOCYTES # BLD AUTO: 0.15 10*3/MM3 (ref 0–0.05)
IMM GRANULOCYTES NFR BLD AUTO: 1 % (ref 0–0.5)
LYMPHOCYTES # BLD AUTO: 2.34 10*3/MM3 (ref 0.7–3.1)
LYMPHOCYTES NFR BLD AUTO: 15.1 % (ref 19.6–45.3)
MCH RBC QN AUTO: 29.7 PG (ref 26.6–33)
MCHC RBC AUTO-ENTMCNC: 31.5 G/DL (ref 31.5–35.7)
MCV RBC AUTO: 94.1 FL (ref 79–97)
MONOCYTES # BLD AUTO: 1.16 10*3/MM3 (ref 0.1–0.9)
MONOCYTES NFR BLD AUTO: 7.5 % (ref 5–12)
NEUTROPHILS NFR BLD AUTO: 11.35 10*3/MM3 (ref 1.7–7)
NEUTROPHILS NFR BLD AUTO: 73.5 % (ref 42.7–76)
NRBC BLD AUTO-RTO: 0 /100 WBC (ref 0–0.2)
PLATELET # BLD AUTO: 268 10*3/MM3 (ref 140–450)
PMV BLD AUTO: 9.1 FL (ref 6–12)
POTASSIUM SERPL-SCNC: 4.3 MMOL/L (ref 3.5–5.2)
PROT SERPL-MCNC: 5.9 G/DL (ref 6–8.5)
RBC # BLD AUTO: 3.54 10*6/MM3 (ref 3.77–5.28)
SODIUM SERPL-SCNC: 139 MMOL/L (ref 136–145)
WBC NRBC COR # BLD AUTO: 15.45 10*3/MM3 (ref 3.4–10.8)

## 2024-08-24 PROCEDURE — 80053 COMPREHEN METABOLIC PANEL: CPT | Performed by: SURGERY

## 2024-08-24 PROCEDURE — 25010000002 ONDANSETRON PER 1 MG: Performed by: SURGERY

## 2024-08-24 PROCEDURE — 94761 N-INVAS EAR/PLS OXIMETRY MLT: CPT

## 2024-08-24 PROCEDURE — 25810000003 SODIUM CHLORIDE 0.9 % SOLUTION: Performed by: SURGERY

## 2024-08-24 PROCEDURE — 94799 UNLISTED PULMONARY SVC/PX: CPT

## 2024-08-24 PROCEDURE — 25010000002 CEFAZOLIN PER 500 MG: Performed by: SURGERY

## 2024-08-24 PROCEDURE — 82948 REAGENT STRIP/BLOOD GLUCOSE: CPT

## 2024-08-24 PROCEDURE — 85025 COMPLETE CBC W/AUTO DIFF WBC: CPT | Performed by: SURGERY

## 2024-08-24 PROCEDURE — 82948 REAGENT STRIP/BLOOD GLUCOSE: CPT | Performed by: SURGERY

## 2024-08-24 PROCEDURE — 25010000002 HYDROMORPHONE 1 MG/ML SOLUTION: Performed by: SURGERY

## 2024-08-24 PROCEDURE — 94664 DEMO&/EVAL PT USE INHALER: CPT

## 2024-08-24 PROCEDURE — 25010000002 ENOXAPARIN PER 10 MG: Performed by: SURGERY

## 2024-08-24 RX ORDER — IPRATROPIUM BROMIDE AND ALBUTEROL SULFATE 2.5; .5 MG/3ML; MG/3ML
3 SOLUTION RESPIRATORY (INHALATION) EVERY 4 HOURS PRN
Status: DISCONTINUED | OUTPATIENT
Start: 2024-08-24 | End: 2024-08-30 | Stop reason: HOSPADM

## 2024-08-24 RX ORDER — OXYCODONE HYDROCHLORIDE 5 MG/1
10 TABLET ORAL EVERY 4 HOURS
Status: COMPLETED | OUTPATIENT
Start: 2024-08-24 | End: 2024-08-28

## 2024-08-24 RX ORDER — MECLIZINE HYDROCHLORIDE 25 MG/1
25 TABLET ORAL 3 TIMES DAILY PRN
Status: DISCONTINUED | OUTPATIENT
Start: 2024-08-24 | End: 2024-08-30 | Stop reason: HOSPADM

## 2024-08-24 RX ORDER — OXYCODONE HCL 20 MG/1
20 TABLET, FILM COATED, EXTENDED RELEASE ORAL EVERY 12 HOURS SCHEDULED
Status: COMPLETED | OUTPATIENT
Start: 2024-08-24 | End: 2024-08-29

## 2024-08-24 RX ADMIN — ENOXAPARIN SODIUM 40 MG: 100 INJECTION SUBCUTANEOUS at 21:39

## 2024-08-24 RX ADMIN — METOPROLOL TARTRATE 25 MG: 25 TABLET, FILM COATED ORAL at 21:40

## 2024-08-24 RX ADMIN — OXYCODONE HYDROCHLORIDE 10 MG: 5 TABLET ORAL at 22:25

## 2024-08-24 RX ADMIN — AMOXICILLIN AND CLAVULANATE POTASSIUM 1 TABLET: 875; 125 TABLET, FILM COATED ORAL at 09:31

## 2024-08-24 RX ADMIN — HYDROMORPHONE HYDROCHLORIDE 1 MG: 1 INJECTION, SOLUTION INTRAMUSCULAR; INTRAVENOUS; SUBCUTANEOUS at 19:11

## 2024-08-24 RX ADMIN — DICLOFENAC SODIUM 4 G: 10 GEL TOPICAL at 21:40

## 2024-08-24 RX ADMIN — GABAPENTIN 800 MG: 400 CAPSULE ORAL at 09:31

## 2024-08-24 RX ADMIN — OXYCODONE HYDROCHLORIDE 15 MG: 5 TABLET ORAL at 18:09

## 2024-08-24 RX ADMIN — METOPROLOL TARTRATE 25 MG: 25 TABLET, FILM COATED ORAL at 09:31

## 2024-08-24 RX ADMIN — DICLOFENAC SODIUM 4 G: 10 GEL TOPICAL at 18:09

## 2024-08-24 RX ADMIN — BUDESONIDE AND FORMOTEROL FUMARATE DIHYDRATE 1 PUFF: 160; 4.5 AEROSOL RESPIRATORY (INHALATION) at 20:18

## 2024-08-24 RX ADMIN — SODIUM CHLORIDE 2000 MG: 900 INJECTION INTRAVENOUS at 03:27

## 2024-08-24 RX ADMIN — ATORVASTATIN CALCIUM 5 MG: 10 TABLET, FILM COATED ORAL at 18:09

## 2024-08-24 RX ADMIN — VENLAFAXINE HYDROCHLORIDE 37.5 MG: 37.5 CAPSULE, EXTENDED RELEASE ORAL at 09:31

## 2024-08-24 RX ADMIN — AMOXICILLIN AND CLAVULANATE POTASSIUM 1 TABLET: 875; 125 TABLET, FILM COATED ORAL at 21:39

## 2024-08-24 RX ADMIN — OXYCODONE HYDROCHLORIDE 15 MG: 5 TABLET ORAL at 14:26

## 2024-08-24 RX ADMIN — OXYCODONE HYDROCHLORIDE 15 MG: 5 TABLET ORAL at 07:02

## 2024-08-24 RX ADMIN — MECLIZINE HYDROCHLORIDE 25 MG: 25 TABLET ORAL at 12:05

## 2024-08-24 RX ADMIN — SODIUM CHLORIDE 125 ML/HR: 9 INJECTION, SOLUTION INTRAVENOUS at 05:38

## 2024-08-24 RX ADMIN — DICLOFENAC SODIUM 4 G: 10 GEL TOPICAL at 11:54

## 2024-08-24 RX ADMIN — DICLOFENAC SODIUM 4 G: 10 GEL TOPICAL at 09:39

## 2024-08-24 RX ADMIN — ONDANSETRON 4 MG: 2 INJECTION INTRAMUSCULAR; INTRAVENOUS at 11:51

## 2024-08-24 RX ADMIN — Medication 10 ML: at 09:32

## 2024-08-24 RX ADMIN — OXYCODONE HYDROCHLORIDE 15 MG: 5 TABLET ORAL at 09:31

## 2024-08-24 RX ADMIN — OXYCODONE HYDROCHLORIDE 15 MG: 5 TABLET ORAL at 02:42

## 2024-08-24 RX ADMIN — HYDROMORPHONE HYDROCHLORIDE 1 MG: 1 INJECTION, SOLUTION INTRAMUSCULAR; INTRAVENOUS; SUBCUTANEOUS at 15:56

## 2024-08-24 RX ADMIN — ENOXAPARIN SODIUM 40 MG: 100 INJECTION SUBCUTANEOUS at 09:31

## 2024-08-24 RX ADMIN — OXYCODONE HYDROCHLORIDE 20 MG: 20 TABLET, FILM COATED, EXTENDED RELEASE ORAL at 21:40

## 2024-08-24 RX ADMIN — HYDROMORPHONE HYDROCHLORIDE 1 MG: 1 INJECTION, SOLUTION INTRAMUSCULAR; INTRAVENOUS; SUBCUTANEOUS at 05:38

## 2024-08-24 RX ADMIN — Medication 10 ML: at 21:41

## 2024-08-24 RX ADMIN — HYDROMORPHONE HYDROCHLORIDE 1 MG: 1 INJECTION, SOLUTION INTRAMUSCULAR; INTRAVENOUS; SUBCUTANEOUS at 11:08

## 2024-08-24 RX ADMIN — HYDROMORPHONE HYDROCHLORIDE 1 MG: 1 INJECTION, SOLUTION INTRAMUSCULAR; INTRAVENOUS; SUBCUTANEOUS at 00:20

## 2024-08-24 RX ADMIN — LOSARTAN POTASSIUM 25 MG: 25 TABLET, FILM COATED ORAL at 21:40

## 2024-08-24 NOTE — PLAN OF CARE
Goal Outcome Evaluation:     C/o pain on incision site, medicated per MAR. Ambulated in her room last night with walker. Heart/oxygen monitor in place. SCD on left leg. Plan of care is ongoing. Call light within reach.

## 2024-08-24 NOTE — PROGRESS NOTES
General Surgery Progress Note    Name: Lisa Jay ADMIT: 2024   : 1970  PCP: Brian Gonzalez MD    MRN: [id number] LOS: 7 days   AGE/SEX: 54 y.o. female  ROOM: [address] [place] [address]    Chief Complaint   Patient presents with    Abdominal Pain     Pt reports mid abdominal pain since last night, nausea. Pt denies any v/d. Pt reports was seen here last week and dx with blood clot to rt leg and was started on xarelto     Subjective     Patient seen examined.  Vital signs stable, afebrile.  Reports continued nausea requiring Zofran.  Using IV and p.o. medication for pain control.  Tolerating some p.o. intake, denies vomiting.    Objective     Scheduled Medications:   amoxicillin-clavulanate, 1 tablet, Oral, Q12H  atorvastatin, 5 mg, Oral, Q PM  budesonide-formoterol, 1 puff, Inhalation, BID - RT  [Held by provider] clopidogrel, 75 mg, Oral, Daily  Diclofenac Sodium, 4 g, Topical, 4x Daily  enoxaparin, 40 mg, Subcutaneous, Q12H  gabapentin, 800 mg, Oral, Daily  insulin lispro, 2-9 Units, Subcutaneous, 4x Daily AC & at Bedtime  ipratropium-albuterol, 3 mL, Nebulization, 4x Daily - RT  losartan, 25 mg, Oral, Nightly  metoprolol tartrate, 25 mg, Oral, BID  oxyCODONE, 15 mg, Oral, Q4H  sodium chloride, 10 mL, Intravenous, Q12H  venlafaxine XR, 37.5 mg, Oral, Daily With Breakfast        Active Infusions:       As Needed Medications:    acetaminophen **OR** acetaminophen **OR** acetaminophen    albuterol sulfate HFA    senna-docusate sodium **AND** polyethylene glycol **AND** bisacodyl **AND** bisacodyl    dextrose    dextrose    glucagon (human recombinant)    HYDROmorphone    hydrOXYzine    melatonin    ondansetron ODT **OR** ondansetron    Potassium Replacement - Follow Nurse / BPA Driven Protocol    prochlorperazine    sodium chloride    sodium chloride    sodium chloride    Vital Signs  Vital Signs Patient Vitals for the past 24 hrs:   BP Temp Temp src Pulse Resp SpO2   24 0714 114/62 -- -- 73 -- 94 %    08/24/24 0712 114/62 98.2 °F (36.8 °C) Oral 79 20 95 %   08/24/24 0517 131/70 99.3 °F (37.4 °C) Oral 77 22 96 %   08/24/24 0019 120/75 -- -- 85 20 94 %   08/23/24 2142 127/69 98.4 °F (36.9 °C) Oral 85 26 92 %   08/23/24 2109 126/69 -- -- 83 17 95 %   08/23/24 1925 -- -- -- 80 18 99 %   08/23/24 1920 -- -- -- 80 18 99 %   08/23/24 1919 -- -- -- 80 18 99 %   08/23/24 1915 -- -- -- 80 18 98 %   08/23/24 1610 -- -- -- 74 18 99 %   08/23/24 1607 -- -- -- 71 18 96 %   08/23/24 1551 -- 98.6 °F (37 °C) Oral 72 20 97 %   08/23/24 1501 160/88 98.9 °F (37.2 °C) Oral 75 18 96 %   08/23/24 1447 148/91 -- -- 73 18 96 %   08/23/24 1432 146/80 -- -- 74 15 97 %   08/23/24 1417 139/82 -- -- 74 9 100 %   08/23/24 1402 129/83 -- -- 76 10 100 %   08/23/24 1347 140/80 -- -- 76 12 100 %   08/23/24 1342 146/74 -- -- 78 12 100 %   08/23/24 1337 147/82 -- -- 79 12 100 %   08/23/24 1332 124/87 98.7 °F (37.1 °C) Oral 79 10 100 %   08/23/24 1044 120/70 98 °F (36.7 °C) Oral 71 15 98 %     I/O:  I/O last 3 completed shifts:  In: 2330 [P.O.:730; I.V.:1600]  Out: 800 [Urine:800]    Physical Exam:  Physical Exam  Constitutional:       General: She is not in acute distress.  Cardiovascular:      Rate and Rhythm: Normal rate.   Pulmonary:      Effort: Pulmonary effort is normal. No respiratory distress.   Abdominal:      Palpations: Abdomen is soft.      Tenderness: There is abdominal tenderness.      Comments: Soft, tenderness to palpation.  Incision clean dry and intact.   Neurological:      Mental Status: She is alert. Mental status is at baseline.   Psychiatric:         Mood and Affect: Mood normal.         Behavior: Behavior normal.         Results Review:     CBC    Results from last 7 days   Lab Units 08/24/24  0336 08/23/24  0056 08/21/24  2327 08/20/24  0528 08/19/24  0015 08/18/24  0746   WBC 10*3/mm3 15.45* 12.68* 9.56 9.68 14.52* 19.21*   HEMOGLOBIN g/dL 10.5* 10.8* 10.8* 10.0* 10.9* 11.9*   PLATELETS 10*3/mm3 268 314 225 689 255 865      BMP   Results from last 7 days   Lab Units 08/24/24  0336 08/23/24  0056 08/21/24  2327 08/20/24  0528 08/19/24  0015 08/18/24  0746 08/17/24  2304   SODIUM mmol/L 139 141 138 139 135* 136  --    POTASSIUM mmol/L 4.3 4.0 4.0 3.7 3.9 4.0 4.2   CHLORIDE mmol/L 106 106 105 104 100 100  --    CO2 mmol/L 23.8 25.7 24.6 28.0 25.3 26.6  --    BUN mg/dL 8 10 10 11 11 12  --    CREATININE mg/dL 0.83 0.75 0.79 0.86 0.80 0.90  --    GLUCOSE mg/dL 103* 114* 98 128* 102* 116*  --    MAGNESIUM mg/dL  --  2.2 2.2  --   --   --   --      Radiology(recent) XR Chest 1 View    Result Date: 8/23/2024  Impression: No acute process. Electronically Signed: Abudlkadir Alonso MD  8/23/2024 7:08 AM EDT  Workstation ID: [id number]     I reviewed the patient's new clinical results.    Assessment & Plan       Lower leg DVT (deep venous thromboembolism), acute, right    Systemic lupus erythematosus    Fibromyalgia      54 y.o. female POD 1 status post robotic cholecystectomy    Diet as tolerated  Antiemetics and pain medication as needed  Needs to ambulate is much as possible, out of bed to chair  Educated patient on incentive spirometer, needs to use 10 times an hour while awake  Atarax as needed for itching  Monitor and replace electrolytes as needed  Lovenox for DVT PPx        This note was created using Dragon Voice Recognition software.    RONALD Mayfield  08/24/24  09:44 EDT

## 2024-08-24 NOTE — PLAN OF CARE
Problem: Adult Inpatient Plan of Care  Goal: Absence of Hospital-Acquired Illness or Injury  Intervention: Prevent Skin Injury  Recent Flowsheet Documentation  Taken 8/24/2024 0800 by Lou Leyva RN  Body Position: position changed independently  Skin Protection:   adhesive use limited   tubing/devices free from skin contact   incontinence pads utilized   Goal Outcome Evaluation:

## 2024-08-24 NOTE — PROGRESS NOTES
[facility] MEDICINE SERVICE  DAILY PROGRESS NOTE    NAME: Lisa Jay  : 1970  MRN: [id number]      LOS: 7 days     PROVIDER OF SERVICE: Agustín Oneil MD    Chief Complaint: Acute cholecystitis  Lisa Jay is a 54 y.o. female with a previous medical history of Fibromyalgia, HTN, Lupus who presented to [facility] on [facility]24 with abdominal pain and dry heaves for the past 2 days.   Subjective:     Interval History:  History taken from: patient  Patient Complaints: abd pain    Patient Denies: Any fever chills or rigors    Review of Systems:   Review of Systems  14 point review of system unremarkable except mentioned above  Objective:     Vital Signs  Temp:  [98.2 °F (36.8 °C)-99.3 °F (37.4 °C)] 98.3 °F (36.8 °C)  Heart Rate:  [69-85] 70  Resp:  [9-26] 14  BP: (114-160)/(62-91) 132/69  Flow (L/min):  [6] 6   Body mass index is 46.17 kg/m².    Physical Exam  Physical Exam  HENT:      Head: Atraumatic.      Mouth/Throat:      Mouth: Mucous membranes are moist.   Eyes:      Pupils: Pupils are equal, round, and reactive to light.   Cardiovascular:      Rate and Rhythm: Normal rate and regular rhythm.   Pulmonary:      Effort: Pulmonary effort is normal.      Breath sounds: Normal breath sounds.   Abdominal:      General: Bowel sounds are normal.      Palpations: Abdomen is soft.   Musculoskeletal:         General: Normal range of motion.      Cervical back: Neck supple.   Skin:     General: Skin is warm.   Neurological:      General: No focal deficit present.      Mental Status: She is alert and oriented to person, place, and time.   Psychiatric:         Mood and Affect: Mood normal.         Scheduled Meds   amoxicillin-clavulanate, 1 tablet, Oral, Q12H  atorvastatin, 5 mg, Oral, Q PM  budesonide-formoterol, 1 puff, Inhalation, BID - RT  [Held by provider] clopidogrel, 75 mg, Oral, Daily  Diclofenac Sodium, 4 g, Topical, 4x Daily  enoxaparin, 40 mg, Subcutaneous, Q12H  gabapentin, 800 mg,  Oral, Daily  insulin lispro, 2-9 Units, Subcutaneous, 4x Daily AC & at Bedtime  ipratropium-albuterol, 3 mL, Nebulization, 4x Daily - RT  losartan, 25 mg, Oral, Nightly  metoprolol tartrate, 25 mg, Oral, BID  oxyCODONE, 15 mg, Oral, Q4H  sodium chloride, 10 mL, Intravenous, Q12H  venlafaxine XR, 37.5 mg, Oral, Daily With Breakfast       PRN Meds     acetaminophen **OR** acetaminophen **OR** acetaminophen    albuterol sulfate HFA    senna-docusate sodium **AND** polyethylene glycol **AND** bisacodyl **AND** bisacodyl    dextrose    dextrose    glucagon (human recombinant)    HYDROmorphone    hydrOXYzine    meclizine    melatonin    ondansetron ODT **OR** ondansetron    Potassium Replacement - Follow Nurse / BPA Driven Protocol    prochlorperazine    sodium chloride    sodium chloride    sodium chloride   Infusions           Diagnostic Data    Results from last 7 days   Lab Units 08/24/24  0336   WBC 10*3/mm3 15.45*   HEMOGLOBIN g/dL 10.5*   HEMATOCRIT % 33.3*   PLATELETS 10*3/mm3 268   GLUCOSE mg/dL 103*   CREATININE mg/dL 0.83   BUN mg/dL 8   SODIUM mmol/L 139   POTASSIUM mmol/L 4.3   AST (SGOT) U/L 90*   ALT (SGPT) U/L 64*   ALK PHOS U/L 74   BILIRUBIN mg/dL 0.2   ANION GAP mmol/L 9.2       XR Chest 1 View    Result Date: 8/23/2024  Impression: No acute process. Electronically Signed: Abdulkadir Alonso MD  8/23/2024 7:08 AM EDT  Workstation ID: [id number]       I reviewed the patient's new clinical results.    Assessment/Plan:     Active and Resolved Problems  #Acute cholecystitis status post CT-guided cholecystostomy drain placement  # Postcholecystectomy  - Patient transition to p.o. antibiotics  - D/OT  - Incentive spirometry  -    DVT  -Diagnosed with RLE DVT last week  -on home  Xarelto    - on enoxaparin therapeutic dose    Fibromyalgia  Hypertension  Lupus      VTE Prophylaxis:  Pharmacologic VTE prophylaxis orders are present.         Code status is   Code Status and Medical Interventions: CPR (Attempt to  Resuscitate); Full Support   Ordered at: 08/17/24 0507     Code Status (Patient has no pulse and is not breathing):    CPR (Attempt to Resuscitate)     Medical Interventions (Patient has pulse or is breathing):    Full Support       Plan for disposition: Home pending surgical recommendations   time: 35  minutes    Signature: Electronically signed by Agustín Oneil MD, 08/24/24, 12:15 EDT.  [facility] Hospitalist Team

## 2024-08-25 LAB
ANION GAP SERPL CALCULATED.3IONS-SCNC: 9.9 MMOL/L (ref 5–15)
BASOPHILS # BLD AUTO: 0.07 10*3/MM3 (ref 0–0.2)
BASOPHILS NFR BLD AUTO: 0.4 % (ref 0–1.5)
BUN SERPL-MCNC: 7 MG/DL (ref 6–20)
BUN/CREAT SERPL: 9.2 (ref 7–25)
CALCIUM SPEC-SCNC: 8.7 MG/DL (ref 8.6–10.5)
CHLORIDE SERPL-SCNC: 102 MMOL/L (ref 98–107)
CO2 SERPL-SCNC: 23.1 MMOL/L (ref 22–29)
CREAT SERPL-MCNC: 0.76 MG/DL (ref 0.57–1)
DEPRECATED RDW RBC AUTO: 43.3 FL (ref 37–54)
EGFRCR SERPLBLD CKD-EPI 2021: 92.1 ML/MIN/1.73
EOSINOPHIL # BLD AUTO: 0.94 10*3/MM3 (ref 0–0.4)
EOSINOPHIL NFR BLD AUTO: 5.9 % (ref 0.3–6.2)
ERYTHROCYTE [DISTWIDTH] IN BLOOD BY AUTOMATED COUNT: 13 % (ref 12.3–15.4)
GLUCOSE BLDC GLUCOMTR-MCNC: 104 MG/DL (ref 70–105)
GLUCOSE BLDC GLUCOMTR-MCNC: 119 MG/DL (ref 70–105)
GLUCOSE BLDC GLUCOMTR-MCNC: 119 MG/DL (ref 70–105)
GLUCOSE BLDC GLUCOMTR-MCNC: 140 MG/DL (ref 70–105)
GLUCOSE SERPL-MCNC: 114 MG/DL (ref 65–99)
HCT VFR BLD AUTO: 32.4 % (ref 34–46.6)
HGB BLD-MCNC: 10.5 G/DL (ref 12–15.9)
IMM GRANULOCYTES # BLD AUTO: 0.21 10*3/MM3 (ref 0–0.05)
IMM GRANULOCYTES NFR BLD AUTO: 1.3 % (ref 0–0.5)
LYMPHOCYTES # BLD AUTO: 2.67 10*3/MM3 (ref 0.7–3.1)
LYMPHOCYTES NFR BLD AUTO: 16.7 % (ref 19.6–45.3)
MCH RBC QN AUTO: 29.8 PG (ref 26.6–33)
MCHC RBC AUTO-ENTMCNC: 32.4 G/DL (ref 31.5–35.7)
MCV RBC AUTO: 92 FL (ref 79–97)
MONOCYTES # BLD AUTO: 1.68 10*3/MM3 (ref 0.1–0.9)
MONOCYTES NFR BLD AUTO: 10.5 % (ref 5–12)
NEUTROPHILS NFR BLD AUTO: 10.39 10*3/MM3 (ref 1.7–7)
NEUTROPHILS NFR BLD AUTO: 65.2 % (ref 42.7–76)
NRBC BLD AUTO-RTO: 0 /100 WBC (ref 0–0.2)
PLATELET # BLD AUTO: 283 10*3/MM3 (ref 140–450)
PMV BLD AUTO: 9 FL (ref 6–12)
POTASSIUM SERPL-SCNC: 3.9 MMOL/L (ref 3.5–5.2)
RBC # BLD AUTO: 3.52 10*6/MM3 (ref 3.77–5.28)
SODIUM SERPL-SCNC: 135 MMOL/L (ref 136–145)
WBC NRBC COR # BLD AUTO: 15.96 10*3/MM3 (ref 3.4–10.8)

## 2024-08-25 PROCEDURE — 94799 UNLISTED PULMONARY SVC/PX: CPT

## 2024-08-25 PROCEDURE — 85025 COMPLETE CBC W/AUTO DIFF WBC: CPT | Performed by: STUDENT IN AN ORGANIZED HEALTH CARE EDUCATION/TRAINING PROGRAM

## 2024-08-25 PROCEDURE — 82948 REAGENT STRIP/BLOOD GLUCOSE: CPT | Performed by: SURGERY

## 2024-08-25 PROCEDURE — 25010000002 HYDROMORPHONE 1 MG/ML SOLUTION: Performed by: SURGERY

## 2024-08-25 PROCEDURE — 25010000002 ENOXAPARIN PER 10 MG: Performed by: STUDENT IN AN ORGANIZED HEALTH CARE EDUCATION/TRAINING PROGRAM

## 2024-08-25 PROCEDURE — 80048 BASIC METABOLIC PNL TOTAL CA: CPT | Performed by: STUDENT IN AN ORGANIZED HEALTH CARE EDUCATION/TRAINING PROGRAM

## 2024-08-25 PROCEDURE — 25010000002 ENOXAPARIN PER 10 MG: Performed by: SURGERY

## 2024-08-25 PROCEDURE — 94664 DEMO&/EVAL PT USE INHALER: CPT

## 2024-08-25 PROCEDURE — 25010000002 HYDROMORPHONE 1 MG/ML SOLUTION

## 2024-08-25 PROCEDURE — 94761 N-INVAS EAR/PLS OXIMETRY MLT: CPT

## 2024-08-25 PROCEDURE — 82948 REAGENT STRIP/BLOOD GLUCOSE: CPT

## 2024-08-25 RX ORDER — ENOXAPARIN SODIUM 150 MG/ML
1 INJECTION SUBCUTANEOUS EVERY 12 HOURS
Status: DISCONTINUED | OUTPATIENT
Start: 2024-08-25 | End: 2024-08-30 | Stop reason: HOSPADM

## 2024-08-25 RX ADMIN — OXYCODONE HYDROCHLORIDE 10 MG: 5 TABLET ORAL at 02:15

## 2024-08-25 RX ADMIN — METOPROLOL TARTRATE 25 MG: 25 TABLET, FILM COATED ORAL at 08:21

## 2024-08-25 RX ADMIN — OXYCODONE HYDROCHLORIDE 20 MG: 20 TABLET, FILM COATED, EXTENDED RELEASE ORAL at 21:33

## 2024-08-25 RX ADMIN — OXYCODONE HYDROCHLORIDE 10 MG: 5 TABLET ORAL at 06:03

## 2024-08-25 RX ADMIN — HYDROMORPHONE HYDROCHLORIDE 1 MG: 1 INJECTION, SOLUTION INTRAMUSCULAR; INTRAVENOUS; SUBCUTANEOUS at 12:16

## 2024-08-25 RX ADMIN — DICLOFENAC SODIUM 4 G: 10 GEL TOPICAL at 08:21

## 2024-08-25 RX ADMIN — VENLAFAXINE HYDROCHLORIDE 37.5 MG: 37.5 CAPSULE, EXTENDED RELEASE ORAL at 08:21

## 2024-08-25 RX ADMIN — DICLOFENAC SODIUM 4 G: 10 GEL TOPICAL at 17:04

## 2024-08-25 RX ADMIN — METOPROLOL TARTRATE 25 MG: 25 TABLET, FILM COATED ORAL at 20:33

## 2024-08-25 RX ADMIN — OXYCODONE HYDROCHLORIDE 10 MG: 5 TABLET ORAL at 14:02

## 2024-08-25 RX ADMIN — OXYCODONE HYDROCHLORIDE 10 MG: 5 TABLET ORAL at 22:01

## 2024-08-25 RX ADMIN — HYDROXYZINE HYDROCHLORIDE 25 MG: 25 TABLET ORAL at 14:13

## 2024-08-25 RX ADMIN — Medication 10 ML: at 22:01

## 2024-08-25 RX ADMIN — CLOPIDOGREL BISULFATE 75 MG: 75 TABLET ORAL at 08:31

## 2024-08-25 RX ADMIN — ACETAMINOPHEN 650 MG: 325 TABLET, FILM COATED ORAL at 22:57

## 2024-08-25 RX ADMIN — BUDESONIDE AND FORMOTEROL FUMARATE DIHYDRATE 1 PUFF: 160; 4.5 AEROSOL RESPIRATORY (INHALATION) at 19:30

## 2024-08-25 RX ADMIN — OXYCODONE HYDROCHLORIDE 10 MG: 5 TABLET ORAL at 09:49

## 2024-08-25 RX ADMIN — ENOXAPARIN SODIUM 135 MG: 150 INJECTION SUBCUTANEOUS at 20:28

## 2024-08-25 RX ADMIN — OXYCODONE HYDROCHLORIDE 10 MG: 5 TABLET ORAL at 17:04

## 2024-08-25 RX ADMIN — LOSARTAN POTASSIUM 25 MG: 25 TABLET, FILM COATED ORAL at 20:34

## 2024-08-25 RX ADMIN — ATORVASTATIN CALCIUM 5 MG: 10 TABLET, FILM COATED ORAL at 17:04

## 2024-08-25 RX ADMIN — MECLIZINE HYDROCHLORIDE 25 MG: 25 TABLET ORAL at 02:39

## 2024-08-25 RX ADMIN — HYDROMORPHONE HYDROCHLORIDE 1 MG: 1 INJECTION, SOLUTION INTRAMUSCULAR; INTRAVENOUS; SUBCUTANEOUS at 16:26

## 2024-08-25 RX ADMIN — Medication 10 ML: at 08:24

## 2024-08-25 RX ADMIN — HYDROMORPHONE HYDROCHLORIDE 1 MG: 1 INJECTION, SOLUTION INTRAMUSCULAR; INTRAVENOUS; SUBCUTANEOUS at 20:29

## 2024-08-25 RX ADMIN — OXYCODONE HYDROCHLORIDE 20 MG: 20 TABLET, FILM COATED, EXTENDED RELEASE ORAL at 08:21

## 2024-08-25 RX ADMIN — GABAPENTIN 800 MG: 400 CAPSULE ORAL at 08:21

## 2024-08-25 RX ADMIN — HYDROXYZINE HYDROCHLORIDE 25 MG: 25 TABLET ORAL at 03:39

## 2024-08-25 RX ADMIN — DICLOFENAC SODIUM 4 G: 10 GEL TOPICAL at 22:02

## 2024-08-25 RX ADMIN — ENOXAPARIN SODIUM 40 MG: 100 INJECTION SUBCUTANEOUS at 08:21

## 2024-08-25 RX ADMIN — HYDROMORPHONE HYDROCHLORIDE 1 MG: 1 INJECTION, SOLUTION INTRAMUSCULAR; INTRAVENOUS; SUBCUTANEOUS at 00:13

## 2024-08-25 RX ADMIN — BUDESONIDE AND FORMOTEROL FUMARATE DIHYDRATE 1 PUFF: 160; 4.5 AEROSOL RESPIRATORY (INHALATION) at 07:20

## 2024-08-25 NOTE — PLAN OF CARE
Goal Outcome Evaluation:                 C/o constant pain on abdomen, abdomen is soft, distended. Patient has been encouraged to ambulate more and to use incentive spirometry every hour.  Per patient she has passed some flatus, but no bowel movement yet. SCD sleeve in place on left lower leg. Plan of care is ongoing. Call light within reach.

## 2024-08-25 NOTE — PLAN OF CARE
Goal Outcome Evaluation:      Pt walked in the hallway today. Has been complaining of constant pain to LLQ. Is on scheduled pain medication and IV dilaudid. VSS and call light within reach. Plan ongoing.

## 2024-08-25 NOTE — PROGRESS NOTES
[facility] MEDICINE SERVICE  DAILY PROGRESS NOTE    NAME: Lisa Jay  : 1970  MRN: [id number]      LOS: 8 days     PROVIDER OF SERVICE: Agustín Oneil MD    Chief Complaint: Acute cholecystitis  Lisa Jay is a 54 y.o. female with a previous medical history of Fibromyalgia, HTN, Lupus who presented to [facility] on 2024 with abdominal pain and dry heaves for the past 2 days.   Subjective:     Interval History:  History taken from: patient  Patient Complaints: abd pain,was tearful ,restarted full dose of Anticoagulation   Patient Denies: Any fever chills or rigors    Review of Systems:   Review of Systems  14 point review of system unremarkable except mentioned above  Objective:     Vital Signs  Temp:  [98.4 °F (36.9 °C)-99 °F (37.2 °C)] 98.4 °F (36.9 °C)  Heart Rate:  [65-82] 65  Resp:  [15-20] 18  BP: ()/(54-76) 99/66   Body mass index is 46.17 kg/m².    Physical Exam  Physical Exam  HENT:      Head: Atraumatic.      Mouth/Throat:      Mouth: Mucous membranes are moist.   Eyes:      Pupils: Pupils are equal, round, and reactive to light.   Cardiovascular:      Rate and Rhythm: Normal rate and regular rhythm.   Pulmonary:      Effort: Pulmonary effort is normal.      Breath sounds: Normal breath sounds.   Abdominal:      General: Bowel sounds are normal.      Palpations: Abdomen is soft.   Musculoskeletal:         General: Normal range of motion.      Cervical back: Neck supple.   Skin:     General: Skin is warm.   Neurological:      General: No focal deficit present.      Mental Status: She is alert and oriented to person, place, and time.   Psychiatric:         Mood and Affect: Mood normal.         Scheduled Meds   atorvastatin, 5 mg, Oral, Q PM  budesonide-formoterol, 1 puff, Inhalation, BID - RT  clopidogrel, 75 mg, Oral, Daily  Diclofenac Sodium, 4 g, Topical, 4x Daily  enoxaparin, 40 mg, Subcutaneous, Q12H  gabapentin, 800 mg, Oral, Daily  insulin lispro, 2-9 Units,  Subcutaneous, 4x Daily AC & at Bedtime  losartan, 25 mg, Oral, Nightly  metoprolol tartrate, 25 mg, Oral, BID  oxyCODONE, 10 mg, Oral, Q4H  oxyCODONE, 20 mg, Oral, Q12H  sodium chloride, 10 mL, Intravenous, Q12H  venlafaxine XR, 37.5 mg, Oral, Daily With Breakfast       PRN Meds     acetaminophen **OR** acetaminophen **OR** acetaminophen    albuterol sulfate HFA    senna-docusate sodium **AND** polyethylene glycol **AND** bisacodyl **AND** bisacodyl    dextrose    dextrose    glucagon (human recombinant)    HYDROmorphone    hydrOXYzine    ipratropium-albuterol    meclizine    melatonin    ondansetron ODT **OR** ondansetron    Potassium Replacement - Follow Nurse / BPA Driven Protocol    prochlorperazine    sodium chloride    sodium chloride    sodium chloride   Infusions           Diagnostic Data    Results from last 7 days   Lab Units 08/25/24  0227 08/24/24  0336   WBC 10*3/mm3 15.96* 15.45*   HEMOGLOBIN g/dL 10.5* 10.5*   HEMATOCRIT % 32.4* 33.3*   PLATELETS 10*3/mm3 283 268   GLUCOSE mg/dL 114* 103*   CREATININE mg/dL 0.76 0.83   BUN mg/dL 7 8   SODIUM mmol/L 135* 139   POTASSIUM mmol/L 3.9 4.3   AST (SGOT) U/L  --  90*   ALT (SGPT) U/L  --  64*   ALK PHOS U/L  --  74   BILIRUBIN mg/dL  --  0.2   ANION GAP mmol/L 9.9 9.2       No radiology results for the last day      I reviewed the patient's new clinical results.    Assessment/Plan:     Active and Resolved Problems  #Acute cholecystitis status post CT-guided cholecystostomy drain placement  # Postcholecystectomy  - Patient transition to p.o. antibiotics  - PT/OT  - Incentive spirometry  -    DVT  -Diagnosed with RLE DVT last week  -on home  Xarelto    - on enoxaparin therapeutic dose  , will dc on xarelto   Fibromyalgia  Hypertension  Lupus      VTE Prophylaxis:  Pharmacologic VTE prophylaxis orders are present.         Code status is   Code Status and Medical Interventions: CPR (Attempt to Resuscitate); Full Support   Ordered at: 08/17/24 0507     Code Status  (Patient has no pulse and is not breathing):    CPR (Attempt to Resuscitate)     Medical Interventions (Patient has pulse or is breathing):    Full Support       Plan for disposition: Home pending surgical recommendations   time: 35  minutes    Signature: Electronically signed by Agustín Oneil MD, 08/25/24, 13:14 EDT.  [facility]    [id number]

## 2024-08-25 NOTE — PROGRESS NOTES
General Surgery Progress Note    Name: Lisa Jay ADMIT: 2024   : 1970  PCP: Brian Gonzalez MD    MRN: [id number] LOS: 8 days   AGE/SEX: 54 y.o. female  ROOM: [address] [place] [address]    Chief Complaint   Patient presents with    Abdominal Pain     Pt reports mid abdominal pain since last night, nausea. Pt denies any v/d. Pt reports was seen here last week and dx with blood clot to rt leg and was started on xarelto     Subjective     Patient seen and examined.  Vital signs stable, afebrile.  Reports slight improvement in pain control with adjustments made yesterday.  Complaining of incisional pain.  Still having intermittent nausea, no vomiting.  Not passing flatus, no BM.  White count 15.96 (15.45).    Objective     Scheduled Medications:   atorvastatin, 5 mg, Oral, Q PM  budesonide-formoterol, 1 puff, Inhalation, BID - RT  clopidogrel, 75 mg, Oral, Daily  Diclofenac Sodium, 4 g, Topical, 4x Daily  enoxaparin, 40 mg, Subcutaneous, Q12H  gabapentin, 800 mg, Oral, Daily  insulin lispro, 2-9 Units, Subcutaneous, 4x Daily AC & at Bedtime  losartan, 25 mg, Oral, Nightly  metoprolol tartrate, 25 mg, Oral, BID  oxyCODONE, 10 mg, Oral, Q4H  oxyCODONE, 20 mg, Oral, Q12H  sodium chloride, 10 mL, Intravenous, Q12H  venlafaxine XR, 37.5 mg, Oral, Daily With Breakfast        Active Infusions:       As Needed Medications:    acetaminophen **OR** acetaminophen **OR** acetaminophen    albuterol sulfate HFA    senna-docusate sodium **AND** polyethylene glycol **AND** bisacodyl **AND** bisacodyl    dextrose    dextrose    glucagon (human recombinant)    HYDROmorphone    hydrOXYzine    ipratropium-albuterol    meclizine    melatonin    ondansetron ODT **OR** ondansetron    Potassium Replacement - Follow Nurse / BPA Driven Protocol    prochlorperazine    sodium chloride    sodium chloride    sodium chloride    Vital Signs  Vital Signs Patient Vitals for the past 24 hrs:   BP Temp Temp src Pulse Resp SpO2   24 0821 116/68  -- -- 72 -- --   08/25/24 0721 -- -- -- 70 16 94 %   08/25/24 0720 -- -- -- 71 16 95 %   08/25/24 0520 101/54 98.8 °F (37.1 °C) Oral -- 16 --   08/25/24 0211 115/76 99 °F (37.2 °C) Oral 77 19 94 %   08/25/24 0011 130/60 -- -- 80 19 97 %   08/24/24 2022 -- -- -- 82 20 94 %   08/24/24 2018 -- -- -- 78 15 94 %   08/24/24 1910 128/68 98.9 °F (37.2 °C) Oral 72 18 93 %   08/24/24 1137 132/69 98.3 °F (36.8 °C) Oral 70 14 94 %   08/24/24 1058 -- -- -- 69 -- 95 %     I/O:  I/O last 3 completed shifts:  In: 1960 [P.O.:960; I.V.:1000]  Out: 1400 [Urine:1400]    Physical Exam:  Physical Exam  Constitutional:       General: She is not in acute distress.  Cardiovascular:      Rate and Rhythm: Normal rate.   Pulmonary:      Effort: Pulmonary effort is normal. No respiratory distress.   Abdominal:      General: There is no distension.      Palpations: Abdomen is soft.      Tenderness: There is abdominal tenderness.      Comments: Incisions clean dry and intact.   Neurological:      Mental Status: She is alert. Mental status is at baseline.   Psychiatric:         Mood and Affect: Mood normal.         Behavior: Behavior normal.         Results Review:     CBC    Results from last 7 days   Lab Units 08/25/24 0227 08/24/24 0336 08/23/24 0056 08/21/24 2327 08/20/24 0528 08/19/24  0015   WBC 10*3/mm3 15.96* 15.45* 12.68* 9.56 9.68 14.52*   HEMOGLOBIN g/dL 10.5* 10.5* 10.8* 10.8* 10.0* 10.9*   PLATELETS 10*3/mm3 283 268 314 251 266 253     BMP   Results from last 7 days   Lab Units 08/25/24 0227 08/24/24 0336 08/23/24 0056 08/21/24 2327 08/20/24  0528 08/19/24  0015   SODIUM mmol/L 135* 139 141 138 139 135*   POTASSIUM mmol/L 3.9 4.3 4.0 4.0 3.7 3.9   CHLORIDE mmol/L 102 106 106 105 104 100   CO2 mmol/L 23.1 23.8 25.7 24.6 28.0 25.3   BUN mg/dL 7 8 10 10 11 11   CREATININE mg/dL 0.76 0.83 0.75 0.79 0.86 0.80   GLUCOSE mg/dL 114* 103* 114* 98 128* 102*   MAGNESIUM mg/dL  --   --  2.2 2.2  --   --      Radiology(recent) No  radiology results for the last day    I reviewed the patient's new clinical results.    Assessment & Plan       Lower leg DVT (deep venous thromboembolism), acute, right    Systemic lupus erythematosus    Fibromyalgia      54 y.o. female POD 2 status post robotic cholecystectomy     Diet as tolerated  Antiemetics and pain medication as needed  Needs to ambulate as much as possible, out of bed to chair  Use incentive spirometer 10 times an hour while awake  Atarax as needed for itching  Monitor and replace electrolytes as needed  Plavix resumed  Okay to start full anticoagulation        This note was created using Dragon Voice Recognition software.    RONALD Mayfield  08/25/24  10:44 EDT

## 2024-08-26 ENCOUNTER — APPOINTMENT (OUTPATIENT)
Dept: CT IMAGING | Facility: HOSPITAL | Age: 54
DRG: 418 | End: 2024-08-26
Payer: MEDICARE

## 2024-08-26 LAB
GLUCOSE BLDC GLUCOMTR-MCNC: 100 MG/DL (ref 70–105)
GLUCOSE BLDC GLUCOMTR-MCNC: 107 MG/DL (ref 70–105)
GLUCOSE BLDC GLUCOMTR-MCNC: 112 MG/DL (ref 70–105)
GLUCOSE BLDC GLUCOMTR-MCNC: 70 MG/DL (ref 70–105)
GLUCOSE BLDC GLUCOMTR-MCNC: 98 MG/DL (ref 70–105)
QT INTERVAL: 410 MS
QTC INTERVAL: 428 MS

## 2024-08-26 PROCEDURE — 25510000001 IOPAMIDOL PER 1 ML: Performed by: HOSPITALIST

## 2024-08-26 PROCEDURE — 99024 POSTOP FOLLOW-UP VISIT: CPT

## 2024-08-26 PROCEDURE — 25010000002 ONDANSETRON PER 1 MG: Performed by: SURGERY

## 2024-08-26 PROCEDURE — 94799 UNLISTED PULMONARY SVC/PX: CPT

## 2024-08-26 PROCEDURE — 25010000002 HYDROMORPHONE 1 MG/ML SOLUTION

## 2024-08-26 PROCEDURE — 82948 REAGENT STRIP/BLOOD GLUCOSE: CPT | Performed by: SURGERY

## 2024-08-26 PROCEDURE — 94761 N-INVAS EAR/PLS OXIMETRY MLT: CPT

## 2024-08-26 PROCEDURE — 82948 REAGENT STRIP/BLOOD GLUCOSE: CPT

## 2024-08-26 PROCEDURE — 25010000002 ENOXAPARIN PER 10 MG: Performed by: STUDENT IN AN ORGANIZED HEALTH CARE EDUCATION/TRAINING PROGRAM

## 2024-08-26 PROCEDURE — 94664 DEMO&/EVAL PT USE INHALER: CPT

## 2024-08-26 PROCEDURE — 74177 CT ABD & PELVIS W/CONTRAST: CPT

## 2024-08-26 RX ORDER — IOPAMIDOL 755 MG/ML
100 INJECTION, SOLUTION INTRAVASCULAR
Status: COMPLETED | OUTPATIENT
Start: 2024-08-26 | End: 2024-08-26

## 2024-08-26 RX ADMIN — ONDANSETRON 4 MG: 2 INJECTION INTRAMUSCULAR; INTRAVENOUS at 10:21

## 2024-08-26 RX ADMIN — HYDROMORPHONE HYDROCHLORIDE 1 MG: 1 INJECTION, SOLUTION INTRAMUSCULAR; INTRAVENOUS; SUBCUTANEOUS at 15:49

## 2024-08-26 RX ADMIN — ENOXAPARIN SODIUM 135 MG: 150 INJECTION SUBCUTANEOUS at 20:57

## 2024-08-26 RX ADMIN — Medication 10 ML: at 22:00

## 2024-08-26 RX ADMIN — IOPAMIDOL 100 ML: 755 INJECTION, SOLUTION INTRAVENOUS at 20:31

## 2024-08-26 RX ADMIN — HYDROMORPHONE HYDROCHLORIDE 1 MG: 1 INJECTION, SOLUTION INTRAMUSCULAR; INTRAVENOUS; SUBCUTANEOUS at 12:15

## 2024-08-26 RX ADMIN — HYDROMORPHONE HYDROCHLORIDE 1 MG: 1 INJECTION, SOLUTION INTRAMUSCULAR; INTRAVENOUS; SUBCUTANEOUS at 19:41

## 2024-08-26 RX ADMIN — METOPROLOL TARTRATE 25 MG: 25 TABLET, FILM COATED ORAL at 20:56

## 2024-08-26 RX ADMIN — OXYCODONE HYDROCHLORIDE 20 MG: 20 TABLET, FILM COATED, EXTENDED RELEASE ORAL at 20:56

## 2024-08-26 RX ADMIN — METOPROLOL TARTRATE 25 MG: 25 TABLET, FILM COATED ORAL at 09:11

## 2024-08-26 RX ADMIN — OXYCODONE HYDROCHLORIDE 10 MG: 5 TABLET ORAL at 22:08

## 2024-08-26 RX ADMIN — OXYCODONE HYDROCHLORIDE 20 MG: 20 TABLET, FILM COATED, EXTENDED RELEASE ORAL at 09:10

## 2024-08-26 RX ADMIN — HYDROMORPHONE HYDROCHLORIDE 1 MG: 1 INJECTION, SOLUTION INTRAMUSCULAR; INTRAVENOUS; SUBCUTANEOUS at 00:47

## 2024-08-26 RX ADMIN — HYDROMORPHONE HYDROCHLORIDE 1 MG: 1 INJECTION, SOLUTION INTRAMUSCULAR; INTRAVENOUS; SUBCUTANEOUS at 04:40

## 2024-08-26 RX ADMIN — BUDESONIDE AND FORMOTEROL FUMARATE DIHYDRATE 1 PUFF: 160; 4.5 AEROSOL RESPIRATORY (INHALATION) at 07:38

## 2024-08-26 RX ADMIN — HYDROXYZINE HYDROCHLORIDE 25 MG: 25 TABLET ORAL at 15:55

## 2024-08-26 RX ADMIN — HYDROMORPHONE HYDROCHLORIDE 1 MG: 1 INJECTION, SOLUTION INTRAMUSCULAR; INTRAVENOUS; SUBCUTANEOUS at 23:41

## 2024-08-26 RX ADMIN — DICLOFENAC SODIUM 4 G: 10 GEL TOPICAL at 09:11

## 2024-08-26 RX ADMIN — HYDROXYZINE HYDROCHLORIDE 25 MG: 25 TABLET ORAL at 22:09

## 2024-08-26 RX ADMIN — SENNOSIDES AND DOCUSATE SODIUM 2 TABLET: 50; 8.6 TABLET ORAL at 10:21

## 2024-08-26 RX ADMIN — OXYCODONE HYDROCHLORIDE 10 MG: 5 TABLET ORAL at 13:29

## 2024-08-26 RX ADMIN — ENOXAPARIN SODIUM 135 MG: 150 INJECTION SUBCUTANEOUS at 09:11

## 2024-08-26 RX ADMIN — DICLOFENAC SODIUM 4 G: 10 GEL TOPICAL at 17:39

## 2024-08-26 RX ADMIN — DICLOFENAC SODIUM 4 G: 10 GEL TOPICAL at 22:00

## 2024-08-26 RX ADMIN — OXYCODONE HYDROCHLORIDE 10 MG: 5 TABLET ORAL at 10:21

## 2024-08-26 RX ADMIN — HYDROXYZINE HYDROCHLORIDE 25 MG: 25 TABLET ORAL at 05:49

## 2024-08-26 RX ADMIN — GABAPENTIN 800 MG: 400 CAPSULE ORAL at 09:11

## 2024-08-26 RX ADMIN — OXYCODONE HYDROCHLORIDE 10 MG: 5 TABLET ORAL at 02:30

## 2024-08-26 RX ADMIN — LOSARTAN POTASSIUM 25 MG: 25 TABLET, FILM COATED ORAL at 20:56

## 2024-08-26 RX ADMIN — OXYCODONE HYDROCHLORIDE 10 MG: 5 TABLET ORAL at 17:36

## 2024-08-26 RX ADMIN — OXYCODONE HYDROCHLORIDE 10 MG: 5 TABLET ORAL at 05:49

## 2024-08-26 RX ADMIN — ATORVASTATIN CALCIUM 5 MG: 10 TABLET, FILM COATED ORAL at 17:36

## 2024-08-26 RX ADMIN — VENLAFAXINE HYDROCHLORIDE 37.5 MG: 37.5 CAPSULE, EXTENDED RELEASE ORAL at 09:10

## 2024-08-26 RX ADMIN — POLYETHYLENE GLYCOL 3350 17 G: 17 POWDER, FOR SOLUTION ORAL at 10:21

## 2024-08-26 RX ADMIN — CLOPIDOGREL BISULFATE 75 MG: 75 TABLET ORAL at 09:10

## 2024-08-26 NOTE — SIGNIFICANT NOTE
08/26/24 1451   Rehab Time/Intention   Session Not Performed   (Pt sates she was just up and just laid back down.  Requested therapy to return tomorrow.)   Recommendation   OT - Next Appointment 08/27/24

## 2024-08-26 NOTE — PROGRESS NOTES
General Surgery Progress Note    Name: Lisa Jay ADMIT: 2024   : 1970  PCP: Brian Gonzalez MD    MRN: [id number] LOS: 9 days   AGE/SEX: 54 y.o. female  ROOM: [address] [place] [address]    Chief Complaint   Patient presents with    Abdominal Pain     Pt reports mid abdominal pain since last night, nausea. Pt denies any v/d. Pt reports was seen here last week and dx with blood clot to rt leg and was started on xarelto     Subjective     Patient seen and examined.  Vital signs stable, afebrile.  Reports left lower quadrant pain.  Using IV and PO medication for pain control.  Still having intermittent nausea, no vomiting.  Passing flatus.      Objective     Scheduled Medications:   atorvastatin, 5 mg, Oral, Q PM  budesonide-formoterol, 1 puff, Inhalation, BID - RT  clopidogrel, 75 mg, Oral, Daily  Diclofenac Sodium, 4 g, Topical, 4x Daily  enoxaparin, 1 mg/kg, Subcutaneous, Q12H  gabapentin, 800 mg, Oral, Daily  insulin lispro, 2-9 Units, Subcutaneous, 4x Daily AC & at Bedtime  losartan, 25 mg, Oral, Nightly  metoprolol tartrate, 25 mg, Oral, BID  oxyCODONE, 10 mg, Oral, Q4H  oxyCODONE, 20 mg, Oral, Q12H  sodium chloride, 10 mL, Intravenous, Q12H  venlafaxine XR, 37.5 mg, Oral, Daily With Breakfast        Active Infusions:       As Needed Medications:    acetaminophen **OR** acetaminophen **OR** acetaminophen    albuterol sulfate HFA    senna-docusate sodium **AND** polyethylene glycol **AND** bisacodyl **AND** bisacodyl    dextrose    dextrose    glucagon (human recombinant)    HYDROmorphone    hydrOXYzine    ipratropium-albuterol    meclizine    melatonin    ondansetron ODT **OR** ondansetron    Potassium Replacement - Follow Nurse / BPA Driven Protocol    prochlorperazine    sodium chloride    sodium chloride    sodium chloride    Vital Signs  Vital Signs Patient Vitals for the past 24 hrs:   BP Temp Temp src Pulse Resp SpO2   24 0739 -- -- -- 65 18 94 %   24 0738 -- -- -- 58 18 99 %   24  0459 117/62 98.1 °F (36.7 °C) Oral 63 24 94 %   08/25/24 2029 121/63 98.4 °F (36.9 °C) Oral 76 17 99 %   08/25/24 1933 -- -- -- 70 16 95 %   08/25/24 1930 -- -- -- 70 16 95 %   08/25/24 1112 99/66 98.4 °F (36.9 °C) Oral 65 18 94 %     I/O:  I/O last 3 completed shifts:  In: -   Out: 1250 [Urine:1250]    Physical Exam:  Physical Exam  Constitutional:       General: She is not in acute distress.  Cardiovascular:      Rate and Rhythm: Normal rate.   Pulmonary:      Effort: Pulmonary effort is normal. No respiratory distress.   Abdominal:      General: There is no distension.      Palpations: Abdomen is soft.      Tenderness: There is abdominal tenderness.      Comments: Incisions clean dry and intact.   Neurological:      Mental Status: She is alert. Mental status is at baseline.   Psychiatric:         Mood and Affect: Mood normal.         Behavior: Behavior normal.         Results Review:     CBC    Results from last 7 days   Lab Units 08/25/24 0227 08/24/24 0336 08/23/24 0056 08/21/24 2327 08/20/24  0528   WBC 10*3/mm3 15.96* 15.45* 12.68* 9.56 9.68   HEMOGLOBIN g/dL 10.5* 10.5* 10.8* 10.8* 10.0*   PLATELETS 10*3/mm3 283 268 314 251 266     BMP   Results from last 7 days   Lab Units 08/25/24 0227 08/24/24 0336 08/23/24 0056 08/21/24 2327 08/20/24  0528   SODIUM mmol/L 135* 139 141 138 139   POTASSIUM mmol/L 3.9 4.3 4.0 4.0 3.7   CHLORIDE mmol/L 102 106 106 105 104   CO2 mmol/L 23.1 23.8 25.7 24.6 28.0   BUN mg/dL 7 8 10 10 11   CREATININE mg/dL 0.76 0.83 0.75 0.79 0.86   GLUCOSE mg/dL 114* 103* 114* 98 128*   MAGNESIUM mg/dL  --   --  2.2 2.2  --      Radiology(recent) No radiology results for the last day    I reviewed the patient's new clinical results.    Assessment & Plan       Lower leg DVT (deep venous thromboembolism), acute, right    Systemic lupus erythematosus    Fibromyalgia      54 y.o. female POD 3 status post robotic cholecystectomy     Diet as tolerated  Antiemetics and pain medication as  needed  Needs to ambulate as much as possible, out of bed to chair  Use incentive spirometer 10 times an hour while awake  Atarax as needed for itching  Monitor and replace electrolytes as needed  Morning labs ordered, will follow-up  Anticipate discharge in next few days as pain improves      This note was created using Dragon Voice Recognition software.    RONALD Mayfield  08/26/24  09:05 EDT

## 2024-08-26 NOTE — PLAN OF CARE
Goal Outcome Evaluation:                 Pt is resting quietly abed with eyes closed at this time. No s/sx of any distress are noted. Pt is AOx4 and is able to make wants and needs known. Bed is in low position with call light in reach. Care plan is ongoing.

## 2024-08-26 NOTE — PROGRESS NOTES
[facility] MEDICINE SERVICE  DAILY PROGRESS NOTE    NAME: Lisa Jay  : 1970  MRN: [id number]      LOS: 9 days     PROVIDER OF SERVICE: Jane Monaco MD    Chief Complaint: Acute cholecystitis  Lisa Jay is a 54 y.o. female with a previous medical history of Fibromyalgia, HTN, Lupus who presented to [facility] on 2024 with abdominal pain and dry heaves for the past 2 days.   Subjective:     Interval History:  History taken from: patient  Patient Complaints: abd pain,was tearful ,restarted full dose of Anticoagulation   Patient Denies: Any fever chills or rigors     patient seen and examined, she wants to continue her IV pain medications for another day.    Review of Systems:   Review of Systems  14 point review of system unremarkable except mentioned above  Objective:     Vital Signs  Temp:  [98.1 °F (36.7 °C)-98.4 °F (36.9 °C)] 98.1 °F (36.7 °C)  Heart Rate:  [58-76] 65  Resp:  [16-24] 18  BP: ()/(62-66) 117/62   Body mass index is 46.17 kg/m².    Physical Exam  Physical Exam  HENT:      Head: Atraumatic.      Mouth/Throat:      Mouth: Mucous membranes are moist.   Eyes:      Pupils: Pupils are equal, round, and reactive to light.   Cardiovascular:      Rate and Rhythm: Normal rate and regular rhythm.   Pulmonary:      Effort: Pulmonary effort is normal.      Breath sounds: Normal breath sounds.   Abdominal:      General: Bowel sounds are normal.      Palpations: Abdomen is soft.   Musculoskeletal:         General: Normal range of motion.      Cervical back: Neck supple.   Skin:     General: Skin is warm.   Neurological:      General: No focal deficit present.      Mental Status: She is alert and oriented to person, place, and time.   Psychiatric:         Mood and Affect: Mood normal.         Scheduled Meds   atorvastatin, 5 mg, Oral, Q PM  budesonide-formoterol, 1 puff, Inhalation, BID - RT  clopidogrel, 75 mg, Oral, Daily  Diclofenac Sodium, 4 g, Topical, 4x  Daily  enoxaparin, 1 mg/kg, Subcutaneous, Q12H  gabapentin, 800 mg, Oral, Daily  insulin lispro, 2-9 Units, Subcutaneous, 4x Daily AC & at Bedtime  losartan, 25 mg, Oral, Nightly  metoprolol tartrate, 25 mg, Oral, BID  oxyCODONE, 10 mg, Oral, Q4H  oxyCODONE, 20 mg, Oral, Q12H  sodium chloride, 10 mL, Intravenous, Q12H  venlafaxine XR, 37.5 mg, Oral, Daily With Breakfast       PRN Meds     acetaminophen **OR** acetaminophen **OR** acetaminophen    albuterol sulfate HFA    senna-docusate sodium **AND** polyethylene glycol **AND** bisacodyl **AND** bisacodyl    dextrose    dextrose    glucagon (human recombinant)    HYDROmorphone    hydrOXYzine    ipratropium-albuterol    meclizine    melatonin    ondansetron ODT **OR** ondansetron    Potassium Replacement - Follow Nurse / BPA Driven Protocol    prochlorperazine    sodium chloride    sodium chloride    sodium chloride   Infusions           Diagnostic Data    Results from last 7 days   Lab Units 08/25/24  0227 08/24/24  0336   WBC 10*3/mm3 15.96* 15.45*   HEMOGLOBIN g/dL 10.5* 10.5*   HEMATOCRIT % 32.4* 33.3*   PLATELETS 10*3/mm3 283 268   GLUCOSE mg/dL 114* 103*   CREATININE mg/dL 0.76 0.83   BUN mg/dL 7 8   SODIUM mmol/L 135* 139   POTASSIUM mmol/L 3.9 4.3   AST (SGOT) U/L  --  90*   ALT (SGPT) U/L  --  64*   ALK PHOS U/L  --  74   BILIRUBIN mg/dL  --  0.2   ANION GAP mmol/L 9.9 9.2       No radiology results for the last day      I reviewed the patient's new clinical results.    Assessment/Plan:     Active and Resolved Problems  #Acute cholecystitis status post CT-guided cholecystostomy drain placement-however the drain has already been removed  # Post cholecystectomy postop day 3  - PT/OT  - Incentive spirometry  - Back on her Plavix also on Lovenox full dose-in lieu of her home dose of Xarelto  -Will discuss with surgery if needs antibiotics    DVT  -Diagnosed with RLE DVT last week  -on Xarelto at home which is currently on hold  - on enoxaparin therapeutic dose,  will dc on xarelto       Fibromyalgia  Hypertension-on losartan metoprolol, monitor blood pressure.  Lupus      VTE Prophylaxis:  Pharmacologic VTE prophylaxis orders are present.         Code status is   Code Status and Medical Interventions: CPR (Attempt to Resuscitate); Full Support   Ordered at: 08/17/24 0507     Code Status (Patient has no pulse and is not breathing):    CPR (Attempt to Resuscitate)     Medical Interventions (Patient has pulse or is breathing):    Full Support       Plan for disposition: Home pending pain control and recommendations regarding the drain  time: 35  minutes    Signature: Electronically signed by Jane Monaco MD, 08/26/24, 09:46 EDT.  [facility] Hospitalist Team

## 2024-08-26 NOTE — CONSULTS
"Nutrition Services    Patient Name: Lisa Jay  YOB: 1970  MRN: [id number]  Admission date: 8/16/2024    NUTRITION SCREENING      Encounter Information: 8/26: Pt being assessed for LOS x 10 days. Pt admitted to [facility] with abdominal pain. Pt found to have acute cholecystitis. Pt s/p cholecystectomy tube and ultimately laparoscopic cholecystectomy.        PO Diet: Diet: Regular/House; Fluid Consistency: Thin (IDDSI 0)   PO Supplements: -   PO Intake:  >50%       Labs (reviewed below): Reviewed, management per attending         GI Function:  + BM on 8/22; prn miralax given 8/26, prn pericolace given 8/26       Skin: Intact        Weight Hx Review: ~2% wt loss x 8 months       Nutrition Intervention: Continue current diet and encourage good PO intake.        Results from last 7 days   Lab Units 08/25/24 0227 08/24/24 0336 08/23/24  0056   SODIUM mmol/L 135* 139 141   POTASSIUM mmol/L 3.9 4.3 4.0   CHLORIDE mmol/L 102 106 106   CO2 mmol/L 23.1 23.8 25.7   BUN mg/dL 7 8 10   CREATININE mg/dL 0.76 0.83 0.75   CALCIUM mg/dL 8.7 8.3* 8.9   BILIRUBIN mg/dL  --  0.2  --    ALK PHOS U/L  --  74  --    ALT (SGPT) U/L  --  64*  --    AST (SGOT) U/L  --  90*  --    GLUCOSE mg/dL 114* 103* 114*     Results from last 7 days   Lab Units 08/25/24 0227 08/24/24 0336 08/23/24  0056 08/21/24  2327   MAGNESIUM mg/dL  --   --  2.2 2.2   HEMOGLOBIN g/dL 10.5*   < > 10.8* 10.8*   HEMATOCRIT % 32.4*   < > 33.5* 35.4    < > = values in this interval not displayed.     No results found for: \"COVID19\"  Lab Results   Component Value Date    HGBA1C 5.9 (H) 07/10/2024       RD to follow up per protocol.    Electronically signed by:  Evie Bowman RD  08/26/24 12:20 EDT    "

## 2024-08-27 LAB
BASOPHILS # BLD AUTO: 0.07 10*3/MM3 (ref 0–0.2)
BASOPHILS NFR BLD AUTO: 0.5 % (ref 0–1.5)
DEPRECATED RDW RBC AUTO: 41.6 FL (ref 37–54)
EOSINOPHIL # BLD AUTO: 1.05 10*3/MM3 (ref 0–0.4)
EOSINOPHIL NFR BLD AUTO: 7.9 % (ref 0.3–6.2)
ERYTHROCYTE [DISTWIDTH] IN BLOOD BY AUTOMATED COUNT: 12.6 % (ref 12.3–15.4)
GLUCOSE BLDC GLUCOMTR-MCNC: 101 MG/DL (ref 70–105)
GLUCOSE BLDC GLUCOMTR-MCNC: 107 MG/DL (ref 70–105)
GLUCOSE BLDC GLUCOMTR-MCNC: 122 MG/DL (ref 70–105)
GLUCOSE BLDC GLUCOMTR-MCNC: 84 MG/DL (ref 70–105)
HCT VFR BLD AUTO: 28.8 % (ref 34–46.6)
HGB BLD-MCNC: 9.2 G/DL (ref 12–15.9)
IMM GRANULOCYTES # BLD AUTO: 0.17 10*3/MM3 (ref 0–0.05)
IMM GRANULOCYTES NFR BLD AUTO: 1.3 % (ref 0–0.5)
LAB AP CASE REPORT: NORMAL
LYMPHOCYTES # BLD AUTO: 2.78 10*3/MM3 (ref 0.7–3.1)
LYMPHOCYTES NFR BLD AUTO: 20.8 % (ref 19.6–45.3)
MCH RBC QN AUTO: 29.2 PG (ref 26.6–33)
MCHC RBC AUTO-ENTMCNC: 31.9 G/DL (ref 31.5–35.7)
MCV RBC AUTO: 91.4 FL (ref 79–97)
MONOCYTES # BLD AUTO: 1.29 10*3/MM3 (ref 0.1–0.9)
MONOCYTES NFR BLD AUTO: 9.7 % (ref 5–12)
NEUTROPHILS NFR BLD AUTO: 59.8 % (ref 42.7–76)
NEUTROPHILS NFR BLD AUTO: 7.99 10*3/MM3 (ref 1.7–7)
NRBC BLD AUTO-RTO: 0 /100 WBC (ref 0–0.2)
PATH REPORT.FINAL DX SPEC: NORMAL
PATH REPORT.GROSS SPEC: NORMAL
PLATELET # BLD AUTO: 300 10*3/MM3 (ref 140–450)
PMV BLD AUTO: 9.5 FL (ref 6–12)
RBC # BLD AUTO: 3.15 10*6/MM3 (ref 3.77–5.28)
WBC NRBC COR # BLD AUTO: 13.35 10*3/MM3 (ref 3.4–10.8)

## 2024-08-27 PROCEDURE — 97116 GAIT TRAINING THERAPY: CPT

## 2024-08-27 PROCEDURE — 97164 PT RE-EVAL EST PLAN CARE: CPT

## 2024-08-27 PROCEDURE — 25010000002 HYDROMORPHONE 1 MG/ML SOLUTION

## 2024-08-27 PROCEDURE — 82948 REAGENT STRIP/BLOOD GLUCOSE: CPT

## 2024-08-27 PROCEDURE — 82948 REAGENT STRIP/BLOOD GLUCOSE: CPT | Performed by: SURGERY

## 2024-08-27 PROCEDURE — 94799 UNLISTED PULMONARY SVC/PX: CPT

## 2024-08-27 PROCEDURE — 97535 SELF CARE MNGMENT TRAINING: CPT

## 2024-08-27 PROCEDURE — 97166 OT EVAL MOD COMPLEX 45 MIN: CPT

## 2024-08-27 PROCEDURE — 25010000002 ENOXAPARIN PER 10 MG: Performed by: STUDENT IN AN ORGANIZED HEALTH CARE EDUCATION/TRAINING PROGRAM

## 2024-08-27 PROCEDURE — 97112 NEUROMUSCULAR REEDUCATION: CPT

## 2024-08-27 PROCEDURE — 99024 POSTOP FOLLOW-UP VISIT: CPT | Performed by: SURGERY

## 2024-08-27 PROCEDURE — 85025 COMPLETE CBC W/AUTO DIFF WBC: CPT | Performed by: HOSPITALIST

## 2024-08-27 RX ORDER — METRONIDAZOLE 500 MG/1
500 TABLET ORAL 3 TIMES DAILY
Qty: 30 TABLET | Refills: 0 | Status: SHIPPED | OUTPATIENT
Start: 2024-08-27 | End: 2024-08-30

## 2024-08-27 RX ORDER — CEFDINIR 300 MG/1
300 CAPSULE ORAL EVERY 12 HOURS SCHEDULED
Status: DISCONTINUED | OUTPATIENT
Start: 2024-08-27 | End: 2024-08-28

## 2024-08-27 RX ORDER — METRONIDAZOLE 500 MG/1
500 TABLET ORAL EVERY 8 HOURS SCHEDULED
Status: DISCONTINUED | OUTPATIENT
Start: 2024-08-27 | End: 2024-08-28

## 2024-08-27 RX ORDER — HYDROXYCHLOROQUINE SULFATE 200 MG/1
400 TABLET, FILM COATED ORAL
Status: DISCONTINUED | OUTPATIENT
Start: 2024-08-27 | End: 2024-08-30 | Stop reason: HOSPADM

## 2024-08-27 RX ORDER — CEFDINIR 300 MG/1
300 CAPSULE ORAL 2 TIMES DAILY
Qty: 20 CAPSULE | Refills: 0 | Status: SHIPPED | OUTPATIENT
Start: 2024-08-27 | End: 2024-08-29 | Stop reason: HOSPADM

## 2024-08-27 RX ADMIN — DICLOFENAC SODIUM 4 G: 10 GEL TOPICAL at 07:24

## 2024-08-27 RX ADMIN — LOSARTAN POTASSIUM 25 MG: 25 TABLET, FILM COATED ORAL at 22:19

## 2024-08-27 RX ADMIN — HYDROMORPHONE HYDROCHLORIDE 1 MG: 1 INJECTION, SOLUTION INTRAMUSCULAR; INTRAVENOUS; SUBCUTANEOUS at 03:37

## 2024-08-27 RX ADMIN — CLOPIDOGREL BISULFATE 75 MG: 75 TABLET ORAL at 10:38

## 2024-08-27 RX ADMIN — VENLAFAXINE HYDROCHLORIDE 37.5 MG: 37.5 CAPSULE, EXTENDED RELEASE ORAL at 07:24

## 2024-08-27 RX ADMIN — OXYCODONE HYDROCHLORIDE 10 MG: 5 TABLET ORAL at 12:27

## 2024-08-27 RX ADMIN — ACETAMINOPHEN 650 MG: 325 TABLET, FILM COATED ORAL at 16:32

## 2024-08-27 RX ADMIN — ACETAMINOPHEN 650 MG: 325 TABLET, FILM COATED ORAL at 05:05

## 2024-08-27 RX ADMIN — OXYCODONE HYDROCHLORIDE 10 MG: 5 TABLET ORAL at 22:10

## 2024-08-27 RX ADMIN — OXYCODONE HYDROCHLORIDE 10 MG: 5 TABLET ORAL at 06:01

## 2024-08-27 RX ADMIN — BUDESONIDE AND FORMOTEROL FUMARATE DIHYDRATE 1 PUFF: 160; 4.5 AEROSOL RESPIRATORY (INHALATION) at 19:42

## 2024-08-27 RX ADMIN — OXYCODONE HYDROCHLORIDE 20 MG: 20 TABLET, FILM COATED, EXTENDED RELEASE ORAL at 22:11

## 2024-08-27 RX ADMIN — Medication 10 ML: at 22:12

## 2024-08-27 RX ADMIN — ATORVASTATIN CALCIUM 5 MG: 10 TABLET, FILM COATED ORAL at 16:32

## 2024-08-27 RX ADMIN — METRONIDAZOLE 500 MG: 500 TABLET ORAL at 15:02

## 2024-08-27 RX ADMIN — HYDROXYZINE HYDROCHLORIDE 25 MG: 25 TABLET ORAL at 15:26

## 2024-08-27 RX ADMIN — OXYCODONE HYDROCHLORIDE 10 MG: 5 TABLET ORAL at 15:02

## 2024-08-27 RX ADMIN — GABAPENTIN 800 MG: 400 CAPSULE ORAL at 10:38

## 2024-08-27 RX ADMIN — HYDROMORPHONE HYDROCHLORIDE 1 MG: 1 INJECTION, SOLUTION INTRAMUSCULAR; INTRAVENOUS; SUBCUTANEOUS at 07:47

## 2024-08-27 RX ADMIN — DICLOFENAC SODIUM 4 G: 10 GEL TOPICAL at 22:12

## 2024-08-27 RX ADMIN — HYDROXYZINE HYDROCHLORIDE 25 MG: 25 TABLET ORAL at 07:24

## 2024-08-27 RX ADMIN — METOPROLOL TARTRATE 25 MG: 25 TABLET, FILM COATED ORAL at 10:38

## 2024-08-27 RX ADMIN — Medication 10 ML: at 12:28

## 2024-08-27 RX ADMIN — DICLOFENAC SODIUM 4 G: 10 GEL TOPICAL at 12:27

## 2024-08-27 RX ADMIN — OXYCODONE HYDROCHLORIDE 10 MG: 5 TABLET ORAL at 17:51

## 2024-08-27 RX ADMIN — CEFDINIR 300 MG: 300 CAPSULE ORAL at 15:02

## 2024-08-27 RX ADMIN — ENOXAPARIN SODIUM 135 MG: 150 INJECTION SUBCUTANEOUS at 22:08

## 2024-08-27 RX ADMIN — ENOXAPARIN SODIUM 135 MG: 150 INJECTION SUBCUTANEOUS at 11:06

## 2024-08-27 RX ADMIN — METRONIDAZOLE 500 MG: 500 TABLET ORAL at 22:10

## 2024-08-27 RX ADMIN — OXYCODONE HYDROCHLORIDE 10 MG: 5 TABLET ORAL at 02:23

## 2024-08-27 RX ADMIN — OXYCODONE HYDROCHLORIDE 20 MG: 20 TABLET, FILM COATED, EXTENDED RELEASE ORAL at 10:38

## 2024-08-27 RX ADMIN — HYDROXYCHLOROQUINE SULFATE 400 MG: 200 TABLET ORAL at 10:38

## 2024-08-27 RX ADMIN — DICLOFENAC SODIUM 4 G: 10 GEL TOPICAL at 18:29

## 2024-08-27 RX ADMIN — HYDROXYZINE HYDROCHLORIDE 25 MG: 25 TABLET ORAL at 23:04

## 2024-08-27 RX ADMIN — SENNOSIDES AND DOCUSATE SODIUM 2 TABLET: 50; 8.6 TABLET ORAL at 07:24

## 2024-08-27 RX ADMIN — POLYETHYLENE GLYCOL 3350 17 G: 17 POWDER, FOR SOLUTION ORAL at 07:23

## 2024-08-27 NOTE — PROGRESS NOTES
GENERAL SURGERY PROGRESS NOTE  Chief Complaint:  Surgery Follow up   LOS: 10 days       Subjective     Interval History:     Overall feels much better today after having her Plaquenil restarted.  Has been passing gas, no BM yet. CT reviewed and discussed with her.    Objective     Vital Signs  Temp:  [98.6 °F (37 °C)] 98.6 °F (37 °C)  Heart Rate:  [77-91] 77  Resp:  [18-20] 20  BP: (101-146)/(55-98) 146/55    Physical Exam:   Abdomen softly distended.  Labs:  Lab Results (last 24 hours)       Procedure Component Value Units Date/Time    POC Glucose 4x Daily Before Meals & at Bedtime [[id number]]  (Abnormal) Collected: 08/27/24 0745    Specimen: Blood Updated: 08/27/24 0747     Glucose 122 mg/dL      Comment: Serial Number: [id number]:  [id number]       Comprehensive Metabolic Panel [[id number]] Updated: 08/27/24 0604    Specimen: Blood from Arm, Right     CBC & Differential [[id number]]  (Abnormal) Collected: 08/27/24 0530    Specimen: Blood from Arm, Right Updated: 08/27/24 0537    Narrative:      The following orders were created for panel order CBC & Differential.  Procedure                               Abnormality         Status                     ---------                               -----------         ------                     CBC Auto Differential[[id number]]        Abnormal            Final result                 Please view results for these tests on the individual orders.    CBC Auto Differential [[id number]]  (Abnormal) Collected: 08/27/24 0530    Specimen: Blood from Arm, Right Updated: 08/27/24 0537     WBC 13.35 10*3/mm3      RBC 3.15 10*6/mm3      Hemoglobin 9.2 g/dL      Hematocrit 28.8 %      MCV 91.4 fL      MCH 29.2 pg      MCHC 31.9 g/dL      RDW 12.6 %      RDW-SD 41.6 fl      MPV 9.5 fL      Platelets 300 10*3/mm3      Neutrophil % 59.8 %      Lymphocyte % 20.8 %      Monocyte % 9.7 %      Eosinophil % 7.9 %      Basophil % 0.5 %      Immature Grans % 1.3 %      Neutrophils, Absolute  7.99 10*3/mm3      Lymphocytes, Absolute 2.78 10*3/mm3      Monocytes, Absolute 1.29 10*3/mm3      Eosinophils, Absolute 1.05 10*3/mm3      Basophils, Absolute 0.07 10*3/mm3      Immature Grans, Absolute 0.17 10*3/mm3      nRBC 0.0 /100 WBC     POC Glucose Once [[id number]]  (Normal) Collected: 08/26/24 2218    Specimen: Blood Updated: 08/26/24 2221     Glucose 98 mg/dL      Comment: Serial Number: [id number]:  [id number]       POC Glucose Once [[id number]]  (Normal) Collected: 08/26/24 2046    Specimen: Blood Updated: 08/26/24 2047     Glucose 70 mg/dL      Comment: Serial Number: [id number]:  [id number]       POC Glucose Once [[id number]]  (Abnormal) Collected: 08/26/24 1719    Specimen: Blood Updated: 08/26/24 1721     Glucose 107 mg/dL      Comment: Serial Number: [id number]:  [id number]       POC Glucose Once [[id number]]  (Abnormal) Collected: 08/26/24 1122    Specimen: Blood Updated: 08/26/24 1124     Glucose 112 mg/dL      Comment: Serial Number: [id number]:  [id number]                Results Review:     Labs and imaging for today were reviewed.    Assessment & Plan     Lisa Jay is a 54 y.o. female who is s/p robotic cholecystectomy.      CT reviewed. Shows more or less post op changes with some debris in the GB fossa without definitive abscess or drainable fluid.   Additionally, her pain has substantially improved.  Continue bowel regimen.  Monitor Hgb.          This document has been electronically signed by Bryce Huizar MD on August 27, 2024 10:53 EDT        Bryce Huizar MD  08/27/24  10:53 EDT

## 2024-08-27 NOTE — THERAPY EVALUATION
Patient Name: Lisa Jay  : 1970    MRN: [id number]                              Today's Date: 2024       Admit Date: 2024    Visit Dx:     ICD-10-CM ICD-9-CM   1. Acute epigastric pain  R10.13 789.06     338.19   2. Acute cholecystitis  K81.0 575.0   3. Acute deep vein thrombosis (DVT) of proximal vein of right lower extremity  I82.4Y1 453.41     Patient Active Problem List   Diagnosis    Hypoglycemia    Asthma exacerbation    Lower leg DVT (deep venous thromboembolism), acute, right    Systemic lupus erythematosus    Fibromyalgia     Past Medical History:   Diagnosis Date    Asthma     Diabetes mellitus     DVT (deep venous thrombosis) 2024    right lower leg    Fibromyalgia     Lupus      Past Surgical History:   Procedure Laterality Date    APPENDECTOMY      CHOLECYSTECTOMY N/A 2024    Procedure: CHOLECYSTECTOMY LAPAROSCOPIC WITH [employer] ROBOT;  Surgeon: Bryce Huizar MD;  Location: [facility];  Service: Robotics - [employer];  Laterality: N/A;    HYSTERECTOMY      JOINT REPLACEMENT Right     KNEE SURGERY      SHOULDER ARTHROSCOPY        General Information       Row Name 24 1555          OT Time and Intention    Document Type evaluation  -SR     Mode of Treatment occupational therapy  -SR       Row Name 24 1555          Occupational Profile    Reason for Services/Referral (Occupational Profile) Pt is POD # 4 for laparoscopic cholecystectomy. See Initial PT eval for PLOF and medical hx. Pt with c/o pain and swelling in her legs.  At baseline, pt lives alone in [facility] that is handicap accessible with 0 AYDEE and she uses a rollator.  -SR       Row Name 24 1555          Living Environment    People in Home alone  -SR       Row Name 24 1555          Home Main Entrance    Number of Stairs, Main Entrance none  -SR       Row Name 24 1558          Cognition    Orientation Status (Cognition) oriented x 4  -SR       Row Name 24 5915           Safety Issues, Functional Mobility    Impairments Affecting Function (Mobility) endurance/activity tolerance;pain  -SR               User Key  (r) = Recorded By, (t) = Taken By, (c) = Cosigned By      Initials Name Provider Type    SR Maryana Castellanos OT Occupational Therapist                     Mobility/ADL's       Row Name 08/27/24 1556          Bed Mobility    Bed Mobility supine-sit  -SR     Supine-Sit [place] (Bed Mobility) moderate assist (50% patient effort)  -SR       Row Name 08/27/24 1556          Sit-Stand Transfer    Sit-Stand [place] (Transfers) contact guard;2 person assist  -SR     Assistive Device (Sit-Stand Transfers) walker, 4-wheeled  -SR       Row Name 08/27/24 1556          Functional Mobility    Functional Mobility- Ind. Level contact guard assist  -SR     Functional Mobility- Device walker, 4-wheeled  -SR       Row Name 08/27/24 1556          Activities of Daily Living    BADL Assessment/Intervention lower body dressing  -SR       Row Name 08/27/24 1556          Lower Body Dressing Assessment/Training    [place] Level (Lower Body Dressing) moderate assist (50% patient effort)  -SR     Comment, (Lower Body Dressing) Pt required assist for socks, though was able to don slip on shoes.  -SR               User Key  (r) = Recorded By, (t) = Taken By, (c) = Cosigned By      Initials Name Provider Type    SR Maryana Castellanos OT Occupational Therapist                   Obj/Interventions       Row Name 08/27/24 1601          Range of Motion Comprehensive    General Range of Motion no range of motion deficits identified  -SR       [facility] Name 08/27/24 1601          Strength Comprehensive (MMT)    Comment, General Manual Muscle Testing (MMT) Assessment BUE 4/5  -SR       Row Name 08/27/24 1601          Balance    Static Sitting Balance modified independence  -SR     Static Standing Balance contact guard  -SR     Position/Device Used, Standing Balance walker, 4-wheeled  -SR      Balance Interventions sitting;standing;sit to stand;supported;static;dynamic;minimal challenge  -SR               User Key  (r) = Recorded By, (t) = Taken By, (c) = Cosigned By      Initials Name Provider Type    Maryana Swann OT Occupational Therapist                   Goals/Plan       Row Name 08/27/24 1605          Bathing Goal 1 (OT)    Activity/Device (Bathing Goal 1, OT) bathing skills, all  -SR     [place] Level/Cues Needed (Bathing Goal 1, OT) minimum assist (75% or more patient effort)  -SR     Time Frame (Bathing Goal 1, OT) 2 weeks  -SR       Row Name 08/27/24 1605          Toileting Goal 1 (OT)    Activity/Device (Toileting Goal 1, OT) toileting skills, all  -SR     [place] Level/Cues Needed (Toileting Goal 1, OT) supervision required  -SR     Time Frame (Toileting Goal 1, OT) 2 weeks  -SR       Row Name 08/27/24 1605          Therapy Assessment/Plan (OT)    Planned Therapy Interventions (OT) activity tolerance training;BADL retraining;IADL retraining;functional balance retraining;neuromuscular control/coordination retraining;ROM/therapeutic exercise;transfer/mobility retraining;strengthening exercise;occupation/activity based interventions  -SR               User Key  (r) = Recorded By, (t) = Taken By, (c) = Cosigned By      Initials Name Provider Type    Maryana Swann OT Occupational Therapist                   Clinical Impression       Row Name 08/27/24 1603          Pain Assessment    Pretreatment Pain Rating 4/10  -SR     Posttreatment Pain Rating 4/10  -SR       Row Name 08/27/24 1603          Plan of Care Review    Outcome Evaluation Pt is POD # 4 for laparoscopic cholecystectomy. See Initial PT eval for PLOF and medical hx. Pt with c/o pain and swelling in her legs.  At baseline, pt lives alone in [facility] that is handicap accessible with 0 AYDEE and she uses a rollator.  She has caregiver at home to assist with bathing and other tasks.  This date pt reports pain in  LLE, though able to moiblize with S-CGA.  Completed standing grooming task with supervision.  Recommend return home with assist.  Will continue to follow to monitor progress.  -SR       Row Name 08/27/24 1603          Therapy Assessment/Plan (OT)    Rehab Potential (OT) good, to achieve stated therapy goals  -SR     Criteria for Skilled Therapeutic Interventions Met (OT) yes  -SR     Therapy Frequency (OT) 3 times/wk  -SR     Predicted Duration of Therapy Intervention (OT) Until discharge  -SR       Row Name 08/27/24 1603          Therapy Plan Review/Discharge Plan (OT)    Anticipated Discharge Disposition (OT) home with assist  -SR       Row Name 08/27/24 1603          Positioning and Restraints    Pre-Treatment Position in bed  -SR     Post Treatment Position chair  -SR     In Chair call light within reach;encouraged to call for assist;exit alarm on  -SR               User Key  (r) = Recorded By, (t) = Taken By, (c) = Cosigned By      Initials Name Provider Type    SR Maryana Castellanos, OT Occupational Therapist                   Outcome Measures       Row Name 08/27/24 1504 08/27/24 0747       How much help from another person do you currently need...    Turning from your back to your side while in flat bed without using bedrails? 3  -BR 4  -AS    Moving from lying on back to sitting on the side of a flat bed without bedrails? 2  -BR 3  -AS    Moving to and from a bed to a chair (including a wheelchair)? 3  -BR 3  -AS    Standing up from a chair using your arms (e.g., wheelchair, bedside chair)? 3  -BR 3  -AS    Climbing 3-5 steps with a railing? 2  -BR 3  -AS    To walk in hospital room? 3  -BR 3  -AS    AM-PAC 6 Clicks Score (PT) 16  -BR 19  -AS    Highest Level of Mobility Goal 5 --> Static standing  -BR 6 --> Walk 10 steps or more  -AS      Row Name 08/27/24 1504          Functional Assessment    Outcome Measure Options AM-PAC 6 Clicks Basic Mobility (PT)  -BR               User Key  (r) = Recorded By,  (t) = Taken By, (c) = Cosigned By      Initials Name Provider Type    AS Tiffanie Ashraf, RN Registered Nurse    Monica Tena PT Physical Therapist                    Occupational Therapy Education       Title: PT OT SLP Therapies (In Progress)       Topic: Occupational Therapy (In Progress)       Point: ADL training (In Progress)       Description:   Instruct learner(s) on proper safety adaptation and remediation techniques during self care or transfers.   Instruct in proper use of assistive devices.                  Learning Progress Summary             Patient Acceptance, E,TB, NR by  at 8/27/2024 1606                         Point: Home exercise program (Not Started)       Description:   Instruct learner(s) on appropriate technique for monitoring, assisting and/or progressing therapeutic exercises/activities.                  Learner Progress:  Not documented in this visit.              Point: Precautions (Not Started)       Description:   Instruct learner(s) on prescribed precautions during self-care and functional transfers.                  Learner Progress:  Not documented in this visit.              Point: Body mechanics (In Progress)       Description:   Instruct learner(s) on proper positioning and spine alignment during self-care, functional mobility activities and/or exercises.                  Learning Progress Summary             Patient Acceptance, E,TB, NR by SR at 8/27/2024 1606                                         User Key       Initials Effective Dates Name Provider Type Discipline     06/16/21 -  Maryana Castellanos OT Occupational Therapist OT                  OT Recommendation and Plan  Planned Therapy Interventions (OT): activity tolerance training, BADL retraining, IADL retraining, functional balance retraining, neuromuscular control/coordination retraining, ROM/therapeutic exercise, transfer/mobility retraining, strengthening exercise, occupation/activity based  interventions  Therapy Frequency (OT): 3 times/wk  Plan of Care Review  Outcome Evaluation: Pt is POD # 4 for laparoscopic cholecystectomy. See Initial PT eval for PLOF and medical hx. Pt with c/o pain and swelling in her legs.  At baseline, pt lives alone in [facility] that is handicap accessible with 0 AYDEE and she uses a rollator.  She has caregiver at home to assist with bathing and other tasks.  This date pt reports pain in LLE, though able to moiblize with S-CGA.  Completed standing grooming task with supervision.  Recommend return home with assist.  Will continue to follow to monitor progress.     Time Calculation:         Time Calculation- OT       Row Name 08/27/24 1606             Time Calculation- OT    OT Start Time 1300  -SR      OT Stop Time 1338  -SR      OT Time Calculation (min) 38 min  -SR      Total Timed Code Minutes- OT 10 minute(s)  -SR      OT Received On 08/27/24  -SR      OT - Next Appointment 08/29/24  -SR      OT Goal Re-Cert Due Date 09/10/24  -SR                User Key  (r) = Recorded By, (t) = Taken By, (c) = Cosigned By      Initials Name Provider Type    SR Maryana Castellanos OT Occupational Therapist                  Therapy Charges for Today       Code Description Service Date Service Provider Modifiers Qty    40126783771 HC OT EVAL MOD COMPLEXITY 4 8/27/2024 Maryana Castellanos OT GO 1    [id number]  OT SELF CARE/MGMT/TRAIN EA 15 MIN 8/27/2024 Maryana Castellanos OT GO 1                 Maryana Castellanso OT  8/27/2024

## 2024-08-27 NOTE — THERAPY RE-EVALUATION
Patient Name: Lisa Jay  : 1970    MRN: [id number]                              Today's Date: 2024       Admit Date: 2024    Visit Dx:     ICD-10-CM ICD-9-CM   1. Acute epigastric pain  R10.13 789.06     338.19   2. Acute cholecystitis  K81.0 575.0   3. Acute deep vein thrombosis (DVT) of proximal vein of right lower extremity  I82.4Y1 453.41     Patient Active Problem List   Diagnosis    Hypoglycemia    Asthma exacerbation    Lower leg DVT (deep venous thromboembolism), acute, right    Systemic lupus erythematosus    Fibromyalgia     Past Medical History:   Diagnosis Date    Asthma     Diabetes mellitus     DVT (deep venous thrombosis) 2024    right lower leg    Fibromyalgia     Lupus      Past Surgical History:   Procedure Laterality Date    APPENDECTOMY      CHOLECYSTECTOMY N/A 2024    Procedure: CHOLECYSTECTOMY LAPAROSCOPIC WITH [employer] ROBOT;  Surgeon: Bryce Huizar MD;  Location: [facility] OR;  Service: Robotics [employer];  Laterality: N/A;    HYSTERECTOMY      JOINT REPLACEMENT Right     KNEE SURGERY      SHOULDER ARTHROSCOPY        General Information       Row Name 24 1445          Physical Therapy Time and Intention    Document Type re-evaluation  -BR     Mode of Treatment physical therapy  -BR               User Key  (r) = Recorded By, (t) = Taken By, (c) = Cosigned By      Initials Name Provider Type    BR Monica Hernandez PT Physical Therapist                   Mobility       Row Name 24 1446          Bed Mobility    Bed Mobility supine-sit  -BR     Supine-Sit [place] (Bed Mobility) moderate assist (50% patient effort)  -BR     Assistive Device (Bed Mobility) head of bed elevated  -BR       Row Name 24 1446          Sit-Stand Transfer    Sit-Stand [place] (Transfers) contact guard;2 person assist  -BR       Row Name 24 1446          Gait/Stairs (Locomotion)    [place] Level (Gait) contact guard  -BR     Assistive  Device (Gait) walker, front-wheeled  -BR     Patient was able to Ambulate yes  -BR     Distance in Feet (Gait) 75  -BR     Deviations/Abnormal Patterns (Gait) stanley decreased;base of support, wide  -BR     Bilateral Gait Deviations heel strike decreased  -BR               User Key  (r) = Recorded By, (t) = Taken By, (c) = Cosigned By      Initials Name Provider Type    BR Monica Hernandez PT Physical Therapist                   Obj/Interventions       Row Name 08/27/24 1453          Range of Motion Comprehensive    General Range of Motion bilateral lower extremity ROM WFL  -BR       Row Name 08/27/24 1453          Strength Comprehensive (MMT)    Comment, General Manual Muscle Testing (MMT) Assessment BLE strength grossly 3+/5  -BR       Row Name 08/27/24 1453          Balance    Balance Assessment sitting static balance;standing static balance  -BR     Static Sitting Balance modified independence  -BR     Position, Sitting Balance unsupported;sitting edge of bed  -BR     Static Standing Balance contact guard  -BR     Position/Device Used, Standing Balance walker, 4-wheeled  -BR       Row Name 08/27/24 1453          Sensory Assessment (Somatosensory)    Sensory Assessment (Somatosensory) LE sensation intact  -BR               User Key  (r) = Recorded By, (t) = Taken By, (c) = Cosigned By      Initials Name Provider Type    BR Monica Hernandez PT Physical Therapist                   Goals/Plan       Row Name 08/27/24 1503          Bed Mobility Goal 1 (PT)    Activity/Assistive Device (Bed Mobility Goal 1, PT) bed mobility activities, all  -BR     [place] Level/Cues Needed (Bed Mobility Goal 1, PT) modified independence  -BR     Time Frame (Bed Mobility Goal 1, PT) long term goal (LTG);2 weeks  -BR       Row Name 08/27/24 1503          Transfer Goal 1 (PT)    Activity/Assistive Device (Transfer Goal 1, PT) transfers, all  -BR     [place] Level/Cues Needed (Transfer Goal 1, PT) modified independence  -BR      Time Frame (Transfer Goal 1, PT) long term goal (LTG);2 weeks  -BR       Row Name 08/27/24 1507          Gait Training Goal 1 (PT)    Activity/Assistive Device (Gait Training Goal 1, PT) gait (walking locomotion);assistive device use  -BR     [place] Level (Gait Training Goal 1, PT) standby assist  -BR     Distance (Gait Training Goal 1, PT) 100  -BR     Time Frame (Gait Training Goal 1, PT) long term goal (LTG);2 weeks  -BR       Row Name 08/27/24 1502          Therapy Assessment/Plan (PT)    Planned Therapy Interventions (PT) balance training;bed mobility training;gait training;strengthening;ROM (range of motion);patient/family education;transfer training  -BR               User Key  (r) = Recorded By, (t) = Taken By, (c) = Cosigned By      Initials Name Provider Type    BR Monica Hernandez, PT Physical Therapist                   Clinical Impression       Row Name 08/27/24 6649          Pain    Pretreatment Pain Rating 4/10  -BR     Posttreatment Pain Rating 4/10  -BR     Pain Location - Side/Orientation Left  -BR     Pain Location lower  -BR     Pain Location - extremity  -BR       Row Name 08/27/24 1503 08/27/24 1451       Plan of Care Review    Plan of Care Reviewed With -- patient;mother  -BR    Outcome Evaluation Physical Therapy Re-evaluation: Pt is POD # 4 for laparoscopic cholecystectomy. See Initial PT eval for PLOF and medical hx. Pt with c/o pain and swelling in her legs. Pt agreeable to getting out of bed and mobilizing. This date, pt requires mod assist for supine>sit, sit<>stand with CGA of 2 and pt ambulated 75 feet with rollator with CGA of 1 with slow stanley and wide base of support. PT goals were established. PT recommendation is Home with Assist.  -BR --      Row Name 08/27/24 0647          Therapy Assessment/Plan (PT)    Rehab Potential (PT) good, to achieve stated therapy goals  -BR     Criteria for Skilled Interventions Met (PT) yes;meets criteria  -BR     Therapy Frequency (PT) 3  times/wk  -BR     Predicted Duration of Therapy Intervention (PT) until D/C  -BR       Row Name 08/27/24 1455          Vital Signs    O2 Delivery Pre Treatment room air  -BR     Pre Patient Position Supine  -BR     Intra Patient Position Standing  -BR     Post Patient Position Sitting  -BR       Row Name 08/27/24 1455          Positioning and Restraints    Pre-Treatment Position in bed  -BR     Post Treatment Position chair  -BR     In Chair notified nsg;reclined;call light within reach;encouraged to call for assist;with other staff  -BR               User Key  (r) = Recorded By, (t) = Taken By, (c) = Cosigned By      Initials Name Provider Type    Monica Tena, PT Physical Therapist                   Outcome Measures       Row Name 08/27/24 1504 08/27/24 0747       How much help from another person do you currently need...    Turning from your back to your side while in flat bed without using bedrails? 3  -BR 4  -AS    Moving from lying on back to sitting on the side of a flat bed without bedrails? 2  -BR 3  -AS    Moving to and from a bed to a chair (including a wheelchair)? 3  -BR 3  -AS    Standing up from a chair using your arms (e.g., wheelchair, bedside chair)? 3  -BR 3  -AS    Climbing 3-5 steps with a railing? 2  -BR 3  -AS    To walk in hospital room? 3  -BR 3  -AS    AM-PAC 6 Clicks Score (PT) 16  -BR 19  -AS    Highest Level of Mobility Goal 5 --> Static standing  -BR 6 --> Walk 10 steps or more  -AS      Row Name 08/27/24 1504          Functional Assessment    Outcome Measure Options AM-PAC 6 Clicks Basic Mobility (PT)  -BR               User Key  (r) = Recorded By, (t) = Taken By, (c) = Cosigned By      Initials Name Provider Type    AS Tiffanie Ashraf RN Registered Nurse    Monica Tena, PT Physical Therapist                                 Physical Therapy Education       Title: PT OT SLP Therapies (Done)       Topic: Physical Therapy (Done)       Point: Mobility training  (Done)       Learning Progress Summary             Patient Acceptance, E,D, VU,DU by BR at 8/27/2024 1504    Acceptance, E,D, VU,DU by BR at 8/22/2024 1448   Family Acceptance, E,D, VU,DU by BR at 8/27/2024 1504                         Point: Body mechanics (Done)       Learning Progress Summary             Patient Acceptance, E,D, VU,DU by BR at 8/27/2024 1504    Acceptance, E,D, VU,DU by BR at 8/22/2024 1448   Family Acceptance, E,D, VU,DU by BR at 8/27/2024 1504                         Point: Precautions (Done)       Learning Progress Summary             Patient Acceptance, E,D, VU,DU by BR at 8/27/2024 1504    Acceptance, E,D, VU,DU by BR at 8/22/2024 1448   Family Acceptance, E,D, VU,DU by BR at 8/27/2024 1504                                         User Key       Initials Effective Dates Name Provider Type Discipline    BR 02/01/22 -  Monica Hernandez, PT Physical Therapist PT                  PT Recommendation and Plan  Planned Therapy Interventions (PT): balance training, bed mobility training, gait training, strengthening, ROM (range of motion), patient/family education, transfer training  Plan of Care Reviewed With: patient, mother  Outcome Evaluation: Physical Therapy Re-evaluation: Pt is POD # 4 for laparoscopic cholecystectomy. See Initial PT eval for PLOF and medical hx. Pt with c/o pain and swelling in her legs. Pt agreeable to getting out of bed and mobilizing. This date, pt requires mod assist for supine>sit, sit<>stand with CGA of 2 and pt ambulated 75 feet with rollator with CGA of 1 with slow stanley and wide base of support. PT goals were established. PT recommendation is Home with Assist.     Time Calculation:         PT Charges       Row Name 08/27/24 1505             Time Calculation    Start Time 1400  -BR      Stop Time 1438  -BR      Time Calculation (min) 38 min  -BR      PT Received On 08/27/24  -BR      PT - Next Appointment 08/28/24  -BR      PT Goal Re-Cert Due Date 09/10/24  -BR          Time Calculation- PT    Total Timed Code Minutes- PT 25 minute(s)  -BR                User Key  (r) = Recorded By, (t) = Taken By, (c) = Cosigned By      Initials Name Provider Type    Monica Tena, PT Physical Therapist                  Therapy Charges for Today       Code Description Service Date Service Provider Modifiers Qty    80004018336 HC PT RE-EVAL ESTABLISHED PLAN 2 8/27/2024 Monica Hernandez, PT GP 1    87475499307 HC GAIT TRAINING EA 15 MIN 8/27/2024 Monica Hernandez, PT GP 1    20585623793 HC PT NEUROMUSC RE EDUCATION EA 15 MIN 8/27/2024 Monica Hernandez, PT GP 1            PT G-Codes  Outcome Measure Options: AM-PAC 6 Clicks Basic Mobility (PT)  AM-PAC 6 Clicks Score (PT): 16  PT Discharge Summary  Anticipated Discharge Disposition (PT): home with assist    Monica Hernandez, MK  8/27/2024

## 2024-08-27 NOTE — PROGRESS NOTES
[facility] MEDICINE SERVICE  DAILY PROGRESS NOTE    NAME: Lisa Jay  : 1970  MRN: [id number]      LOS: 10 days     PROVIDER OF SERVICE: Jane Monaco MD    Chief Complaint: Acute cholecystitis  Lisa Jay is a 54 y.o. female with a previous medical history of Fibromyalgia, HTN, Lupus who presented to [facility] on 2024 with abdominal pain and dry heaves for the past 2 days.   Subjective:     Interval History:  History taken from: patient  Patient Complaints: abd pain,was tearful ,restarted full dose of Anticoagulation   Patient Denies: Any fever chills or rigors     patient seen and examined, she wants to continue her IV pain medications for another day.     patient seen and examined, explained to her that her IV medications have been stopped and she would be on p.o. medications.  She also has discharged orders and however has appealed her discharge.    Review of Systems:   Review of Systems  14 point review of system unremarkable except mentioned above  Objective:     Vital Signs  Temp:  [98.2 °F (36.8 °C)] 98.2 °F (36.8 °C)  Heart Rate:  [77-91] 77  Resp:  [14-20] 14  BP: (138-146)/(55-98) 146/55   Body mass index is 46.17 kg/m².    Physical Exam  Physical Exam  HENT:      Head: Atraumatic.      Mouth/Throat:      Mouth: Mucous membranes are moist.   Eyes:      Pupils: Pupils are equal, round, and reactive to light.   Cardiovascular:      Rate and Rhythm: Normal rate and regular rhythm.   Pulmonary:      Effort: Pulmonary effort is normal.      Breath sounds: Normal breath sounds.   Abdominal:      General: Bowel sounds are normal.      Palpations: Abdomen is soft.   Musculoskeletal:         General: Normal range of motion.      Cervical back: Neck supple.   Skin:     General: Skin is warm.   Neurological:      General: No focal deficit present.      Mental Status: She is alert and oriented to person, place, and time.   Psychiatric:         Mood and Affect: Mood  normal.         Scheduled Meds   atorvastatin, 5 mg, Oral, Q PM  budesonide-formoterol, 1 puff, Inhalation, BID - RT  cefdinir, 300 mg, Oral, Q12H  clopidogrel, 75 mg, Oral, Daily  Diclofenac Sodium, 4 g, Topical, 4x Daily  enoxaparin, 1 mg/kg, Subcutaneous, Q12H  gabapentin, 800 mg, Oral, Daily  hydroxychloroquine, 400 mg, Oral, Q24H  insulin lispro, 2-9 Units, Subcutaneous, 4x Daily AC & at Bedtime  losartan, 25 mg, Oral, Nightly  metoprolol tartrate, 25 mg, Oral, BID  metroNIDAZOLE, 500 mg, Oral, Q8H  oxyCODONE, 10 mg, Oral, Q4H  oxyCODONE, 20 mg, Oral, Q12H  sodium chloride, 10 mL, Intravenous, Q12H  venlafaxine XR, 37.5 mg, Oral, Daily With Breakfast       PRN Meds     acetaminophen **OR** acetaminophen **OR** acetaminophen    albuterol sulfate HFA    senna-docusate sodium **AND** polyethylene glycol **AND** bisacodyl **AND** bisacodyl    dextrose    dextrose    glucagon (human recombinant)    hydrOXYzine    ipratropium-albuterol    meclizine    melatonin    ondansetron ODT **OR** ondansetron    Potassium Replacement - Follow Nurse / BPA Driven Protocol    prochlorperazine    sodium chloride    sodium chloride    sodium chloride   Infusions           Diagnostic Data    Results from last 7 days   Lab Units 08/27/24  0530 08/25/24  0227 08/24/24  0336   WBC 10*3/mm3 13.35* 15.96* 15.45*   HEMOGLOBIN g/dL 9.2* 10.5* 10.5*   HEMATOCRIT % 28.8* 32.4* 33.3*   PLATELETS 10*3/mm3 300 283 268   GLUCOSE mg/dL  --  114* 103*   CREATININE mg/dL  --  0.76 0.83   BUN mg/dL  --  7 8   SODIUM mmol/L  --  135* 139   POTASSIUM mmol/L  --  3.9 4.3   AST (SGOT) U/L  --   --  90*   ALT (SGPT) U/L  --   --  64*   ALK PHOS U/L  --   --  74   BILIRUBIN mg/dL  --   --  0.2   ANION GAP mmol/L  --  9.9 9.2       CT Abdomen Pelvis With Contrast    Result Date: 8/26/2024  Impression: There is a heterogeneous fluid collection at the gallbladder fossa/cholecystectomy site. This contains multiple small locules of air/gas, and demonstrates  some thin peripheral enhancement. The appearance overall is nonspecific. Considerations include postoperative phlegmon/early abscess, evolving postoperative seroma or hematoma, or biloma. CT follow-up is recommended. Additional findings associated with recent laparoscopic cholecystectomy. There are couple small vague groundglass densities in the lingula which may reflect small infectious or inflammatory foci of uncertain clinical significance. Electronically Signed: Dov Davis MD  8/26/2024 11:37 PM EDT  Workstation ID: [id number]       I reviewed the patient's new clinical results.    Assessment/Plan:     Active and Resolved Problems  #Acute cholecystitis status post CT-guided cholecystostomy drain placement-however the drain has already been removed  # Post cholecystectomy postop day 3  - PT/OT  - Incentive spirometry  - Back on her Plavix also on Lovenox full dose-in lieu of her home dose of Xarelto  -Patient did complete a course of antibiotics however the CT scan was done and shows postop changes and some debris in the gallbladder fossa without definitive abscess or drainable fluid.  Discussed with surgery again and starting her on p.o. antibiotics for 10 days.      DVT  -Diagnosed with RLE DVT last week  -on Xarelto at home which is currently on hold  - on enoxaparin therapeutic dose, will dc on xarelto       Fibromyalgia  Hypertension-on losartan metoprolol, monitor blood pressure.  Lupus      VTE Prophylaxis:  Pharmacologic VTE prophylaxis orders are present.         Code status is   Code Status and Medical Interventions: CPR (Attempt to Resuscitate); Full Support   Ordered at: 08/17/24 0507     Code Status (Patient has no pulse and is not breathing):    CPR (Attempt to Resuscitate)     Medical Interventions (Patient has pulse or is breathing):    Full Support       Plan for disposition:   Patient has discharge orders and however appealed her discharge     time: 35  minutes    Signature: Electronically  signed by Jane Monaco MD, 08/27/24, 13:34 EDT.  [facility], [place], operated by [facility] Hospitalist Team

## 2024-08-27 NOTE — CASE MANAGEMENT/SOCIAL WORK
Continued Stay Note  [facility]     Patient Name: Lisa Jay  MRN: [id number]  Today's Date: 8/27/2024    Admit Date: 8/16/2024    Plan: Home. Sister can transport at discharge.   Discharge Plan       Row Name 08/27/24 1707       Plan    Plan Home. Sister can transport at discharge.    Plan Comments DC barriers: Patient did not feel she was ready to discharge and filed an appeal.  CM team notified.             Joanie Gruber RN    SIPS 1  [email or web address]  Office [phone]  Cell [phone]

## 2024-08-27 NOTE — PLAN OF CARE
Goal Outcome Evaluation:              Outcome Evaluation: Pt is POD # 4 for laparoscopic cholecystectomy. See Initial PT eval for PLOF and medical hx. Pt with c/o pain and swelling in her legs.  At baseline, pt lives alone in [facility] that is handicap accessible with 0 AYDEE and she uses a rollator.  She has caregiver at home to assist with bathing and other tasks.  This date pt reports pain in LLE, though able to moiblize with S-CGA.  Completed standing grooming task with supervision.  Recommend return home with assist.  Will continue to follow to monitor progress.      Anticipated Discharge Disposition (OT): home with assist

## 2024-08-27 NOTE — PLAN OF CARE
Goal Outcome Evaluation:                 Pt is resting abed at this time. Pt was up to shower this shift with linens changed. Pt continues to request pain medication often. No s/sx of any distress are noted at this this time. Pt is AOx4 and is able to make wants and needs known. Bed is in low position with call light in reach. Care plan is ongoing.

## 2024-08-27 NOTE — PLAN OF CARE
Goal Outcome Evaluation:  Plan of Care Reviewed With: patient, mother   Physical Therapy Re-evaluation: Pt is POD # 4 for laparoscopic cholecystectomy. See Initial PT eval for PLOF and medical hx. Pt with c/o pain and swelling in her legs. Pt agreeable to getting out of bed and mobilizing. This date, pt requires mod assist for supine>sit, sit<>stand with CGA of 2 and pt ambulated 75 feet with rollator with CGA of 1 with slow stanley and wide base of support. PT goals were established. PT recommendation is Home with Assist.       Anticipated Discharge Disposition (PT): home with assist

## 2024-08-28 ENCOUNTER — APPOINTMENT (OUTPATIENT)
Dept: CARDIOLOGY | Facility: HOSPITAL | Age: 54
DRG: 418 | End: 2024-08-28
Payer: MEDICARE

## 2024-08-28 LAB
ALBUMIN SERPL-MCNC: 3.3 G/DL (ref 3.5–5.2)
ALBUMIN/GLOB SERPL: 1 G/DL
ALP SERPL-CCNC: 81 U/L (ref 39–117)
ALT SERPL W P-5'-P-CCNC: 28 U/L (ref 1–33)
ANION GAP SERPL CALCULATED.3IONS-SCNC: 7.6 MMOL/L (ref 5–15)
AST SERPL-CCNC: 26 U/L (ref 1–32)
BASOPHILS # BLD AUTO: 0.06 10*3/MM3 (ref 0–0.2)
BASOPHILS NFR BLD AUTO: 0.6 % (ref 0–1.5)
BH CV LOWER VASCULAR LEFT COMMON FEMORAL AUGMENT: NORMAL
BH CV LOWER VASCULAR LEFT COMMON FEMORAL COMPETENT: NORMAL
BH CV LOWER VASCULAR LEFT COMMON FEMORAL COMPRESS: NORMAL
BH CV LOWER VASCULAR LEFT COMMON FEMORAL PHASIC: NORMAL
BH CV LOWER VASCULAR LEFT COMMON FEMORAL SPONT: NORMAL
BH CV LOWER VASCULAR LEFT DISTAL FEMORAL COMPRESS: NORMAL
BH CV LOWER VASCULAR LEFT GASTRONEMIUS COMPRESS: NORMAL
BH CV LOWER VASCULAR LEFT GREATER SAPH AK COMPRESS: NORMAL
BH CV LOWER VASCULAR LEFT GREATER SAPH BK COMPRESS: NORMAL
BH CV LOWER VASCULAR LEFT LESSER SAPH COMPRESS: NORMAL
BH CV LOWER VASCULAR LEFT MID FEMORAL AUGMENT: NORMAL
BH CV LOWER VASCULAR LEFT MID FEMORAL COMPETENT: NORMAL
BH CV LOWER VASCULAR LEFT MID FEMORAL COMPRESS: NORMAL
BH CV LOWER VASCULAR LEFT MID FEMORAL PHASIC: NORMAL
BH CV LOWER VASCULAR LEFT MID FEMORAL SPONT: NORMAL
BH CV LOWER VASCULAR LEFT PERONEAL COMPRESS: NORMAL
BH CV LOWER VASCULAR LEFT POPLITEAL AUGMENT: NORMAL
BH CV LOWER VASCULAR LEFT POPLITEAL COMPETENT: NORMAL
BH CV LOWER VASCULAR LEFT POPLITEAL COMPRESS: NORMAL
BH CV LOWER VASCULAR LEFT POPLITEAL PHASIC: NORMAL
BH CV LOWER VASCULAR LEFT POPLITEAL SPONT: NORMAL
BH CV LOWER VASCULAR LEFT POSTERIOR TIBIAL COMPRESS: NORMAL
BH CV LOWER VASCULAR LEFT PROXIMAL FEMORAL COMPRESS: NORMAL
BH CV LOWER VASCULAR LEFT SAPHENOFEMORAL JUNCTION COMPRESS: NORMAL
BH CV LOWER VASCULAR RIGHT COMMON FEMORAL AUGMENT: NORMAL
BH CV LOWER VASCULAR RIGHT COMMON FEMORAL COMPETENT: NORMAL
BH CV LOWER VASCULAR RIGHT COMMON FEMORAL COMPRESS: NORMAL
BH CV LOWER VASCULAR RIGHT COMMON FEMORAL PHASIC: NORMAL
BH CV LOWER VASCULAR RIGHT COMMON FEMORAL SPONT: NORMAL
BH CV LOWER VASCULAR RIGHT DISTAL FEMORAL COMPRESS: NORMAL
BH CV LOWER VASCULAR RIGHT GASTRONEMIUS COMPRESS: NORMAL
BH CV LOWER VASCULAR RIGHT GREATER SAPH AK COMPRESS: NORMAL
BH CV LOWER VASCULAR RIGHT GREATER SAPH BK COMPRESS: NORMAL
BH CV LOWER VASCULAR RIGHT LESSER SAPH COMPRESS: NORMAL
BH CV LOWER VASCULAR RIGHT MID FEMORAL AUGMENT: NORMAL
BH CV LOWER VASCULAR RIGHT MID FEMORAL COMPETENT: NORMAL
BH CV LOWER VASCULAR RIGHT MID FEMORAL COMPRESS: NORMAL
BH CV LOWER VASCULAR RIGHT MID FEMORAL PHASIC: NORMAL
BH CV LOWER VASCULAR RIGHT MID FEMORAL SPONT: NORMAL
BH CV LOWER VASCULAR RIGHT PERONEAL COMPRESS: NORMAL
BH CV LOWER VASCULAR RIGHT POPLITEAL AUGMENT: NORMAL
BH CV LOWER VASCULAR RIGHT POPLITEAL COMPETENT: NORMAL
BH CV LOWER VASCULAR RIGHT POPLITEAL COMPRESS: NORMAL
BH CV LOWER VASCULAR RIGHT POPLITEAL PHASIC: NORMAL
BH CV LOWER VASCULAR RIGHT POPLITEAL SPONT: NORMAL
BH CV LOWER VASCULAR RIGHT POSTERIOR TIBIAL COMPRESS: NORMAL
BH CV LOWER VASCULAR RIGHT PROXIMAL FEMORAL COMPRESS: NORMAL
BH CV LOWER VASCULAR RIGHT SAPHENOFEMORAL JUNCTION COMPRESS: NORMAL
BILIRUB SERPL-MCNC: 0.2 MG/DL (ref 0–1.2)
BUN SERPL-MCNC: 10 MG/DL (ref 6–20)
BUN/CREAT SERPL: 12.7 (ref 7–25)
CALCIUM SPEC-SCNC: 9 MG/DL (ref 8.6–10.5)
CHLORIDE SERPL-SCNC: 105 MMOL/L (ref 98–107)
CO2 SERPL-SCNC: 28.4 MMOL/L (ref 22–29)
CREAT SERPL-MCNC: 0.79 MG/DL (ref 0.57–1)
DEPRECATED RDW RBC AUTO: 43.1 FL (ref 37–54)
EGFRCR SERPLBLD CKD-EPI 2021: 87.9 ML/MIN/1.73
EOSINOPHIL # BLD AUTO: 1.12 10*3/MM3 (ref 0–0.4)
EOSINOPHIL NFR BLD AUTO: 10.3 % (ref 0.3–6.2)
ERYTHROCYTE [DISTWIDTH] IN BLOOD BY AUTOMATED COUNT: 12.7 % (ref 12.3–15.4)
GLOBULIN UR ELPH-MCNC: 3.2 GM/DL
GLUCOSE BLDC GLUCOMTR-MCNC: 105 MG/DL (ref 70–105)
GLUCOSE BLDC GLUCOMTR-MCNC: 106 MG/DL (ref 70–105)
GLUCOSE BLDC GLUCOMTR-MCNC: 90 MG/DL (ref 70–105)
GLUCOSE BLDC GLUCOMTR-MCNC: 99 MG/DL (ref 70–105)
GLUCOSE SERPL-MCNC: 105 MG/DL (ref 65–99)
HCT VFR BLD AUTO: 27.1 % (ref 34–46.6)
HGB BLD-MCNC: 8.5 G/DL (ref 12–15.9)
IMM GRANULOCYTES # BLD AUTO: 0.14 10*3/MM3 (ref 0–0.05)
IMM GRANULOCYTES NFR BLD AUTO: 1.3 % (ref 0–0.5)
LYMPHOCYTES # BLD AUTO: 1.99 10*3/MM3 (ref 0.7–3.1)
LYMPHOCYTES NFR BLD AUTO: 18.4 % (ref 19.6–45.3)
MCH RBC QN AUTO: 29 PG (ref 26.6–33)
MCHC RBC AUTO-ENTMCNC: 31.4 G/DL (ref 31.5–35.7)
MCV RBC AUTO: 92.5 FL (ref 79–97)
MONOCYTES # BLD AUTO: 0.9 10*3/MM3 (ref 0.1–0.9)
MONOCYTES NFR BLD AUTO: 8.3 % (ref 5–12)
NEUTROPHILS NFR BLD AUTO: 6.63 10*3/MM3 (ref 1.7–7)
NEUTROPHILS NFR BLD AUTO: 61.1 % (ref 42.7–76)
NRBC BLD AUTO-RTO: 0 /100 WBC (ref 0–0.2)
PLATELET # BLD AUTO: 286 10*3/MM3 (ref 140–450)
PMV BLD AUTO: 9.2 FL (ref 6–12)
POTASSIUM SERPL-SCNC: 4 MMOL/L (ref 3.5–5.2)
PROT SERPL-MCNC: 6.5 G/DL (ref 6–8.5)
RBC # BLD AUTO: 2.93 10*6/MM3 (ref 3.77–5.28)
SODIUM SERPL-SCNC: 141 MMOL/L (ref 136–145)
WBC NRBC COR # BLD AUTO: 10.84 10*3/MM3 (ref 3.4–10.8)

## 2024-08-28 PROCEDURE — 94761 N-INVAS EAR/PLS OXIMETRY MLT: CPT

## 2024-08-28 PROCEDURE — 25010000002 ENOXAPARIN PER 10 MG: Performed by: STUDENT IN AN ORGANIZED HEALTH CARE EDUCATION/TRAINING PROGRAM

## 2024-08-28 PROCEDURE — 94664 DEMO&/EVAL PT USE INHALER: CPT

## 2024-08-28 PROCEDURE — 99024 POSTOP FOLLOW-UP VISIT: CPT | Performed by: SURGERY

## 2024-08-28 PROCEDURE — 94799 UNLISTED PULMONARY SVC/PX: CPT

## 2024-08-28 PROCEDURE — 80053 COMPREHEN METABOLIC PANEL: CPT | Performed by: HOSPITALIST

## 2024-08-28 PROCEDURE — 82948 REAGENT STRIP/BLOOD GLUCOSE: CPT

## 2024-08-28 PROCEDURE — 93970 EXTREMITY STUDY: CPT | Performed by: SURGERY

## 2024-08-28 PROCEDURE — 97116 GAIT TRAINING THERAPY: CPT

## 2024-08-28 PROCEDURE — 93970 EXTREMITY STUDY: CPT

## 2024-08-28 PROCEDURE — 97110 THERAPEUTIC EXERCISES: CPT

## 2024-08-28 PROCEDURE — 85025 COMPLETE CBC W/AUTO DIFF WBC: CPT | Performed by: HOSPITALIST

## 2024-08-28 PROCEDURE — 97530 THERAPEUTIC ACTIVITIES: CPT

## 2024-08-28 PROCEDURE — 97535 SELF CARE MNGMENT TRAINING: CPT

## 2024-08-28 RX ORDER — CEFUROXIME AXETIL 500 MG/1
500 TABLET ORAL EVERY 12 HOURS SCHEDULED
Status: DISCONTINUED | OUTPATIENT
Start: 2024-08-28 | End: 2024-08-30 | Stop reason: HOSPADM

## 2024-08-28 RX ORDER — METRONIDAZOLE 500 MG/1
500 TABLET ORAL EVERY 8 HOURS SCHEDULED
Status: DISCONTINUED | OUTPATIENT
Start: 2024-08-28 | End: 2024-08-30 | Stop reason: HOSPADM

## 2024-08-28 RX ADMIN — LOSARTAN POTASSIUM 25 MG: 25 TABLET, FILM COATED ORAL at 21:28

## 2024-08-28 RX ADMIN — CEFDINIR 300 MG: 300 CAPSULE ORAL at 08:05

## 2024-08-28 RX ADMIN — BUDESONIDE AND FORMOTEROL FUMARATE DIHYDRATE 1 PUFF: 160; 4.5 AEROSOL RESPIRATORY (INHALATION) at 07:40

## 2024-08-28 RX ADMIN — OXYCODONE HYDROCHLORIDE 20 MG: 20 TABLET, FILM COATED, EXTENDED RELEASE ORAL at 21:28

## 2024-08-28 RX ADMIN — OXYCODONE HYDROCHLORIDE 10 MG: 5 TABLET ORAL at 14:16

## 2024-08-28 RX ADMIN — METRONIDAZOLE 500 MG: 500 TABLET ORAL at 13:32

## 2024-08-28 RX ADMIN — GABAPENTIN 800 MG: 400 CAPSULE ORAL at 08:05

## 2024-08-28 RX ADMIN — HYDROXYZINE HYDROCHLORIDE 25 MG: 25 TABLET ORAL at 21:28

## 2024-08-28 RX ADMIN — ACETAMINOPHEN 650 MG: 325 TABLET, FILM COATED ORAL at 04:32

## 2024-08-28 RX ADMIN — OXYCODONE HYDROCHLORIDE 20 MG: 20 TABLET, FILM COATED, EXTENDED RELEASE ORAL at 08:04

## 2024-08-28 RX ADMIN — BUDESONIDE AND FORMOTEROL FUMARATE DIHYDRATE 1 PUFF: 160; 4.5 AEROSOL RESPIRATORY (INHALATION) at 20:04

## 2024-08-28 RX ADMIN — METOPROLOL TARTRATE 25 MG: 25 TABLET, FILM COATED ORAL at 21:28

## 2024-08-28 RX ADMIN — Medication 10 ML: at 08:11

## 2024-08-28 RX ADMIN — HYDROXYZINE HYDROCHLORIDE 25 MG: 25 TABLET ORAL at 14:16

## 2024-08-28 RX ADMIN — ENOXAPARIN SODIUM 135 MG: 150 INJECTION SUBCUTANEOUS at 21:29

## 2024-08-28 RX ADMIN — OXYCODONE HYDROCHLORIDE 10 MG: 5 TABLET ORAL at 11:14

## 2024-08-28 RX ADMIN — DICLOFENAC SODIUM 4 G: 10 GEL TOPICAL at 11:23

## 2024-08-28 RX ADMIN — CEFUROXIME AXETIL 500 MG: 500 TABLET ORAL at 21:28

## 2024-08-28 RX ADMIN — OXYCODONE HYDROCHLORIDE 10 MG: 5 TABLET ORAL at 02:11

## 2024-08-28 RX ADMIN — DICLOFENAC SODIUM 4 G: 10 GEL TOPICAL at 21:29

## 2024-08-28 RX ADMIN — VENLAFAXINE HYDROCHLORIDE 37.5 MG: 37.5 CAPSULE, EXTENDED RELEASE ORAL at 08:05

## 2024-08-28 RX ADMIN — Medication 10 ML: at 21:30

## 2024-08-28 RX ADMIN — HYDROXYCHLOROQUINE SULFATE 400 MG: 200 TABLET ORAL at 08:05

## 2024-08-28 RX ADMIN — METRONIDAZOLE 500 MG: 500 TABLET ORAL at 21:28

## 2024-08-28 RX ADMIN — ACETAMINOPHEN 650 MG: 325 TABLET, FILM COATED ORAL at 19:27

## 2024-08-28 RX ADMIN — DICLOFENAC SODIUM 4 G: 10 GEL TOPICAL at 08:10

## 2024-08-28 RX ADMIN — CLOPIDOGREL BISULFATE 75 MG: 75 TABLET ORAL at 08:05

## 2024-08-28 RX ADMIN — METOPROLOL TARTRATE 25 MG: 25 TABLET, FILM COATED ORAL at 08:05

## 2024-08-28 RX ADMIN — DICLOFENAC SODIUM 4 G: 10 GEL TOPICAL at 17:58

## 2024-08-28 RX ADMIN — OXYCODONE HYDROCHLORIDE 10 MG: 5 TABLET ORAL at 06:17

## 2024-08-28 RX ADMIN — ATORVASTATIN CALCIUM 5 MG: 10 TABLET, FILM COATED ORAL at 17:57

## 2024-08-28 RX ADMIN — METRONIDAZOLE 500 MG: 500 TABLET ORAL at 06:17

## 2024-08-28 NOTE — PLAN OF CARE
Goal Outcome Evaluation:      Pt VSS, Pt has been painfull this shift, Pain treated. Notified Dr Jarrett Hospitalist , Pt has left thigh pain and is worried about a blood clot.  ordered ALDA Venous duplex lower extremity. Call light in reach plan on going.

## 2024-08-28 NOTE — PROGRESS NOTES
[facility] MEDICINE SERVICE  DAILY PROGRESS NOTE    NAME: Lisa Jay  : 1970  MRN: [id number]      LOS: 11 days     PROVIDER OF SERVICE: Jane Monaco MD    Chief Complaint: Acute cholecystitis  Lisa Jay is a 54 y.o. female with a previous medical history of Fibromyalgia, HTN, Lupus who presented to [facility] on 2024 with abdominal pain and dry heaves for the past 2 days.   Subjective:     Interval History:  History taken from: patient  Patient Complaints: abd pain,was tearful ,restarted full dose of Anticoagulation   Patient Denies: Any fever chills or rigors     patient seen and examined, she wants to continue her IV pain medications for another day.     patient seen and examined, explained to her that her IV medications have been stopped and she would be on p.o. medications.  She also has discharged orders and however has appealed her discharge.     patient seen and examined, she still complaining of pain in the left leg however DVT studies have been negative.    Review of Systems:   Review of Systems  14 point review of system unremarkable except mentioned above  Objective:     Vital Signs  Temp:  [98 °F (36.7 °C)-98.2 °F (36.8 °C)] 98 °F (36.7 °C)  Heart Rate:  [70-76] 72  Resp:  [10-18] 18  BP: (105-130)/(63-75) 106/63   Body mass index is 46.17 kg/m².    Physical Exam  Physical Exam  HENT:      Head: Atraumatic.      Mouth/Throat:      Mouth: Mucous membranes are moist.   Eyes:      Pupils: Pupils are equal, round, and reactive to light.   Cardiovascular:      Rate and Rhythm: Normal rate and regular rhythm.   Pulmonary:      Effort: Pulmonary effort is normal.      Breath sounds: Normal breath sounds.   Abdominal:      General: Bowel sounds are normal.      Palpations: Abdomen is soft.   Musculoskeletal:         General: Normal range of motion.      Cervical back: Neck supple.   Skin:     General: Skin is warm.   Neurological:      General: No focal deficit  present.      Mental Status: She is alert and oriented to person, place, and time.   Psychiatric:         Mood and Affect: Mood normal.         Scheduled Meds   atorvastatin, 5 mg, Oral, Q PM  budesonide-formoterol, 1 puff, Inhalation, BID - RT  cefdinir, 300 mg, Oral, Q12H  clopidogrel, 75 mg, Oral, Daily  Diclofenac Sodium, 4 g, Topical, 4x Daily  enoxaparin, 1 mg/kg, Subcutaneous, Q12H  gabapentin, 800 mg, Oral, Daily  hydroxychloroquine, 400 mg, Oral, Q24H  insulin lispro, 2-9 Units, Subcutaneous, 4x Daily AC & at Bedtime  losartan, 25 mg, Oral, Nightly  metoprolol tartrate, 25 mg, Oral, BID  metroNIDAZOLE, 500 mg, Oral, Q8H  oxyCODONE, 10 mg, Oral, Q4H  oxyCODONE, 20 mg, Oral, Q12H  sodium chloride, 10 mL, Intravenous, Q12H  venlafaxine XR, 37.5 mg, Oral, Daily With Breakfast       PRN Meds     acetaminophen **OR** acetaminophen **OR** acetaminophen    albuterol sulfate HFA    senna-docusate sodium **AND** polyethylene glycol **AND** bisacodyl **AND** bisacodyl    dextrose    dextrose    glucagon (human recombinant)    hydrOXYzine    ipratropium-albuterol    meclizine    melatonin    ondansetron ODT **OR** ondansetron    Potassium Replacement - Follow Nurse / BPA Driven Protocol    prochlorperazine    sodium chloride    sodium chloride    sodium chloride   Infusions           Diagnostic Data    Results from last 7 days   Lab Units 08/27/24  0530 08/25/24  0227 08/24/24  0336   WBC 10*3/mm3 13.35* 15.96* 15.45*   HEMOGLOBIN g/dL 9.2* 10.5* 10.5*   HEMATOCRIT % 28.8* 32.4* 33.3*   PLATELETS 10*3/mm3 300 283 268   GLUCOSE mg/dL  --  114* 103*   CREATININE mg/dL  --  0.76 0.83   BUN mg/dL  --  7 8   SODIUM mmol/L  --  135* 139   POTASSIUM mmol/L  --  3.9 4.3   AST (SGOT) U/L  --   --  90*   ALT (SGPT) U/L  --   --  64*   ALK PHOS U/L  --   --  74   BILIRUBIN mg/dL  --   --  0.2   ANION GAP mmol/L  --  9.9 9.2       CT Abdomen Pelvis With Contrast    Result Date: 8/26/2024  Impression: There is a heterogeneous fluid  collection at the gallbladder fossa/cholecystectomy site. This contains multiple small locules of air/gas, and demonstrates some thin peripheral enhancement. The appearance overall is nonspecific. Considerations include postoperative phlegmon/early abscess, evolving postoperative seroma or hematoma, or biloma. CT follow-up is recommended. Additional findings associated with recent laparoscopic cholecystectomy. There are couple small vague groundglass densities in the lingula which may reflect small infectious or inflammatory foci of uncertain clinical significance. Electronically Signed: Dov Davis MD  8/26/2024 11:37 PM EDT  Workstation ID: [id number]       I reviewed the patient's new clinical results.    Assessment/Plan:     Active and Resolved Problems  #Acute cholecystitis status post CT-guided cholecystostomy drain placement-however the drain has already been removed  # Post cholecystectomy postop day 3  - PT/OT  - Incentive spirometry  - Back on her Plavix also on Lovenox full dose-in lieu of her home dose of Xarelto  -Patient did complete a course of antibiotics however the CT scan was done and shows postop changes and some debris in the gallbladder fossa without definitive abscess or drainable fluid.  Discussed with surgery again and starting her on p.o. antibiotics for 10 days.      DVT  -Diagnosed with RLE DVT last week  -on Xarelto at home which is currently on hold  - on enoxaparin therapeutic dose, will dc on xarelto   -Pain again left lower extremity last night, bilateral venous Doppler studies have been ordered, negative      Fibromyalgia  Hypertension-on losartan metoprolol, monitor blood pressure.  Lupus      VTE Prophylaxis:  Pharmacologic VTE prophylaxis orders are present.         Code status is   Code Status and Medical Interventions: CPR (Attempt to Resuscitate); Full Support   Ordered at: 08/17/24 4602     Code Status (Patient has no pulse and is not breathing):    CPR (Attempt to  Resuscitate)     Medical Interventions (Patient has pulse or is breathing):    Full Support       Plan for disposition:   Pending venous bilateral lower extremity Doppler studies     time: 35  minutes    Signature: Electronically signed by Jane Monaoc MD, 08/28/24, 09:53 EDT.  [facility] Hospitalist Team

## 2024-08-28 NOTE — PLAN OF CARE
"Assessment: Lisa Jay presents with functional mobility impairments which indicate the need for skilled intervention. Tolerating session today without incident. Pt at supervision level for all functional mobility. Gait distance limited by abdominal pain and fatigue. Pt requires education on Rollator safety while ambulating, as she tends to rests forearms on rollator handles and does not have functioning rollator brakes. Recommending getting rollator brakes fixed as soon as able to due to safety concerns. Will continue to follow and progress as tolerated.     Plan/Recommendations:   If medically appropriate, Low Intensity Therapy recommended post-acute care - This is recommended as therapy feels this patient would require 2-3 visits per week. OP or HH would be the best option depending on patient's home bound status. Consider, if the patient has other  \"skilled\" needs such as wounds, IV antibiotics, etc. Combined with \"low intensity\" could also equate to a SNF. If patient is medically complex, consider LTAC. Pt requires no DME at discharge.     Pt desires Home with Home Health at discharge. Pt cooperative; agreeable to therapeutic recommendations and plan of care.     "

## 2024-08-28 NOTE — THERAPY TREATMENT NOTE
"Subjective: Upon arrival, pt in restroom with bed alarm going off. Pt states she was not able to wait any longer to use the restroom. Pt agreeable to therapeutic plan of care.    Objective:     Bed mobility - Supine to sitting SBA on flat bed.     Transfers - Supervision using rollator    ADLs - Toiletting distant supervision. Oral and facial care while standing at sink level Close supervision.     Ambulation - 10 feet + 100 feet Supervision using rollator. Multiple brief standing rest breaks due to fatigue. Pt educated on rollator safety, as she rests forearms on rollator handles and does not have functioning rollator brakes.     Therapeutic Exercise - 15 Reps B LE AROM unsupported sitting / EOB    Vitals: WNL    Pain: Pt did not rate intensity of abdominal pain    Education: Provided education on the importance of mobility in the acute care setting, Verbal/Tactile Cues, Transfer Training, and Gait Training    Assessment: Lisa Jay presents with functional mobility impairments which indicate the need for skilled intervention. Tolerating session today without incident. Pt at supervision level for all functional mobility. Gait distance limited by abdominal pain and fatigue. Pt requires education on Rollator safety while ambulating, as she tends to rests forearms on rollator handles and does not have functioning rollator brakes. Recommending getting rollator brakes fixed as soon as able to due to safety concerns. Will continue to follow and progress as tolerated.     Plan/Recommendations:   If medically appropriate, Low Intensity Therapy recommended post-acute care - This is recommended as therapy feels this patient would require 2-3 visits per week. OP or HH would be the best option depending on patient's home bound status. Consider, if the patient has other  \"skilled\" needs such as wounds, IV antibiotics, etc. Combined with \"low intensity\" could also equate to a SNF. If patient is medically complex, consider " LTAC. Pt requires no DME at discharge.     Pt desires Home with Home Health at discharge. Pt cooperative; agreeable to therapeutic recommendations and plan of care.         Basic Mobility 6-click:  Rollin = Total, A lot = 2, A little = 3; 4 = None  Supine>Sit:   1 = Total, A lot = 2, A little = 3; 4 = None   Sit>Stand with arms:  1 = Total, A lot = 2, A little = 3; 4 = None  Bed>Chair:   1 = Total, A lot = 2, A little = 3; 4 = None  Ambulate in room:  1 = Total, A lot = 2, A little = 3; 4 = None  3-5 Steps with railin = Total, A lot = 2, A little = 3; 4 = None  Score: 22    Modified [place]: N/A = No pre-op stroke/TIA    Post-Tx Position: Supine with HOB Elevated, Alarms activated, and Call light and personal items within reach  PPE: gloves

## 2024-08-28 NOTE — PROGRESS NOTES
GENERAL SURGERY PROGRESS NOTE  Chief Complaint:  Surgery Follow up   LOS: 11 days       Subjective     Interval History:     Complains of left leg pain and swelling.  Ultrasound done shows no evidence of DVT in bilateral lower extremities.  Otherwise, feels pretty good.    Objective     Vital Signs  Temp:  [98 °F (36.7 °C)-98.1 °F (36.7 °C)] 98 °F (36.7 °C)  Heart Rate:  [70-76] 72  Resp:  [10-18] 18  BP: (105-130)/(63-75) 106/63    Physical Exam:   Abdomen soft, incisions healing  Labs:  Lab Results (last 24 hours)       Procedure Component Value Units Date/Time    Comprehensive Metabolic Panel [[id number]]  (Abnormal) Collected: 08/28/24 1122    Specimen: Blood from Arm, Right Updated: 08/28/24 1150     Glucose 105 mg/dL      BUN 10 mg/dL      Creatinine 0.79 mg/dL      Sodium 141 mmol/L      Potassium 4.0 mmol/L      Chloride 105 mmol/L      CO2 28.4 mmol/L      Calcium 9.0 mg/dL      Total Protein 6.5 g/dL      Albumin 3.3 g/dL      ALT (SGPT) 28 U/L      AST (SGOT) 26 U/L      Alkaline Phosphatase 81 U/L      Total Bilirubin 0.2 mg/dL      Globulin 3.2 gm/dL      A/G Ratio 1.0 g/dL      BUN/Creatinine Ratio 12.7     Anion Gap 7.6 mmol/L      eGFR 87.9 mL/min/1.73     Narrative:      GFR Normal >60  Chronic Kidney Disease <60  Kidney Failure <15      CBC & Differential [[id number]]  (Abnormal) Collected: 08/28/24 1122    Specimen: Blood from Arm, Right Updated: 08/28/24 1136    Narrative:      The following orders were created for panel order CBC & Differential.  Procedure                               Abnormality         Status                     ---------                               -----------         ------                     CBC Auto Differential[[id number]]        Abnormal            Final result                 Please view results for these tests on the individual orders.    CBC Auto Differential [[id number]]  (Abnormal) Collected: 08/28/24 1122    Specimen: Blood from Arm, Right Updated: 08/28/24 1136      WBC 10.84 10*3/mm3      RBC 2.93 10*6/mm3      Hemoglobin 8.5 g/dL      Hematocrit 27.1 %      MCV 92.5 fL      MCH 29.0 pg      MCHC 31.4 g/dL      RDW 12.7 %      RDW-SD 43.1 fl      MPV 9.2 fL      Platelets 286 10*3/mm3      Neutrophil % 61.1 %      Lymphocyte % 18.4 %      Monocyte % 8.3 %      Eosinophil % 10.3 %      Basophil % 0.6 %      Immature Grans % 1.3 %      Neutrophils, Absolute 6.63 10*3/mm3      Lymphocytes, Absolute 1.99 10*3/mm3      Monocytes, Absolute 0.90 10*3/mm3      Eosinophils, Absolute 1.12 10*3/mm3      Basophils, Absolute 0.06 10*3/mm3      Immature Grans, Absolute 0.14 10*3/mm3      nRBC 0.0 /100 WBC     POC Glucose Once [[id number]]  (Normal) Collected: 08/28/24 1118    Specimen: Blood Updated: 08/28/24 1119     Glucose 90 mg/dL      Comment: Serial Number: [id number]:  [id number]       POC Glucose Once [[id number]]  (Normal) Collected: 08/28/24 0708    Specimen: Blood Updated: 08/28/24 0709     Glucose 105 mg/dL      Comment: Serial Number: [id number]:  [id number]       POC Glucose 4x Daily Before Meals & at Bedtime [[id number]]  (Normal) Collected: 08/27/24 2243    Specimen: Blood Updated: 08/27/24 2245     Glucose 101 mg/dL      Comment: Serial Number: [id number]:  [id number]       POC Glucose Once [[id number]]  (Normal) Collected: 08/27/24 1644    Specimen: Blood Updated: 08/27/24 1650     Glucose 84 mg/dL      Comment: Serial Number: [id number]:  [id number]       Tissue Pathology Exam [[id number]] Collected: 08/23/24 1222    Specimen: Tissue from Gallbladder Updated: 08/27/24 1303     Case Report --     Surgical Pathology Report                         Case: [id number]                                  Authorizing Provider:  Bryce Huizar,   Collected:           08/23/2024 12:22 PM                                 MD                                                                           Ordering Location:     [facility] MAIN  Received:             08/26/2024 07:50 AM                                 OR                                                                           Pathologist:           Bryce Mahan MD                                                             Specimen:    Gallbladder                                                                                 Final Diagnosis --     Gallbladder, cholecystectomy:    Acute cholecystitis with extensive mucosal erosions    Transmural inflammation and congestion    Cholelithiasis    No evidence of malignancy    JPR       Gross Description --     1. Gallbladder.  Received in formalin designated gallbladder is a gallbladder measuring tan by 4.6 x 2.8 cm.  The serosa is reddish-brown with areas of purulent exudate.  Within the container is an ovoid yellowish-brown calculus.  The gallbladder is opened revealing dark brown mucosa with areas of erosion but without polyps or masses.  Representative sections of cystic duct and gallbladder mucosa are submitted in 1 cassette.  SAMIRA                 Results Review:     Labs and imaging for today were reviewed.    Assessment & Plan     Lisa Jay is a 54 y.o. female who is status post robotic cholecystectomy      She is okay for discharge from my standpoint.  Discussed with hospitalist service yesterday discharge plans.  Can see me in 1 to 2 weeks in the office for follow-up.          This document has been electronically signed by Bryce Huizar MD on August 28, 2024 12:36 EDT        Bryce Huizar MD  08/28/24  12:36 EDT

## 2024-08-28 NOTE — PLAN OF CARE
Goal Outcome Evaluation:      Pt is able to make needs known. Pt reports pain that is not controlled with scheduled meds. Pt is very demanding and hard to work with. Pt appealed d/c

## 2024-08-28 NOTE — CASE MANAGEMENT/SOCIAL WORK
Patient has active discharge appeal with [facility] and will remain inhouse until determination has been received from [facility].      Records sent to [facility] as requested.  There is a copy of appeal records bundle in media if patient requests copy of records sent as per patient rights.    Also send DNOD to [employer] as requested, fax [phone].    Appeal information and confirmation of records upload follow.                    Layne Wu  Utilization Review Coordinator  [address]  [address]  Ph: [phone]  Fx: [phone]

## 2024-08-28 NOTE — CASE MANAGEMENT/SOCIAL WORK
Continued Stay Note  [facility]     Patient Name: Lisa Jay  MRN: [id number]  Today's Date: 8/28/2024    Admit Date: 8/16/2024    Plan: Home Sister can transport at discharge.  Appeal is process   Discharge Plan       Row Name 08/28/24 1515       Plan    Plan Home Sister can transport at discharge.  Appeal is process    Plan Comments DC barriers:  Appeal in process.   MD and nurses aware of appeal in process.               Joanie Gruber RN    SIPS 1  [email or web address]  Office [phone]  Cell [phone]

## 2024-08-29 ENCOUNTER — APPOINTMENT (OUTPATIENT)
Dept: MRI IMAGING | Facility: HOSPITAL | Age: 54
DRG: 418 | End: 2024-08-29
Payer: MEDICARE

## 2024-08-29 LAB
GLUCOSE BLDC GLUCOMTR-MCNC: 103 MG/DL (ref 70–105)
GLUCOSE BLDC GLUCOMTR-MCNC: 74 MG/DL (ref 70–105)
GLUCOSE BLDC GLUCOMTR-MCNC: 84 MG/DL (ref 70–105)
GLUCOSE BLDC GLUCOMTR-MCNC: 92 MG/DL (ref 70–105)

## 2024-08-29 PROCEDURE — 72158 MRI LUMBAR SPINE W/O & W/DYE: CPT

## 2024-08-29 PROCEDURE — 82948 REAGENT STRIP/BLOOD GLUCOSE: CPT

## 2024-08-29 PROCEDURE — 94799 UNLISTED PULMONARY SVC/PX: CPT

## 2024-08-29 PROCEDURE — 82948 REAGENT STRIP/BLOOD GLUCOSE: CPT | Performed by: SURGERY

## 2024-08-29 PROCEDURE — A9579 GAD-BASE MR CONTRAST NOS,1ML: HCPCS | Performed by: HOSPITALIST

## 2024-08-29 PROCEDURE — 25010000002 ENOXAPARIN PER 10 MG: Performed by: STUDENT IN AN ORGANIZED HEALTH CARE EDUCATION/TRAINING PROGRAM

## 2024-08-29 PROCEDURE — 25010000002 GADOTERIDOL PER 1 ML: Performed by: HOSPITALIST

## 2024-08-29 RX ORDER — OXYCODONE HCL 20 MG/1
20 TABLET, FILM COATED, EXTENDED RELEASE ORAL EVERY 12 HOURS SCHEDULED
Status: DISCONTINUED | OUTPATIENT
Start: 2024-08-29 | End: 2024-08-30 | Stop reason: HOSPADM

## 2024-08-29 RX ORDER — CEFUROXIME AXETIL 500 MG/1
500 TABLET ORAL EVERY 12 HOURS SCHEDULED
Qty: 16 TABLET | Refills: 0 | Status: SHIPPED | OUTPATIENT
Start: 2024-08-29 | End: 2024-08-30

## 2024-08-29 RX ORDER — OXYCODONE HYDROCHLORIDE 5 MG/1
15 TABLET ORAL ONCE
Status: COMPLETED | OUTPATIENT
Start: 2024-08-29 | End: 2024-08-29

## 2024-08-29 RX ORDER — OXYCODONE HYDROCHLORIDE 5 MG/1
10 TABLET ORAL ONCE
Status: COMPLETED | OUTPATIENT
Start: 2024-08-29 | End: 2024-08-29

## 2024-08-29 RX ADMIN — OXYCODONE HYDROCHLORIDE 20 MG: 20 TABLET, FILM COATED, EXTENDED RELEASE ORAL at 08:57

## 2024-08-29 RX ADMIN — OXYCODONE HYDROCHLORIDE 20 MG: 20 TABLET, FILM COATED, EXTENDED RELEASE ORAL at 21:46

## 2024-08-29 RX ADMIN — METRONIDAZOLE 500 MG: 500 TABLET ORAL at 04:59

## 2024-08-29 RX ADMIN — GADOTERIDOL 20 ML: 279.3 INJECTION, SOLUTION INTRAVENOUS at 18:00

## 2024-08-29 RX ADMIN — HYDROXYZINE HYDROCHLORIDE 25 MG: 25 TABLET ORAL at 21:59

## 2024-08-29 RX ADMIN — DICLOFENAC SODIUM 4 G: 10 GEL TOPICAL at 12:44

## 2024-08-29 RX ADMIN — METRONIDAZOLE 500 MG: 500 TABLET ORAL at 21:45

## 2024-08-29 RX ADMIN — CLOPIDOGREL BISULFATE 75 MG: 75 TABLET ORAL at 08:58

## 2024-08-29 RX ADMIN — Medication 10 ML: at 09:04

## 2024-08-29 RX ADMIN — HYDROXYCHLOROQUINE SULFATE 400 MG: 200 TABLET ORAL at 08:57

## 2024-08-29 RX ADMIN — Medication 10 ML: at 21:47

## 2024-08-29 RX ADMIN — METRONIDAZOLE 500 MG: 500 TABLET ORAL at 14:38

## 2024-08-29 RX ADMIN — OXYCODONE HYDROCHLORIDE 10 MG: 5 TABLET ORAL at 04:59

## 2024-08-29 RX ADMIN — VENLAFAXINE HYDROCHLORIDE 37.5 MG: 37.5 CAPSULE, EXTENDED RELEASE ORAL at 08:58

## 2024-08-29 RX ADMIN — DICLOFENAC SODIUM 4 G: 10 GEL TOPICAL at 08:58

## 2024-08-29 RX ADMIN — METOPROLOL TARTRATE 25 MG: 25 TABLET, FILM COATED ORAL at 08:58

## 2024-08-29 RX ADMIN — ENOXAPARIN SODIUM 135 MG: 150 INJECTION SUBCUTANEOUS at 08:57

## 2024-08-29 RX ADMIN — OXYCODONE HYDROCHLORIDE 15 MG: 5 TABLET ORAL at 19:34

## 2024-08-29 RX ADMIN — CEFUROXIME AXETIL 500 MG: 500 TABLET ORAL at 21:47

## 2024-08-29 RX ADMIN — METOPROLOL TARTRATE 25 MG: 25 TABLET, FILM COATED ORAL at 21:47

## 2024-08-29 RX ADMIN — LOSARTAN POTASSIUM 25 MG: 25 TABLET, FILM COATED ORAL at 21:46

## 2024-08-29 RX ADMIN — BUDESONIDE AND FORMOTEROL FUMARATE DIHYDRATE 1 PUFF: 160; 4.5 AEROSOL RESPIRATORY (INHALATION) at 18:44

## 2024-08-29 RX ADMIN — CEFUROXIME AXETIL 500 MG: 500 TABLET ORAL at 08:57

## 2024-08-29 RX ADMIN — DICLOFENAC SODIUM 4 G: 10 GEL TOPICAL at 21:45

## 2024-08-29 RX ADMIN — GABAPENTIN 800 MG: 400 CAPSULE ORAL at 08:57

## 2024-08-29 RX ADMIN — ENOXAPARIN SODIUM 135 MG: 150 INJECTION SUBCUTANEOUS at 21:45

## 2024-08-29 NOTE — PLAN OF CARE
Goal Outcome Evaluation:      Pt VSS, Pain treated, Pt is able to make needs known, Call light in reach, Plan on going

## 2024-08-29 NOTE — PLAN OF CARE
Goal Outcome Evaluation:         Pt is able to make needs know. Pt seeks out staff and is very demanding. Pt relays she is ready to be discharged today and that sister can pick her up but not until after 1800. Pt is getting an MRI prior to discharge. Pt complains of uncontrolled pain, provider notified.

## 2024-08-29 NOTE — SIGNIFICANT NOTE
08/29/24 1618   OTHER   Discipline physical therapy assistant   Rehab Time/Intention   Session Not Performed other (see comments)  (Pt discharge in progress.)   Therapy Assessment/Plan (PT)   Criteria for Skilled Interventions Met (PT) yes   Recommendation   PT - Next Appointment 08/30/24

## 2024-08-29 NOTE — DISCHARGE SUMMARY
[facility] Medicine Services  Discharge Summary    Date of Service: 2024  Patient Name: Lisa Jay  : 1970  MRN: [id number]    Date of Admission: 2024  Discharge Diagnosis: Cholecystitis postcholecystectomy  Date of Discharge: 2024  Primary Care Physician: Brian Gonzalez MD      Presenting Problem:   Acute cholecystitis [K81.0]  Acute epigastric pain [R10.13]  Acute deep vein thrombosis (DVT) of proximal vein of right lower extremity [I82.4Y1]    Active and Resolved Hospital Problems:  Active Hospital Problems    Diagnosis POA    Fibromyalgia [M79.7] Yes    Lower leg DVT (deep venous thromboembolism), acute, right [I82.4Z1] Yes    Systemic lupus erythematosus [M32.9] Yes      Resolved Hospital Problems    Diagnosis POA    **Acute cholecystitis [K81.0] Yes         Hospital Course         Hospital Course:  #Acute cholecystitis status post CT-guided cholecystostomy drain placement-however the drain has already been removed  # Post cholecystectomy  -Resume home dose of Xarelto upon discharge.  -Patient did complete a course of antibiotics however the CT scan was done and shows postop changes and some debris in the gallbladder fossa without definitive abscess or drainable fluid.  Discussed with surgery again and starting her on p.o. antibiotics for 10 days.  Antibiotic scripts have been provided        DVT  -Diagnosed with RLE DVT last week  -on Xarelto at home which is currently on hold  - on enoxaparin therapeutic dose, will dc on xarelto   -Pain again left lower extremity last night, bilateral venous Doppler studies have been ordered, negative  -Continue to have pain in the left lower extremity and also has complained of pain in the lower back, MRI of the lumbar spine completed and showed following-     Suggested degenerative changes in the lower lumbar spine.  2.There is some signal on the STIR sequence in the area of the facets at L4-5 and L5-S1 on the right with some  enhancement in the facet area on the right at L5-S1 that could be secondary to some inflammation.    -follow up with pcp outpatient         Fibromyalgia  Hypertension-on losartan metoprolol, continued upon discharge  Lupus           Day of Discharge     Vital Signs:  Temp:  [97.7 °F (36.5 °C)-98.8 °F (37.1 °C)] 98.8 °F (37.1 °C)  Heart Rate:  [70-71] 71  Resp:  [13-15] 14  BP: (114-117)/(69-73) 114/72    Physical Exam:  Physical Exam   Physical Exam  HENT:      Head: Atraumatic.      Mouth/Throat:      Mouth: Mucous membranes are moist.   Eyes:      Pupils: Pupils are equal, round, and reactive to light.   Cardiovascular:      Rate and Rhythm: Normal rate and regular rhythm.   Pulmonary:      Effort: Pulmonary effort is normal.      Breath sounds: Normal breath sounds.   Abdominal:      General: Bowel sounds are normal.      Palpations: Abdomen is soft.   Musculoskeletal:         General: Normal range of motion.      Cervical back: Neck supple.   Skin:     General: Skin is warm.   Neurological:      General: No focal deficit present.      Mental Status: She is alert and oriented to person, place, and time.   Psychiatric:         Mood and Affect: Mood normal.     Pertinent  and/or Most Recent Results     LAB RESULTS:      Lab 08/28/24  1122 08/27/24  0530 08/25/24 0227 08/24/24  0336   WBC 10.84* 13.35* 15.96* 15.45*   HEMOGLOBIN 8.5* 9.2* 10.5* 10.5*   HEMATOCRIT 27.1* 28.8* 32.4* 33.3*   PLATELETS 286 300 283 268   NEUTROS ABS 6.63 7.99* 10.39* 11.35*   IMMATURE GRANS (ABS) 0.14* 0.17* 0.21* 0.15*   LYMPHS ABS 1.99 2.78 2.67 2.34   MONOS ABS 0.90 1.29* 1.68* 1.16*   EOS ABS 1.12* 1.05* 0.94* 0.39   MCV 92.5 91.4 92.0 94.1         Lab 08/28/24  1122 08/25/24 0227 08/24/24  0336   SODIUM 141 135* 139   POTASSIUM 4.0 3.9 4.3   CHLORIDE 105 102 106   CO2 28.4 23.1 23.8   ANION GAP 7.6 9.9 9.2   BUN 10 7 8   CREATININE 0.79 0.76 0.83   EGFR 87.9 92.1 82.9   GLUCOSE 105* 114* 103*   CALCIUM 9.0 8.7 8.3*         Lab  08/28/24  1122 08/24/24  0336   TOTAL PROTEIN 6.5 5.9*   ALBUMIN 3.3* 3.2*   GLOBULIN 3.2 2.7   ALT (SGPT) 28 64*   AST (SGOT) 26 90*   BILIRUBIN 0.2 0.2   ALK PHOS 81 74                       Brief Urine Lab Results  (Last result in the past 365 days)        Color   Clarity   Blood   Leuk Est   Nitrite   Protein   CREAT   Urine HCG        08/16/24 2358 Yellow   Clear   Negative   Trace   Negative   30 mg/dL (1+)                 Microbiology Results (last 10 days)       ** No results found for the last 240 hours. **            MRI Lumbar Spine With & Without Contrast    Result Date: 8/29/2024  Impression: Impression: 1.Suggested degenerative changes in the lower lumbar spine. 2.There is some signal on the STIR sequence in the area of the facets at L4-5 and L5-S1 on the right with some enhancement in the facet area on the right at L5-S1 that could be secondary to some inflammation. Electronically Signed: Power Fiore MD  8/29/2024 6:29 PM EDT  Workstation ID: [id number]    CT Abdomen Pelvis With Contrast    Result Date: 8/26/2024  Impression: Impression: There is a heterogeneous fluid collection at the gallbladder fossa/cholecystectomy site. This contains multiple small locules of air/gas, and demonstrates some thin peripheral enhancement. The appearance overall is nonspecific. Considerations include postoperative phlegmon/early abscess, evolving postoperative seroma or hematoma, or biloma. CT follow-up is recommended. Additional findings associated with recent laparoscopic cholecystectomy. There are couple small vague groundglass densities in the lingula which may reflect small infectious or inflammatory foci of uncertain clinical significance. Electronically Signed: Dov Davis MD  8/26/2024 11:37 PM EDT  Workstation ID: [id number]    XR Chest 1 View    Result Date: 8/23/2024  Impression: Impression: No acute process. Electronically Signed: Abdulkadir Alonso MD  8/23/2024 7:08 AM EDT  Workstation ID: [id number]    CT  Percutaneous Cholecystostomy    Result Date: 8/18/2024  Impression: CT-guided cholecystostomy tube placement, as described in detail above. Tube will need to stay in place for at least 4 to 6 weeks, unless it is removed at the time of interval cholecystectomy. Electronically Signed: John Mendoza MD  8/18/2024 3:06 PM EDT  Workstation ID: [id number]    CT Abdomen Pelvis With Contrast    Result Date: 8/17/2024  Impression: Impression: 1.Findings compatible with acute cholecystitis. 2.Fibroid uterus. 3.Mild degenerative changes. Electronically Signed: Alfredo Rollins MD  8/17/2024 4:29 AM EDT  Workstation ID: [id number]    CT Lower Extremity Right Without Contrast    Result Date: 8/7/2024  Impression: Impression: No acute fracture or traumatic malalignment identified. Electronically Signed: Miguel Angel Virgen MD  8/7/2024 8:53 PM EDT  Workstation ID: [id number]    CT Pelvis Without Contrast    Result Date: 8/7/2024  Impression: Impression: No acute osseous abnormality. Electronically Signed: Quinn Dangelo  8/7/2024 8:48 PM EDT  Workstation ID: [id number]     Results for orders placed during the hospital encounter of 08/16/24    Duplex Venous Lower Extremity - Bilateral CAR    Interpretation Summary    Normal bilateral lower extremity venous duplex scan.      Results for orders placed during the hospital encounter of 08/16/24    Duplex Venous Lower Extremity - Bilateral CAR    Interpretation Summary    Normal bilateral lower extremity venous duplex scan.          Labs Pending at Discharge:      Procedures Performed  Procedure(s):  CHOLECYSTECTOMY LAPAROSCOPIC WITH DAVINCI ROBOT         Consults:   Consults       Date and Time Order Name Status Description    8/17/2024  5:10 AM Inpatient General Surgery Consult Completed               Discharge Details        Discharge Medications        New Medications        Instructions Start Date   cefuroxime 500 MG tablet  Commonly known as: CEFTIN   500 mg, Oral, Every 12 Hours Scheduled       metroNIDAZOLE 500 MG tablet  Commonly known as: Flagyl   500 mg, Oral, 3 Times Daily             Continue These Medications        Instructions Start Date   albuterol sulfate  (90 Base) MCG/ACT inhaler  Commonly known as: PROVENTIL HFA;VENTOLIN HFA;PROAIR HFA   1 puff, Inhalation, Every 4 Hours PRN      atorvastatin 10 MG tablet  Commonly known as: LIPITOR   5 mg, Oral, Every Evening      clopidogrel 75 MG tablet  Commonly known as: PLAVIX   75 mg, Oral, Daily      cyclobenzaprine 10 MG tablet  Commonly known as: FLEXERIL   10 mg, Oral, 3 Times Daily PRN      Diclofenac Sodium 1 % gel gel  Commonly known as: VOLTAREN   4 g, Topical, 4 Times Daily PRN      Fluticasone Furoate-Vilanterol 100-25 MCG/ACT aerosol powder   Commonly known as: Breo Ellipta   1 puff, Inhalation, Daily - RT      gabapentin 800 MG tablet  Commonly known as: NEURONTIN   800 mg, Oral, 3 Times Daily      hydroxychloroquine 200 MG tablet  Commonly known as: PLAQUENIL   400 mg, Oral, Every Morning      ipratropium-albuterol 0.5-2.5 mg/3 ml nebulizer  Commonly known as: DUO-NEB   3 mL, Nebulization, 4 Times Daily - RT      ketoconazole 2 % cream  Commonly known as: NIZORAL   1 Application, Topical, 2 Times Daily PRN      losartan 50 MG tablet  Commonly known as: COZAAR   25 mg, Oral, Nightly      metoprolol tartrate 25 MG tablet  Commonly known as: LOPRESSOR   25 mg, Oral, 2 Times Daily      ondansetron ODT 4 MG disintegrating tablet  Commonly known as: ZOFRAN-ODT   4 mg, Translingual, Every 6 Hours PRN      oxyCODONE 15 MG immediate release tablet  Commonly known as: ROXICODONE   15 mg, Oral, Every 4 Hours      triamcinolone 0.1 % cream  Commonly known as: KENALOG   1 Application, Topical, 2 Times Daily PRN      venlafaxine XR 37.5 MG 24 hr capsule  Commonly known as: EFFEXOR-XR   37.5 mg, Oral, Daily      Xarelto Starter Pack tablet therapy pack starter pack  Generic drug: Rivaroxaban   Take as directed             Stop These Medications       hydroCHLOROthiazide 25 MG tablet     Tirzepatide 5 MG/0.5ML solution pen-injector pen  Commonly known as: MOUNJARO              Allergies   Allergen Reactions    Morphine Unknown - High Severity     Pt sates only in Pill form.         Discharge Disposition:   Home or Self Care    Diet:  Hospital:  Diet Order   Procedures    Diet: Regular/House; Fluid Consistency: Thin (IDDSI 0)         Discharge Activity:   Activity Instructions    Ambulate as much as possible  No lifting greater than 10-15 pounds             CODE STATUS:  Code Status and Medical Interventions: CPR (Attempt to Resuscitate); Full Support   Ordered at: 08/17/24 0507     Code Status (Patient has no pulse and is not breathing):    CPR (Attempt to Resuscitate)     Medical Interventions (Patient has pulse or is breathing):    Full Support         Future Appointments   Date Time Provider Department Center   9/18/2024  1:30 PM Bryce Huizar MD [facility]       Additional Instructions for the Follow-ups that You Need to Schedule       Discharge Follow-up with PCP   As directed       Currently Documented PCP:    Brian Gonzalez MD    PCP Phone Number:    [phone]     Follow Up Details: follow up with pcp in 1-2 days                Time spent on Discharge including face to face service:  >30 minutes    Signature: Electronically signed by Jane Monaco MD, 08/30/24, 07:54 EDT.  [facility] Hospitalist Team

## 2024-08-29 NOTE — CASE MANAGEMENT/SOCIAL WORK
Case Management/Social Work    Patient Name:  Lisa Jay  YOB: 1970  MRN: [id number]  Admit Date:  8/16/2024        [facility] DISCHARGE APPEAL DETERMIANTION RECEIVED --    [facility] Agrees with termination of Hospital services  Beneficiary Liability Starts on 08/30/2024    CM made aware. Patient may now be discharged.           Electronically signed by:  Edy Calderon CM  08/29/24 11:46 EDT    Edy Calderon  Case Management Associate  [address] [address]  P: [phone]  F: [phone]

## 2024-08-30 ENCOUNTER — READMISSION MANAGEMENT (OUTPATIENT)
Dept: CALL CENTER | Facility: HOSPITAL | Age: 54
End: 2024-08-30
Payer: MEDICARE

## 2024-08-30 VITALS
DIASTOLIC BLOOD PRESSURE: 64 MMHG | SYSTOLIC BLOOD PRESSURE: 120 MMHG | HEIGHT: 68 IN | WEIGHT: 293 LBS | RESPIRATION RATE: 20 BRPM | OXYGEN SATURATION: 95 % | TEMPERATURE: 99.2 F | BODY MASS INDEX: 44.41 KG/M2 | HEART RATE: 65 BPM

## 2024-08-30 LAB
GLUCOSE BLDC GLUCOMTR-MCNC: 108 MG/DL (ref 70–105)
GLUCOSE BLDC GLUCOMTR-MCNC: 86 MG/DL (ref 70–105)
GLUCOSE BLDC GLUCOMTR-MCNC: 94 MG/DL (ref 70–105)

## 2024-08-30 PROCEDURE — 94799 UNLISTED PULMONARY SVC/PX: CPT

## 2024-08-30 PROCEDURE — 94761 N-INVAS EAR/PLS OXIMETRY MLT: CPT

## 2024-08-30 PROCEDURE — 82948 REAGENT STRIP/BLOOD GLUCOSE: CPT | Performed by: SURGERY

## 2024-08-30 PROCEDURE — 82948 REAGENT STRIP/BLOOD GLUCOSE: CPT

## 2024-08-30 PROCEDURE — 94664 DEMO&/EVAL PT USE INHALER: CPT

## 2024-08-30 PROCEDURE — 25010000002 ENOXAPARIN PER 10 MG: Performed by: STUDENT IN AN ORGANIZED HEALTH CARE EDUCATION/TRAINING PROGRAM

## 2024-08-30 RX ORDER — METRONIDAZOLE 500 MG/1
500 TABLET ORAL 3 TIMES DAILY
Qty: 24 TABLET | Refills: 0 | Status: SHIPPED | OUTPATIENT
Start: 2024-08-30 | End: 2024-09-07

## 2024-08-30 RX ORDER — CEFUROXIME AXETIL 500 MG/1
500 TABLET ORAL EVERY 12 HOURS SCHEDULED
Qty: 16 TABLET | Refills: 0 | Status: SHIPPED | OUTPATIENT
Start: 2024-08-30 | End: 2024-09-07

## 2024-08-30 RX ADMIN — METRONIDAZOLE 500 MG: 500 TABLET ORAL at 05:15

## 2024-08-30 RX ADMIN — ENOXAPARIN SODIUM 135 MG: 150 INJECTION SUBCUTANEOUS at 09:13

## 2024-08-30 RX ADMIN — GABAPENTIN 800 MG: 400 CAPSULE ORAL at 09:12

## 2024-08-30 RX ADMIN — CEFUROXIME AXETIL 500 MG: 500 TABLET ORAL at 09:13

## 2024-08-30 RX ADMIN — OXYCODONE HYDROCHLORIDE 20 MG: 20 TABLET, FILM COATED, EXTENDED RELEASE ORAL at 09:13

## 2024-08-30 RX ADMIN — DICLOFENAC SODIUM 4 G: 10 GEL TOPICAL at 17:42

## 2024-08-30 RX ADMIN — ATORVASTATIN CALCIUM 5 MG: 10 TABLET, FILM COATED ORAL at 17:42

## 2024-08-30 RX ADMIN — METOPROLOL TARTRATE 25 MG: 25 TABLET, FILM COATED ORAL at 09:13

## 2024-08-30 RX ADMIN — HYDROXYCHLOROQUINE SULFATE 400 MG: 200 TABLET ORAL at 09:13

## 2024-08-30 RX ADMIN — METRONIDAZOLE 500 MG: 500 TABLET ORAL at 13:07

## 2024-08-30 RX ADMIN — DICLOFENAC SODIUM 4 G: 10 GEL TOPICAL at 13:07

## 2024-08-30 RX ADMIN — BUDESONIDE AND FORMOTEROL FUMARATE DIHYDRATE 1 PUFF: 160; 4.5 AEROSOL RESPIRATORY (INHALATION) at 08:08

## 2024-08-30 RX ADMIN — CLOPIDOGREL BISULFATE 75 MG: 75 TABLET ORAL at 09:13

## 2024-08-30 RX ADMIN — Medication 10 ML: at 09:28

## 2024-08-30 RX ADMIN — BUDESONIDE AND FORMOTEROL FUMARATE DIHYDRATE 1 PUFF: 160; 4.5 AEROSOL RESPIRATORY (INHALATION) at 19:49

## 2024-08-30 RX ADMIN — ACETAMINOPHEN 650 MG: 325 TABLET, FILM COATED ORAL at 05:17

## 2024-08-30 RX ADMIN — VENLAFAXINE HYDROCHLORIDE 37.5 MG: 37.5 CAPSULE, EXTENDED RELEASE ORAL at 09:13

## 2024-08-30 RX ADMIN — DICLOFENAC SODIUM 4 G: 10 GEL TOPICAL at 09:13

## 2024-08-30 NOTE — PLAN OF CARE
Goal Outcome Evaluation:      Pt VSS, Pain treated with medication, Pt is able to make needs known. Pt was not able to discharge tonight. Pt was not able to get prescription medication in time of discharge. Plan is discharge 8/30/24 around 7pm when sister gets off work and get medications from our pharmacy. Call light in reach, Plan on going

## 2024-08-30 NOTE — SIGNIFICANT NOTE
08/30/24 1059   OTHER   Discipline occupational therapist   Rehab Time/Intention   Session Not Performed other (see comments)  (D/C note is signed this am. OT to FU at next date of service if pt remains admitted.)   Recommendation   OT - Next Appointment 09/03/24        no known mental health issues.

## 2024-08-30 NOTE — PLAN OF CARE
Problem: Fall Injury Risk  Goal: Absence of Fall and Fall-Related Injury  Outcome: Ongoing, Progressing     Problem: Pain (Surgery Nonspecified)  Goal: Acceptable Pain Control  Outcome: Ongoing, Progressing  Goal: Acceptable Pain Control  Outcome: Ongoing, Progressing   Goal Outcome Evaluation:

## 2024-08-30 NOTE — CASE MANAGEMENT/SOCIAL WORK
Continued Stay Note  [facility]     Patient Name: Lisa Jay  MRN: [id number]  Today's Date: 8/30/2024    Admit Date: 8/16/2024    Plan: Home. Sister can transport at discharge. Patient made aware that insurance /[employer] agree patient discharge ready .   Discharge Plan       Row Name 08/30/24 1101       Plan    Plan Comments CM contacted nursing re. when pt will be discharged.  Stated that pt's sister will be transporting but isn't avail until 1900 tonight. and pt refused to be transported any other way back home.                    Expected Discharge Date and Time       Expected Discharge Date Expected Discharge Time    Aug 29, 2024               Niurka Alaniz RN

## 2024-08-30 NOTE — CASE MANAGEMENT/SOCIAL WORK
Continued Stay Note   [facility]     Patient Name: Lisa aJy  MRN: [id number]  Today's Date: 8/29/2024    Admit Date: 8/16/2024    Plan: Home. Sister can transport at discharge.   Discharge Plan       Row Name 08/29/24 2000       Plan    Plan Home. Sister can transport at discharge.Patient made aware that insurance /[employer] agree patient discharge ready .    Plan Comments Per appeal process. [employer] agrees that patient is appropriate to discharge.   Family will transport                  Expected Discharge Date and Time       Expected Discharge Date Expected Discharge Time    Aug 29, 2024           Joanie Gruber RN    SIPS 1  [email or web address]  Office [phone]  Cell [phone]

## 2024-08-30 NOTE — PROGRESS NOTES
[facility] MEDICINE SERVICE  DAILY PROGRESS NOTE    NAME: Lisa Jay  : 1970  MRN: [id number]      LOS: 13 days     PROVIDER OF SERVICE: Jane Monaco MD    Chief Complaint: Acute cholecystitis  Lisa Jay is a 54 y.o. female with a previous medical history of Fibromyalgia, HTN, Lupus who presented to [facility] on 2024 with abdominal pain and dry heaves for the past 2 days.   Subjective:     Interval History:  History taken from: patient  Patient Complaints: abd pain,was tearful ,restarted full dose of Anticoagulation   Patient Denies: Any fever chills or rigors     patient seen and examined, she wants to continue her IV pain medications for another day.     patient seen and examined, explained to her that her IV medications have been stopped and she would be on p.o. medications.  She also has discharged orders and however has appealed her discharge.     patient seen and examined, she still complaining of pain in the left leg however DVT studies have been negative.     pt now asking for back mri    Review of Systems:   Review of Systems  14 point review of system unremarkable except mentioned above  Objective:     Vital Signs  Temp:  [97.7 °F (36.5 °C)-98.8 °F (37.1 °C)] 98.8 °F (37.1 °C)  Heart Rate:  [70-71] 71  Resp:  [13-15] 14  BP: (114-117)/(69-73) 114/72   Body mass index is 46.17 kg/m².    Physical Exam  Physical Exam  HENT:      Head: Atraumatic.      Mouth/Throat:      Mouth: Mucous membranes are moist.   Eyes:      Pupils: Pupils are equal, round, and reactive to light.   Cardiovascular:      Rate and Rhythm: Normal rate and regular rhythm.   Pulmonary:      Effort: Pulmonary effort is normal.      Breath sounds: Normal breath sounds.   Abdominal:      General: Bowel sounds are normal.      Palpations: Abdomen is soft.   Musculoskeletal:         General: Normal range of motion.      Cervical back: Neck supple.   Skin:     General: Skin is warm.    Neurological:      General: No focal deficit present.      Mental Status: She is alert and oriented to person, place, and time.   Psychiatric:         Mood and Affect: Mood normal.         Scheduled Meds   atorvastatin, 5 mg, Oral, Q PM  budesonide-formoterol, 1 puff, Inhalation, BID - RT  cefuroxime, 500 mg, Oral, Q12H  clopidogrel, 75 mg, Oral, Daily  Diclofenac Sodium, 4 g, Topical, 4x Daily  enoxaparin, 1 mg/kg, Subcutaneous, Q12H  gabapentin, 800 mg, Oral, Daily  hydroxychloroquine, 400 mg, Oral, Q24H  insulin lispro, 2-9 Units, Subcutaneous, 4x Daily AC & at Bedtime  losartan, 25 mg, Oral, Nightly  metoprolol tartrate, 25 mg, Oral, BID  metroNIDAZOLE, 500 mg, Oral, Q8H  oxyCODONE, 20 mg, Oral, Q12H  sodium chloride, 10 mL, Intravenous, Q12H  venlafaxine XR, 37.5 mg, Oral, Daily With Breakfast       PRN Meds     acetaminophen **OR** acetaminophen **OR** acetaminophen    albuterol sulfate HFA    senna-docusate sodium **AND** polyethylene glycol **AND** bisacodyl **AND** bisacodyl    dextrose    dextrose    glucagon (human recombinant)    hydrOXYzine    ipratropium-albuterol    meclizine    melatonin    ondansetron ODT **OR** ondansetron    Potassium Replacement - Follow Nurse / BPA Driven Protocol    prochlorperazine    sodium chloride    sodium chloride    sodium chloride   Infusions           Diagnostic Data    Results from last 7 days   Lab Units 08/28/24  1122   WBC 10*3/mm3 10.84*   HEMOGLOBIN g/dL 8.5*   HEMATOCRIT % 27.1*   PLATELETS 10*3/mm3 286   GLUCOSE mg/dL 105*   CREATININE mg/dL 0.79   BUN mg/dL 10   SODIUM mmol/L 141   POTASSIUM mmol/L 4.0   AST (SGOT) U/L 26   ALT (SGPT) U/L 28   ALK PHOS U/L 81   BILIRUBIN mg/dL 0.2   ANION GAP mmol/L 7.6       MRI Lumbar Spine With & Without Contrast    Result Date: 8/29/2024  Impression: 1.Suggested degenerative changes in the lower lumbar spine. 2.There is some signal on the STIR sequence in the area of the facets at L4-5 and L5-S1 on the right with some  enhancement in the facet area on the right at L5-S1 that could be secondary to some inflammation. Electronically Signed: Power Fiore MD  8/29/2024 6:29 PM EDT  Workstation ID: [id number]       I reviewed the patient's new clinical results.    Assessment/Plan:     Active and Resolved Problems  #Acute cholecystitis status post CT-guided cholecystostomy drain placement-however the drain has already been removed  # Post cholecystectomy postop day 3  - PT/OT  - Incentive spirometry  - Back on her Plavix also on Lovenox full dose-in lieu of her home dose of Xarelto  -Patient did complete a course of antibiotics however the CT scan was done and shows postop changes and some debris in the gallbladder fossa without definitive abscess or drainable fluid.  Discussed with surgery again and starting her on p.o. antibiotics for 10 days.      DVT  -Diagnosed with RLE DVT last week  -on Xarelto at home which is currently on hold  - on enoxaparin therapeutic dose, will dc on xarelto   -Pain again left lower extremity last night, bilateral venous Doppler studies have been ordered, negative-now asking for back mri since c/o pain lower back      Fibromyalgia  Hypertension-on losartan metoprolol, monitor blood pressure.  Lupus      VTE Prophylaxis:  Pharmacologic VTE prophylaxis orders are present.         Code status is   Code Status and Medical Interventions: CPR (Attempt to Resuscitate); Full Support   Ordered at: 08/17/24 0507     Code Status (Patient has no pulse and is not breathing):    CPR (Attempt to Resuscitate)     Medical Interventions (Patient has pulse or is breathing):    Full Support       Plan for disposition:   Pending venous bilateral lower extremity Doppler studies     time: 35  minutes    Signature: Electronically signed by Jane Monaco MD,  [facility] Hospitalist Team

## 2024-08-30 NOTE — PLAN OF CARE
Goal Outcome Evaluation:         Pt is able to make needs known. Pt seeks out staff and very demanding. Pt reports she is ready to be discharged today. Meds to beds is initiated and sister will transport tonight at 1900.

## 2024-08-31 NOTE — CASE MANAGEMENT/SOCIAL WORK
Case Management Discharge Note      Final Note: home    Provided Post Acute Provider List?: N/A  N/A Provider List Comment: denies dc needs        Transportation Services  Private: Car    Final Discharge Disposition Code: 01 - home or self-care

## 2024-08-31 NOTE — OUTREACH NOTE
Prep Survey      Flowsheet Row Responses   [facility] facility patient discharged from? [facility]   Is LACE score < 7 ? No   Eligibility Readm Mgmt   Discharge diagnosis Lap cholecystectomy   Does the patient have one of the following disease processes/diagnoses(primary or secondary)? General Surgery   Does the patient have Home health ordered? No   Is there a DME ordered? No   Prep survey completed? Yes            MIKA MIRANDA - Registered Nurse

## 2024-09-03 ENCOUNTER — TELEPHONE (OUTPATIENT)
Dept: SURGERY | Facility: CLINIC | Age: 54
End: 2024-09-03
Payer: MEDICARE

## 2024-09-03 ENCOUNTER — READMISSION MANAGEMENT (OUTPATIENT)
Dept: CALL CENTER | Facility: HOSPITAL | Age: 54
End: 2024-09-03
Payer: MEDICARE

## 2024-09-03 NOTE — TELEPHONE ENCOUNTER
I called Lisa Jay to check on them post operatively. Couldn't reach patient but did talk to sister. We discussed instructions and post op office visit. Encouraged to call the office with any other questions.

## 2024-09-03 NOTE — OUTREACH NOTE
General Surgery Week 1 Survey      Flowsheet Row Responses   [facility] facility patient discharged from? [facility]   Does the patient have one of the following disease processes/diagnoses(primary or secondary)? General Surgery   Week 1 attempt successful? No   Unsuccessful attempts Attempt 1  [All numbers listed were attempted-no answer]            Juju H - Registered Nurse

## 2024-09-09 ENCOUNTER — TELEPHONE (OUTPATIENT)
Dept: SURGERY | Facility: CLINIC | Age: 54
End: 2024-09-09
Payer: MEDICARE

## 2024-09-13 ENCOUNTER — TELEPHONE (OUTPATIENT)
Dept: SURGERY | Facility: CLINIC | Age: 54
End: 2024-09-13
Payer: MEDICARE

## 2024-09-13 NOTE — TELEPHONE ENCOUNTER
Patient's daughter called stating patient has a large lump around main incision. Saw PCP today and was told most likely a seroma. PCP wanted patient to call to make you aware. No fever. She did report nausea..  Dr. Huizar informed.

## 2024-09-17 ENCOUNTER — READMISSION MANAGEMENT (OUTPATIENT)
Dept: CALL CENTER | Facility: HOSPITAL | Age: 54
End: 2024-09-17
Payer: MEDICARE

## 2024-09-18 ENCOUNTER — OFFICE VISIT (OUTPATIENT)
Dept: SURGERY | Facility: CLINIC | Age: 54
End: 2024-09-18
Payer: MEDICARE

## 2024-09-18 ENCOUNTER — APPOINTMENT (OUTPATIENT)
Dept: CT IMAGING | Facility: HOSPITAL | Age: 54
DRG: 203 | End: 2024-09-18
Payer: MEDICARE

## 2024-09-18 ENCOUNTER — HOSPITAL ENCOUNTER (INPATIENT)
Facility: HOSPITAL | Age: 54
LOS: 1 days | Discharge: HOME | DRG: 203 | End: 2024-09-20
Attending: EMERGENCY MEDICINE | Admitting: INTERNAL MEDICINE
Payer: MEDICARE

## 2024-09-18 ENCOUNTER — READMISSION MANAGEMENT (OUTPATIENT)
Dept: CALL CENTER | Facility: HOSPITAL | Age: 54
End: 2024-09-18
Payer: MEDICARE

## 2024-09-18 ENCOUNTER — APPOINTMENT (OUTPATIENT)
Dept: GENERAL RADIOLOGY | Facility: HOSPITAL | Age: 54
DRG: 203 | End: 2024-09-18
Payer: MEDICARE

## 2024-09-18 VITALS
WEIGHT: 255 LBS | TEMPERATURE: 97.5 F | DIASTOLIC BLOOD PRESSURE: 83 MMHG | BODY MASS INDEX: 40.02 KG/M2 | HEIGHT: 67 IN | OXYGEN SATURATION: 100 % | SYSTOLIC BLOOD PRESSURE: 120 MMHG | HEART RATE: 87 BPM

## 2024-09-18 DIAGNOSIS — R51.9 ACUTE NONINTRACTABLE HEADACHE, UNSPECIFIED HEADACHE TYPE: ICD-10-CM

## 2024-09-18 DIAGNOSIS — Z09 ENCOUNTER FOR FOLLOW-UP: Primary | ICD-10-CM

## 2024-09-18 DIAGNOSIS — R11.0 NAUSEA: ICD-10-CM

## 2024-09-18 DIAGNOSIS — J45.41 MODERATE PERSISTENT ASTHMA WITH EXACERBATION: Primary | ICD-10-CM

## 2024-09-18 LAB
ALBUMIN SERPL-MCNC: 4.7 G/DL (ref 3.5–5.2)
ALBUMIN/GLOB SERPL: 1.3 G/DL
ALP SERPL-CCNC: 99 U/L (ref 39–117)
ALT SERPL W P-5'-P-CCNC: 16 U/L (ref 1–33)
ANION GAP SERPL CALCULATED.3IONS-SCNC: 12 MMOL/L (ref 5–15)
AST SERPL-CCNC: 27 U/L (ref 1–32)
B PARAPERT DNA SPEC QL NAA+PROBE: NOT DETECTED
B PERT DNA SPEC QL NAA+PROBE: NOT DETECTED
BASOPHILS # BLD AUTO: 0.19 10*3/MM3 (ref 0–0.2)
BASOPHILS NFR BLD AUTO: 1.5 % (ref 0–1.5)
BILIRUB SERPL-MCNC: 0.3 MG/DL (ref 0–1.2)
BUN SERPL-MCNC: 17 MG/DL (ref 6–20)
BUN/CREAT SERPL: 15.6 (ref 7–25)
C PNEUM DNA NPH QL NAA+NON-PROBE: NOT DETECTED
CALCIUM SPEC-SCNC: 10.1 MG/DL (ref 8.6–10.5)
CHLORIDE SERPL-SCNC: 101 MMOL/L (ref 98–107)
CO2 SERPL-SCNC: 27 MMOL/L (ref 22–29)
CREAT SERPL-MCNC: 1.09 MG/DL (ref 0.57–1)
D DIMER PPP FEU-MCNC: 2.42 MG/L (FEU) (ref 0–0.56)
DEPRECATED RDW RBC AUTO: 45.5 FL (ref 37–54)
EGFRCR SERPLBLD CKD-EPI 2021: 59.7 ML/MIN/1.73
EOSINOPHIL # BLD AUTO: 1.62 10*3/MM3 (ref 0–0.4)
EOSINOPHIL NFR BLD AUTO: 13.1 % (ref 0.3–6.2)
ERYTHROCYTE [DISTWIDTH] IN BLOOD BY AUTOMATED COUNT: 13.2 % (ref 12.3–15.4)
FLUAV SUBTYP SPEC NAA+PROBE: NOT DETECTED
FLUBV RNA ISLT QL NAA+PROBE: NOT DETECTED
GLOBULIN UR ELPH-MCNC: 3.7 GM/DL
GLUCOSE BLDC GLUCOMTR-MCNC: 119 MG/DL (ref 70–105)
GLUCOSE SERPL-MCNC: 125 MG/DL (ref 65–99)
HADV DNA SPEC NAA+PROBE: NOT DETECTED
HCOV 229E RNA SPEC QL NAA+PROBE: NOT DETECTED
HCOV HKU1 RNA SPEC QL NAA+PROBE: NOT DETECTED
HCOV NL63 RNA SPEC QL NAA+PROBE: NOT DETECTED
HCOV OC43 RNA SPEC QL NAA+PROBE: NOT DETECTED
HCT VFR BLD AUTO: 41 % (ref 34–46.6)
HGB BLD-MCNC: 12.5 G/DL (ref 12–15.9)
HMPV RNA NPH QL NAA+NON-PROBE: NOT DETECTED
HPIV1 RNA ISLT QL NAA+PROBE: NOT DETECTED
HPIV2 RNA SPEC QL NAA+PROBE: NOT DETECTED
HPIV3 RNA NPH QL NAA+PROBE: NOT DETECTED
HPIV4 P GENE NPH QL NAA+PROBE: NOT DETECTED
IMM GRANULOCYTES # BLD AUTO: 0.11 10*3/MM3 (ref 0–0.05)
IMM GRANULOCYTES NFR BLD AUTO: 0.9 % (ref 0–0.5)
LYMPHOCYTES # BLD AUTO: 3.16 10*3/MM3 (ref 0.7–3.1)
LYMPHOCYTES NFR BLD AUTO: 25.5 % (ref 19.6–45.3)
M PNEUMO IGG SER IA-ACNC: NOT DETECTED
MCH RBC QN AUTO: 28.8 PG (ref 26.6–33)
MCHC RBC AUTO-ENTMCNC: 30.5 G/DL (ref 31.5–35.7)
MCV RBC AUTO: 94.5 FL (ref 79–97)
MONOCYTES # BLD AUTO: 1.09 10*3/MM3 (ref 0.1–0.9)
MONOCYTES NFR BLD AUTO: 8.8 % (ref 5–12)
NEUTROPHILS NFR BLD AUTO: 50.2 % (ref 42.7–76)
NEUTROPHILS NFR BLD AUTO: 6.22 10*3/MM3 (ref 1.7–7)
NRBC BLD AUTO-RTO: 0 /100 WBC (ref 0–0.2)
NT-PROBNP SERPL-MCNC: <36 PG/ML (ref 0–900)
PLATELET # BLD AUTO: 404 10*3/MM3 (ref 140–450)
PMV BLD AUTO: 10.1 FL (ref 6–12)
POTASSIUM SERPL-SCNC: 4.1 MMOL/L (ref 3.5–5.2)
PROT SERPL-MCNC: 8.4 G/DL (ref 6–8.5)
RBC # BLD AUTO: 4.34 10*6/MM3 (ref 3.77–5.28)
RHINOVIRUS RNA SPEC NAA+PROBE: NOT DETECTED
RSV RNA NPH QL NAA+NON-PROBE: NOT DETECTED
SARS-COV-2 RNA NPH QL NAA+NON-PROBE: NOT DETECTED
SODIUM SERPL-SCNC: 140 MMOL/L (ref 136–145)
TROPONIN T SERPL HS-MCNC: 11 NG/L
WBC NRBC COR # BLD AUTO: 12.39 10*3/MM3 (ref 3.4–10.8)

## 2024-09-18 PROCEDURE — 80053 COMPREHEN METABOLIC PANEL: CPT | Performed by: NURSE PRACTITIONER

## 2024-09-18 PROCEDURE — 0202U NFCT DS 22 TRGT SARS-COV-2: CPT | Performed by: NURSE PRACTITIONER

## 2024-09-18 PROCEDURE — 25010000002 METHYLPREDNISOLONE PER 125 MG: Performed by: NURSE PRACTITIONER

## 2024-09-18 PROCEDURE — 1159F MED LIST DOCD IN RCRD: CPT | Performed by: SURGERY

## 2024-09-18 PROCEDURE — 25010000002 DIPHENHYDRAMINE PER 50 MG: Performed by: NURSE PRACTITIONER

## 2024-09-18 PROCEDURE — 99285 EMERGENCY DEPT VISIT HI MDM: CPT

## 2024-09-18 PROCEDURE — 93005 ELECTROCARDIOGRAM TRACING: CPT | Performed by: EMERGENCY MEDICINE

## 2024-09-18 PROCEDURE — 85025 COMPLETE CBC W/AUTO DIFF WBC: CPT | Performed by: NURSE PRACTITIONER

## 2024-09-18 PROCEDURE — 99024 POSTOP FOLLOW-UP VISIT: CPT | Performed by: SURGERY

## 2024-09-18 PROCEDURE — 25510000001 IOPAMIDOL PER 1 ML: Performed by: NURSE PRACTITIONER

## 2024-09-18 PROCEDURE — 85379 FIBRIN DEGRADATION QUANT: CPT | Performed by: NURSE PRACTITIONER

## 2024-09-18 PROCEDURE — 93005 ELECTROCARDIOGRAM TRACING: CPT

## 2024-09-18 PROCEDURE — 83880 ASSAY OF NATRIURETIC PEPTIDE: CPT | Performed by: NURSE PRACTITIONER

## 2024-09-18 PROCEDURE — 71275 CT ANGIOGRAPHY CHEST: CPT

## 2024-09-18 PROCEDURE — G0378 HOSPITAL OBSERVATION PER HR: HCPCS

## 2024-09-18 PROCEDURE — 94640 AIRWAY INHALATION TREATMENT: CPT

## 2024-09-18 PROCEDURE — 1160F RVW MEDS BY RX/DR IN RCRD: CPT | Performed by: SURGERY

## 2024-09-18 PROCEDURE — 25010000002 ONDANSETRON PER 1 MG: Performed by: NURSE PRACTITIONER

## 2024-09-18 PROCEDURE — 82948 REAGENT STRIP/BLOOD GLUCOSE: CPT

## 2024-09-18 PROCEDURE — 25010000002 PROCHLORPERAZINE 10 MG/2ML SOLUTION: Performed by: NURSE PRACTITIONER

## 2024-09-18 PROCEDURE — 71046 X-RAY EXAM CHEST 2 VIEWS: CPT

## 2024-09-18 PROCEDURE — 84484 ASSAY OF TROPONIN QUANT: CPT | Performed by: NURSE PRACTITIONER

## 2024-09-18 PROCEDURE — 94799 UNLISTED PULMONARY SVC/PX: CPT

## 2024-09-18 PROCEDURE — 94761 N-INVAS EAR/PLS OXIMETRY MLT: CPT

## 2024-09-18 RX ORDER — BISACODYL 10 MG
10 SUPPOSITORY, RECTAL RECTAL DAILY PRN
Status: DISCONTINUED | OUTPATIENT
Start: 2024-09-18 | End: 2024-09-20 | Stop reason: HOSPADM

## 2024-09-18 RX ORDER — CELECOXIB 200 MG/1
1 CAPSULE ORAL 2 TIMES DAILY
COMMUNITY
Start: 2024-08-28

## 2024-09-18 RX ORDER — IOPAMIDOL 755 MG/ML
100 INJECTION, SOLUTION INTRAVASCULAR
Status: COMPLETED | OUTPATIENT
Start: 2024-09-18 | End: 2024-09-18

## 2024-09-18 RX ORDER — HYDROCHLOROTHIAZIDE 25 MG/1
25 TABLET ORAL DAILY
COMMUNITY

## 2024-09-18 RX ORDER — GABAPENTIN 400 MG/1
800 CAPSULE ORAL 3 TIMES DAILY
Status: DISCONTINUED | OUTPATIENT
Start: 2024-09-18 | End: 2024-09-19

## 2024-09-18 RX ORDER — ONDANSETRON 4 MG/1
4 TABLET, ORALLY DISINTEGRATING ORAL EVERY 8 HOURS PRN
COMMUNITY

## 2024-09-18 RX ORDER — SODIUM CHLORIDE 0.9 % (FLUSH) 0.9 %
10 SYRINGE (ML) INJECTION AS NEEDED
Status: DISCONTINUED | OUTPATIENT
Start: 2024-09-18 | End: 2024-09-20 | Stop reason: HOSPADM

## 2024-09-18 RX ORDER — METOPROLOL TARTRATE 25 MG/1
25 TABLET, FILM COATED ORAL EVERY 12 HOURS SCHEDULED
Status: DISCONTINUED | OUTPATIENT
Start: 2024-09-18 | End: 2024-09-19

## 2024-09-18 RX ORDER — IPRATROPIUM BROMIDE AND ALBUTEROL SULFATE 2.5; .5 MG/3ML; MG/3ML
3 SOLUTION RESPIRATORY (INHALATION) ONCE
Status: COMPLETED | OUTPATIENT
Start: 2024-09-18 | End: 2024-09-18

## 2024-09-18 RX ORDER — ACETAMINOPHEN 325 MG/1
650 TABLET ORAL EVERY 4 HOURS PRN
Status: DISCONTINUED | OUTPATIENT
Start: 2024-09-18 | End: 2024-09-20 | Stop reason: HOSPADM

## 2024-09-18 RX ORDER — TIRZEPATIDE 2.5 MG/.5ML
INJECTION, SOLUTION SUBCUTANEOUS
Status: ON HOLD | COMMUNITY
Start: 2024-05-29 | End: 2024-09-18

## 2024-09-18 RX ORDER — ONDANSETRON 2 MG/ML
4 INJECTION INTRAMUSCULAR; INTRAVENOUS EVERY 6 HOURS PRN
Status: DISCONTINUED | OUTPATIENT
Start: 2024-09-18 | End: 2024-09-20 | Stop reason: HOSPADM

## 2024-09-18 RX ORDER — OXYCODONE HYDROCHLORIDE 5 MG/1
15 TABLET ORAL EVERY 4 HOURS PRN
Status: DISCONTINUED | OUTPATIENT
Start: 2024-09-18 | End: 2024-09-19

## 2024-09-18 RX ORDER — METHYLPREDNISOLONE SODIUM SUCCINATE 125 MG/2ML
125 INJECTION, POWDER, LYOPHILIZED, FOR SOLUTION INTRAMUSCULAR; INTRAVENOUS ONCE
Status: COMPLETED | OUTPATIENT
Start: 2024-09-18 | End: 2024-09-18

## 2024-09-18 RX ORDER — IPRATROPIUM BROMIDE AND ALBUTEROL SULFATE 2.5; .5 MG/3ML; MG/3ML
3 SOLUTION RESPIRATORY (INHALATION) EVERY 4 HOURS PRN
Status: DISCONTINUED | OUTPATIENT
Start: 2024-09-18 | End: 2024-09-20 | Stop reason: HOSPADM

## 2024-09-18 RX ORDER — PROCHLORPERAZINE EDISYLATE 5 MG/ML
10 INJECTION INTRAMUSCULAR; INTRAVENOUS ONCE
Status: COMPLETED | OUTPATIENT
Start: 2024-09-18 | End: 2024-09-18

## 2024-09-18 RX ORDER — CYCLOBENZAPRINE HCL 10 MG
10 TABLET ORAL 3 TIMES DAILY PRN
Status: DISCONTINUED | OUTPATIENT
Start: 2024-09-18 | End: 2024-09-19

## 2024-09-18 RX ORDER — ONDANSETRON 4 MG/1
4 TABLET, FILM COATED ORAL DAILY PRN
Qty: 30 TABLET | Refills: 1 | Status: ON HOLD | OUTPATIENT
Start: 2024-09-18 | End: 2024-09-18

## 2024-09-18 RX ORDER — BISACODYL 5 MG/1
5 TABLET, DELAYED RELEASE ORAL DAILY PRN
Status: DISCONTINUED | OUTPATIENT
Start: 2024-09-18 | End: 2024-09-20 | Stop reason: HOSPADM

## 2024-09-18 RX ORDER — IPRATROPIUM BROMIDE AND ALBUTEROL SULFATE 2.5; .5 MG/3ML; MG/3ML
3 SOLUTION RESPIRATORY (INHALATION) EVERY 4 HOURS PRN
COMMUNITY

## 2024-09-18 RX ORDER — SODIUM CHLORIDE 9 MG/ML
40 INJECTION, SOLUTION INTRAVENOUS AS NEEDED
Status: DISCONTINUED | OUTPATIENT
Start: 2024-09-18 | End: 2024-09-20 | Stop reason: HOSPADM

## 2024-09-18 RX ORDER — SODIUM CHLORIDE 0.9 % (FLUSH) 0.9 %
10 SYRINGE (ML) INJECTION EVERY 12 HOURS SCHEDULED
Status: DISCONTINUED | OUTPATIENT
Start: 2024-09-18 | End: 2024-09-20 | Stop reason: HOSPADM

## 2024-09-18 RX ORDER — POLYETHYLENE GLYCOL 3350 17 G/17G
17 POWDER, FOR SOLUTION ORAL DAILY PRN
Status: DISCONTINUED | OUTPATIENT
Start: 2024-09-18 | End: 2024-09-20 | Stop reason: HOSPADM

## 2024-09-18 RX ORDER — LOSARTAN POTASSIUM 25 MG/1
25 TABLET ORAL NIGHTLY
Status: DISCONTINUED | OUTPATIENT
Start: 2024-09-18 | End: 2024-09-19

## 2024-09-18 RX ORDER — DIPHENHYDRAMINE HYDROCHLORIDE 50 MG/ML
25 INJECTION INTRAMUSCULAR; INTRAVENOUS ONCE
Status: COMPLETED | OUTPATIENT
Start: 2024-09-18 | End: 2024-09-18

## 2024-09-18 RX ORDER — HYDROCHLOROTHIAZIDE 12.5 MG/1
12.5 TABLET ORAL DAILY
Status: ON HOLD | COMMUNITY
End: 2024-09-18

## 2024-09-18 RX ORDER — AMOXICILLIN 250 MG
2 CAPSULE ORAL 2 TIMES DAILY PRN
Status: DISCONTINUED | OUTPATIENT
Start: 2024-09-18 | End: 2024-09-20 | Stop reason: HOSPADM

## 2024-09-18 RX ADMIN — DIPHENHYDRAMINE HYDROCHLORIDE 25 MG: 50 INJECTION, SOLUTION INTRAMUSCULAR; INTRAVENOUS at 16:34

## 2024-09-18 RX ADMIN — PROCHLORPERAZINE EDISYLATE 10 MG: 5 INJECTION INTRAMUSCULAR; INTRAVENOUS at 16:31

## 2024-09-18 RX ADMIN — IPRATROPIUM BROMIDE AND ALBUTEROL SULFATE 3 ML: .5; 3 SOLUTION RESPIRATORY (INHALATION) at 20:02

## 2024-09-18 RX ADMIN — CYCLOBENZAPRINE 10 MG: 10 TABLET, FILM COATED ORAL at 21:06

## 2024-09-18 RX ADMIN — DICLOFENAC SODIUM 4 G: 10 GEL TOPICAL at 22:07

## 2024-09-18 RX ADMIN — OXYCODONE HYDROCHLORIDE 15 MG: 5 TABLET ORAL at 21:06

## 2024-09-18 RX ADMIN — METOPROLOL TARTRATE 25 MG: 25 TABLET, FILM COATED ORAL at 21:07

## 2024-09-18 RX ADMIN — ACETAMINOPHEN 650 MG: 325 TABLET, FILM COATED ORAL at 21:06

## 2024-09-18 RX ADMIN — GABAPENTIN 800 MG: 400 CAPSULE ORAL at 21:07

## 2024-09-18 RX ADMIN — LOSARTAN POTASSIUM 25 MG: 25 TABLET, FILM COATED ORAL at 21:07

## 2024-09-18 RX ADMIN — Medication 10 ML: at 21:08

## 2024-09-18 RX ADMIN — METHYLPREDNISOLONE SODIUM SUCCINATE 125 MG: 125 INJECTION, POWDER, FOR SOLUTION INTRAMUSCULAR; INTRAVENOUS at 21:06

## 2024-09-18 RX ADMIN — ONDANSETRON 4 MG: 2 INJECTION INTRAMUSCULAR; INTRAVENOUS at 21:08

## 2024-09-18 RX ADMIN — IOPAMIDOL 100 ML: 755 INJECTION, SOLUTION INTRAVENOUS at 18:30

## 2024-09-19 LAB
ANION GAP SERPL CALCULATED.3IONS-SCNC: 11 MMOL/L (ref 5–15)
BASOPHILS # BLD AUTO: 0.05 10*3/MM3 (ref 0–0.2)
BASOPHILS NFR BLD AUTO: 0.5 % (ref 0–1.5)
BUN SERPL-MCNC: 18 MG/DL (ref 6–20)
BUN/CREAT SERPL: 18.8 (ref 7–25)
CALCIUM SPEC-SCNC: 9.6 MG/DL (ref 8.6–10.5)
CHLORIDE SERPL-SCNC: 105 MMOL/L (ref 98–107)
CO2 SERPL-SCNC: 23 MMOL/L (ref 22–29)
CREAT SERPL-MCNC: 0.96 MG/DL (ref 0.57–1)
DEPRECATED RDW RBC AUTO: 45.4 FL (ref 37–54)
EGFRCR SERPLBLD CKD-EPI 2021: 69.6 ML/MIN/1.73
EOSINOPHIL # BLD AUTO: 0.02 10*3/MM3 (ref 0–0.4)
EOSINOPHIL NFR BLD AUTO: 0.2 % (ref 0.3–6.2)
ERYTHROCYTE [DISTWIDTH] IN BLOOD BY AUTOMATED COUNT: 13.2 % (ref 12.3–15.4)
GLUCOSE BLDC GLUCOMTR-MCNC: 222 MG/DL (ref 70–105)
GLUCOSE SERPL-MCNC: 215 MG/DL (ref 65–99)
HCT VFR BLD AUTO: 35.7 % (ref 34–46.6)
HGB BLD-MCNC: 11.3 G/DL (ref 12–15.9)
IMM GRANULOCYTES # BLD AUTO: 0.05 10*3/MM3 (ref 0–0.05)
IMM GRANULOCYTES NFR BLD AUTO: 0.5 % (ref 0–0.5)
LYMPHOCYTES # BLD AUTO: 1.52 10*3/MM3 (ref 0.7–3.1)
LYMPHOCYTES NFR BLD AUTO: 14.5 % (ref 19.6–45.3)
MCH RBC QN AUTO: 29.7 PG (ref 26.6–33)
MCHC RBC AUTO-ENTMCNC: 31.7 G/DL (ref 31.5–35.7)
MCV RBC AUTO: 93.7 FL (ref 79–97)
MONOCYTES # BLD AUTO: 0.1 10*3/MM3 (ref 0.1–0.9)
MONOCYTES NFR BLD AUTO: 1 % (ref 5–12)
NEUTROPHILS NFR BLD AUTO: 8.74 10*3/MM3 (ref 1.7–7)
NEUTROPHILS NFR BLD AUTO: 83.3 % (ref 42.7–76)
NRBC BLD AUTO-RTO: 0 /100 WBC (ref 0–0.2)
PLATELET # BLD AUTO: 314 10*3/MM3 (ref 140–450)
PMV BLD AUTO: 10.4 FL (ref 6–12)
POTASSIUM SERPL-SCNC: 4.4 MMOL/L (ref 3.5–5.2)
QT INTERVAL: 453 MS
QTC INTERVAL: 518 MS
RBC # BLD AUTO: 3.81 10*6/MM3 (ref 3.77–5.28)
SODIUM SERPL-SCNC: 139 MMOL/L (ref 136–145)
WBC NRBC COR # BLD AUTO: 10.48 10*3/MM3 (ref 3.4–10.8)

## 2024-09-19 PROCEDURE — 63710000001 PREDNISONE PER 5 MG: Performed by: INTERNAL MEDICINE

## 2024-09-19 PROCEDURE — 94761 N-INVAS EAR/PLS OXIMETRY MLT: CPT

## 2024-09-19 PROCEDURE — 85025 COMPLETE CBC W/AUTO DIFF WBC: CPT | Performed by: NURSE PRACTITIONER

## 2024-09-19 PROCEDURE — 63710000001 PREDNISONE PER 1 MG: Performed by: INTERNAL MEDICINE

## 2024-09-19 PROCEDURE — 82948 REAGENT STRIP/BLOOD GLUCOSE: CPT

## 2024-09-19 PROCEDURE — 94799 UNLISTED PULMONARY SVC/PX: CPT

## 2024-09-19 PROCEDURE — 80048 BASIC METABOLIC PNL TOTAL CA: CPT | Performed by: NURSE PRACTITIONER

## 2024-09-19 PROCEDURE — 94664 DEMO&/EVAL PT USE INHALER: CPT

## 2024-09-19 RX ORDER — BUDESONIDE AND FORMOTEROL FUMARATE DIHYDRATE 160; 4.5 UG/1; UG/1
1 AEROSOL RESPIRATORY (INHALATION)
Status: DISCONTINUED | OUTPATIENT
Start: 2024-09-19 | End: 2024-09-20 | Stop reason: HOSPADM

## 2024-09-19 RX ORDER — CETIRIZINE HYDROCHLORIDE 10 MG/1
10 TABLET ORAL DAILY
Status: DISCONTINUED | OUTPATIENT
Start: 2024-09-19 | End: 2024-09-20 | Stop reason: HOSPADM

## 2024-09-19 RX ORDER — PREDNISONE 20 MG/1
20 TABLET ORAL DAILY
Status: DISCONTINUED | OUTPATIENT
Start: 2024-09-21 | End: 2024-09-20 | Stop reason: HOSPADM

## 2024-09-19 RX ORDER — PREDNISONE 10 MG/1
10 TABLET ORAL DAILY
Status: DISCONTINUED | OUTPATIENT
Start: 2024-09-23 | End: 2024-09-20 | Stop reason: HOSPADM

## 2024-09-19 RX ORDER — ALBUTEROL SULFATE 0.83 MG/ML
2.5 SOLUTION RESPIRATORY (INHALATION) EVERY 6 HOURS PRN
Status: DISCONTINUED | OUTPATIENT
Start: 2024-09-19 | End: 2024-09-20

## 2024-09-19 RX ORDER — OXYCODONE HYDROCHLORIDE 5 MG/1
15 TABLET ORAL EVERY 4 HOURS
Status: DISCONTINUED | OUTPATIENT
Start: 2024-09-19 | End: 2024-09-20 | Stop reason: HOSPADM

## 2024-09-19 RX ORDER — CELECOXIB 200 MG/1
200 CAPSULE ORAL 2 TIMES DAILY
Status: DISCONTINUED | OUTPATIENT
Start: 2024-09-19 | End: 2024-09-20 | Stop reason: HOSPADM

## 2024-09-19 RX ORDER — IPRATROPIUM BROMIDE AND ALBUTEROL SULFATE 2.5; .5 MG/3ML; MG/3ML
3 SOLUTION RESPIRATORY (INHALATION) EVERY 4 HOURS PRN
Status: DISCONTINUED | OUTPATIENT
Start: 2024-09-19 | End: 2024-09-20 | Stop reason: HOSPADM

## 2024-09-19 RX ORDER — HYDROCHLOROTHIAZIDE 25 MG/1
25 TABLET ORAL DAILY
Status: DISCONTINUED | OUTPATIENT
Start: 2024-09-19 | End: 2024-09-20 | Stop reason: HOSPADM

## 2024-09-19 RX ORDER — CYCLOBENZAPRINE HCL 10 MG
10 TABLET ORAL 3 TIMES DAILY PRN
Status: DISCONTINUED | OUTPATIENT
Start: 2024-09-19 | End: 2024-09-20 | Stop reason: HOSPADM

## 2024-09-19 RX ORDER — VENLAFAXINE HYDROCHLORIDE 37.5 MG/1
37.5 CAPSULE, EXTENDED RELEASE ORAL DAILY
Status: DISCONTINUED | OUTPATIENT
Start: 2024-09-19 | End: 2024-09-20 | Stop reason: HOSPADM

## 2024-09-19 RX ORDER — GABAPENTIN 400 MG/1
800 CAPSULE ORAL EVERY 8 HOURS SCHEDULED
Status: DISCONTINUED | OUTPATIENT
Start: 2024-09-19 | End: 2024-09-20 | Stop reason: HOSPADM

## 2024-09-19 RX ORDER — HYDROXYCHLOROQUINE SULFATE 200 MG/1
400 TABLET, FILM COATED ORAL EVERY MORNING
Status: DISCONTINUED | OUTPATIENT
Start: 2024-09-19 | End: 2024-09-20 | Stop reason: HOSPADM

## 2024-09-19 RX ORDER — ATORVASTATIN CALCIUM 10 MG/1
5 TABLET, FILM COATED ORAL EVERY EVENING
Status: DISCONTINUED | OUTPATIENT
Start: 2024-09-19 | End: 2024-09-20 | Stop reason: HOSPADM

## 2024-09-19 RX ORDER — CLOPIDOGREL BISULFATE 75 MG/1
75 TABLET ORAL DAILY
Status: DISCONTINUED | OUTPATIENT
Start: 2024-09-19 | End: 2024-09-20 | Stop reason: HOSPADM

## 2024-09-19 RX ORDER — AZITHROMYCIN 250 MG/1
500 TABLET, FILM COATED ORAL
Status: DISCONTINUED | OUTPATIENT
Start: 2024-09-19 | End: 2024-09-20 | Stop reason: HOSPADM

## 2024-09-19 RX ORDER — BENZONATATE 100 MG/1
200 CAPSULE ORAL 3 TIMES DAILY PRN
Status: DISCONTINUED | OUTPATIENT
Start: 2024-09-19 | End: 2024-09-20 | Stop reason: HOSPADM

## 2024-09-19 RX ORDER — LOSARTAN POTASSIUM 25 MG/1
25 TABLET ORAL NIGHTLY
Status: DISCONTINUED | OUTPATIENT
Start: 2024-09-19 | End: 2024-09-20 | Stop reason: HOSPADM

## 2024-09-19 RX ORDER — METOPROLOL TARTRATE 25 MG/1
25 TABLET, FILM COATED ORAL 2 TIMES DAILY
Status: DISCONTINUED | OUTPATIENT
Start: 2024-09-19 | End: 2024-09-20 | Stop reason: HOSPADM

## 2024-09-19 RX ADMIN — IPRATROPIUM BROMIDE AND ALBUTEROL SULFATE 3 ML: .5; 3 SOLUTION RESPIRATORY (INHALATION) at 17:42

## 2024-09-19 RX ADMIN — BUDESONIDE AND FORMOTEROL FUMARATE DIHYDRATE 1 PUFF: 160; 4.5 AEROSOL RESPIRATORY (INHALATION) at 19:33

## 2024-09-19 RX ADMIN — ATORVASTATIN CALCIUM 5 MG: 10 TABLET, FILM COATED ORAL at 17:43

## 2024-09-19 RX ADMIN — HYDROCHLOROTHIAZIDE 25 MG: 25 TABLET ORAL at 09:48

## 2024-09-19 RX ADMIN — OXYCODONE HYDROCHLORIDE 15 MG: 5 TABLET ORAL at 02:38

## 2024-09-19 RX ADMIN — Medication 10 ML: at 20:19

## 2024-09-19 RX ADMIN — OXYCODONE HYDROCHLORIDE 15 MG: 5 TABLET ORAL at 20:19

## 2024-09-19 RX ADMIN — MELATONIN TAB 5 MG 5 MG: 5 TAB at 02:47

## 2024-09-19 RX ADMIN — HYDROXYCHLOROQUINE SULFATE 400 MG: 200 TABLET ORAL at 08:01

## 2024-09-19 RX ADMIN — THEOPHYLLINE ANHYDROUS 300 MG: 300 CAPSULE, EXTENDED RELEASE ORAL at 20:19

## 2024-09-19 RX ADMIN — METOPROLOL TARTRATE 25 MG: 25 TABLET, FILM COATED ORAL at 20:19

## 2024-09-19 RX ADMIN — CYCLOBENZAPRINE 10 MG: 10 TABLET, FILM COATED ORAL at 05:27

## 2024-09-19 RX ADMIN — CETIRIZINE HYDROCHLORIDE 10 MG: 10 TABLET, FILM COATED ORAL at 18:51

## 2024-09-19 RX ADMIN — Medication 10 ML: at 09:49

## 2024-09-19 RX ADMIN — PREDNISONE 30 MG: 20 TABLET ORAL at 20:19

## 2024-09-19 RX ADMIN — GABAPENTIN 800 MG: 400 CAPSULE ORAL at 08:01

## 2024-09-19 RX ADMIN — CELECOXIB 200 MG: 200 CAPSULE ORAL at 09:48

## 2024-09-19 RX ADMIN — VENLAFAXINE HYDROCHLORIDE 37.5 MG: 37.5 CAPSULE, EXTENDED RELEASE ORAL at 09:48

## 2024-09-19 RX ADMIN — OXYCODONE HYDROCHLORIDE 15 MG: 5 TABLET ORAL at 08:01

## 2024-09-19 RX ADMIN — CYCLOBENZAPRINE 10 MG: 10 TABLET, FILM COATED ORAL at 17:49

## 2024-09-19 RX ADMIN — BENZOCAINE 6 MG-MENTHOL 10 MG LOZENGES 1 EACH: at 09:48

## 2024-09-19 RX ADMIN — BUDESONIDE AND FORMOTEROL FUMARATE DIHYDRATE 1 PUFF: 160; 4.5 AEROSOL RESPIRATORY (INHALATION) at 08:08

## 2024-09-19 RX ADMIN — ACETAMINOPHEN 650 MG: 325 TABLET, FILM COATED ORAL at 17:43

## 2024-09-19 RX ADMIN — OXYCODONE HYDROCHLORIDE 15 MG: 5 TABLET ORAL at 14:42

## 2024-09-19 RX ADMIN — AZITHROMYCIN DIHYDRATE 500 MG: 250 TABLET ORAL at 20:19

## 2024-09-19 RX ADMIN — GABAPENTIN 800 MG: 400 CAPSULE ORAL at 14:42

## 2024-09-19 RX ADMIN — OXYCODONE HYDROCHLORIDE 15 MG: 5 TABLET ORAL at 11:05

## 2024-09-19 RX ADMIN — CLOPIDOGREL BISULFATE 75 MG: 75 TABLET ORAL at 09:48

## 2024-09-19 RX ADMIN — OXYCODONE HYDROCHLORIDE 15 MG: 5 TABLET ORAL at 23:08

## 2024-09-19 RX ADMIN — RIVAROXABAN 20 MG: 20 TABLET, FILM COATED ORAL at 09:48

## 2024-09-19 RX ADMIN — METOPROLOL TARTRATE 25 MG: 25 TABLET, FILM COATED ORAL at 09:48

## 2024-09-19 RX ADMIN — GABAPENTIN 800 MG: 400 CAPSULE ORAL at 23:08

## 2024-09-19 RX ADMIN — LOSARTAN POTASSIUM 25 MG: 25 TABLET, FILM COATED ORAL at 20:19

## 2024-09-19 RX ADMIN — CELECOXIB 200 MG: 200 CAPSULE ORAL at 20:19

## 2024-09-20 ENCOUNTER — APPOINTMENT (OUTPATIENT)
Dept: CARDIOLOGY | Facility: HOSPITAL | Age: 54
DRG: 203 | End: 2024-09-20
Payer: MEDICARE

## 2024-09-20 ENCOUNTER — READMISSION MANAGEMENT (OUTPATIENT)
Dept: CALL CENTER | Facility: HOSPITAL | Age: 54
End: 2024-09-20
Payer: MEDICARE

## 2024-09-20 VITALS
WEIGHT: 252 LBS | TEMPERATURE: 97.6 F | HEART RATE: 81 BPM | OXYGEN SATURATION: 99 % | DIASTOLIC BLOOD PRESSURE: 87 MMHG | BODY MASS INDEX: 38.19 KG/M2 | SYSTOLIC BLOOD PRESSURE: 141 MMHG | HEIGHT: 68 IN | RESPIRATION RATE: 18 BRPM

## 2024-09-20 LAB
BH CV LOWER VASCULAR LEFT COMMON FEMORAL AUGMENT: NORMAL
BH CV LOWER VASCULAR LEFT COMMON FEMORAL COMPETENT: NORMAL
BH CV LOWER VASCULAR LEFT COMMON FEMORAL COMPRESS: NORMAL
BH CV LOWER VASCULAR LEFT COMMON FEMORAL PHASIC: NORMAL
BH CV LOWER VASCULAR LEFT COMMON FEMORAL SPONT: NORMAL
BH CV LOWER VASCULAR RIGHT COMMON FEMORAL AUGMENT: NORMAL
BH CV LOWER VASCULAR RIGHT COMMON FEMORAL COMPETENT: NORMAL
BH CV LOWER VASCULAR RIGHT COMMON FEMORAL COMPRESS: NORMAL
BH CV LOWER VASCULAR RIGHT COMMON FEMORAL PHASIC: NORMAL
BH CV LOWER VASCULAR RIGHT COMMON FEMORAL SPONT: NORMAL
BH CV LOWER VASCULAR RIGHT DISTAL FEMORAL COMPRESS: NORMAL
BH CV LOWER VASCULAR RIGHT GASTRONEMIUS COMPRESS: NORMAL
BH CV LOWER VASCULAR RIGHT GREATER SAPH AK COMPRESS: NORMAL
BH CV LOWER VASCULAR RIGHT GREATER SAPH BK COMPRESS: NORMAL
BH CV LOWER VASCULAR RIGHT LESSER SAPH COMPRESS: NORMAL
BH CV LOWER VASCULAR RIGHT MID FEMORAL AUGMENT: NORMAL
BH CV LOWER VASCULAR RIGHT MID FEMORAL COMPETENT: NORMAL
BH CV LOWER VASCULAR RIGHT MID FEMORAL COMPRESS: NORMAL
BH CV LOWER VASCULAR RIGHT MID FEMORAL PHASIC: NORMAL
BH CV LOWER VASCULAR RIGHT MID FEMORAL SPONT: NORMAL
BH CV LOWER VASCULAR RIGHT PERONEAL COMPRESS: NORMAL
BH CV LOWER VASCULAR RIGHT POPLITEAL AUGMENT: NORMAL
BH CV LOWER VASCULAR RIGHT POPLITEAL COMPETENT: NORMAL
BH CV LOWER VASCULAR RIGHT POPLITEAL COMPRESS: NORMAL
BH CV LOWER VASCULAR RIGHT POPLITEAL PHASIC: NORMAL
BH CV LOWER VASCULAR RIGHT POPLITEAL SPONT: NORMAL
BH CV LOWER VASCULAR RIGHT POSTERIOR TIBIAL COMPRESS: NORMAL
BH CV LOWER VASCULAR RIGHT PROXIMAL FEMORAL COMPRESS: NORMAL
BH CV LOWER VASCULAR RIGHT SAPHENOFEMORAL JUNCTION COMPRESS: NORMAL
GLUCOSE BLDC GLUCOMTR-MCNC: 168 MG/DL (ref 70–105)
GLUCOSE BLDC GLUCOMTR-MCNC: 200 MG/DL (ref 70–105)
GLUCOSE BLDC GLUCOMTR-MCNC: 203 MG/DL (ref 70–105)
GLUCOSE BLDC GLUCOMTR-MCNC: 227 MG/DL (ref 70–105)

## 2024-09-20 PROCEDURE — 82948 REAGENT STRIP/BLOOD GLUCOSE: CPT | Performed by: STUDENT IN AN ORGANIZED HEALTH CARE EDUCATION/TRAINING PROGRAM

## 2024-09-20 PROCEDURE — 94664 DEMO&/EVAL PT USE INHALER: CPT

## 2024-09-20 PROCEDURE — 25010000002 ONDANSETRON PER 1 MG: Performed by: NURSE PRACTITIONER

## 2024-09-20 PROCEDURE — 82948 REAGENT STRIP/BLOOD GLUCOSE: CPT

## 2024-09-20 PROCEDURE — 93971 EXTREMITY STUDY: CPT | Performed by: SURGERY

## 2024-09-20 PROCEDURE — 94618 PULMONARY STRESS TESTING: CPT

## 2024-09-20 PROCEDURE — 63710000001 INSULIN LISPRO (HUMAN) PER 5 UNITS: Performed by: STUDENT IN AN ORGANIZED HEALTH CARE EDUCATION/TRAINING PROGRAM

## 2024-09-20 PROCEDURE — 93971 EXTREMITY STUDY: CPT

## 2024-09-20 PROCEDURE — 63710000001 PREDNISONE PER 5 MG: Performed by: INTERNAL MEDICINE

## 2024-09-20 PROCEDURE — 94799 UNLISTED PULMONARY SVC/PX: CPT

## 2024-09-20 PROCEDURE — 63710000001 PREDNISONE PER 1 MG: Performed by: INTERNAL MEDICINE

## 2024-09-20 RX ORDER — FLUTICASONE FUROATE, UMECLIDINIUM BROMIDE AND VILANTEROL TRIFENATATE 100; 62.5; 25 UG/1; UG/1; UG/1
1 POWDER RESPIRATORY (INHALATION) DAILY
Qty: 60 EACH | Refills: 2 | Status: SHIPPED | OUTPATIENT
Start: 2024-09-20 | End: 2024-09-20 | Stop reason: HOSPADM

## 2024-09-20 RX ORDER — CETIRIZINE HYDROCHLORIDE 10 MG/1
10 TABLET ORAL DAILY
Qty: 30 TABLET | Refills: 0 | Status: SHIPPED | OUTPATIENT
Start: 2024-09-21

## 2024-09-20 RX ORDER — THEOPHYLLINE 300 MG/1
150 TABLET, EXTENDED RELEASE ORAL EVERY 12 HOURS
Qty: 30 TABLET | Refills: 0 | Status: SHIPPED | OUTPATIENT
Start: 2024-09-21

## 2024-09-20 RX ORDER — AZITHROMYCIN 250 MG/1
TABLET, FILM COATED ORAL
Qty: 2 TABLET | Refills: 0 | Status: SHIPPED | OUTPATIENT
Start: 2024-09-21

## 2024-09-20 RX ORDER — GUAIFENESIN 600 MG/1
600 TABLET, EXTENDED RELEASE ORAL EVERY 12 HOURS SCHEDULED
Status: DISCONTINUED | OUTPATIENT
Start: 2024-09-20 | End: 2024-09-20 | Stop reason: HOSPADM

## 2024-09-20 RX ORDER — BENZONATATE 200 MG/1
200 CAPSULE ORAL 3 TIMES DAILY PRN
Qty: 30 CAPSULE | Refills: 0 | Status: SHIPPED | OUTPATIENT
Start: 2024-09-20

## 2024-09-20 RX ORDER — ALBUTEROL SULFATE 0.83 MG/ML
2.5 SOLUTION RESPIRATORY (INHALATION) EVERY 6 HOURS
Status: DISCONTINUED | OUTPATIENT
Start: 2024-09-20 | End: 2024-09-20 | Stop reason: HOSPADM

## 2024-09-20 RX ORDER — INSULIN LISPRO 100 [IU]/ML
2-7 INJECTION, SOLUTION INTRAVENOUS; SUBCUTANEOUS
Status: DISCONTINUED | OUTPATIENT
Start: 2024-09-20 | End: 2024-09-20 | Stop reason: HOSPADM

## 2024-09-20 RX ORDER — IBUPROFEN 600 MG/1
1 TABLET ORAL
Status: DISCONTINUED | OUTPATIENT
Start: 2024-09-20 | End: 2024-09-20 | Stop reason: HOSPADM

## 2024-09-20 RX ORDER — BUDESONIDE AND FORMOTEROL FUMARATE DIHYDRATE 160; 4.5 UG/1; UG/1
2 AEROSOL RESPIRATORY (INHALATION)
Qty: 10.2 G | Refills: 12 | Status: SHIPPED | OUTPATIENT
Start: 2024-09-20

## 2024-09-20 RX ORDER — ALBUTEROL SULFATE 0.83 MG/ML
2.5 SOLUTION RESPIRATORY (INHALATION) EVERY 4 HOURS PRN
Qty: 90 ML | Refills: 12 | Status: SHIPPED | OUTPATIENT
Start: 2024-09-20

## 2024-09-20 RX ORDER — NICOTINE POLACRILEX 4 MG
15 LOZENGE BUCCAL
Status: DISCONTINUED | OUTPATIENT
Start: 2024-09-20 | End: 2024-09-20 | Stop reason: HOSPADM

## 2024-09-20 RX ORDER — PREDNISONE 10 MG/1
TABLET ORAL
Qty: 6 TABLET | Refills: 0 | Status: SHIPPED | OUTPATIENT
Start: 2024-09-21 | End: 2024-09-24

## 2024-09-20 RX ORDER — GUAIFENESIN 600 MG/1
600 TABLET, EXTENDED RELEASE ORAL EVERY 12 HOURS SCHEDULED
Qty: 30 TABLET | Refills: 0 | Status: SHIPPED | OUTPATIENT
Start: 2024-09-20

## 2024-09-20 RX ORDER — DEXTROSE MONOHYDRATE 25 G/50ML
25 INJECTION, SOLUTION INTRAVENOUS
Status: DISCONTINUED | OUTPATIENT
Start: 2024-09-20 | End: 2024-09-20 | Stop reason: HOSPADM

## 2024-09-20 RX ADMIN — VENLAFAXINE HYDROCHLORIDE 37.5 MG: 37.5 CAPSULE, EXTENDED RELEASE ORAL at 08:15

## 2024-09-20 RX ADMIN — PREDNISONE 30 MG: 20 TABLET ORAL at 08:09

## 2024-09-20 RX ADMIN — CLOPIDOGREL BISULFATE 75 MG: 75 TABLET ORAL at 08:13

## 2024-09-20 RX ADMIN — GABAPENTIN 800 MG: 400 CAPSULE ORAL at 13:42

## 2024-09-20 RX ADMIN — ALBUTEROL SULFATE 2.5 MG: 2.5 SOLUTION RESPIRATORY (INHALATION) at 16:31

## 2024-09-20 RX ADMIN — GUAIFENESIN 600 MG: 600 TABLET, EXTENDED RELEASE ORAL at 11:27

## 2024-09-20 RX ADMIN — ALBUTEROL SULFATE 2.5 MG: 2.5 SOLUTION RESPIRATORY (INHALATION) at 19:10

## 2024-09-20 RX ADMIN — OXYCODONE HYDROCHLORIDE 15 MG: 5 TABLET ORAL at 15:23

## 2024-09-20 RX ADMIN — OXYCODONE HYDROCHLORIDE 15 MG: 5 TABLET ORAL at 04:30

## 2024-09-20 RX ADMIN — ALBUTEROL SULFATE 2.5 MG: 2.5 SOLUTION RESPIRATORY (INHALATION) at 08:36

## 2024-09-20 RX ADMIN — ONDANSETRON 4 MG: 2 INJECTION INTRAMUSCULAR; INTRAVENOUS at 02:01

## 2024-09-20 RX ADMIN — BUDESONIDE AND FORMOTEROL FUMARATE DIHYDRATE 1 PUFF: 160; 4.5 AEROSOL RESPIRATORY (INHALATION) at 08:36

## 2024-09-20 RX ADMIN — OXYCODONE HYDROCHLORIDE 15 MG: 5 TABLET ORAL at 08:09

## 2024-09-20 RX ADMIN — BUDESONIDE AND FORMOTEROL FUMARATE DIHYDRATE 1 PUFF: 160; 4.5 AEROSOL RESPIRATORY (INHALATION) at 19:14

## 2024-09-20 RX ADMIN — OXYCODONE HYDROCHLORIDE 15 MG: 5 TABLET ORAL at 19:49

## 2024-09-20 RX ADMIN — GABAPENTIN 800 MG: 400 CAPSULE ORAL at 06:25

## 2024-09-20 RX ADMIN — CETIRIZINE HYDROCHLORIDE 10 MG: 10 TABLET, FILM COATED ORAL at 08:09

## 2024-09-20 RX ADMIN — ACETAMINOPHEN 650 MG: 325 TABLET, FILM COATED ORAL at 12:30

## 2024-09-20 RX ADMIN — CYCLOBENZAPRINE 10 MG: 10 TABLET, FILM COATED ORAL at 12:30

## 2024-09-20 RX ADMIN — HYDROCHLOROTHIAZIDE 25 MG: 25 TABLET ORAL at 08:13

## 2024-09-20 RX ADMIN — INSULIN LISPRO 3 UNITS: 100 INJECTION, SOLUTION INTRAVENOUS; SUBCUTANEOUS at 17:21

## 2024-09-20 RX ADMIN — OXYCODONE HYDROCHLORIDE 15 MG: 5 TABLET ORAL at 11:27

## 2024-09-20 RX ADMIN — CELECOXIB 200 MG: 200 CAPSULE ORAL at 08:09

## 2024-09-20 RX ADMIN — HYDROXYCHLOROQUINE SULFATE 400 MG: 200 TABLET ORAL at 08:09

## 2024-09-20 RX ADMIN — RIVAROXABAN 20 MG: 20 TABLET, FILM COATED ORAL at 08:13

## 2024-09-20 RX ADMIN — METOPROLOL TARTRATE 25 MG: 25 TABLET, FILM COATED ORAL at 08:13

## 2024-09-20 RX ADMIN — INSULIN LISPRO 2 UNITS: 100 INJECTION, SOLUTION INTRAVENOUS; SUBCUTANEOUS at 11:27

## 2024-09-20 RX ADMIN — Medication 10 ML: at 17:24

## 2024-09-20 RX ADMIN — Medication 10 ML: at 08:13

## 2024-09-20 RX ADMIN — ONDANSETRON 4 MG: 2 INJECTION INTRAMUSCULAR; INTRAVENOUS at 17:24

## 2024-09-20 RX ADMIN — AZITHROMYCIN DIHYDRATE 500 MG: 250 TABLET ORAL at 08:09

## 2024-09-20 RX ADMIN — ATORVASTATIN CALCIUM 5 MG: 10 TABLET, FILM COATED ORAL at 17:20

## 2024-09-20 RX ADMIN — THEOPHYLLINE ANHYDROUS 300 MG: 300 CAPSULE, EXTENDED RELEASE ORAL at 08:10

## 2024-09-25 ENCOUNTER — READMISSION MANAGEMENT (OUTPATIENT)
Dept: CALL CENTER | Facility: HOSPITAL | Age: 54
End: 2024-09-25
Payer: MEDICARE

## 2024-10-01 ENCOUNTER — READMISSION MANAGEMENT (OUTPATIENT)
Dept: CALL CENTER | Facility: HOSPITAL | Age: 54
End: 2024-10-01
Payer: MEDICARE

## 2024-10-01 NOTE — OUTREACH NOTE
Medical Week 2 Survey      Flowsheet Row Responses   [facility] facility patient discharged from? [facility]   Does the patient have one of the following disease processes/diagnoses(primary or secondary)? Other   Week 2 attempt successful? No   Unsuccessful attempts Attempt 1   oke Malini ESTRADA - Registered Nurse

## 2025-03-30 ENCOUNTER — APPOINTMENT (OUTPATIENT)
Dept: CT IMAGING | Facility: HOSPITAL | Age: 55
End: 2025-03-30
Payer: MEDICARE

## 2025-03-30 ENCOUNTER — APPOINTMENT (OUTPATIENT)
Dept: CARDIOLOGY | Facility: HOSPITAL | Age: 55
End: 2025-03-30
Payer: MEDICARE

## 2025-03-30 ENCOUNTER — HOSPITAL ENCOUNTER (OUTPATIENT)
Facility: HOSPITAL | Age: 55
Setting detail: OBSERVATION
Discharge: HOME HEALTH | End: 2025-04-01
Attending: EMERGENCY MEDICINE | Admitting: EMERGENCY MEDICINE
Payer: MEDICARE

## 2025-03-30 DIAGNOSIS — E16.2 HYPOGLYCEMIA: Primary | ICD-10-CM

## 2025-03-30 DIAGNOSIS — R53.1 WEAKNESS: ICD-10-CM

## 2025-03-30 DIAGNOSIS — R55 SYNCOPE, UNSPECIFIED SYNCOPE TYPE: ICD-10-CM

## 2025-03-30 LAB
ALBUMIN SERPL-MCNC: 4.2 G/DL (ref 3.5–5.2)
ALBUMIN/GLOB SERPL: 1.3 G/DL
ALP SERPL-CCNC: 98 U/L (ref 39–117)
ALT SERPL W P-5'-P-CCNC: 20 U/L (ref 1–33)
ANION GAP SERPL CALCULATED.3IONS-SCNC: 11.9 MMOL/L (ref 5–15)
AST SERPL-CCNC: 33 U/L (ref 1–32)
BACTERIA UR QL AUTO: ABNORMAL /HPF
BASOPHILS # BLD AUTO: 0.08 10*3/MM3 (ref 0–0.2)
BASOPHILS NFR BLD AUTO: 0.9 % (ref 0–1.5)
BILIRUB SERPL-MCNC: 0.2 MG/DL (ref 0–1.2)
BILIRUB UR QL STRIP: NEGATIVE
BUN SERPL-MCNC: 14 MG/DL (ref 6–20)
BUN/CREAT SERPL: 14.7 (ref 7–25)
CALCIUM SPEC-SCNC: 9.4 MG/DL (ref 8.6–10.5)
CHLORIDE SERPL-SCNC: 101 MMOL/L (ref 98–107)
CLARITY UR: CLEAR
CO2 SERPL-SCNC: 27.1 MMOL/L (ref 22–29)
COLOR UR: YELLOW
CREAT SERPL-MCNC: 0.95 MG/DL (ref 0.57–1)
DEPRECATED RDW RBC AUTO: 40.8 FL (ref 37–54)
EGFRCR SERPLBLD CKD-EPI 2021: 70.5 ML/MIN/1.73
EOSINOPHIL # BLD AUTO: 0.18 10*3/MM3 (ref 0–0.4)
EOSINOPHIL NFR BLD AUTO: 1.9 % (ref 0.3–6.2)
ERYTHROCYTE [DISTWIDTH] IN BLOOD BY AUTOMATED COUNT: 12.2 % (ref 12.3–15.4)
GLOBULIN UR ELPH-MCNC: 3.2 GM/DL
GLUCOSE BLDC GLUCOMTR-MCNC: 112 MG/DL (ref 70–105)
GLUCOSE BLDC GLUCOMTR-MCNC: 83 MG/DL (ref 70–105)
GLUCOSE BLDC GLUCOMTR-MCNC: 89 MG/DL (ref 70–105)
GLUCOSE BLDC GLUCOMTR-MCNC: 96 MG/DL (ref 70–105)
GLUCOSE SERPL-MCNC: 73 MG/DL (ref 65–99)
GLUCOSE UR STRIP-MCNC: NEGATIVE MG/DL
HCT VFR BLD AUTO: 39.4 % (ref 34–46.6)
HGB BLD-MCNC: 12.5 G/DL (ref 12–15.9)
HGB UR QL STRIP.AUTO: NEGATIVE
HOLD SPECIMEN: NORMAL
HYALINE CASTS UR QL AUTO: ABNORMAL /LPF
IMM GRANULOCYTES # BLD AUTO: 0.06 10*3/MM3 (ref 0–0.05)
IMM GRANULOCYTES NFR BLD AUTO: 0.6 % (ref 0–0.5)
KETONES UR QL STRIP: NEGATIVE
LEUKOCYTE ESTERASE UR QL STRIP.AUTO: ABNORMAL
LIPASE SERPL-CCNC: 22 U/L (ref 13–60)
LYMPHOCYTES # BLD AUTO: 2.88 10*3/MM3 (ref 0.7–3.1)
LYMPHOCYTES NFR BLD AUTO: 30.9 % (ref 19.6–45.3)
MAGNESIUM SERPL-MCNC: 2.1 MG/DL (ref 1.6–2.6)
MCH RBC QN AUTO: 28.9 PG (ref 26.6–33)
MCHC RBC AUTO-ENTMCNC: 31.7 G/DL (ref 31.5–35.7)
MCV RBC AUTO: 91.2 FL (ref 79–97)
MONOCYTES # BLD AUTO: 1.01 10*3/MM3 (ref 0.1–0.9)
MONOCYTES NFR BLD AUTO: 10.8 % (ref 5–12)
NEUTROPHILS NFR BLD AUTO: 5.12 10*3/MM3 (ref 1.7–7)
NEUTROPHILS NFR BLD AUTO: 54.9 % (ref 42.7–76)
NITRITE UR QL STRIP: NEGATIVE
NRBC BLD AUTO-RTO: 0 /100 WBC (ref 0–0.2)
PH UR STRIP.AUTO: <=5 [PH] (ref 5–8)
PLATELET # BLD AUTO: 325 10*3/MM3 (ref 140–450)
PMV BLD AUTO: 9.7 FL (ref 6–12)
POTASSIUM SERPL-SCNC: 3.5 MMOL/L (ref 3.5–5.2)
PROT SERPL-MCNC: 7.4 G/DL (ref 6–8.5)
PROT UR QL STRIP: NEGATIVE
RBC # BLD AUTO: 4.32 10*6/MM3 (ref 3.77–5.28)
RBC # UR STRIP: ABNORMAL /HPF
REF LAB TEST METHOD: ABNORMAL
SODIUM SERPL-SCNC: 140 MMOL/L (ref 136–145)
SP GR UR STRIP: 1.01 (ref 1–1.03)
SQUAMOUS #/AREA URNS HPF: ABNORMAL /HPF
T4 FREE SERPL-MCNC: 1.17 NG/DL (ref 0.93–1.7)
TSH SERPL DL<=0.05 MIU/L-ACNC: 1.58 UIU/ML (ref 0.27–4.2)
UROBILINOGEN UR QL STRIP: ABNORMAL
WBC # UR STRIP: ABNORMAL /HPF
WBC NRBC COR # BLD AUTO: 9.33 10*3/MM3 (ref 3.4–10.8)

## 2025-03-30 PROCEDURE — G0378 HOSPITAL OBSERVATION PER HR: HCPCS

## 2025-03-30 PROCEDURE — 83735 ASSAY OF MAGNESIUM: CPT

## 2025-03-30 PROCEDURE — 96366 THER/PROPH/DIAG IV INF ADDON: CPT

## 2025-03-30 PROCEDURE — 93356 MYOCRD STRAIN IMG SPCKL TRCK: CPT

## 2025-03-30 PROCEDURE — 84443 ASSAY THYROID STIM HORMONE: CPT

## 2025-03-30 PROCEDURE — 82948 REAGENT STRIP/BLOOD GLUCOSE: CPT

## 2025-03-30 PROCEDURE — 80053 COMPREHEN METABOLIC PANEL: CPT

## 2025-03-30 PROCEDURE — 96365 THER/PROPH/DIAG IV INF INIT: CPT

## 2025-03-30 PROCEDURE — 84439 ASSAY OF FREE THYROXINE: CPT | Performed by: NURSE PRACTITIONER

## 2025-03-30 PROCEDURE — 93005 ELECTROCARDIOGRAM TRACING: CPT

## 2025-03-30 PROCEDURE — 93306 TTE W/DOPPLER COMPLETE: CPT | Performed by: INTERNAL MEDICINE

## 2025-03-30 PROCEDURE — 25010000003 DEXTROSE 5 % SOLUTION: Performed by: NURSE PRACTITIONER

## 2025-03-30 PROCEDURE — 81001 URINALYSIS AUTO W/SCOPE: CPT

## 2025-03-30 PROCEDURE — 70450 CT HEAD/BRAIN W/O DYE: CPT

## 2025-03-30 PROCEDURE — 93005 ELECTROCARDIOGRAM TRACING: CPT | Performed by: EMERGENCY MEDICINE

## 2025-03-30 PROCEDURE — 93306 TTE W/DOPPLER COMPLETE: CPT

## 2025-03-30 PROCEDURE — 85025 COMPLETE CBC W/AUTO DIFF WBC: CPT

## 2025-03-30 PROCEDURE — 83690 ASSAY OF LIPASE: CPT

## 2025-03-30 PROCEDURE — 93356 MYOCRD STRAIN IMG SPCKL TRCK: CPT | Performed by: INTERNAL MEDICINE

## 2025-03-30 PROCEDURE — 99285 EMERGENCY DEPT VISIT HI MDM: CPT

## 2025-03-30 RX ORDER — POLYETHYLENE GLYCOL 3350 17 G/17G
17 POWDER, FOR SOLUTION ORAL DAILY PRN
Status: DISCONTINUED | OUTPATIENT
Start: 2025-03-30 | End: 2025-04-01 | Stop reason: HOSPADM

## 2025-03-30 RX ORDER — SODIUM CHLORIDE 0.9 % (FLUSH) 0.9 %
10 SYRINGE (ML) INJECTION AS NEEDED
Status: DISCONTINUED | OUTPATIENT
Start: 2025-03-30 | End: 2025-04-01 | Stop reason: HOSPADM

## 2025-03-30 RX ORDER — METOPROLOL TARTRATE 25 MG/1
25 TABLET, FILM COATED ORAL ONCE
Status: COMPLETED | OUTPATIENT
Start: 2025-03-30 | End: 2025-03-30

## 2025-03-30 RX ORDER — OXYCODONE HYDROCHLORIDE 5 MG/1
15 TABLET ORAL ONCE AS NEEDED
Refills: 0 | Status: COMPLETED | OUTPATIENT
Start: 2025-03-30 | End: 2025-03-30

## 2025-03-30 RX ORDER — BISACODYL 10 MG
10 SUPPOSITORY, RECTAL RECTAL DAILY PRN
Status: DISCONTINUED | OUTPATIENT
Start: 2025-03-30 | End: 2025-04-01 | Stop reason: HOSPADM

## 2025-03-30 RX ORDER — AMOXICILLIN 250 MG
2 CAPSULE ORAL 2 TIMES DAILY PRN
Status: DISCONTINUED | OUTPATIENT
Start: 2025-03-30 | End: 2025-04-01 | Stop reason: HOSPADM

## 2025-03-30 RX ORDER — HYDROCHLOROTHIAZIDE 25 MG/1
25 TABLET ORAL ONCE
Status: DISCONTINUED | OUTPATIENT
Start: 2025-03-30 | End: 2025-03-31

## 2025-03-30 RX ORDER — ACETAMINOPHEN 160 MG/5ML
650 SOLUTION ORAL EVERY 4 HOURS PRN
Status: DISCONTINUED | OUTPATIENT
Start: 2025-03-30 | End: 2025-04-01 | Stop reason: HOSPADM

## 2025-03-30 RX ORDER — TRIAMCINOLONE ACETONIDE 1 MG/G
1 CREAM TOPICAL EVERY 12 HOURS SCHEDULED
Status: COMPLETED | OUTPATIENT
Start: 2025-03-30 | End: 2025-03-30

## 2025-03-30 RX ORDER — ALUMINA, MAGNESIA, AND SIMETHICONE 2400; 2400; 240 MG/30ML; MG/30ML; MG/30ML
15 SUSPENSION ORAL EVERY 6 HOURS PRN
Status: DISCONTINUED | OUTPATIENT
Start: 2025-03-30 | End: 2025-04-01 | Stop reason: HOSPADM

## 2025-03-30 RX ORDER — SODIUM CHLORIDE 0.9 % (FLUSH) 0.9 %
10 SYRINGE (ML) INJECTION EVERY 12 HOURS SCHEDULED
Status: DISCONTINUED | OUTPATIENT
Start: 2025-03-30 | End: 2025-04-01 | Stop reason: HOSPADM

## 2025-03-30 RX ORDER — ONDANSETRON 4 MG/1
4 TABLET, ORALLY DISINTEGRATING ORAL EVERY 6 HOURS PRN
Status: DISCONTINUED | OUTPATIENT
Start: 2025-03-30 | End: 2025-04-01 | Stop reason: HOSPADM

## 2025-03-30 RX ORDER — BISACODYL 5 MG/1
5 TABLET, DELAYED RELEASE ORAL DAILY PRN
Status: DISCONTINUED | OUTPATIENT
Start: 2025-03-30 | End: 2025-04-01 | Stop reason: HOSPADM

## 2025-03-30 RX ORDER — ACETAMINOPHEN 650 MG/1
650 SUPPOSITORY RECTAL EVERY 4 HOURS PRN
Status: DISCONTINUED | OUTPATIENT
Start: 2025-03-30 | End: 2025-04-01 | Stop reason: HOSPADM

## 2025-03-30 RX ORDER — GABAPENTIN 400 MG/1
800 CAPSULE ORAL ONCE
Status: COMPLETED | OUTPATIENT
Start: 2025-03-30 | End: 2025-03-30

## 2025-03-30 RX ORDER — CYCLOBENZAPRINE HCL 10 MG
10 TABLET ORAL ONCE
Status: COMPLETED | OUTPATIENT
Start: 2025-03-30 | End: 2025-03-30

## 2025-03-30 RX ORDER — ACETAMINOPHEN 325 MG/1
650 TABLET ORAL EVERY 4 HOURS PRN
Status: DISCONTINUED | OUTPATIENT
Start: 2025-03-30 | End: 2025-04-01 | Stop reason: HOSPADM

## 2025-03-30 RX ORDER — DEXTROSE MONOHYDRATE 50 MG/ML
50 INJECTION, SOLUTION INTRAVENOUS CONTINUOUS
Status: DISPENSED | OUTPATIENT
Start: 2025-03-30 | End: 2025-03-31

## 2025-03-30 RX ORDER — ONDANSETRON 2 MG/ML
4 INJECTION INTRAMUSCULAR; INTRAVENOUS EVERY 6 HOURS PRN
Status: DISCONTINUED | OUTPATIENT
Start: 2025-03-30 | End: 2025-04-01 | Stop reason: HOSPADM

## 2025-03-30 RX ORDER — NICOTINE POLACRILEX 4 MG
15 LOZENGE BUCCAL
Status: DISCONTINUED | OUTPATIENT
Start: 2025-03-30 | End: 2025-04-01 | Stop reason: HOSPADM

## 2025-03-30 RX ORDER — SODIUM CHLORIDE 9 MG/ML
40 INJECTION, SOLUTION INTRAVENOUS AS NEEDED
Status: DISCONTINUED | OUTPATIENT
Start: 2025-03-30 | End: 2025-04-01 | Stop reason: HOSPADM

## 2025-03-30 RX ORDER — IBUPROFEN 600 MG/1
1 TABLET ORAL
Status: DISCONTINUED | OUTPATIENT
Start: 2025-03-30 | End: 2025-04-01 | Stop reason: HOSPADM

## 2025-03-30 RX ORDER — DEXTROSE MONOHYDRATE 25 G/50ML
25 INJECTION, SOLUTION INTRAVENOUS
Status: DISCONTINUED | OUTPATIENT
Start: 2025-03-30 | End: 2025-04-01 | Stop reason: HOSPADM

## 2025-03-30 RX ORDER — NITROGLYCERIN 0.4 MG/1
0.4 TABLET SUBLINGUAL
Status: DISCONTINUED | OUTPATIENT
Start: 2025-03-30 | End: 2025-04-01 | Stop reason: HOSPADM

## 2025-03-30 RX ADMIN — OXYCODONE HYDROCHLORIDE 15 MG: 5 TABLET ORAL at 22:21

## 2025-03-30 RX ADMIN — Medication 10 ML: at 20:46

## 2025-03-30 RX ADMIN — DEXTROSE MONOHYDRATE 50 ML/HR: 50 INJECTION, SOLUTION INTRAVENOUS at 22:21

## 2025-03-30 RX ADMIN — CYCLOBENZAPRINE HYDROCHLORIDE 10 MG: 10 TABLET, FILM COATED ORAL at 20:29

## 2025-03-30 RX ADMIN — DEXTROSE MONOHYDRATE 50 ML/HR: 50 INJECTION, SOLUTION INTRAVENOUS at 15:23

## 2025-03-30 RX ADMIN — METOPROLOL TARTRATE 25 MG: 25 TABLET, FILM COATED ORAL at 20:28

## 2025-03-30 RX ADMIN — GABAPENTIN 800 MG: 400 CAPSULE ORAL at 20:28

## 2025-03-30 RX ADMIN — TRIAMCINOLONE ACETONIDE 1 APPLICATION: 1 CREAM TOPICAL at 22:21

## 2025-03-30 NOTE — ED PROVIDER NOTES
Subjective   History of Present Illness  Chief complaint: Syncopal episode of [employer], hypoglycemia      Context: Patient is a 54-year-old female with a history of diabetes who presents to the ER for syncopal episode while at Syn[employer], and hypoglycemia.  Patient reports she could tell that her blood sugar was dropping, and she ate several food items to prevent her blood sugar from dropping, patient reports that she had syncopal episode, denies falling or hitting her head.  Patient reports while at Synagogue her blood sugar was in the 60s, EMS arrived stating her blood sugar was 126, and by the time patient has arrived in the ER blood sugar was down to 96.  Patient reports mild headache, states she gets that when she her blood sugar gets low.  Patient denies any chest pain, shortness of air, fever, abdominal pain, nausea/vomiting/diarrhea.  Patient reports she is on blood thinners.        PCP: Brian Gonzalez    LMP: Hysterectomy          Review of Systems   Constitutional:  Negative for fever.       Past Medical History:   Diagnosis Date    Asthma     Diabetes mellitus     DVT (deep venous thrombosis) 08/23/2024    right lower leg    Fibromyalgia     Lupus        Allergies   Allergen Reactions    Morphine Unknown - High Severity     Pt sates only in Pill form.       Past Surgical History:   Procedure Laterality Date    APPENDECTOMY      CHOLECYSTECTOMY N/A 8/23/2024    Procedure: CHOLECYSTECTOMY LAPAROSCOPIC WITH [employer]INCI ROBOT;  Surgeon: Bryce Huizar MD;  Location: [facility];  Service: Robotics - DaVinci;  Laterality: N/A;    HYSTERECTOMY      JOINT REPLACEMENT Right     KNEE SURGERY      SHOULDER ARTHROSCOPY         No family history on file.    Social History     Socioeconomic History    Marital status: Single   Tobacco Use    Smoking status: Never     Passive exposure: Never    Smokeless tobacco: Never   Vaping Use    Vaping status: Never Used   Substance and Sexual Activity    Alcohol use: Never    Drug  use: Never    Sexual activity: Never           Objective   Physical Exam  Vitals and nursing note reviewed.   Constitutional:       Appearance: Normal appearance.   HENT:      Head: Normocephalic.      Nose: Nose normal.      Mouth/Throat:      Mouth: Mucous membranes are moist.   Eyes:      Extraocular Movements: Extraocular movements intact.   Cardiovascular:      Rate and Rhythm: Normal rate and regular rhythm.      Pulses: Normal pulses.      Heart sounds: Normal heart sounds.   Pulmonary:      Effort: Pulmonary effort is normal.      Breath sounds: Normal breath sounds. No wheezing.   Abdominal:      Palpations: Abdomen is soft.      Tenderness: There is no abdominal tenderness. There is no guarding or rebound.   Musculoskeletal:         General: Normal range of motion.      Cervical back: Normal range of motion.   Skin:     General: Skin is warm and dry.      Capillary Refill: Capillary refill takes less than 2 seconds.   Neurological:      General: No focal deficit present.      Mental Status: She is alert and oriented to person, place, and time.      GCS: GCS eye subscore is 4. GCS verbal subscore is 5. GCS motor subscore is 6.      Motor: Motor function is intact.   Psychiatric:         Mood and Affect: Mood normal.         Behavior: Behavior normal.         Procedures           ED Course             /88   Pulse 67   Temp 98.7 °F (37.1 °C) (Oral)   Resp 18   SpO2 100%   Labs Reviewed   COMPREHENSIVE METABOLIC PANEL - Abnormal; Notable for the following components:       Result Value    AST (SGOT) 33 (*)     All other components within normal limits    Narrative:     GFR Categories in Chronic Kidney Disease (CKD)      GFR Category          GFR (mL/min/1.73)    Interpretation  G1                     90 or greater         Normal or high (1)  G2                      60-89                Mild decrease (1)  G3a                   45-59                Mild to moderate decrease  G3b                   30-44                 Moderate to severe decrease  G4                    15-29                Severe decrease  G5                    14 or less           Kidney failure          (1)In the absence of evidence of kidney disease, neither GFR category G1 or G2 fulfill the criteria for CKD.    eGFR calculation 2021 CKD-EPI creatinine equation, which does not include race as a factor   URINALYSIS W/ MICROSCOPIC IF INDICATED (NO CULTURE) - Abnormal; Notable for the following components:    Leuk Esterase, UA Trace (*)     All other components within normal limits   CBC WITH AUTO DIFFERENTIAL - Abnormal; Notable for the following components:    RDW 12.2 (*)     Immature Grans % 0.6 (*)     Monocytes, Absolute 1.01 (*)     Immature Grans, Absolute 0.06 (*)     All other components within normal limits   URINALYSIS, MICROSCOPIC ONLY - Abnormal; Notable for the following components:    WBC, UA 3-5 (*)     All other components within normal limits   LIPASE - Normal   MAGNESIUM - Normal   TSH RFX ON ABNORMAL TO FREE T4 - Normal   POCT GLUCOSE FINGERSTICK - Normal   POCT GLUCOSE FINGERSTICK - Normal   POCT GLUCOSE FINGERSTICK - Normal   CBC AND DIFFERENTIAL    Narrative:     The following orders were created for panel order CBC & Differential.  Procedure                               Abnormality         Status                     ---------                               -----------         ------                     CBC Auto Differential[[id number]]        Abnormal            Final result                 Please view results for these tests on the individual orders.   EXTRA TUBES    Narrative:     The following orders were created for panel order Extra Tubes.  Procedure                               Abnormality         Status                     ---------                               -----------         ------                     Gold Top - SST[[id number]]                                   Final result                 Please view results  for these tests on the individual orders.   GOLD TOP - SST     Medications   sodium chloride 0.9 % flush 10 mL (has no administration in time range)     CT Head Without Contrast  Result Date: 3/30/2025  Impression: 1.No acute intracranial abnormality. Specifically, no evidence of acute hemorrhage, mass effect or midline shift. 2.Two small extra-axial masses in the high right parietal region, one of which is partially calcified. These are likely meningiomas. Only 1 of these has been described on patient's prior outside imaging reports from [place] 2022. Electronically Signed: Fede Chavez  3/30/2025 1:17 PM EDT  Workstation ID: [id number]                                              Medical Decision Making  Radiology interpretation: CT head reviewed and interpreted by Mikal: .No acute intracranial abnormality. Specifically, no evidence of acute hemorrhage, mass effect or midline shift.  2.Two small extra-axial masses in the high right parietal region, one of which is partially calcified. These are likely meningiomas. Only 1 of these has been described on patient's prior outside imaging reports from [place] 2022.  Further interpretation by radiologist as above    Lab interpretation:  Labs all viewed by me and significant for: POC glucose 89.  White count 9.33, hemoglobin 12.5, lipase 22, TSH 1.580, mag 2.1, BUN 14, creatinine 0.95, glucose 73, sodium 140, potassium 3.5, ALT 20, AST 33.  UA-trace leuks, WBCs 3-5.    EKG viewed and interpreted by Dr. Ren: Sinus rhythm, probable left atrial enlargement, rate 71, QTc 440, no acute ST elevation noted.  comparison: Dated 9/18/2024.  Sinus rhythm, prolonged QTc interval, rate 79, QTc 518.    IV was established and labs and scans were obtained to evaluate for infection, electrolyte abnormalities, anemia, hypo-/hypermagnesemia, hypo-/hyperthyroidism, ICH, this is not an all-inclusive list.  Patient is a 54-year-old female with a history of diabetes, lupus, and DVTs who  presents to the ER via EMS for above complaint.  On initial examination patient is resting comfortably in the bed, nontoxic in appearance with no signs and symptoms of distress.  Physical examination revealed lungs clear bilaterally on auscultation, no tenderness noted to abdomen on palpation, no neurofocal deficits noted.  Patient reports she ate breakfast this morning and several other light snacks such as yogurt, states she started feeling as though her blood sugar was dropping and ate a couple more snacks while she was in [employer].  Patient reports she did have a syncopal episode, denies actual falling or hitting her head.  EMS arrived and stated her sugar was 126 on arrival.  By the time patient got here her blood sugar was down to 96, and a repeat blood sugar was done 15 minutes later and her blood sugar was 89.  Patient is alert and oriented, and no slurred speech.  CT head was ordered due to the fact the patient is on blood thinners.  Patient was given juice, a Boost and a frozen meal.  Recheck of patient's sugar was 83.  Patient continues to be asymptomatic during this ER visit.  Discussed with Dr. Ren who suggested placing her in OBS unit overnight.  Spoke with MARJ Bray with OBS unit who agreed to assume care.  On reexamination patient was resting comfortably in the bed, nontoxic in appearance with no signs and symptoms of distress.  Discussed findings and decision to place patient in OBS unit overnight.  Patient was reluctant at first however after discussion and answering patient's questions patient was agreeable to being placed in the OBS unit overnight for observation.      Appropriate PPE worn during exam.  Discussed care with: Katarina Cardenas NP with OBS unit    Problems Addressed:  Hypoglycemia: complicated acute illness or injury  Syncope, unspecified syncope type: complicated acute illness or injury    Amount and/or Complexity of Data Reviewed  Labs: ordered.  Radiology:  ordered.    Risk  Prescription drug management.  Decision regarding hospitalization.        Final diagnoses:   Hypoglycemia   Syncope, unspecified syncope type       ED Disposition  ED Disposition       ED Disposition   Decision to Admit    Condition   --    Comment   Level of Care: Observation Unit [28]   Diagnosis: Hypoglycemia [099085]   Admitting Physician: SHWETA VALLADARES [[id number]]   Attending Physician: SHWETA VALLADARES [[id number]]                 No follow-up provider specified.       Medication List      No changes were made to your prescriptions during this visit.            Yudy Sheffield, APRN  03/30/25 1447

## 2025-03-30 NOTE — ED NOTES
2 juices given to pt to raise BG. Called cafeteria to send PB crackers to stabilize BG. Cafeteria is going to look to see if they have any.

## 2025-03-30 NOTE — NURSING NOTE
I began with patients admission questions, while I was in the room the person who performs Transthoracic Echoes arrived in the patients room to do the patients Echo. The Echo tech asked if I wanted her to come back at a later time, to not delay patient care I stepped out of the patients room so she could move forward with the Echo.

## 2025-03-30 NOTE — PLAN OF CARE
Goal Outcome Evaluation:  Plan of Care Reviewed With: patient        Progress: no change  Outcome Evaluation: New admission for syncopal episode related to hypoglycemia

## 2025-03-30 NOTE — ED NOTES
Pt given a boost and frozen meal to stabilize BG with protein. Asked provider to come into room as pt claims these interventions will not work.

## 2025-03-31 ENCOUNTER — APPOINTMENT (OUTPATIENT)
Dept: CT IMAGING | Facility: HOSPITAL | Age: 55
End: 2025-03-31
Payer: MEDICARE

## 2025-03-31 LAB
ANION GAP SERPL CALCULATED.3IONS-SCNC: 12.4 MMOL/L (ref 5–15)
ANION GAP SERPL CALCULATED.3IONS-SCNC: 8.4 MMOL/L (ref 5–15)
AORTIC DIMENSIONLESS INDEX: 0.86 (DI)
AV MEAN PRESS GRAD SYS DOP V1V2: 5 MMHG
AV VMAX SYS DOP: 147 CM/SEC
BASOPHILS # BLD AUTO: 0.06 10*3/MM3 (ref 0–0.2)
BASOPHILS NFR BLD AUTO: 0.7 % (ref 0–1.5)
BH CV ECHO LEFT VENTRICLE GLOBAL LONGITUDINAL STRAIN: -26.4 %
BH CV ECHO MEAS - ACS: 2.2 CM
BH CV ECHO MEAS - AO MAX PG: 8.6 MMHG
BH CV ECHO MEAS - AO V2 VTI: 28 CM
BH CV ECHO MEAS - AVA(I,D): 2.45 CM2
BH CV ECHO MEAS - EDV(CUBED): 91.1 ML
BH CV ECHO MEAS - EDV(MOD-SP4): 85.2 ML
BH CV ECHO MEAS - EF(MOD-SP4): 65.8 %
BH CV ECHO MEAS - ESV(CUBED): 24.4 ML
BH CV ECHO MEAS - ESV(MOD-SP4): 29.1 ML
BH CV ECHO MEAS - FS: 35.6 %
BH CV ECHO MEAS - IVS/LVPW: 1 CM
BH CV ECHO MEAS - IVSD: 1 CM
BH CV ECHO MEAS - LA DIMENSION: 4.2 CM
BH CV ECHO MEAS - LAT PEAK E' VEL: 17.5 CM/SEC
BH CV ECHO MEAS - LV MASS(C)D: 153.3 GRAMS
BH CV ECHO MEAS - LV MAX PG: 5.9 MMHG
BH CV ECHO MEAS - LV MEAN PG: 3 MMHG
BH CV ECHO MEAS - LV V1 MAX: 121 CM/SEC
BH CV ECHO MEAS - LV V1 VTI: 24.2 CM
BH CV ECHO MEAS - LVIDD: 4.5 CM
BH CV ECHO MEAS - LVIDS: 2.9 CM
BH CV ECHO MEAS - LVOT AREA: 2.8 CM2
BH CV ECHO MEAS - LVOT DIAM: 1.9 CM
BH CV ECHO MEAS - LVPWD: 1 CM
BH CV ECHO MEAS - MED PEAK E' VEL: 9.7 CM/SEC
BH CV ECHO MEAS - MV A MAX VEL: 120 CM/SEC
BH CV ECHO MEAS - MV DEC SLOPE: 434 CM/SEC2
BH CV ECHO MEAS - MV DEC TIME: 0.25 SEC
BH CV ECHO MEAS - MV E MAX VEL: 82.1 CM/SEC
BH CV ECHO MEAS - MV E/A: 0.68
BH CV ECHO MEAS - MV MAX PG: 4.9 MMHG
BH CV ECHO MEAS - MV MEAN PG: 2 MMHG
BH CV ECHO MEAS - MV P1/2T: 60.6 MSEC
BH CV ECHO MEAS - MV V2 VTI: 28 CM
BH CV ECHO MEAS - MVA(P1/2T): 3.6 CM2
BH CV ECHO MEAS - MVA(VTI): 2.45 CM2
BH CV ECHO MEAS - PA ACC TIME: 0.13 SEC
BH CV ECHO MEAS - PA V2 MAX: 118 CM/SEC
BH CV ECHO MEAS - PULM A REVS DUR: 0.11 SEC
BH CV ECHO MEAS - PULM A REVS VEL: 36.5 CM/SEC
BH CV ECHO MEAS - PULM DIAS VEL: 43.8 CM/SEC
BH CV ECHO MEAS - PULM S/D: 1.66
BH CV ECHO MEAS - PULM SYS VEL: 72.5 CM/SEC
BH CV ECHO MEAS - RAP SYSTOLE: 3 MMHG
BH CV ECHO MEAS - RV MAX PG: 2.01 MMHG
BH CV ECHO MEAS - RV V1 MAX: 70.8 CM/SEC
BH CV ECHO MEAS - RV V1 VTI: 15.2 CM
BH CV ECHO MEAS - RVDD: 3.3 CM
BH CV ECHO MEAS - RVSP: 37.8 MMHG
BH CV ECHO MEAS - SV(LVOT): 68.6 ML
BH CV ECHO MEAS - SV(MOD-SP4): 56.1 ML
BH CV ECHO MEAS - TAPSE (>1.6): 2.09 CM
BH CV ECHO MEAS - TR MAX PG: 34.8 MMHG
BH CV ECHO MEAS - TR MAX VEL: 295 CM/SEC
BH CV ECHO MEASUREMENTS AVERAGE E/E' RATIO: 6.04
BH CV XLRA - TDI S': 18.3 CM/SEC
BUN SERPL-MCNC: 10 MG/DL (ref 6–20)
BUN SERPL-MCNC: 11 MG/DL (ref 6–20)
BUN/CREAT SERPL: 11.8 (ref 7–25)
BUN/CREAT SERPL: 13.4 (ref 7–25)
CALCIUM SPEC-SCNC: 8.8 MG/DL (ref 8.6–10.5)
CALCIUM SPEC-SCNC: 9.6 MG/DL (ref 8.6–10.5)
CHLORIDE SERPL-SCNC: 103 MMOL/L (ref 98–107)
CHLORIDE SERPL-SCNC: 99 MMOL/L (ref 98–107)
CO2 SERPL-SCNC: 28.6 MMOL/L (ref 22–29)
CO2 SERPL-SCNC: 29.6 MMOL/L (ref 22–29)
CREAT SERPL-MCNC: 0.82 MG/DL (ref 0.57–1)
CREAT SERPL-MCNC: 0.85 MG/DL (ref 0.57–1)
DEPRECATED RDW RBC AUTO: 41.1 FL (ref 37–54)
EGFRCR SERPLBLD CKD-EPI 2021: 80.5 ML/MIN/1.73
EGFRCR SERPLBLD CKD-EPI 2021: 84.1 ML/MIN/1.73
EOSINOPHIL # BLD AUTO: 0.2 10*3/MM3 (ref 0–0.4)
EOSINOPHIL NFR BLD AUTO: 2.3 % (ref 0.3–6.2)
ERYTHROCYTE [DISTWIDTH] IN BLOOD BY AUTOMATED COUNT: 12.3 % (ref 12.3–15.4)
GLUCOSE BLDC GLUCOMTR-MCNC: 106 MG/DL (ref 70–105)
GLUCOSE BLDC GLUCOMTR-MCNC: 110 MG/DL (ref 70–105)
GLUCOSE BLDC GLUCOMTR-MCNC: 117 MG/DL (ref 70–105)
GLUCOSE BLDC GLUCOMTR-MCNC: 133 MG/DL (ref 70–105)
GLUCOSE BLDC GLUCOMTR-MCNC: 90 MG/DL (ref 70–105)
GLUCOSE BLDC GLUCOMTR-MCNC: 92 MG/DL (ref 70–105)
GLUCOSE BLDC GLUCOMTR-MCNC: 94 MG/DL (ref 70–105)
GLUCOSE SERPL-MCNC: 102 MG/DL (ref 65–99)
GLUCOSE SERPL-MCNC: 91 MG/DL (ref 65–99)
HBA1C MFR BLD: 5.72 % (ref 4.8–5.6)
HCT VFR BLD AUTO: 39.3 % (ref 34–46.6)
HGB BLD-MCNC: 12.7 G/DL (ref 12–15.9)
IMM GRANULOCYTES # BLD AUTO: 0.04 10*3/MM3 (ref 0–0.05)
IMM GRANULOCYTES NFR BLD AUTO: 0.5 % (ref 0–0.5)
LEFT ATRIUM VOLUME INDEX: 22.3 ML/M2
LV EF BIPLANE MOD: 66 %
LYMPHOCYTES # BLD AUTO: 3.34 10*3/MM3 (ref 0.7–3.1)
LYMPHOCYTES NFR BLD AUTO: 38.2 % (ref 19.6–45.3)
MCH RBC QN AUTO: 29.4 PG (ref 26.6–33)
MCHC RBC AUTO-ENTMCNC: 32.3 G/DL (ref 31.5–35.7)
MCV RBC AUTO: 91 FL (ref 79–97)
MONOCYTES # BLD AUTO: 0.81 10*3/MM3 (ref 0.1–0.9)
MONOCYTES NFR BLD AUTO: 9.3 % (ref 5–12)
NEUTROPHILS NFR BLD AUTO: 4.29 10*3/MM3 (ref 1.7–7)
NEUTROPHILS NFR BLD AUTO: 49 % (ref 42.7–76)
NRBC BLD AUTO-RTO: 0 /100 WBC (ref 0–0.2)
PLATELET # BLD AUTO: 330 10*3/MM3 (ref 140–450)
PMV BLD AUTO: 10.1 FL (ref 6–12)
POTASSIUM SERPL-SCNC: 3.4 MMOL/L (ref 3.5–5.2)
POTASSIUM SERPL-SCNC: 3.4 MMOL/L (ref 3.5–5.2)
POTASSIUM SERPL-SCNC: 3.5 MMOL/L (ref 3.5–5.2)
QT INTERVAL: 405 MS
QTC INTERVAL: 440 MS
RBC # BLD AUTO: 4.32 10*6/MM3 (ref 3.77–5.28)
SINUS: 3 CM
SODIUM SERPL-SCNC: 140 MMOL/L (ref 136–145)
SODIUM SERPL-SCNC: 141 MMOL/L (ref 136–145)
WBC NRBC COR # BLD AUTO: 8.74 10*3/MM3 (ref 3.4–10.8)

## 2025-03-31 PROCEDURE — 74176 CT ABD & PELVIS W/O CONTRAST: CPT

## 2025-03-31 PROCEDURE — 94640 AIRWAY INHALATION TREATMENT: CPT

## 2025-03-31 PROCEDURE — 25010000002 KETOROLAC TROMETHAMINE PER 15 MG: Performed by: NURSE PRACTITIONER

## 2025-03-31 PROCEDURE — 94761 N-INVAS EAR/PLS OXIMETRY MLT: CPT

## 2025-03-31 PROCEDURE — G0378 HOSPITAL OBSERVATION PER HR: HCPCS

## 2025-03-31 PROCEDURE — 99204 OFFICE O/P NEW MOD 45 MIN: CPT | Performed by: INTERNAL MEDICINE

## 2025-03-31 PROCEDURE — 80048 BASIC METABOLIC PNL TOTAL CA: CPT | Performed by: NURSE PRACTITIONER

## 2025-03-31 PROCEDURE — 82948 REAGENT STRIP/BLOOD GLUCOSE: CPT

## 2025-03-31 PROCEDURE — 96366 THER/PROPH/DIAG IV INF ADDON: CPT

## 2025-03-31 PROCEDURE — 96375 TX/PRO/DX INJ NEW DRUG ADDON: CPT

## 2025-03-31 PROCEDURE — 85025 COMPLETE CBC W/AUTO DIFF WBC: CPT | Performed by: NURSE PRACTITIONER

## 2025-03-31 PROCEDURE — 99214 OFFICE O/P EST MOD 30 MIN: CPT | Performed by: INTERNAL MEDICINE

## 2025-03-31 PROCEDURE — 83036 HEMOGLOBIN GLYCOSYLATED A1C: CPT | Performed by: NURSE PRACTITIONER

## 2025-03-31 PROCEDURE — 94799 UNLISTED PULMONARY SVC/PX: CPT

## 2025-03-31 PROCEDURE — 82948 REAGENT STRIP/BLOOD GLUCOSE: CPT | Performed by: NURSE PRACTITIONER

## 2025-03-31 RX ORDER — KETOROLAC TROMETHAMINE 30 MG/ML
15 INJECTION, SOLUTION INTRAMUSCULAR; INTRAVENOUS ONCE
Status: COMPLETED | OUTPATIENT
Start: 2025-03-31 | End: 2025-03-31

## 2025-03-31 RX ORDER — DEXTROSE MONOHYDRATE 50 MG/ML
50 INJECTION, SOLUTION INTRAVENOUS CONTINUOUS
Status: DISCONTINUED | OUTPATIENT
Start: 2025-03-31 | End: 2025-03-31

## 2025-03-31 RX ORDER — CYCLOBENZAPRINE HCL 10 MG
10 TABLET ORAL 3 TIMES DAILY PRN
Status: DISCONTINUED | OUTPATIENT
Start: 2025-03-31 | End: 2025-03-31

## 2025-03-31 RX ORDER — HYDROXYCHLOROQUINE SULFATE 200 MG/1
400 TABLET, FILM COATED ORAL DAILY
Status: DISCONTINUED | OUTPATIENT
Start: 2025-04-01 | End: 2025-04-01 | Stop reason: HOSPADM

## 2025-03-31 RX ORDER — HYDROCHLOROTHIAZIDE 25 MG/1
25 TABLET ORAL DAILY
Status: DISCONTINUED | OUTPATIENT
Start: 2025-03-31 | End: 2025-04-01 | Stop reason: HOSPADM

## 2025-03-31 RX ORDER — CLOPIDOGREL BISULFATE 75 MG/1
75 TABLET ORAL DAILY
Status: DISCONTINUED | OUTPATIENT
Start: 2025-03-31 | End: 2025-03-31

## 2025-03-31 RX ORDER — SORBITOL SOLUTION 70 %
50 SOLUTION, ORAL MISCELLANEOUS ONCE
Status: COMPLETED | OUTPATIENT
Start: 2025-03-31 | End: 2025-03-31

## 2025-03-31 RX ORDER — DICYCLOMINE HYDROCHLORIDE 10 MG/1
10 CAPSULE ORAL 4 TIMES DAILY
Status: DISCONTINUED | OUTPATIENT
Start: 2025-03-31 | End: 2025-04-01 | Stop reason: HOSPADM

## 2025-03-31 RX ORDER — CYCLOBENZAPRINE HCL 10 MG
10 TABLET ORAL EVERY 6 HOURS PRN
Status: DISCONTINUED | OUTPATIENT
Start: 2025-03-31 | End: 2025-04-01 | Stop reason: HOSPADM

## 2025-03-31 RX ORDER — LOSARTAN POTASSIUM 25 MG/1
25 TABLET ORAL NIGHTLY
Status: DISCONTINUED | OUTPATIENT
Start: 2025-03-31 | End: 2025-04-01 | Stop reason: HOSPADM

## 2025-03-31 RX ORDER — POTASSIUM CHLORIDE 1500 MG/1
40 TABLET, EXTENDED RELEASE ORAL ONCE
Status: COMPLETED | OUTPATIENT
Start: 2025-03-31 | End: 2025-03-31

## 2025-03-31 RX ORDER — ATORVASTATIN CALCIUM 10 MG/1
5 TABLET, FILM COATED ORAL NIGHTLY
Status: DISCONTINUED | OUTPATIENT
Start: 2025-03-31 | End: 2025-04-01 | Stop reason: HOSPADM

## 2025-03-31 RX ORDER — HYDROXYCHLOROQUINE SULFATE 200 MG/1
400 TABLET, FILM COATED ORAL DAILY
Status: DISCONTINUED | OUTPATIENT
Start: 2025-03-31 | End: 2025-03-31

## 2025-03-31 RX ORDER — OXYCODONE HYDROCHLORIDE 5 MG/1
15 TABLET ORAL EVERY 4 HOURS
Refills: 0 | Status: DISCONTINUED | OUTPATIENT
Start: 2025-03-31 | End: 2025-04-01 | Stop reason: HOSPADM

## 2025-03-31 RX ORDER — CETIRIZINE HYDROCHLORIDE 10 MG/1
10 TABLET ORAL DAILY
Status: DISCONTINUED | OUTPATIENT
Start: 2025-03-31 | End: 2025-04-01 | Stop reason: HOSPADM

## 2025-03-31 RX ORDER — GUAIFENESIN 600 MG/1
600 TABLET, EXTENDED RELEASE ORAL EVERY 12 HOURS SCHEDULED
Status: DISCONTINUED | OUTPATIENT
Start: 2025-03-31 | End: 2025-03-31

## 2025-03-31 RX ORDER — CLOPIDOGREL BISULFATE 75 MG/1
75 TABLET ORAL DAILY
Status: DISCONTINUED | OUTPATIENT
Start: 2025-03-31 | End: 2025-04-01 | Stop reason: HOSPADM

## 2025-03-31 RX ORDER — VENLAFAXINE HYDROCHLORIDE 37.5 MG/1
37.5 CAPSULE, EXTENDED RELEASE ORAL DAILY
Status: DISCONTINUED | OUTPATIENT
Start: 2025-03-31 | End: 2025-04-01 | Stop reason: HOSPADM

## 2025-03-31 RX ORDER — OXYCODONE HYDROCHLORIDE 5 MG/1
15 TABLET ORAL ONCE
Refills: 0 | Status: COMPLETED | OUTPATIENT
Start: 2025-03-31 | End: 2025-03-31

## 2025-03-31 RX ORDER — BUDESONIDE AND FORMOTEROL FUMARATE DIHYDRATE 160; 4.5 UG/1; UG/1
2 AEROSOL RESPIRATORY (INHALATION)
Status: DISCONTINUED | OUTPATIENT
Start: 2025-03-31 | End: 2025-04-01 | Stop reason: HOSPADM

## 2025-03-31 RX ORDER — HYDROCODONE BITARTRATE AND ACETAMINOPHEN 5; 325 MG/1; MG/1
1 TABLET ORAL ONCE
Refills: 0 | Status: COMPLETED | OUTPATIENT
Start: 2025-04-01 | End: 2025-03-31

## 2025-03-31 RX ORDER — GABAPENTIN 400 MG/1
800 CAPSULE ORAL EVERY 8 HOURS SCHEDULED
Status: DISCONTINUED | OUTPATIENT
Start: 2025-03-31 | End: 2025-04-01 | Stop reason: HOSPADM

## 2025-03-31 RX ORDER — IPRATROPIUM BROMIDE AND ALBUTEROL SULFATE 2.5; .5 MG/3ML; MG/3ML
3 SOLUTION RESPIRATORY (INHALATION) EVERY 4 HOURS PRN
Status: DISCONTINUED | OUTPATIENT
Start: 2025-03-31 | End: 2025-04-01 | Stop reason: HOSPADM

## 2025-03-31 RX ORDER — METOPROLOL TARTRATE 25 MG/1
25 TABLET, FILM COATED ORAL 2 TIMES DAILY
Status: DISCONTINUED | OUTPATIENT
Start: 2025-03-31 | End: 2025-04-01 | Stop reason: HOSPADM

## 2025-03-31 RX ADMIN — OXYCODONE HYDROCHLORIDE 15 MG: 5 TABLET ORAL at 12:45

## 2025-03-31 RX ADMIN — OXYCODONE HYDROCHLORIDE 15 MG: 5 TABLET ORAL at 20:52

## 2025-03-31 RX ADMIN — ATORVASTATIN CALCIUM 5 MG: 10 TABLET ORAL at 20:53

## 2025-03-31 RX ADMIN — OXYCODONE HYDROCHLORIDE 15 MG: 5 TABLET ORAL at 08:10

## 2025-03-31 RX ADMIN — DICYCLOMINE HYDROCHLORIDE 10 MG: 10 CAPSULE ORAL at 20:57

## 2025-03-31 RX ADMIN — BUDESONIDE AND FORMOTEROL FUMARATE DIHYDRATE 2 PUFF: 160; 4.5 AEROSOL RESPIRATORY (INHALATION) at 07:57

## 2025-03-31 RX ADMIN — VENLAFAXINE HYDROCHLORIDE 37.5 MG: 37.5 CAPSULE, EXTENDED RELEASE ORAL at 08:10

## 2025-03-31 RX ADMIN — HYDROXYCHLOROQUINE SULFATE 400 MG: 200 TABLET ORAL at 08:10

## 2025-03-31 RX ADMIN — LOSARTAN POTASSIUM 25 MG: 25 TABLET, FILM COATED ORAL at 20:59

## 2025-03-31 RX ADMIN — SORBITOL SOLUTION (BULK) 50 ML: 70 SOLUTION at 17:20

## 2025-03-31 RX ADMIN — CYCLOBENZAPRINE HYDROCHLORIDE 10 MG: 10 TABLET, FILM COATED ORAL at 14:40

## 2025-03-31 RX ADMIN — POTASSIUM CHLORIDE 40 MEQ: 1500 TABLET, EXTENDED RELEASE ORAL at 11:30

## 2025-03-31 RX ADMIN — GABAPENTIN 800 MG: 400 CAPSULE ORAL at 08:10

## 2025-03-31 RX ADMIN — BUDESONIDE AND FORMOTEROL FUMARATE DIHYDRATE 2 PUFF: 160; 4.5 AEROSOL RESPIRATORY (INHALATION) at 18:00

## 2025-03-31 RX ADMIN — OXYCODONE HYDROCHLORIDE 15 MG: 5 TABLET ORAL at 04:49

## 2025-03-31 RX ADMIN — CYCLOBENZAPRINE HYDROCHLORIDE 10 MG: 10 TABLET, FILM COATED ORAL at 08:10

## 2025-03-31 RX ADMIN — HYDROCHLOROTHIAZIDE 25 MG: 25 TABLET ORAL at 08:10

## 2025-03-31 RX ADMIN — POTASSIUM CHLORIDE 40 MEQ: 1500 TABLET, EXTENDED RELEASE ORAL at 17:20

## 2025-03-31 RX ADMIN — GABAPENTIN 800 MG: 400 CAPSULE ORAL at 20:55

## 2025-03-31 RX ADMIN — Medication 10 ML: at 20:54

## 2025-03-31 RX ADMIN — GABAPENTIN 800 MG: 400 CAPSULE ORAL at 14:40

## 2025-03-31 RX ADMIN — HYDROCODONE BITARTRATE AND ACETAMINOPHEN 1 TABLET: 5; 325 TABLET ORAL at 23:25

## 2025-03-31 RX ADMIN — CLOPIDOGREL BISULFATE 75 MG: 75 TABLET, FILM COATED ORAL at 11:30

## 2025-03-31 RX ADMIN — CETIRIZINE HYDROCHLORIDE 10 MG: 10 TABLET, FILM COATED ORAL at 08:10

## 2025-03-31 RX ADMIN — KETOROLAC TROMETHAMINE 15 MG: 30 INJECTION, SOLUTION INTRAMUSCULAR at 17:20

## 2025-03-31 RX ADMIN — CYCLOBENZAPRINE HYDROCHLORIDE 10 MG: 10 TABLET, FILM COATED ORAL at 20:56

## 2025-03-31 NOTE — PLAN OF CARE
Goal Outcome Evaluation:    IVF infusing. Awaiting endo consult for further recommendations

## 2025-03-31 NOTE — CONSULTS
ENDOCRINE INITIAL CONSULT NOTE  DATE OF SERVICE: 25  Patient Care Team:  Brian Gonzalez MD as PCP - General (Family Medicine)        PATIENT NAME: Lisa Jay  PATIENT : 1970 AGE: 54 y.o.  MRN NUMBER: [id number]  PRIMARY CARE: Brian Gonzalez MD    ==========================================================================    REASON FOR CONSULT: Hypoglycemia    HPI / SUBJECTIVE  54 y.o. female with multiple medical problems including type 2 diabetes and morbid obesity currently following endocrinology team at [facility] admitted the hospital for hypoglycemia and syncope.  Patient with longstanding history of hypoglycemic episodes and currently following endocrinology at [facility].  Patient also was history significant for type 2 diabetes currently being managed with Mounjaro therapy.  Patient had similar episode in the past in  while patient was maintained on Trulicity therapy.  Patient reports that she was doing stable on high-protein and low carbohydrate/low carbohydrate but she was recently told to start take carbohydrates since then patient has been experiencing low blood sugars.  Patient reports to have symptoms of hypoglycemia when the blood sugar goes up to 110 and patient reports to have syncopal episode and the blood sugar which is less than 90.  On chart review patient had a BMI of 47.98 in  and now patient have a BMI of 38.89.    ==========================================================================                                                PAST MEDICAL HISTORY    Past Medical History:   Diagnosis Date    Asthma     Diabetes mellitus     DVT (deep venous thrombosis) 2024    right lower leg    Fibromyalgia     Lupus        ==========================================================================    PAST SURGICAL HISTORY    Past Surgical History:   Procedure Laterality Date    APPENDECTOMY      CHOLECYSTECTOMY N/A 2024    Procedure:  CHOLECYSTECTOMY LAPAROSCOPIC WITH DAVINCI ROBOT;  Surgeon: Bryce Huizar MD;  Location: [facility] MAIN OR;  Service: Robotics - DaVinci;  Laterality: N/A;    HYSTERECTOMY      JOINT REPLACEMENT Right     KNEE SURGERY      SHOULDER ARTHROSCOPY         ==========================================================================    FAMILY HISTORY    History reviewed. No pertinent family history.    ==========================================================================    SOCIAL HISTORY    Social History     Socioeconomic History    Marital status: Single   Tobacco Use    Smoking status: Never     Passive exposure: Never    Smokeless tobacco: Never   Vaping Use    Vaping status: Never Used   Substance and Sexual Activity    Alcohol use: Never    Drug use: Never    Sexual activity: Never       ==========================================================================    HOME MEDICATIONS REPORTED ON ADMISSION      Current Facility-Administered Medications:     acetaminophen (TYLENOL) tablet 650 mg, 650 mg, Oral, Q4H PRN **OR** acetaminophen (TYLENOL) 160 MG/5ML oral solution 650 mg, 650 mg, Oral, Q4H PRN **OR** acetaminophen (TYLENOL) suppository 650 mg, 650 mg, Rectal, Q4H PRN, TripKendall garciananda ANNMARIE STEWARDN    aluminum-magnesium hydroxide-simethicone (MAALOX MAX) 400-400-40 MG/5ML suspension 15 mL, 15 mL, Oral, Q6H PRN, Tripradha Katarina S APRN    atorvastatin (LIPITOR) tablet 5 mg, 5 mg, Oral, Nightly, TripKendall garciananda S, APRN    sennosides-docusate (PERICOLACE) 8.6-50 MG per tablet 2 tablet, 2 tablet, Oral, BID PRN **AND** polyethylene glycol (MIRALAX) packet 17 g, 17 g, Oral, Daily PRN **AND** bisacodyl (DULCOLAX) EC tablet 5 mg, 5 mg, Oral, Daily PRN **AND** bisacodyl (DULCOLAX) suppository 10 mg, 10 mg, Rectal, Daily PRN, Katarina Hall APRN    budesonide-formoterol (SYMBICORT) 160-4.5 MCG/ACT inhaler 2 puff, 2 puff, Inhalation, BID - RT, Katarina Hall APRN, 2 puff at 03/31/25 0757    cetirizine  (zyrTEC) tablet 10 mg, 10 mg, Oral, Daily, Tripure, Katarina S, APRN, 10 mg at 03/31/25 0810    clopidogrel (PLAVIX) tablet 75 mg, 75 mg, Oral, Daily, Tripure, Katarina S, APRN, 75 mg at 03/31/25 1130    cyclobenzaprine (FLEXERIL) tablet 10 mg, 10 mg, Oral, Q6H PRN, Tripure, Katarina S, APRN, 10 mg at 03/31/25 1440    dextrose (D50W) (25 g/50 mL) IV injection 25 g, 25 g, Intravenous, Q15 Min PRN, Tripure, Katarina S, APRN    dextrose (GLUTOSE) oral gel 15 g, 15 g, Oral, Q15 Min PRN, Tripure, Katarina S, APRN    dicyclomine (BENTYL) capsule 10 mg, 10 mg, Oral, 4x Daily, Tripure, Katarina S, APRN    gabapentin (NEURONTIN) capsule 800 mg, 800 mg, Oral, Q8H, Tripure, Katarina S, APRN, 800 mg at 03/31/25 1440    glucagon (GLUCAGEN) injection 1 mg, 1 mg, Intramuscular, Q15 Min PRN, TripureJeromea S, APRN    hydroCHLOROthiazide tablet 25 mg, 25 mg, Oral, Daily, Tripure, Katarina S, APRN, 25 mg at 03/31/25 0810    [START ON 4/1/2025] hydroxychloroquine (PLAQUENIL) tablet 400 mg, 400 mg, Oral, Daily, Tripure, Katarina S, APRN    ipratropium-albuterol (DUO-NEB) nebulizer solution 3 mL, 3 mL, Nebulization, Q4H PRN, Tripure, Katarina S, APRN    losartan (COZAAR) tablet 25 mg, 25 mg, Oral, Nightly, Tripure, Katarina S, APRN    [Held by provider] metoprolol tartrate (LOPRESSOR) tablet 25 mg, 25 mg, Oral, BID, Tripure, Katarina S, APRN    nitroglycerin (NITROSTAT) SL tablet 0.4 mg, 0.4 mg, Sublingual, Q5 Min PRN, TripureJeromea S, APRN    ondansetron ODT (ZOFRAN-ODT) disintegrating tablet 4 mg, 4 mg, Oral, Q6H PRN **OR** ondansetron (ZOFRAN) injection 4 mg, 4 mg, Intravenous, Q6H PRN, Katarina Hall APRN    oxyCODONE (ROXICODONE) immediate release tablet 15 mg, 15 mg, Oral, Q4H, Katarina Hall APRN, 15 mg at 03/31/25 1245    [COMPLETED] Insert Peripheral IV, , , Once **AND** sodium chloride 0.9 % flush 10 mL, 10 mL, Intravenous, PRN, Yudy Sheffield APRN    sodium chloride 0.9 % flush 10 mL, 10 mL,  Intravenous, Q12H, Tripure, Katarina S, APRN, 10 mL at 03/30/25 2046    sodium chloride 0.9 % flush 10 mL, 10 mL, Intravenous, PRN, Tripure, Katarina S, APRN    sodium chloride 0.9 % infusion 40 mL, 40 mL, Intravenous, PRN, Tripure, Katarina S, APRN    venlafaxine XR (EFFEXOR-XR) 24 hr capsule 37.5 mg, 37.5 mg, Oral, Daily, Tripure, Katarina S, APRN, 37.5 mg at 03/31/25 0810    ==========================================================================    ALLERGIES    Allergies   Allergen Reactions    Metformin Nausea And Vomiting    Morphine Unknown - High Severity     Pt sates only in Pill form.       ==========================================================================    ACTIVE HOSPITAL MEDICATIONS    Scheduled Medications:  atorvastatin, 5 mg, Oral, Nightly  budesonide-formoterol, 2 puff, Inhalation, BID - RT  cetirizine, 10 mg, Oral, Daily  clopidogrel, 75 mg, Oral, Daily  dicyclomine, 10 mg, Oral, 4x Daily  gabapentin, 800 mg, Oral, Q8H  hydroCHLOROthiazide, 25 mg, Oral, Daily  [START ON 4/1/2025] hydroxychloroquine, 400 mg, Oral, Daily  losartan, 25 mg, Oral, Nightly  [Held by provider] metoprolol tartrate, 25 mg, Oral, BID  oxyCODONE, 15 mg, Oral, Q4H  sodium chloride, 10 mL, Intravenous, Q12H  venlafaxine XR, 37.5 mg, Oral, Daily         PRN Medications:    acetaminophen **OR** acetaminophen **OR** acetaminophen    aluminum-magnesium hydroxide-simethicone    senna-docusate sodium **AND** polyethylene glycol **AND** bisacodyl **AND** bisacodyl    cyclobenzaprine    dextrose    dextrose    glucagon (human recombinant)    ipratropium-albuterol    nitroglycerin    ondansetron ODT **OR** ondansetron    [COMPLETED] Insert Peripheral IV **AND** sodium chloride    sodium chloride    sodium chloride     ==========================================================================    OBJECTIVE    Vitals:    03/31/25 1725   BP: 112/83   Pulse: 60   Resp: 18   Temp: 98.2 °F (36.8 °C)   SpO2: 95%      Body mass index  is 38.89 kg/m².     General: Alert, cooperative, no acute distress    ==========================================================================    LABORATORY DATA    Lab Results   Component Value Date    GLUCOSE 102 (H) 03/31/2025    BUN 11 03/31/2025    CREATININE 0.82 03/31/2025    EGFRIFAFRI >60 11/02/2022    BCR 13.4 03/31/2025    K 3.4 (L) 03/31/2025    CO2 29.6 (H) 03/31/2025    CALCIUM 8.8 03/31/2025    ALBUMIN 4.2 03/30/2025    LABIL2 1.1 11/02/2022    AST 33 (H) 03/30/2025    ALT 20 03/30/2025       Lab Results   Component Value Date    HGBA1C 5.72 (H) 03/31/2025    HGBA1C 5.9 (H) 07/10/2024    HGBA1C 6.1 (H) 08/11/2023     Lab Results   Component Value Date    CREATININE 0.82 03/31/2025     Results from last 7 days   Lab Units 03/31/25  1730 03/31/25  1421 03/31/25  1204 03/31/25  0812 03/31/25  0241 03/30/25  2105   GLUCOSE mg/dL 106* 94 133* 117* 90 112*     ==========================================================================    ASSESSMENT AND PLAN    # Reactive hypoglycemia  - Patient most likely suffering from reactive hypoglycemia due to significant amount of weight loss with underlying hyperinsulinemia due to underlying history of type 2 diabetes with significant improvement due to GLP-1 receptor agonist therapy and significant dietary modification  - Counseled patient again to go back to high-protein low carbohydrate diet as carbohydrate rich meal will trigger insulin production and can cause hypoglycemia for her given there is improvement in insulin sensitivity due to medication and weight loss  - Patient counseled for high-protein low carbohydrate diet  - Discontinued dextrose water for now  - Continue blood sugar monitoring  - If blood sugar remains stable patient is stable for discharge from endocrine team standpoint and continue to follow endocrine team at [facility] which is previously established    # Obesity with BMI of 38.89    Thank you for courtesy of  consultation.  Will follow with you.  Rest as per primary care.    Part of this note may be an electronic transcription/translation of spoken language to printed text using the Dragon Dictation System.     The time of this note does not reflect the time I saw the patient but the time that this note was written.    =======================================  Mauricio Duncan MD  Department of Endocrine, Diabetes and Metabolism  [place], [place]  =======================================

## 2025-03-31 NOTE — H&P
[facility] H&P    Patient Name: Lisa Jay  : 1970  MRN: [id number]  Primary Care Physician: Brian Gonzalez MD  Date of admission: 3/30/2025     Patient Care Team:  Brian Gonzalez MD as PCP - General (Family Medicine)          Subjective   History Present Illness     Chief Complaint:   Chief Complaint   Patient presents with    Syncope     Passed out at [employer] this AM, initially confused for EMS but now coming around. No seizure activity noted,  per EMS. Pt c/o headache and right knee pain.      Syncope     Syncope  Associated symptoms include malaise/fatigue. Pertinent negatives include no nausea or vomiting.     ED 2025  Context: Patient is a 54-year-old female with a history of diabetes who presents to the ER for syncopal episode while at [employer], and hypoglycemia.  Patient reports she could tell that her blood sugar was dropping, and she ate several food items to prevent her blood sugar from dropping, patient reports that she had syncopal episode, denies falling or hitting her head.  Patient reports while at [employer] her blood sugar was in the 60s, EMS arrived stating her blood sugar was 126, and by the time patient has arrived in the ER blood sugar was down to 96.  Patient reports mild headache, states she gets that when she her blood sugar gets low.  Patient denies any chest pain, shortness of air, fever, abdominal pain, nausea/vomiting/diarrhea.  Patient reports she is on blood thinners.     Observation 2025  Patient agrees with HPI noted above including symptoms of hypoglycemia including fatigue, confusion and feeling cold/clammy for the past few weeks.  She states that she is symptomatic if her glucose is less than 110.  She is established with endocrinologist from [place] but she came to the ED yesterday due to syncopal episode at [employer].  She felt like she was hypoglycemic.  She has a continuous glucose monitoring, some instances of glucose <60 but mostly around .  She has to constant eat and feels like glucose still drops. She has tried multiple strategies. She states that she had a pancreas biopsy recently which was negative for tumor. She is currently asymptomatic and on IVF. Endocrinologist consulted.     Reassessed this afternoon and reports sudden onset of severe abdominal pain, she points at lower quadrant, midline below umbilical area. She does have a scar from a surgical incision, she states it was from a cholecystectomy. She is tearful    Review of Systems   Constitutional: Positive for malaise/fatigue.   Cardiovascular:  Positive for syncope.   Gastrointestinal:  Negative for nausea and vomiting.   All other systems reviewed and are negative.          Personal History     Past Medical History:   Past Medical History:   Diagnosis Date    Asthma     Diabetes mellitus     DVT (deep venous thrombosis) 08/23/2024    right lower leg    Fibromyalgia     Lupus        Surgical History:      Past Surgical History:   Procedure Laterality Date    APPENDECTOMY      CHOLECYSTECTOMY N/A 8/23/2024    Procedure: CHOLECYSTECTOMY LAPAROSCOPIC WITH DAVINCI ROBOT;  Surgeon: Bryce Huizar MD;  Location: [facility];  Service: Robotics - DaVinci;  Laterality: N/A;    HYSTERECTOMY      JOINT REPLACEMENT Right     KNEE SURGERY      SHOULDER ARTHROSCOPY             Family History: family history is not on file. Otherwise pertinent FHx was reviewed and unremarkable.     Social History:  reports that she has never smoked. She has never been exposed to tobacco smoke. She has never used smokeless tobacco. She reports that she does not drink alcohol and does not use drugs.      Medications:  Prior to Admission medications    Medication Sig Start Date End Date Taking? Authorizing Provider   budesonide-formoterol (Symbicort) 160-4.5 MCG/ACT inhaler Inhale 2 puffs 2 (Two) Times a Day. 9/20/24  Yes Baldemar Ibarra MD   clopidogrel (PLAVIX) 75 MG tablet Take 1 tablet by mouth Daily.  12/3/23  Yes Sima Arenas PA-C   cyclobenzaprine (FLEXERIL) 10 MG tablet Take 1 tablet by mouth Every 6 (Six) Hours.   Yes Jorge Ordonez MD   gabapentin (NEURONTIN) 800 MG tablet Take 1 tablet by mouth 3 (Three) Times a Day.   Yes Jorge Ordonez MD   hydroCHLOROthiazide 25 MG tablet Take 1 tablet by mouth Daily.   Yes Jorge Ordonez MD   hydroxychloroquine (PLAQUENIL) 200 MG tablet Take 2 tablets by mouth Every Morning.   Yes Jorge Ordonez MD   losartan (COZAAR) 50 MG tablet Take 0.5 tablets by mouth Every Night. 3/8/24  Yes Jorge Ordonez MD   metoprolol tartrate (LOPRESSOR) 25 MG tablet Take 1 tablet by mouth 2 (Two) Times a Day.   Yes Jorge Ordonez MD   ondansetron ODT (ZOFRAN-ODT) 4 MG disintegrating tablet Place 1 tablet on the tongue Every 8 (Eight) Hours As Needed for Nausea or Vomiting.   Yes Jorge Ordonez MD   oxyCODONE (ROXICODONE) 15 MG immediate release tablet Take 1 tablet by mouth Every 4 (Four) Hours.   Yes Jorge Ordonez MD   triamcinolone (KENALOG) 0.1 % cream Apply 1 Application topically to the appropriate area as directed 2 (Two) Times a Day As Needed.   Yes Jorge Ordonez MD   venlafaxine XR (EFFEXOR-XR) 37.5 MG 24 hr capsule Take 1 capsule by mouth Daily.   Yes Jorge Ordonez MD   ketoconazole (NIZORAL) 2 % cream Apply 1 Application topically to the appropriate area as directed 2 (Two) Times a Day As Needed.  3/31/25 Yes Jorge Ordonez MD   albuterol (PROVENTIL) (2.5 MG/3ML) 0.083% nebulizer solution Take 2.5 mg by nebulization Every 4 (Four) Hours As Needed for Wheezing. 9/20/24   Baldemar Ibarra MD   albuterol sulfate  (90 Base) MCG/ACT inhaler Inhale 1 puff Every 4 (Four) Hours As Needed for Wheezing. 4/2/24   Kimmy Wallace APRN   atorvastatin (LIPITOR) 10 MG tablet Take 0.5 tablets by mouth Every Evening.    ProviderJorge MD   cetirizine (zyrTEC) 10 MG tablet Take 1 tablet by mouth Daily.  9/21/24   Baldemar Ibarra MD   ipratropium-albuterol (DUO-NEB) 0.5-2.5 mg/3 ml nebulizer Take 3 mL by nebulization Every 4 (Four) Hours As Needed for Wheezing.    Jorge Ordonez MD   azithromycin (ZITHROMAX) 250 MG tablet Daily. Indications: COPD Exacerbation Suppression  Patient not taking: Reported on 3/30/2025 9/21/24 3/31/25  Baldemar Ibarra MD   benzonatate (TESSALON) 200 MG capsule Take 1 capsule by mouth 3 (Three) Times a Day As Needed for Cough.  Patient not taking: Reported on 3/30/2025 9/20/24 3/31/25  Baldemar Ibarra MD   celecoxib (CeleBREX) 200 MG capsule Take 1 capsule by mouth 2 (Two) Times a Day.  Patient not taking: Reported on 3/30/2025 8/28/24 3/31/25  Jorge Ordonez MD   Diclofenac Sodium (VOLTAREN) 1 % gel gel Apply 4 g topically to the appropriate area as directed 4 (Four) Times a Day As Needed.  3/31/25  Jorge Ordonze MD   guaiFENesin (MUCINEX) 600 MG 12 hr tablet Take 1 tablet by mouth Every 12 (Twelve) Hours. 9/20/24 3/31/25  Baldemar Ibarra MD   rivaroxaban (XARELTO) 20 MG tablet Take 1 tablet by mouth Daily.  Patient not taking: Reported on 3/30/2025 9/12/24 3/31/25  Jorge Ordonez MD   rivaroxaban (XARELTO) 20 MG tablet Take 1 tablet by mouth Daily.  Patient not taking: Reported on 3/30/2025 9/20/24 3/31/25  Baldemar Ibarra MD   theophylline (THEODUR) 300 MG 12 hr tablet Take 0.5 tablets by mouth Every 12 (Twelve) Hours.  Patient not taking: Reported on 3/30/2025 9/21/24 3/31/25  Baldemar Ibarra MD   Tirzepatide (Mounjaro) 5 MG/0.5ML solution pen-injector pen Inject 0.5 mL under the skin into the appropriate area as directed 1 (One) Time Per Week.  3/31/25  Jorge Ordonez MD       Allergies:    Allergies   Allergen Reactions    Morphine Unknown - High Severity     Pt sates only in Pill form.       Objective   Objective     Vital Signs  Temp:  [98.1 °F (36.7 °C)-98.4 °F (36.9 °C)] 98.2 °F (36.8 °C)  Heart Rate:  [66-80] 80  Resp:  [15-23] 18  BP:  (118-158)/() 135/96  SpO2:  [95 %-100 %] 99 %  on   ;   Device (Oxygen Therapy): room air  Body mass index is 38.89 kg/m².    Physical Exam  Vitals and nursing note reviewed.   Constitutional:       Appearance: Normal appearance. She is obese.   HENT:      Head: Normocephalic and atraumatic.      Right Ear: External ear normal.      Left Ear: External ear normal.      Nose: Nose normal.      Mouth/Throat:      Mouth: Mucous membranes are moist.      Pharynx: Oropharynx is clear.   Eyes:      Extraocular Movements: Extraocular movements intact.   Cardiovascular:      Rate and Rhythm: Normal rate and regular rhythm.      Pulses: Normal pulses.      Heart sounds: Normal heart sounds.   Pulmonary:      Effort: Pulmonary effort is normal.      Breath sounds: Normal breath sounds.   Abdominal:      General: Abdomen is flat. Bowel sounds are normal.      Palpations: Abdomen is soft.   Musculoskeletal:         General: Normal range of motion.      Cervical back: Normal range of motion.   Skin:     General: Skin is warm.   Neurological:      General: No focal deficit present.      Mental Status: She is alert and oriented to person, place, and time.   Psychiatric:         Mood and Affect: Mood normal.         Behavior: Behavior normal.         Results Review:  I have personally reviewed most recent lab results and radiology images and interpretations and agree with findings, most notably: CMP, CBC,.  EKG, CT head, UA    Results from last 7 days   Lab Units 03/30/25  1201   WBC 10*3/mm3 9.33   HEMOGLOBIN g/dL 12.5   HEMATOCRIT % 39.4   PLATELETS 10*3/mm3 325     Results from last 7 days   Lab Units 03/31/25  0324 03/30/25  1201   SODIUM mmol/L 141 140   POTASSIUM mmol/L 3.4* 3.5   CHLORIDE mmol/L 103 101   CO2 mmol/L 29.6* 27.1   BUN mg/dL 11 14   CREATININE mg/dL 0.82 0.95   GLUCOSE mg/dL 102* 73   CALCIUM mg/dL 8.8 9.4   ALK PHOS U/L  --  98   ALT (SGPT) U/L  --  20   AST (SGOT) U/L  --  33*     Estimated Creatinine  Clearance: 100.7 mL/min (by C-G formula based on SCr of 0.82 mg/dL).  Brief Urine Lab Results  (Last result in the past 365 days)        Color   Clarity   Blood   Leuk Est   Nitrite   Protein   CREAT   Urine HCG        03/30/25 1150 Yellow   Clear   Negative   Trace   Negative   Negative                   Microbiology Results (last 10 days)       ** No results found for the last 240 hours. **            ECG/EMG Results (most recent)       Procedure Component Value Units Date/Time    Adult Transthoracic Echo Complete w/ Color, Spectral and Contrast if Necessary Per Protocol [[id number]] Resulted: 03/31/25 0055     Updated: 03/31/25 0055     LV GLOBAL STRAIN  -26.4 %      LVIDd 4.5 cm      LVIDs 2.9 cm      IVSd 1.00 cm      LVPWd 1.00 cm      FS 35.6 %      IVS/LVPW 1.00 cm      ESV(cubed) 24.4 ml      EDV(cubed) 91.1 ml      LV mass(C)d 153.3 grams      LVOT area 2.8 cm2      LVOT diam 1.90 cm      EDV(MOD-sp4) 85.2 ml      ESV(MOD-sp4) 29.1 ml      SV(MOD-sp4) 56.1 ml      EF(MOD-sp4) 65.8 %      MV E max gurjit 82.1 cm/sec      MV A max gurjit 120.0 cm/sec      MV dec time 0.25 sec      MV E/A 0.68     Pulm A Revs Dur 0.11 sec      LA ESV Index (BP) 22.3 ml/m2      Med Peak E' Gurjit 9.7 cm/sec      Lat Peak E' Gurjit 17.5 cm/sec      TR max gurjit 295.0 cm/sec      Avg E/e' ratio 6.04     SV(LVOT) 68.6 ml      RVIDd 3.3 cm      TAPSE (>1.6) 2.09 cm      RV S' 18.3 cm/sec      LA dimension (2D)  4.2 cm      Pulm Sys Gurjit 72.5 cm/sec      Pulm Abdullahi Gurjit 43.8 cm/sec      Pulm S/D 1.66     Pulm A Revs Gurjit 36.5 cm/sec      LV V1 max 121.0 cm/sec      LV V1 max PG 5.9 mmHg      LV V1 mean PG 3.0 mmHg      LV V1 VTI 24.2 cm      Ao pk gurjit 147.0 cm/sec      Ao max PG 8.6 mmHg      Ao mean PG 5.0 mmHg      Ao V2 VTI 28.0 cm      SAMMY(I,D) 2.45 cm2      Dimensionless Index 0.82 (DI)      MV max PG 4.9 mmHg      MV mean PG 2.00 mmHg      MV V2 VTI 28.0 cm      MV P1/2t 60.6 msec      MVA(P1/2t) 3.6 cm2      MVA(VTI) 2.45 cm2      MV dec slope  434.0 cm/sec2      TR max PG 34.8 mmHg      RVSP(TR) 37.8 mmHg      RAP systole 3.0 mmHg      RV V1 max PG 2.01 mmHg      RV V1 max 70.8 cm/sec      RV V1 VTI 15.2 cm      PA V2 max 118.0 cm/sec      PA acc time 0.13 sec      ACS 2.20 cm      Sinus 3.0 cm      EF(MOD-bp) 66.0 %     Narrative:        Estimated right ventricular systolic pressure from tricuspid   regurgitation is mildly elevated (35-45 mmHg).      Impression:          Telemetry Scan [[id number]] Resulted: 03/30/25     Updated: 03/31/25 0616    ECG 12 Lead Syncope [[id number]] Collected: 03/30/25 1130     Updated: 03/31/25 0633     QT Interval 405 ms      QTC Interval 440 ms     Narrative:      HEART RATE=71  bpm  RR Eyetuywn=911  ms  WA Ebrbadtu=121  ms  P Horizontal Axis=-15  deg  P Front Axis=45  deg  QRSD Interval=84  ms  QT Zojuykie=581  ms  HUnW=764  ms  QRS Axis=40  deg  T Wave Axis=10  deg  - ABNORMAL ECG -  Sinus rhythm  Probable  left atrial enlargement  Low voltage, precordial leads  Consider  anteroseptal infarct  When compared with ECG of 18-Sep-2024 13:55:53,  Significant repolarization change  Electronically Signed By: Semaj Ren ([facility]) 2025-03-31 06:33:48  Date and Time of Study:2025-03-30 11:30:39    Telemetry Scan [[id number]] Resulted: 03/30/25     Updated: 03/31/25 0716    Telemetry Scan [[id number]] Resulted: 03/30/25     Updated: 03/31/25 0753            Results for orders placed during the hospital encounter of 09/18/24    Duplex Venous Lower Extremity - Right CAR    Interpretation Summary    Normal right lower extremity venous duplex scan.          CT Head Without Contrast  Result Date: 3/30/2025  Impression: 1.No acute intracranial abnormality. Specifically, no evidence of acute hemorrhage, mass effect or midline shift. 2.Two small extra-axial masses in the high right parietal region, one of which is partially calcified. These are likely meningiomas. Only 1 of these has been described on patient's prior outside imaging reports  from [place] 2022. Electronically Signed: Fede Chavez  3/30/2025 1:17 PM EDT  Workstation ID: [id number]        Estimated Creatinine Clearance: 100.7 mL/min (by C-G formula based on SCr of 0.82 mg/dL).    Assessment & Plan   Assessment/Plan       Active Hospital Problems    Diagnosis  POA    **Hypoglycemia [E16.2]  Yes      Resolved Hospital Problems   No resolved problems to display.     Syncope  Lab Results   Component Value Date    TROPONINT 11 09/18/2024    TROPONINT 9 04/01/2024   -CMP showed potassium 3.4 and patient received potassium per protocol  -UA negative  -CT head showed no acute intracranial abnormality  -EKG showed sinus rhythm with heart rate 71  -2D echo pending results   -Telemetry    Diabetes mellitus type 2 with hypoglycemic   Lab Results   Component Value Date    GLUCOSE 102 (H) 03/31/2025    GLUCOSE 73 03/30/2025    GLUCOSE 215 (H) 09/19/2024    GLUCOSE 125 (H) 09/18/2024   -A1c pending  -Glucose was 96 in the ED and down to 73 despite hypoglycemic treatment  -She is symptomatic with glucose <110  -D5W at 50cc/h  -Consult endocrinologist  -Diabetic diet  -Monitor before meals and at bedtime     Abdominal pain  Lab Results   Component Value Date    WBC 9.33 03/30/2025    AST 33 (H) 03/30/2025    ALT 20 03/30/2025    ALKPHOS 98 03/30/2025    BILITOT 0.2 03/30/2025    LIPASE 22 03/30/2025   -UA negative  -CT of abdomen and pelvis ordered  -Toradol x1 for now  -Monitor while admitted     Hypertension  -Well controlled   BP Readings from Last 1 Encounters:   03/31/25 135/96   - Continue HCTZ and losartan.  Hold Lopressor for now due to hypoglycemia  - Monitor while admitted     Lupus  -Continue Plaquenil    Fibromyalgia  -Continue oxycodone and gabapentin    VTE Prophylaxis - Active VTE Prophylaxis  Mechanical:        Start        03/30/25 5448  Maintain Sequential Compression Device  Continuous                          Select Pharmacologic VTE Prophylaxis if Desired & Appropriate      CODE  STATUS:    Code Status and Medical Interventions: CPR (Attempt to Resuscitate); Full Support   Ordered at: 03/30/25 1444     Code Status (Patient has no pulse and is not breathing):    CPR (Attempt to Resuscitate)     Medical Interventions (Patient has pulse or is breathing):    Full Support     Level Of Support Discussed With:    Patient       This patient has been examined wearing personal protective equipment.     I discussed the patient's findings and my recommendations with patient and nursing staff.      Signature:Electronically signed by SAAD Griffith, 03/31/25, 12:05 PM EDT.

## 2025-04-01 VITALS
TEMPERATURE: 97.8 F | DIASTOLIC BLOOD PRESSURE: 83 MMHG | WEIGHT: 252 LBS | BODY MASS INDEX: 38.19 KG/M2 | RESPIRATION RATE: 16 BRPM | HEIGHT: 68 IN | HEART RATE: 78 BPM | SYSTOLIC BLOOD PRESSURE: 147 MMHG | OXYGEN SATURATION: 98 %

## 2025-04-01 LAB
ANION GAP SERPL CALCULATED.3IONS-SCNC: 12.3 MMOL/L (ref 5–15)
BASOPHILS # BLD AUTO: 0.06 10*3/MM3 (ref 0–0.2)
BASOPHILS NFR BLD AUTO: 0.8 % (ref 0–1.5)
BUN SERPL-MCNC: 17 MG/DL (ref 6–20)
BUN/CREAT SERPL: 16 (ref 7–25)
CALCIUM SPEC-SCNC: 9.3 MG/DL (ref 8.6–10.5)
CHLORIDE SERPL-SCNC: 101 MMOL/L (ref 98–107)
CO2 SERPL-SCNC: 26.7 MMOL/L (ref 22–29)
CREAT SERPL-MCNC: 1.06 MG/DL (ref 0.57–1)
DEPRECATED RDW RBC AUTO: 42.8 FL (ref 37–54)
EGFRCR SERPLBLD CKD-EPI 2021: 61.8 ML/MIN/1.73
EOSINOPHIL # BLD AUTO: 0.2 10*3/MM3 (ref 0–0.4)
EOSINOPHIL NFR BLD AUTO: 2.5 % (ref 0.3–6.2)
ERYTHROCYTE [DISTWIDTH] IN BLOOD BY AUTOMATED COUNT: 12.5 % (ref 12.3–15.4)
GLUCOSE BLDC GLUCOMTR-MCNC: 102 MG/DL (ref 70–105)
GLUCOSE BLDC GLUCOMTR-MCNC: 108 MG/DL (ref 70–105)
GLUCOSE BLDC GLUCOMTR-MCNC: 135 MG/DL (ref 70–105)
GLUCOSE BLDC GLUCOMTR-MCNC: 88 MG/DL (ref 70–105)
GLUCOSE BLDC GLUCOMTR-MCNC: 95 MG/DL (ref 70–105)
GLUCOSE SERPL-MCNC: 97 MG/DL (ref 65–99)
HCT VFR BLD AUTO: 36.8 % (ref 34–46.6)
HGB BLD-MCNC: 11.5 G/DL (ref 12–15.9)
IMM GRANULOCYTES # BLD AUTO: 0.03 10*3/MM3 (ref 0–0.05)
IMM GRANULOCYTES NFR BLD AUTO: 0.4 % (ref 0–0.5)
LYMPHOCYTES # BLD AUTO: 3.08 10*3/MM3 (ref 0.7–3.1)
LYMPHOCYTES NFR BLD AUTO: 38.9 % (ref 19.6–45.3)
MCH RBC QN AUTO: 29 PG (ref 26.6–33)
MCHC RBC AUTO-ENTMCNC: 31.3 G/DL (ref 31.5–35.7)
MCV RBC AUTO: 92.7 FL (ref 79–97)
MONOCYTES # BLD AUTO: 0.87 10*3/MM3 (ref 0.1–0.9)
MONOCYTES NFR BLD AUTO: 11 % (ref 5–12)
NEUTROPHILS NFR BLD AUTO: 3.68 10*3/MM3 (ref 1.7–7)
NEUTROPHILS NFR BLD AUTO: 46.4 % (ref 42.7–76)
NRBC BLD AUTO-RTO: 0 /100 WBC (ref 0–0.2)
PLATELET # BLD AUTO: 285 10*3/MM3 (ref 140–450)
PMV BLD AUTO: 9.9 FL (ref 6–12)
POTASSIUM SERPL-SCNC: 3.9 MMOL/L (ref 3.5–5.2)
RBC # BLD AUTO: 3.97 10*6/MM3 (ref 3.77–5.28)
SODIUM SERPL-SCNC: 140 MMOL/L (ref 136–145)
WBC NRBC COR # BLD AUTO: 7.92 10*3/MM3 (ref 3.4–10.8)

## 2025-04-01 PROCEDURE — 94664 DEMO&/EVAL PT USE INHALER: CPT

## 2025-04-01 PROCEDURE — 94761 N-INVAS EAR/PLS OXIMETRY MLT: CPT

## 2025-04-01 PROCEDURE — 94799 UNLISTED PULMONARY SVC/PX: CPT

## 2025-04-01 PROCEDURE — 85025 COMPLETE CBC W/AUTO DIFF WBC: CPT | Performed by: NURSE PRACTITIONER

## 2025-04-01 PROCEDURE — G0108 DIAB MANAGE TRN  PER INDIV: HCPCS

## 2025-04-01 PROCEDURE — 97162 PT EVAL MOD COMPLEX 30 MIN: CPT

## 2025-04-01 PROCEDURE — 82948 REAGENT STRIP/BLOOD GLUCOSE: CPT | Performed by: NURSE PRACTITIONER

## 2025-04-01 PROCEDURE — 80048 BASIC METABOLIC PNL TOTAL CA: CPT | Performed by: NURSE PRACTITIONER

## 2025-04-01 PROCEDURE — 99214 OFFICE O/P EST MOD 30 MIN: CPT | Performed by: INTERNAL MEDICINE

## 2025-04-01 PROCEDURE — 82948 REAGENT STRIP/BLOOD GLUCOSE: CPT

## 2025-04-01 PROCEDURE — G0378 HOSPITAL OBSERVATION PER HR: HCPCS

## 2025-04-01 RX ORDER — BLOOD SUGAR DIAGNOSTIC
1 STRIP MISCELLANEOUS
Qty: 100 EACH | Refills: 2 | Status: SHIPPED | OUTPATIENT
Start: 2025-04-01

## 2025-04-01 RX ORDER — DICYCLOMINE HYDROCHLORIDE 10 MG/1
10 CAPSULE ORAL 4 TIMES DAILY PRN
Qty: 30 CAPSULE | Refills: 0 | Status: SHIPPED | OUTPATIENT
Start: 2025-04-01

## 2025-04-01 RX ORDER — BLOOD-GLUCOSE METER
1 EACH MISCELLANEOUS ONCE
Qty: 1 KIT | Refills: 0 | Status: SHIPPED | OUTPATIENT
Start: 2025-04-01 | End: 2025-04-03

## 2025-04-01 RX ORDER — LANCETS
1 EACH MISCELLANEOUS
Qty: 100 EACH | Refills: 2 | Status: SHIPPED | OUTPATIENT
Start: 2025-04-01

## 2025-04-01 RX ADMIN — CLOPIDOGREL BISULFATE 75 MG: 75 TABLET, FILM COATED ORAL at 08:53

## 2025-04-01 RX ADMIN — GABAPENTIN 800 MG: 400 CAPSULE ORAL at 05:33

## 2025-04-01 RX ADMIN — BUDESONIDE AND FORMOTEROL FUMARATE DIHYDRATE 2 PUFF: 160; 4.5 AEROSOL RESPIRATORY (INHALATION) at 08:12

## 2025-04-01 RX ADMIN — CYCLOBENZAPRINE HYDROCHLORIDE 10 MG: 10 TABLET, FILM COATED ORAL at 05:33

## 2025-04-01 RX ADMIN — OXYCODONE HYDROCHLORIDE 15 MG: 5 TABLET ORAL at 00:52

## 2025-04-01 RX ADMIN — HYDROCHLOROTHIAZIDE 25 MG: 25 TABLET ORAL at 08:53

## 2025-04-01 RX ADMIN — OXYCODONE HYDROCHLORIDE 15 MG: 5 TABLET ORAL at 12:35

## 2025-04-01 RX ADMIN — OXYCODONE HYDROCHLORIDE 15 MG: 5 TABLET ORAL at 05:33

## 2025-04-01 RX ADMIN — OXYCODONE HYDROCHLORIDE 15 MG: 5 TABLET ORAL at 08:53

## 2025-04-01 RX ADMIN — DICYCLOMINE HYDROCHLORIDE 10 MG: 10 CAPSULE ORAL at 08:53

## 2025-04-01 RX ADMIN — VENLAFAXINE HYDROCHLORIDE 37.5 MG: 37.5 CAPSULE, EXTENDED RELEASE ORAL at 08:53

## 2025-04-01 RX ADMIN — HYDROXYCHLOROQUINE SULFATE 400 MG: 200 TABLET ORAL at 08:52

## 2025-04-01 RX ADMIN — Medication 10 ML: at 08:53

## 2025-04-01 RX ADMIN — OXYCODONE HYDROCHLORIDE 15 MG: 5 TABLET ORAL at 17:30

## 2025-04-01 RX ADMIN — DICYCLOMINE HYDROCHLORIDE 10 MG: 10 CAPSULE ORAL at 12:35

## 2025-04-01 RX ADMIN — CETIRIZINE HYDROCHLORIDE 10 MG: 10 TABLET, FILM COATED ORAL at 08:53

## 2025-04-01 RX ADMIN — GABAPENTIN 800 MG: 400 CAPSULE ORAL at 13:57

## 2025-04-01 RX ADMIN — DICYCLOMINE HYDROCHLORIDE 10 MG: 10 CAPSULE ORAL at 17:30

## 2025-04-01 NOTE — CASE MANAGEMENT/SOCIAL WORK
Discharge Planning Assessment   Mahesh     Patient Name: Lisa Jay  MRN: [id number]  Today's Date: 4/1/2025    Admit Date: 3/30/2025    Plan: Return home with niece. Referred to [facility];accepted for PT and OT   Discharge Needs Assessment       Row Name 04/01/25 1315       Living Environment    People in Home alone    Unique Family Situation staying with niece for a few weeks    Current Living Arrangements home    Potentially Unsafe Housing Conditions none    In the past 12 months has the electric, gas, oil, or water company threatened to shut off services in your home? No    Primary Care Provided by self    Provides Primary Care For no one    Family Caregiver if Needed other (see comments)  niece    Quality of Family Relationships helpful;involved;supportive    Able to Return to Prior Arrangements yes       Resource/Environmental Concerns    Resource/Environmental Concerns none    Transportation Concerns none       Transportation Needs    In the past 12 months, has lack of transportation kept you from medical appointments or from getting medications? no    In the past 12 months, has lack of transportation kept you from meetings, work, or from getting things needed for daily living? No       Food Insecurity    Within the past 12 months, you worried that your food would run out before you got the money to buy more. Never true    Within the past 12 months, the food you bought just didn't last and you didn't have money to get more. Never true       Transition Planning    Patient/Family Anticipates Transition to home;home with family    Patient/Family Anticipated Services at Transition none    Transportation Anticipated car, drives self;family or friend will provide       Discharge Needs Assessment    Readmission Within the Last 30 Days no previous admission in last 30 days    Equipment Currently Used at Home cane, quad;wheelchair;walker, standard;shower chair;grab bar    Concerns to be Addressed discharge planning     Do you want help finding or keeping work or a job? I do not need or want help    Do you want help with school or training? For example, starting or completing job training or getting a high school diploma, GED or equivalent No    Anticipated Changes Related to Illness none    Equipment Needed After Discharge none    Discharge Facility/Level of Care Needs home with home health                   Discharge Plan       Row Name 04/01/25 1317       Plan    Plan Return home with niece. Referred to [facility];accepted for PT and OT    Patient/Family in Agreement with Plan yes    Plan Comments CM met with Ms. Jay who c/o about being sleepy. She  is a/o and  said she lives alone in [place] but has been staying with her niece in [place] and will go back there for a few weeks. She agrees with home health nursing and PT and choice is [facility]. CM contacted Erma with [facility] and added to Epic and she was accepted.  PCP and Pharmacy confirmed. She would   not discuss home equipment but when asked she does have a family member to transport her home                  Continued Care and Services - Admitted Since 3/30/2025       Home Medical Care       Service Provider Request Status Services Address Phone Fax Patient Preferred    [facility] Pending - Request Sent -- [phone] [facility], [address] [address] [phone] --                     Demographic Summary       Row Name 04/01/25 1301       General Information    Admission Type observation    Arrived From emergency department    Required Notices Provided Observation Status Notice    Referral Source admission list    Reason for Consult discharge planning    Preferred Language English;[place] Island[place]       Contact Information    Permission Granted to Share Info With                    Functional Status       Row Name 04/01/25 1315       Functional Status    Usual Activity Tolerance moderate    Current Activity Tolerance  moderate       Functional Status, IADL    Medications independent    Meal Preparation independent    Housekeeping independent    Laundry independent    Shopping independent    If for any reason you need help with day-to-day activities such as bathing, preparing meals, shopping, managing finances, etc., do you get the help you need? I get all the help I need       Employment/    Employment Status disabled                          Annika LOREDO,RN Case Manager  [facility]  Phone: [place]- [phone] cell- [phone]

## 2025-04-01 NOTE — DISCHARGE PLACEMENT REQUEST
"Billy Amaro (54 y.o. Female)       Date of Birth   1970    Social Security Number       Address   [address] [place] [address]    Home Phone   [phone]    MRN   [id number]       Mu-ism   Catholic    Marital Status   Single                            Admission Date   3/30/2025    Admission Type   Emergency    Admitting Provider   Semaj Ren MD    Attending Provider   Semaj Ren MD    Department, Room/Bed   [facility] OBSERVATION, 222/1       Discharge Date       Discharge Disposition       Discharge Destination                                 Attending Provider: Semaj Ren MD    Allergies: Metformin, Morphine    Isolation: None   Infection: None   Code Status: CPR    Ht: 171.5 cm (67.5\")   Wt: 114 kg (252 lb)    Admission Cmt: None   Principal Problem: Hypoglycemia [E16.2]                   Active Insurance as of 3/30/2025       Primary Coverage       Payor Plan Insurance Group Employer/Plan Group    HUMANA MEDICARE REPLACEMENT HUMANA MEDICARE ADVANTAGE [facility] HMO - NON PAR [id number]       Payor Plan Address Payor Plan Phone Number Payor Plan Fax Number Effective Dates       1/1/2023 - None Entered      Subscriber Name Subscriber Birth Date Member ID       BILLY AMARO 1970 [id number]                     Emergency Contacts        (Rel.) Home Phone Work Phone Mobile Phone    Cintia Mcclain (Sister) -- -- [phone]              Insurance Information                  HUMANA MEDICARE REPLACEMENT/HUMANA MEDICARE ADVANTAGE [facility] HMO - NON PAR Phone: --    Subscriber: Billy Amaro Subscriber#: [id number]    Group#: [id number] Precert#: --    Authorization#: [id number] Effective Date: --          "

## 2025-04-01 NOTE — CONSULTS
Nutrition Services    Patient Name: Lisa Jay  YOB: 1970  MRN: [id number]  Admission date: 3/30/2025    Comment:    *RD visited with pt this afternoon. Went over diet - suggested staying at 30-45g carbohydrates per meal. Discussed different types of carbohydrates and avoiding simple carbohydrates when blood sugar is not low. While I was in the room pt's blood glucose would drop to  in which pt would report that this is low for her and she would get tired. Pt might not be use to having blood glucoses in the normal range which could be causing hypoglycemia symptoms. I discussed many snack options in which pt reports that she consumes. She tends to do well with adequate protein. Encouraged well-balanced meals as I believe pt is also not eating much. Pt seemed to understand the rationale of pairing protein with carbohydrates.     --Provided pt with diet education materials. Will attempt to visit with pt as time allows (pt declined RD visit in the morning, requested a later visit)    --Add Boost Glucose Control TID (Provides 570 kcals, 48 g protein if consumed) for additional protein     CLINICAL NUTRITION ASSESSMENT      Reason for Assessment 4/1: Diet education      H&P      Past Medical History:   Diagnosis Date    Asthma     Diabetes mellitus     DVT (deep venous thrombosis) 08/23/2024    right lower leg    Fibromyalgia     Lupus        Past Surgical History:   Procedure Laterality Date    APPENDECTOMY      CHOLECYSTECTOMY N/A 8/23/2024    Procedure: CHOLECYSTECTOMY LAPAROSCOPIC WITH [employer] ROBOT;  Surgeon: Bryce Huizar MD;  Location: [facility];  Service: Robotics - [employer];  Laterality: N/A;    HYSTERECTOMY      JOINT REPLACEMENT Right     KNEE SURGERY      SHOULDER ARTHROSCOPY          Current Problems   Hypertension  Lupus  Fibromyalgia   Diabetes mellitus type 2 with hypoglycemia   -Endocrinology is following        Encounter Information        Trending Narrative      "4/1: Pt was admitted with syncope. Endocrinology is following for hypoglycemia. Woke pt up at my visit. Pt reports that she did not get any sleep and asked RD to come back at a later time. I left written materials on carbohydrate counting and a high protein diet. Will attempt to visit with pt again if time allows. Per chart review, pt has lost 42# over 1 year (14% loss). NFPE completed and not consistent with nutrition diagnosis of malnutrition at this time using AND/ASPEN criteria       Anthropometrics        Current Height, Weight Height: 171.5 cm (67.5\")  Weight: 114 kg (252 lb) (03/30/25 2012)       Usual Body Weight (UBW) Unknown       Trending Weight Hx     This admission: 4/1: 252# (obtained 3/30)              PTA: 4/1: 42# loss over 1 year (14% loss)     Wt Readings from Last 30 Encounters:   03/30/25 2012 114 kg (252 lb)   09/18/24 1349 114 kg (252 lb)   09/18/24 1250 116 kg (255 lb)   08/19/24 1506 136 kg (299 lb 3.2 oz)   08/19/24 1334 (!) 137 kg (301 lb 13 oz)   08/16/24 2034 110 kg (242 lb 8.1 oz)   08/07/24 1707 110 kg (242 lb)   04/01/24 1009 133 kg (294 lb)   12/02/23 0133 (!) 139 kg (306 lb 7 oz)   12/01/23 1919 (!) 141 kg (310 lb)      BMI kg/m2 Body mass index is 38.89 kg/m².       Labs        Pertinent Labs Reviewed. Management per MD   Results from last 7 days   Lab Units 04/01/25  0117 03/31/25  1448 03/31/25  0324 03/30/25  1201   SODIUM mmol/L 140 140 141 140   POTASSIUM mmol/L 3.9 3.5  3.4* 3.4* 3.5   CHLORIDE mmol/L 101 99 103 101   CO2 mmol/L 26.7 28.6 29.6* 27.1   BUN mg/dL 17 10 11 14   CREATININE mg/dL 1.06* 0.85 0.82 0.95   CALCIUM mg/dL 9.3 9.6 8.8 9.4   BILIRUBIN mg/dL  --   --   --  0.2   ALK PHOS U/L  --   --   --  98   ALT (SGPT) U/L  --   --   --  20   AST (SGOT) U/L  --   --   --  33*   GLUCOSE mg/dL 97 91 102* 73     Results from last 7 days   Lab Units 04/01/25  0117 03/31/25  1448 03/30/25  1201   MAGNESIUM mg/dL  --   --  2.1   HEMOGLOBIN g/dL 11.5*   < > 12.5   HEMATOCRIT " % 36.8   < > 39.4    < > = values in this interval not displayed.     Lab Results   Component Value Date    HGBA1C 5.72 (H) 03/31/2025        Medications    Scheduled Medications atorvastatin, 5 mg, Oral, Nightly  budesonide-formoterol, 2 puff, Inhalation, BID - RT  cetirizine, 10 mg, Oral, Daily  clopidogrel, 75 mg, Oral, Daily  dicyclomine, 10 mg, Oral, 4x Daily  gabapentin, 800 mg, Oral, Q8H  hydroCHLOROthiazide, 25 mg, Oral, Daily  hydroxychloroquine, 400 mg, Oral, Daily  losartan, 25 mg, Oral, Nightly  [Held by provider] metoprolol tartrate, 25 mg, Oral, BID  oxyCODONE, 15 mg, Oral, Q4H  sodium chloride, 10 mL, Intravenous, Q12H  venlafaxine XR, 37.5 mg, Oral, Daily        Infusions      PRN Medications   acetaminophen **OR** acetaminophen **OR** acetaminophen    aluminum-magnesium hydroxide-simethicone    senna-docusate sodium **AND** polyethylene glycol **AND** bisacodyl **AND** bisacodyl    cyclobenzaprine    dextrose    dextrose    glucagon (human recombinant)    ipratropium-albuterol    nitroglycerin    ondansetron ODT **OR** ondansetron    [COMPLETED] Insert Peripheral IV **AND** sodium chloride    sodium chloride    sodium chloride     Physical Findings        Trending Physical   Appearance, NFPE 4/1: NFPE completed and not consistent with nutrition diagnosis of malnutrition at this time using AND/ASPEN criteria     --  Edema    No edema    Bowel Function   No BM documented - pt has stool softeners ordered PRN    Tubes   No feeding tube    Chewing/Swallowing   No issues reported    Skin   Intact    --  Current Nutrition Orders & Evaluation of Intake       Oral Nutrition     Food Allergies NKFA    Current PO Diet Diet: High Protein, Diabetic; Consistent Carbohydrate; Texture: Regular (IDDSI 7); Fluid Consistency: Thin (IDDSI 0)   Supplement None ordered    PO Evaluation     Trending % PO Intake 4/1: 0-75%    --  Nutritional Risk Screening        NRS-2002 Score          Nutrition Diagnosis          Nutrition Dx Problem 1   Altered nutrition related values (blood glucoses) to diabetes as evidenced by low blood glucoses     Nutrition Dx Problem 2        Intervention Goal         Intervention Goal(s) To consistently consume % of meals      Nutrition Intervention        RD Action NFPE complete, provide diet education as time allows      Nutrition Prescription          Diet Prescription High protein, diabetic    Supplement Prescription Boost Glucose Control TID (Provides 570 kcals, 48 g protein if consumed)     --  Monitor/Evaluation        Monitor PO intake, Pertinent labs         Electronically signed by:  Yareli Brewer RD  04/01/25 11:15 EDT

## 2025-04-01 NOTE — CONSULTS
Diabetes Education    Patient Name:  Lisa Jay  YOB: 1970  MRN: [id number]  Admit Date:  3/30/2025    Follow up note: Educator did give pt 1 sample Freestyle Brian 3 sensor. Sensor at bedside.       Electronically signed by:  Aspen Wells RN  04/01/25 12:03 EDT

## 2025-04-01 NOTE — THERAPY EVALUATION
Patient Name: Lisa Jay  : 1970    MRN: [id number]                              Today's Date: 2025       Admit Date: 3/30/2025    Visit Dx:     ICD-10-CM ICD-9-CM   1. Hypoglycemia  E16.2 251.2   2. Syncope, unspecified syncope type  R55 780.2     Patient Active Problem List   Diagnosis    Hypoglycemia    Asthma exacerbation    Lower leg DVT (deep venous thromboembolism), acute, right    Systemic lupus erythematosus    Fibromyalgia     Past Medical History:   Diagnosis Date    Asthma     Diabetes mellitus     DVT (deep venous thrombosis) 2024    right lower leg    Fibromyalgia     Lupus      Past Surgical History:   Procedure Laterality Date    APPENDECTOMY      CHOLECYSTECTOMY N/A 2024    Procedure: CHOLECYSTECTOMY LAPAROSCOPIC WITH [employer] ROBOT;  Surgeon: Bryce Huizar MD;  Location: [facility];  Service: Robotics - [employer];  Laterality: N/A;    HYSTERECTOMY      JOINT REPLACEMENT Right     KNEE SURGERY      SHOULDER ARTHROSCOPY        General Information       Row Name 25 1132          Physical Therapy Time and Intention    Document Type evaluation  -AM     Mode of Treatment physical therapy  -AM       Row Name 25 1132          General Information    Patient Profile Reviewed yes  -AM     Prior Level of Function independent:;all household mobility;community mobility;gait;transfer;bed mobility  ambulates short distances without an assistive device.  Ambulates longer distances with rollator  -AM     Existing Precautions/Restrictions fall  -AM     Barriers to Rehab medically complex  -AM       Row Name 25 1132          Living Environment    Current Living Arrangements home  -AM     People in Home alone  -AM       Row Name 25 1132          Home Main Entrance    Number of Stairs, Main Entrance none  -AM       Row Name 25 1132          Stairs Within Home, Primary    Number of Stairs, Within Home, Primary none  -AM       Row Name 25 1132           Cognition    Orientation Status (Cognition) oriented x 4  -AM       Row Name 04/01/25 1132          Safety Issues/Impairments Affecting Functional Mobility    Impairments Affecting Function (Mobility) balance;endurance/activity tolerance;pain;strength  -AM     Comment, Safety Issues/Impairments (Mobility) gait belt utilized  -AM               User Key  (r) = Recorded By, (t) = Taken By, (c) = Cosigned By      Initials Name Provider Type    AM Bari Carias, PT Physical Therapist                   Mobility       Row Name 04/01/25 1134          Bed Mobility    Bed Mobility bed mobility (all) activities  -AM     All Activities, [place] (Bed Mobility) supervision  -AM       Row Name 04/01/25 1134          Sit-Stand Transfer    Sit-Stand [place] (Transfers) contact guard;1 person assist  -AM       Row Name 04/01/25 1134          Gait/Stairs (Locomotion)    Comment, (Gait/Stairs) Ambulated 10' without RW with Mod/Max A secondary to decreased balance, wide KATE, antalgic gait secondary to back pain.  Obtained/fitted RW and ambulated 35' with RW with CGA/SBA with wide KATE, shuffliing gait  -AM               User Key  (r) = Recorded By, (t) = Taken By, (c) = Cosigned By      Initials Name Provider Type    AM Bari Carias, PT Physical Therapist                   Obj/Interventions       Row Name 04/01/25 1136          Range of Motion Comprehensive    General Range of Motion no range of motion deficits identified  -AM       Row Name 04/01/25 1136          Strength Comprehensive (MMT)    Comment, General Manual Muscle Testing (MMT) Assessment RLE: 4+/5  L hip 4-/5 knee/ankle: 4/5  -AM       Row Name 04/01/25 1136          Motor Skills    Motor Skills functional endurance  -AM     Functional Endurance fair  -AM       Row Name 04/01/25 1136          Balance    Balance Assessment sitting static balance;sitting dynamic balance;standing static balance;standing dynamic balance  -AM     Static Sitting Balance independent   -AM     Dynamic Sitting Balance independent  -AM     Position, Sitting Balance unsupported  -AM     Static Standing Balance contact guard  -AM     Dynamic Standing Balance contact guard  -AM     Position/Device Used, Standing Balance supported;walker, front-wheeled  -AM       Row Name 04/01/25 1136          Sensory Assessment (Somatosensory)    Sensory Assessment (Somatosensory) sensation intact  -AM               User Key  (r) = Recorded By, (t) = Taken By, (c) = Cosigned By      Initials Name Provider Type    AM Bari Carias, PT Physical Therapist                   Goals/Plan       Row Name 04/01/25 1141          Bed Mobility Goal 1 (PT)    Activity/Assistive Device (Bed Mobility Goal 1, PT) bed mobility activities, all  -AM     Josephine Level/Cues Needed (Bed Mobility Goal 1, PT) independent  -AM       Row Name 04/01/25 1141          Transfer Goal 1 (PT)    Activity/Assistive Device (Transfer Goal 1, PT) transfers, all;walker, rolling  -AM     Josephine Level/Cues Needed (Transfer Goal 1, PT) independent  -AM     Time Frame (Transfer Goal 1, PT) long term goal (LTG)  -AM       Row Name 04/01/25 1141          Gait Training Goal 1 (PT)    Activity/Assistive Device (Gait Training Goal 1, PT) gait (walking locomotion);walker, rolling  -AM     Josephine Level (Gait Training Goal 1, PT) modified independence  -AM     Distance (Gait Training Goal 1, PT) 100'  -AM     Time Frame (Gait Training Goal 1, PT) long term goal (LTG)  -AM       Row Name 04/01/25 1141          Therapy Assessment/Plan (PT)    Planned Therapy Interventions (PT) balance training;bed mobility training;gait training;strengthening;patient/family education;transfer training  -AM               User Key  (r) = Recorded By, (t) = Taken By, (c) = Cosigned By      Initials Name Provider Type    AM Bari Carias, PT Physical Therapist                   Clinical Impression       Row Name 04/01/25 1136          Pain    Pretreatment Pain Rating  1/10  -AM     Posttreatment Pain Rating 8/10  -AM     Pain Location back  -AM     Pain Management Interventions exercise or physical activity utilized  -AM     Response to Pain Interventions activity participation with increased pain  -AM       Row Name 04/01/25 1136          Plan of Care Review    Plan of Care Reviewed With patient  -AM     Outcome Evaluation Pt is a 53 y/o female with syncopal episode at [employer] related to hypoglycemia with fatigue, confusion, and reports of cold/claminess.  CT head: (-) acute.; 2 small masses in R parietal lobe likely meningiomas.  Pt reports she lives alone in a 1 story home with no steps to enter into home.  Pt ambulated short distances with no assistive device and ambulated longer distances with rollator prior to hospitalization.  Pt on room air and telemetry this date.  Supervision for bed mobility, CGA for transfers.  Attempted to ambulate 10' without an assistive device, pt required Mod/Max A secondary to decreased balance.  Obtained RW and pt ambulated 35' with RW with CGA/SBA.  Recommend use of RW at all times when at home, pt in agreement.  Recommend HHPT.  -AM       Row Name 04/01/25 1136          Therapy Assessment/Plan (PT)    Patient/Family Therapy Goals Statement (PT) To go home  -AM     Rehab Potential (PT) good  -AM     Criteria for Skilled Interventions Met (PT) yes  -AM     Therapy Frequency (PT) 3 times/wk  -AM     Predicted Duration of Therapy Intervention (PT) until d/c  -AM       Row Name 04/01/25 1136          Vital Signs    O2 Delivery Pre Treatment room air  -AM     O2 Delivery Intra Treatment room air  -AM     O2 Delivery Post Treatment room air  -AM     Pre Patient Position Supine  -AM     Intra Patient Position Standing  -AM     Post Patient Position Supine  -AM       Row Name 04/01/25 1136          Positioning and Restraints    Pre-Treatment Position in bed  -AM     Post Treatment Position bed  -AM     In Bed notified nsg;supine;call light within  reach;encouraged to call for assist;with other staff  -AM               User Key  (r) = Recorded By, (t) = Taken By, (c) = Cosigned By      Initials Name Provider Type    AM Bari Carias PT Physical Therapist                   Outcome Measures       Row Name 04/01/25 1141          How much help from another person do you currently need...    Turning from your back to your side while in flat bed without using bedrails? 4  -AM     Moving from lying on back to sitting on the side of a flat bed without bedrails? 4  -AM     Moving to and from a bed to a chair (including a wheelchair)? 3  -AM     Standing up from a chair using your arms (e.g., wheelchair, bedside chair)? 3  -AM     Climbing 3-5 steps with a railing? 2  -AM     To walk in hospital room? 3  -AM     AM-PAC 6 Clicks Score (PT) 19  -AM               User Key  (r) = Recorded By, (t) = Taken By, (c) = Cosigned By      Initials Name Provider Type    AM Bari Carias, MK Physical Therapist                                 Physical Therapy Education       Title: PT OT SLP Therapies (Done)       Topic: Physical Therapy (Done)       Point: Mobility training (Done)       Learning Progress Summary            Patient Acceptance, E,TB, VU by AM at 4/1/2025 1142                      Point: Body mechanics (Done)       Learning Progress Summary            Patient Acceptance, E,TB, VU by AM at 4/1/2025 1142                      Point: Precautions (Done)       Learning Progress Summary            Patient Acceptance, E,TB, VU by AM at 4/1/2025 1142                                      User Key       Initials Effective Dates Name Provider Type Discipline    AM 05/10/21 -  Bari Carias PT Physical Therapist PT                  PT Recommendation and Plan  Planned Therapy Interventions (PT): balance training, bed mobility training, gait training, strengthening, patient/family education, transfer training  Outcome Evaluation: Pt is a 55 y/o female with syncopal episode at  [employer] related to hypoglycemia with fatigue, confusion, and reports of cold/claminess.  CT head: (-) acute.; 2 small masses in R parietal lobe likely meningiomas.  Pt reports she lives alone in a 1 story home with no steps to enter into home.  Pt ambulated short distances with no assistive device and ambulated longer distances with rollator prior to hospitalization.  Pt on room air and telemetry this date.  Supervision for bed mobility, CGA for transfers.  Attempted to ambulate 10' without an assistive device, pt required Mod/Max A secondary to decreased balance.  Obtained RW and pt ambulated 35' with RW with CGA/SBA.  Recommend use of RW at all times when at home, pt in agreement.  Recommend HHPT.     Time Calculation:         PT Charges       Row Name 04/01/25 1145             Time Calculation    Start Time 0924  -AM      Stop Time 0958  -AM      Time Calculation (min) 34 min  -AM      PT Received On 04/01/25  -AM      PT - Next Appointment 04/03/25  -AM      PT Goal Re-Cert Due Date 04/15/25  -AM                User Key  (r) = Recorded By, (t) = Taken By, (c) = Cosigned By      Initials Name Provider Type    AM Bari Carias, PT Physical Therapist                  Therapy Charges for Today       Code Description Service Date Service Provider Modifiers Qty    [id number] HC PT EVAL MOD COMPLEXITY 4 4/1/2025 Bari Carias, PT GP 1            PT G-Codes  AM-PAC 6 Clicks Score (PT): 19  PT Discharge Summary  Anticipated Discharge Disposition (PT): home with home health    Bari Carias, MK  4/1/2025

## 2025-04-01 NOTE — PLAN OF CARE
Goal Outcome Evaluation:  Plan of Care Reviewed With: patient           Outcome Evaluation: Pt is a 53 y/o female with syncopal episode at [employer] related to hypoglycemia with fatigue, confusion, and reports of cold/claminess.  CT head: (-) acute.; 2 small masses in R parietal lobe likely meningiomas.  Pt reports she lives alone in a 1 story home with no steps to enter into home.  Pt ambulated short distances with no assistive device and ambulated longer distances with rollator prior to hospitalization.  Pt on room air and telemetry this date.  Supervision for bed mobility, CGA for transfers.  Attempted to ambulate 10' without an assistive device, pt required Mod/Max A secondary to decreased balance.  Obtained RW and pt ambulated 35' with RW with CGA/SBA.  Recommend use of RW at all times when at home, pt in agreement.  Recommend HHPT.    Anticipated Discharge Disposition (PT): home with home health

## 2025-04-01 NOTE — DISCHARGE SUMMARY
[facility]    Brian Gonzalez MD    CHIEF COMPLAINT:     Hypoglycemia    HISTORY OF PRESENT ILLNESS:    Syncope        ED 03/30/2025  Context: Patient is a 54-year-old female with a history of diabetes who presents to the ER for syncopal episode while at [facility], and hypoglycemia.  Patient reports she could tell that her blood sugar was dropping, and she ate several food items to prevent her blood sugar from dropping, patient reports that she had syncopal episode, denies falling or hitting her head.  Patient reports while at [facility] her blood sugar was in the 60s, EMS arrived stating her blood sugar was 126, and by the time patient has arrived in the ER blood sugar was down to 96.  Patient reports mild headache, states she gets that when she her blood sugar gets low.  Patient denies any chest pain, shortness of air, fever, abdominal pain, nausea/vomiting/diarrhea.  Patient reports she is on blood thinners.      Observation 03/31/2025  Patient agrees with HPI noted above including symptoms of hypoglycemia including fatigue, confusion and feeling cold/clammy for the past few weeks.  She states that she is symptomatic if her glucose is less than 110.  She is established with endocrinologist from [place] but she came to the ED yesterday due to syncopal episode at [facility].  She felt like she was hypoglycemic.  She has a continuous glucose monitoring, some instances of glucose <60 but mostly around . She has to constant eat and feels like glucose still drops. She has tried multiple strategies. She states that she had a pancreas biopsy recently which was negative for tumor. She is currently asymptomatic and on IVF. Endocrinologist consulted.      Reassessed this afternoon and reports sudden onset of severe abdominal pain, she points at lower quadrant, midline below umbilical area. She does have a scar from a surgical incision, she states it was from a cholecystectomy. She is tearful          Observation 04/01/2025  Patient denies any abdominal pain today. States that medication from yesterday helped. She is still concerned with hypoglycemia but she is agreeable with discharge today. She does request to be monitored today and dc later versus dc this morning. She has been off of IVF since last night and glucose has been stable. Discussed recommendations of high protein/low carb diet. Dietician and diabetes educator consulted. PT recommends HH.     Blood sugar reviewed and remains stable.  Patient has been off of IV fluids since last night and has been drinking protein boost and eating a high-protein diet.  She denies any distress.  She states that she is tired liked related to drowsiness secondary to medications.  Her medications for Fibromyalgia include gabapentin, oxycodone and Flexeril.    Past Medical History:   Diagnosis Date    Asthma     Diabetes mellitus     DVT (deep venous thrombosis) 08/23/2024    right lower leg    Fibromyalgia     Lupus      Past Surgical History:   Procedure Laterality Date    APPENDECTOMY      CHOLECYSTECTOMY N/A 8/23/2024    Procedure: CHOLECYSTECTOMY LAPAROSCOPIC WITH EyepicINCI ROBOT;  Surgeon: Byrce Huizar MD;  Location: [facility];  Service: Robotics - DaVinci;  Laterality: N/A;    HYSTERECTOMY      JOINT REPLACEMENT Right     KNEE SURGERY      SHOULDER ARTHROSCOPY       History reviewed. No pertinent family history.  Social History     Tobacco Use    Smoking status: Never     Passive exposure: Never    Smokeless tobacco: Never   Vaping Use    Vaping status: Never Used   Substance Use Topics    Alcohol use: Never    Drug use: Never     Medications Prior to Admission   Medication Sig Dispense Refill Last Dose/Taking    budesonide-formoterol (Symbicort) 160-4.5 MCG/ACT inhaler Inhale 2 puffs 2 (Two) Times a Day. 10.2 g 12 Past Week    clopidogrel (PLAVIX) 75 MG tablet Take 1 tablet by mouth Daily. 30 tablet 2 Taking    cyclobenzaprine (FLEXERIL) 10 MG  tablet Take 1 tablet by mouth Every 6 (Six) Hours.   3/30/2025    gabapentin (NEURONTIN) 800 MG tablet Take 1 tablet by mouth 3 (Three) Times a Day.   3/29/2025    hydroCHLOROthiazide 25 MG tablet Take 1 tablet by mouth Daily.   3/29/2025    hydroxychloroquine (PLAQUENIL) 200 MG tablet Take 2 tablets by mouth Every Morning.   3/30/2025    losartan (COZAAR) 50 MG tablet Take 0.5 tablets by mouth Every Night.   Past Month    metoprolol tartrate (LOPRESSOR) 25 MG tablet Take 1 tablet by mouth 2 (Two) Times a Day.   3/29/2025    ondansetron ODT (ZOFRAN-ODT) 4 MG disintegrating tablet Place 1 tablet on the tongue Every 8 (Eight) Hours As Needed for Nausea or Vomiting.   3/29/2025    oxyCODONE (ROXICODONE) 15 MG immediate release tablet Take 1 tablet by mouth Every 4 (Four) Hours.   3/30/2025    triamcinolone (KENALOG) 0.1 % cream Apply 1 Application topically to the appropriate area as directed 2 (Two) Times a Day As Needed.   3/29/2025    venlafaxine XR (EFFEXOR-XR) 37.5 MG 24 hr capsule Take 1 capsule by mouth Daily.   3/30/2025    albuterol (PROVENTIL) (2.5 MG/3ML) 0.083% nebulizer solution Take 2.5 mg by nebulization Every 4 (Four) Hours As Needed for Wheezing. 90 mL 12     albuterol sulfate  (90 Base) MCG/ACT inhaler Inhale 1 puff Every 4 (Four) Hours As Needed for Wheezing. 8 g 0 More than a month    atorvastatin (LIPITOR) 10 MG tablet Take 0.5 tablets by mouth Every Evening.       cetirizine (zyrTEC) 10 MG tablet Take 1 tablet by mouth Daily. 30 tablet 0 More than a month    ipratropium-albuterol (DUO-NEB) 0.5-2.5 mg/3 ml nebulizer Take 3 mL by nebulization Every 4 (Four) Hours As Needed for Wheezing.   More than a month     Allergies:  Metformin and Morphine    Immunization History   Administered Date(s) Administered    COVID-19 (PFIZER) 12YRS+ (COMIRNATY) 10/04/2023    COVID-19 (PFIZER) BIVALENT 12+YRS 04/26/2023    COVID-19 (PFIZER) Purple Cap Monovalent 03/15/2021, 04/05/2021, 04/23/2021, 10/18/2021            REVIEW OF SYSTEMS:    Review of Systems   Cardiovascular:  Positive for syncope.     Review of Systems   Constitutional: Positive for malaise/fatigue.   Cardiovascular:  Positive for syncope.   Gastrointestinal:  Negative for nausea and vomiting.   All other systems reviewed and are negative.       Vital Signs  Temp:  [97.2 °F (36.2 °C)-98.6 °F (37 °C)] 97.2 °F (36.2 °C)  Heart Rate:  [60-80] 68  Resp:  [16-20] 20  BP: (101-141)/(67-83) 141/82          Physical Exam:  Physical Exam  Vitals and nursing note reviewed.   Constitutional:       Appearance: Normal appearance. She is obese.   HENT:      Head: Normocephalic and atraumatic.      Right Ear: External ear normal.      Left Ear: External ear normal.      Nose: Nose normal.      Mouth/Throat:      Mouth: Mucous membranes are moist.      Pharynx: Oropharynx is clear.   Eyes:      Extraocular Movements: Extraocular movements intact.   Cardiovascular:      Rate and Rhythm: Normal rate and regular rhythm.      Pulses: Normal pulses.      Heart sounds: Normal heart sounds.   Pulmonary:      Effort: Pulmonary effort is normal.      Breath sounds: Normal breath sounds.   Abdominal:      General: Abdomen is flat. Bowel sounds are normal.      Palpations: Abdomen is soft.   Musculoskeletal:         General: Normal range of motion.      Cervical back: Normal range of motion.   Skin:     General: Skin is warm.   Neurological:      General: No focal deficit present.      Mental Status: She is alert and oriented to person, place, and time.   Psychiatric:         Mood and Affect: Mood normal.         Behavior: Behavior normal.           Emotional Behavior:    Normal    Debilities:   None  Results Review:    I reviewed the patient's new clinical results.  Lab Results (most recent)       Procedure Component Value Units Date/Time    POC Glucose Once [[id number]]  (Normal) Collected: 04/01/25 1203    Specimen: Blood Updated: 04/01/25 1204     Glucose 88 mg/dL       Comment: Serial Number: [id number]:  [id number]       POC Glucose 4x Daily Before Meals & at Bedtime [[id number]]  (Normal) Collected: 04/01/25 0734    Specimen: Blood Updated: 04/01/25 0736     Glucose 95 mg/dL      Comment: Serial Number: [id number]:  [id number]       Basic Metabolic Panel [[id number]]  (Abnormal) Collected: 04/01/25 0117    Specimen: Blood Updated: 04/01/25 0231     Glucose 97 mg/dL      BUN 17 mg/dL      Creatinine 1.06 mg/dL      Sodium 140 mmol/L      Potassium 3.9 mmol/L      Chloride 101 mmol/L      CO2 26.7 mmol/L      Calcium 9.3 mg/dL      BUN/Creatinine Ratio 16.0     Anion Gap 12.3 mmol/L      eGFR 61.8 mL/min/1.73     Narrative:      GFR Categories in Chronic Kidney Disease (CKD)      GFR Category          GFR (mL/min/1.73)    Interpretation  G1                     90 or greater         Normal or high (1)  G2                      60-89                Mild decrease (1)  G3a                   45-59                Mild to moderate decrease  G3b                   30-44                Moderate to severe decrease  G4                    15-29                Severe decrease  G5                    14 or less           Kidney failure          (1)In the absence of evidence of kidney disease, neither GFR category G1 or G2 fulfill the criteria for CKD.    eGFR calculation 2021 CKD-EPI creatinine equation, which does not include race as a factor    CBC & Differential [[id number]]  (Abnormal) Collected: 04/01/25 0117    Specimen: Blood Updated: 04/01/25 0206    Narrative:      The following orders were created for panel order CBC & Differential.  Procedure                               Abnormality         Status                     ---------                               -----------         ------                     CBC Auto Differential[[id number]]        Abnormal            Final result                 Please view results for these tests on the individual orders.    CBC Auto Differential  [[id number]]  (Abnormal) Collected: 04/01/25 0117    Specimen: Blood Updated: 04/01/25 0206     WBC 7.92 10*3/mm3      RBC 3.97 10*6/mm3      Hemoglobin 11.5 g/dL      Hematocrit 36.8 %      MCV 92.7 fL      MCH 29.0 pg      MCHC 31.3 g/dL      RDW 12.5 %      RDW-SD 42.8 fl      MPV 9.9 fL      Platelets 285 10*3/mm3      Neutrophil % 46.4 %      Lymphocyte % 38.9 %      Monocyte % 11.0 %      Eosinophil % 2.5 %      Basophil % 0.8 %      Immature Grans % 0.4 %      Neutrophils, Absolute 3.68 10*3/mm3      Lymphocytes, Absolute 3.08 10*3/mm3      Monocytes, Absolute 0.87 10*3/mm3      Eosinophils, Absolute 0.20 10*3/mm3      Basophils, Absolute 0.06 10*3/mm3      Immature Grans, Absolute 0.03 10*3/mm3      nRBC 0.0 /100 WBC     Basic Metabolic Panel [[id number]]  (Normal) Collected: 03/31/25 1448    Specimen: Blood from Arm, Left Updated: 03/31/25 1828     Glucose 91 mg/dL      BUN 10 mg/dL      Creatinine 0.85 mg/dL      Sodium 140 mmol/L      Potassium 3.5 mmol/L      Chloride 99 mmol/L      CO2 28.6 mmol/L      Calcium 9.6 mg/dL      BUN/Creatinine Ratio 11.8     Anion Gap 12.4 mmol/L      eGFR 80.5 mL/min/1.73     Narrative:      GFR Categories in Chronic Kidney Disease (CKD)      GFR Category          GFR (mL/min/1.73)    Interpretation  G1                     90 or greater         Normal or high (1)  G2                      60-89                Mild decrease (1)  G3a                   45-59                Mild to moderate decrease  G3b                   30-44                Moderate to severe decrease  G4                    15-29                Severe decrease  G5                    14 or less           Kidney failure          (1)In the absence of evidence of kidney disease, neither GFR category G1 or G2 fulfill the criteria for CKD.    eGFR calculation 2021 CKD-EPI creatinine equation, which does not include race as a factor    Hemoglobin A1c [[id number]]  (Abnormal) Collected: 03/31/25 1448    Specimen: Blood  from Arm, Left Updated: 03/31/25 1649     Hemoglobin A1C 5.72 %     CBC & Differential [[id number]]  (Abnormal) Collected: 03/31/25 1448    Specimen: Blood from Arm, Left Updated: 03/31/25 1610    Narrative:      The following orders were created for panel order CBC & Differential.  Procedure                               Abnormality         Status                     ---------                               -----------         ------                     CBC Auto Differential[[id number]]        Abnormal            Final result                 Please view results for these tests on the individual orders.    CBC Auto Differential [[id number]]  (Abnormal) Collected: 03/31/25 1448    Specimen: Blood from Arm, Left Updated: 03/31/25 1610     WBC 8.74 10*3/mm3      RBC 4.32 10*6/mm3      Hemoglobin 12.7 g/dL      Hematocrit 39.3 %      MCV 91.0 fL      MCH 29.4 pg      MCHC 32.3 g/dL      RDW 12.3 %      RDW-SD 41.1 fl      MPV 10.1 fL      Platelets 330 10*3/mm3      Neutrophil % 49.0 %      Lymphocyte % 38.2 %      Monocyte % 9.3 %      Eosinophil % 2.3 %      Basophil % 0.7 %      Immature Grans % 0.5 %      Neutrophils, Absolute 4.29 10*3/mm3      Lymphocytes, Absolute 3.34 10*3/mm3      Monocytes, Absolute 0.81 10*3/mm3      Eosinophils, Absolute 0.20 10*3/mm3      Basophils, Absolute 0.06 10*3/mm3      Immature Grans, Absolute 0.04 10*3/mm3      nRBC 0.0 /100 WBC     Potassium [[id number]]  (Abnormal) Collected: 03/31/25 1448    Specimen: Blood from Arm, Left Updated: 03/31/25 1545     Potassium 3.4 mmol/L     T4, Free [[id number]]  (Normal) Collected: 03/30/25 1201    Specimen: Blood Updated: 03/30/25 1707     Free T4 1.17 ng/dL     Comprehensive Metabolic Panel [[id number]]  (Abnormal) Collected: 03/30/25 1201    Specimen: Blood Updated: 03/30/25 1251     Glucose 73 mg/dL      BUN 14 mg/dL      Creatinine 0.95 mg/dL      Sodium 140 mmol/L      Potassium 3.5 mmol/L      Comment: Slight hemolysis detected by analyzer.  Result may be falsely elevated.        Chloride 101 mmol/L      CO2 27.1 mmol/L      Calcium 9.4 mg/dL      Total Protein 7.4 g/dL      Albumin 4.2 g/dL      ALT (SGPT) 20 U/L      AST (SGOT) 33 U/L      Comment: Slight hemolysis detected by analyzer. Result may be falsely elevated.        Alkaline Phosphatase 98 U/L      Total Bilirubin 0.2 mg/dL      Globulin 3.2 gm/dL      A/G Ratio 1.3 g/dL      BUN/Creatinine Ratio 14.7     Anion Gap 11.9 mmol/L      eGFR 70.5 mL/min/1.73     Narrative:      GFR Categories in Chronic Kidney Disease (CKD)      GFR Category          GFR (mL/min/1.73)    Interpretation  G1                     90 or greater         Normal or high (1)  G2                      60-89                Mild decrease (1)  G3a                   45-59                Mild to moderate decrease  G3b                   30-44                Moderate to severe decrease  G4                    15-29                Severe decrease  G5                    14 or less           Kidney failure          (1)In the absence of evidence of kidney disease, neither GFR category G1 or G2 fulfill the criteria for CKD.    eGFR calculation 2021 CKD-EPI creatinine equation, which does not include race as a factor    Magnesium [[id number]]  (Normal) Collected: 03/30/25 1201    Specimen: Blood Updated: 03/30/25 1251     Magnesium 2.1 mg/dL     Lipase [[id number]]  (Normal) Collected: 03/30/25 1201    Specimen: Blood Updated: 03/30/25 1245     Lipase 22 U/L     TSH Rfx On Abnormal To Free T4 [[id number]]  (Normal) Collected: 03/30/25 1201    Specimen: Blood Updated: 03/30/25 1245     TSH 1.580 uIU/mL     Extra Tubes [[id number]] Collected: 03/30/25 1201    Specimen: Blood, Venous Line Updated: 03/30/25 1216    Narrative:      The following orders were created for panel order Extra Tubes.  Procedure                               Abnormality         Status                     ---------                               -----------         ------                      Gold Top - SST[[id number]]                                   Final result                 Please view results for these tests on the individual orders.    Gold Top - SST [[id number]] Collected: 03/30/25 1201    Specimen: Blood Updated: 03/30/25 1216     Extra Tube Hold for add-ons.     Comment: Auto resulted.       Urinalysis, Microscopic Only - Urine, Clean Catch [[id number]]  (Abnormal) Collected: 03/30/25 1150    Specimen: Urine, Clean Catch Updated: 03/30/25 1203     RBC, UA 0-2 /HPF      WBC, UA 3-5 /HPF      Bacteria, UA None Seen /HPF      Squamous Epithelial Cells, UA 0-2 /HPF      Hyaline Casts, UA None Seen /LPF      Methodology Automated Microscopy    Urinalysis With Microscopic If Indicated (No Culture) - Urine, Clean Catch [[id number]]  (Abnormal) Collected: 03/30/25 1150    Specimen: Urine, Clean Catch Updated: 03/30/25 1158     Color, UA Yellow     Appearance, UA Clear     pH, UA <=5.0     Specific Gravity, UA 1.010     Glucose, UA Negative     Ketones, UA Negative     Bilirubin, UA Negative     Blood, UA Negative     Protein, UA Negative     Leuk Esterase, UA Trace     Nitrite, UA Negative     Urobilinogen, UA 0.2 E.U./dL            Imaging Results (Most Recent)       Procedure Component Value Units Date/Time    CT Abdomen Pelvis Without Contrast [[id number]] Collected: 03/31/25 1521     Updated: 03/31/25 1539    Narrative:      CT ABDOMEN PELVIS WO CONTRAST    Date of Exam: 3/31/2025 3:19 PM EDT    Indication: severe abdominal pain.    Comparison: None available.    Technique: Axial CT images were obtained of the abdomen and pelvis without the administration of contrast. Sagittal and coronal reconstructions were performed.  Automated exposure control and iterative reconstruction methods were used.      Findings:  The lung bases are clear bilaterally. In the posterior right chest wall at the level of the hemidiaphragm is a 3.8 cm ovoid fat-containing mass. Is not completely imaged on  this exam but suspected to represent a lipoma.    A cholecystectomy has been performed. The fluid collection previously noted in the gallbladder fossa has resolved. The liver, spleen, pancreas and adrenal glands appear unremarkable. Both kidneys appear normal.    No adenopathy or free fluid is seen in the abdomen or pelvis. The urinary bladder appears normal. Multiple masses are noted in the uterus, some of which appear calcified. Findings likely represent leiomyomas. The uterus measures 8.4 cm craniocaudal by   8.6 cm AP by 8.5 cm transverse. A small to moderate amount of stool is noted in the colon. No acute bowel abnormality is identified. The appendix is not visualized. The lack of oral contrast limits evaluation of the bowel. No hernia is seen.    No focal osseous abnormality is seen.          Impression:      Impression:  1.3.8 cm lipoma in the posterior right chest wall at the level of the diaphragm.  2.Previous cholecystectomy and appendectomy.  3.Multiple uterine leiomyomas, as above.            Electronically Signed: Mike Gamez MD    3/31/2025 3:37 PM EDT    Workstation ID: [id number]    CT Head Without Contrast [[id number]] Collected: 03/30/25 1308     Updated: 03/30/25 1319    Narrative:      CT HEAD WO CONTRAST    Date of Exam: 3/30/2025 12:44 PM EDT    Indication: Syncope, blood thinner use.    Comparison: None available. Outside CT report from [facility] dated 6/15/2022    Technique: Axial CT images were obtained of the head without contrast administration.  Coronal reconstructions were performed.  Automated exposure control and iterative reconstruction methods were used.      Findings:  The ventricles and sulci are proportional without evidence of volume loss or hydrocephalus. There is no mass effect or midline shift. There are 2 small extra-axial masses within the high right parietal region, one measuring 10 mm and the other measuring   11 mm. The more lateral anterior lesion is  partially calcified likely representing a meningioma. This was described previously on prior examinations. The more medial/posterior lesion also likely reflects a secondary meningioma however there is no   definite calcification. There is no subarachnoid hemorrhage. The gray-white matter differentiation is preserved. The calvarium is intact. The mastoid air cells and middle ears are well aerated. The paranasal sinuses appear clear.      Impression:      Impression:  1.No acute intracranial abnormality. Specifically, no evidence of acute hemorrhage, mass effect or midline shift.  2.Two small extra-axial masses in the high right parietal region, one of which is partially calcified. These are likely meningiomas. Only 1 of these has been described on patient's prior outside imaging reports from [place] 2022.        Electronically Signed: Fede Chavez    3/30/2025 1:17 PM EDT    Workstation ID: [id number]          reviewed    ECG/EMG Results (most recent)       Procedure Component Value Units Date/Time    Telemetry Scan [[id number]] Resulted: 03/30/25     Updated: 03/31/25 0616    ECG 12 Lead Syncope [[id number]] Collected: 03/30/25 1130     Updated: 03/31/25 0633     QT Interval 405 ms      QTC Interval 440 ms     Narrative:      HEART RATE=71  bpm  RR Vbvgdwlb=144  ms  NV Bthumluh=878  ms  P Horizontal Axis=-15  deg  P Front Axis=45  deg  QRSD Interval=84  ms  QT Bfonzygu=627  ms  JKkT=381  ms  QRS Axis=40  deg  T Wave Axis=10  deg  - ABNORMAL ECG -  Sinus rhythm  Probable  left atrial enlargement  Low voltage, precordial leads  Consider  anteroseptal infarct  When compared with ECG of 18-Sep-2024 13:55:53,  Significant repolarization change  Electronically Signed By: Semaj Ren ([facility]) 2025-03-31 06:33:48  Date and Time of Study:2025-03-30 11:30:39    Telemetry Scan [[id number]] Resulted: 03/30/25     Updated: 03/31/25 0716    Telemetry Scan [[id number]] Resulted: 03/30/25     Updated: 03/31/25 0753    Adult  Transthoracic Echo Complete w/ Color, Spectral and Contrast if Necessary Per Protocol [[id number]] Resulted: 03/31/25 1908     Updated: 03/31/25 1908     LV GLOBAL STRAIN  -26.4 %      LVIDd 4.5 cm      LVIDs 2.9 cm      IVSd 1.00 cm      LVPWd 1.00 cm      FS 35.6 %      IVS/LVPW 1.00 cm      ESV(cubed) 24.4 ml      EDV(cubed) 91.1 ml      LV mass(C)d 153.3 grams      LVOT area 2.8 cm2      LVOT diam 1.90 cm      EDV(MOD-sp4) 85.2 ml      ESV(MOD-sp4) 29.1 ml      SV(MOD-sp4) 56.1 ml      EF(MOD-sp4) 65.8 %      MV E max gurjit 82.1 cm/sec      MV A max gurjit 120.0 cm/sec      MV dec time 0.25 sec      MV E/A 0.68     Pulm A Revs Dur 0.11 sec      LA ESV Index (BP) 22.3 ml/m2      Med Peak E' Gurjit 9.7 cm/sec      Lat Peak E' Gurjit 17.5 cm/sec      TR max gurjit 295.0 cm/sec      Avg E/e' ratio 6.04     SV(LVOT) 68.6 ml      RVIDd 3.3 cm      TAPSE (>1.6) 2.09 cm      RV S' 18.3 cm/sec      LA dimension (2D)  4.2 cm      Pulm Sys Gurjit 72.5 cm/sec      Pulm Abdullahi Gurjit 43.8 cm/sec      Pulm S/D 1.66     Pulm A Revs Gurjit 36.5 cm/sec      LV V1 max 121.0 cm/sec      LV V1 max PG 5.9 mmHg      LV V1 mean PG 3.0 mmHg      LV V1 VTI 24.2 cm      Ao pk gurjit 147.0 cm/sec      Ao max PG 8.6 mmHg      Ao mean PG 5.0 mmHg      Ao V2 VTI 28.0 cm      SAMMY(I,D) 2.45 cm2      Dimensionless Index 0.86 (DI)      MV max PG 4.9 mmHg      MV mean PG 2.00 mmHg      MV V2 VTI 28.0 cm      MV P1/2t 60.6 msec      MVA(P1/2t) 3.6 cm2      MVA(VTI) 2.45 cm2      MV dec slope 434.0 cm/sec2      TR max PG 34.8 mmHg      RVSP(TR) 37.8 mmHg      RAP systole 3.0 mmHg      RV V1 max PG 2.01 mmHg      RV V1 max 70.8 cm/sec      RV V1 VTI 15.2 cm      PA V2 max 118.0 cm/sec      PA acc time 0.13 sec      ACS 2.20 cm      Sinus 3.0 cm      EF(MOD-bp) 66.0 %     Narrative:        Left ventricular systolic function is normal. Calculated left   ventricular EF = 66% Left ventricular ejection fraction appears to be 66 -   70%.    Left ventricular diastolic function is  consistent with (grade I)   impaired relaxation.    The right ventricular cavity is mildly dilated.    The left atrial cavity is mildly dilated.    Estimated right ventricular systolic pressure from tricuspid   regurgitation is mildly elevated (35-45 mmHg).      Telemetry Scan [[id number]] Resulted: 03/30/25     Updated: 03/31/25 2327    Telemetry Scan [[id number]] Resulted: 03/30/25     Updated: 04/01/25 0607    Telemetry Scan [[id number]] Resulted: 03/30/25     Updated: 04/01/25 0939          reviewed    Results for orders placed during the hospital encounter of 09/18/24    Duplex Venous Lower Extremity - Right CAR    Interpretation Summary    Normal right lower extremity venous duplex scan.      Results for orders placed during the hospital encounter of 03/30/25    Adult Transthoracic Echo Complete w/ Color, Spectral and Contrast if Necessary Per Protocol    Interpretation Summary    Left ventricular systolic function is normal. Calculated left ventricular EF = 66% Left ventricular ejection fraction appears to be 66 - 70%.    Left ventricular diastolic function is consistent with (grade I) impaired relaxation.    The right ventricular cavity is mildly dilated.    The left atrial cavity is mildly dilated.    Estimated right ventricular systolic pressure from tricuspid regurgitation is mildly elevated (35-45 mmHg).      Microbiology Results (last 10 days)       ** No results found for the last 240 hours. **            Assessment & Plan     Hypoglycemia          Syncope        Lab Results   Component Value Date     TROPONINT 11 09/18/2024     TROPONINT 9 04/01/2024   -CMP showed potassium 3.4 and patient received potassium per protocol  -Potassium 3.9 today  -UA negative  -CT head showed no acute intracranial abnormality  -EKG showed sinus rhythm with heart rate 71  -2D echo showed EF 66%  -Telemetry     Diabetes mellitus type 2 with hypoglycemic   Lab Results   Component Value Date    GLUCOSE 97 04/01/2025    GLUCOSE  91 03/31/2025    GLUCOSE 102 (H) 03/31/2025    GLUCOSE 73 03/30/2025           Lab Results   Component Value Date     GLUCOSE 102 (H) 03/31/2025     GLUCOSE 73 03/30/2025     GLUCOSE 215 (H) 09/19/2024     GLUCOSE 125 (H) 09/18/2024   -A1c 5.72  -Glucose was 96 in the ED and down to 73 despite hypoglycemic treatment  -She is symptomatic with glucose <110  -D5W at 50cc/h yesterday and has been off IVF since last night, no hypoglycemia noted  -Endocrinologist consulted and recommended high protein/low carb diet and f/u with primary endocrinologist  -Diabetes educator and dietician consulted  -Diabetic diet  -Monitor before meals and at bedtime   -PT recommended HH     Abdominal pain  (Resolved)        Lab Results   Component Value Date     WBC 9.33 03/30/2025     AST 33 (H) 03/30/2025     ALT 20 03/30/2025     ALKPHOS 98 03/30/2025     BILITOT 0.2 03/30/2025     LIPASE 22 03/30/2025   -UA negative  -CT of abdomen and pelvis showed moderate stool, 3.8 lipoma, multiple uterine leiomyomas  -Toradol x1 for now  -Monitor while admitted   -Bentyl as needed  -Follow-up with PCP and GYN outpatient     Hypertension  -Well controlled   BP Readings from Last 1 Encounters:   04/01/25 141/82   - Continue HCTZ and losartan.  Hold Lopressor for now due to hypoglycemia  - Monitor while admitted      Lupus  -Continue Plaquenil     Fibromyalgia  -Continue oxycodone and gabapentin       I discussed the patients findings and my recommendations with patient and nursing staff.     Discharge Diagnosis:      Hypoglycemia      Hospital Course  Patient is a 54 y.o. female presented with symptoms of hypoglycemia as noted in HPI above.  She reported she had a syncopal episode at [facility] due to hypoglycemia.  Her glucose remained stable during ER and observation but patient reported she felt symptomatic if glucose was less than 110.  This is acute on chronic and she is established with endocrinologist in [place].  Endocrinologist consulted and  recommended high-protein/low-carb diet.  She was evaluated by dietitian and diabetes educator.  CT abdomen pelvis was performed she complained of abdominal pain but was negative for acute findings.  She will be following up with GYN outpatient for uterine leiomyomas as well as her primary endocrinologist. At this time, patient felt to be in good condition for discharge with close follow up with PCP. Instructed to take all medications as prescribed and to return to ED if any concerning signs/symptoms. All test/lab results were discussed with patient. All questions were answered and patient verbalizes understanding.   .      Past Medical History:     Past Medical History:   Diagnosis Date    Asthma     Diabetes mellitus     DVT (deep venous thrombosis) 08/23/2024    right lower leg    Fibromyalgia     Lupus        Past Surgical History:     Past Surgical History:   Procedure Laterality Date    APPENDECTOMY      CHOLECYSTECTOMY N/A 8/23/2024    Procedure: CHOLECYSTECTOMY LAPAROSCOPIC WITH DAVINCI ROBOT;  Surgeon: Bryce Huizar MD;  Location: [facility];  Service: Robotics - DaVinci;  Laterality: N/A;    HYSTERECTOMY      JOINT REPLACEMENT Right     KNEE SURGERY      SHOULDER ARTHROSCOPY         Social History:   Social History     Socioeconomic History    Marital status: Single   Tobacco Use    Smoking status: Never     Passive exposure: Never    Smokeless tobacco: Never   Vaping Use    Vaping status: Never Used   Substance and Sexual Activity    Alcohol use: Never    Drug use: Never    Sexual activity: Never       Procedures Performed         Consults:   Consults       Date and Time Order Name Status Description    3/31/2025  7:58 AM Inpatient Endocrinology Consult Completed             Condition on Discharge:     Stable    Discharge Disposition      Discharge Medications     Discharge Medications        ASK your doctor about these medications        Instructions Start Date   albuterol sulfate   (90 Base) MCG/ACT inhaler  Commonly known as: PROVENTIL HFA;VENTOLIN HFA;PROAIR HFA   1 puff, Inhalation, Every 4 Hours PRN      albuterol (2.5 MG/3ML) 0.083% nebulizer solution  Commonly known as: PROVENTIL   2.5 mg, Nebulization, Every 4 Hours PRN      atorvastatin 10 MG tablet  Commonly known as: LIPITOR   5 mg, Oral, Every Evening      cetirizine 10 MG tablet  Commonly known as: zyrTEC   10 mg, Oral, Daily      clopidogrel 75 MG tablet  Commonly known as: PLAVIX   75 mg, Oral, Daily      cyclobenzaprine 10 MG tablet  Commonly known as: FLEXERIL   10 mg, Oral, Every 6 Hours      gabapentin 800 MG tablet  Commonly known as: NEURONTIN   800 mg, 3 Times Daily      hydroCHLOROthiazide 25 MG tablet   25 mg, Daily      hydroxychloroquine 200 MG tablet  Commonly known as: PLAQUENIL   400 mg, Every Morning      ipratropium-albuterol 0.5-2.5 mg/3 ml nebulizer  Commonly known as: DUO-NEB   3 mL, Nebulization, Every 4 Hours PRN      losartan 50 MG tablet  Commonly known as: COZAAR   25 mg, Nightly      metoprolol tartrate 25 MG tablet  Commonly known as: LOPRESSOR   25 mg, 2 Times Daily      ondansetron ODT 4 MG disintegrating tablet  Commonly known as: ZOFRAN-ODT   4 mg, Every 8 Hours PRN      oxyCODONE 15 MG immediate release tablet  Commonly known as: ROXICODONE   15 mg, Every 4 Hours      Symbicort 160-4.5 MCG/ACT inhaler  Generic drug: budesonide-formoterol   2 puffs, Inhalation, 2 Times Daily - RT      triamcinolone 0.1 % cream  Commonly known as: KENALOG   1 Application, 2 Times Daily PRN      venlafaxine XR 37.5 MG 24 hr capsule  Commonly known as: EFFEXOR-XR   37.5 mg, Daily               Discharge Diet:     Activity at Discharge:     Follow-up Appointments  No future appointments.  Additional Instructions for the Follow-ups that You Need to Schedule       Ambulatory Referral to Home Health   As directed      Face to Face Visit Date: 4/1/2025   Follow-up provider for Plan of Care?: I treated the patient in an acute  care facility and will not continue treatment after discharge.   Follow-up provider: LUZ MARIA OBRIEN [[id number]]   Reason/Clinical Findings: hypoglycemia, weakness   Describe mobility limitations that make leaving home difficult: hypoglycemia, weakness   Nursing/Therapeutic Services Requested: Skilled Nursing Physical Therapy   Skilled nursing orders: Medication education Other   Frequency: 1 Week 1                Test Results Pending at Discharge  Pending Results       None             Risk for Readmission (LACE) Score: 7 (4/1/2025  6:00 AM)      Greater than 30 minutes spent in discharge activities for this patient    Signature:Electronically signed by SAAD Griffith, 04/01/25, 12:59 PM EDT.

## 2025-04-01 NOTE — PROGRESS NOTES
ENDOCRINE CONSULT PROGRESS NOTE  DATE OF SERVICE: 25        PATIENT NAME: Lisa Jay  PATIENT : 1970 AGE: 54 y.o.  MRN NUMBER: [id number]    ==========================================================================    CHIEF COMPLAINT: Postprandial reactive hypoglycemia    CARE TEAM:   Patient Care Team:  Biran Gonzalez MD as PCP - General (Family Medicine)    SUBJECTIVE    Pt seen and examined.  Blood sugar reviewed for last 24 hours.  Counseled patient again in detail to maintain high-protein low carbohydrate diet.  Patient is currently off D5 drip since yesterday.    ==========================================================================    CURRENT ACTIVE HOSPITAL MEDICATIONS    Scheduled Medications:  atorvastatin, 5 mg, Oral, Nightly  budesonide-formoterol, 2 puff, Inhalation, BID - RT  cetirizine, 10 mg, Oral, Daily  clopidogrel, 75 mg, Oral, Daily  dicyclomine, 10 mg, Oral, 4x Daily  gabapentin, 800 mg, Oral, Q8H  hydroCHLOROthiazide, 25 mg, Oral, Daily  hydroxychloroquine, 400 mg, Oral, Daily  losartan, 25 mg, Oral, Nightly  [Held by provider] metoprolol tartrate, 25 mg, Oral, BID  oxyCODONE, 15 mg, Oral, Q4H  sodium chloride, 10 mL, Intravenous, Q12H  venlafaxine XR, 37.5 mg, Oral, Daily         PRN Medications:    acetaminophen **OR** acetaminophen **OR** acetaminophen    aluminum-magnesium hydroxide-simethicone    senna-docusate sodium **AND** polyethylene glycol **AND** bisacodyl **AND** bisacodyl    cyclobenzaprine    dextrose    dextrose    glucagon (human recombinant)    ipratropium-albuterol    nitroglycerin    ondansetron ODT **OR** ondansetron    [COMPLETED] Insert Peripheral IV **AND** sodium chloride    sodium chloride    sodium chloride     ==========================================================================    OBJECTIVE    Vitals:    25 1610   BP: 147/83   Pulse: 78   Resp: 16   Temp: 97.8 °F (36.6 °C)   SpO2: 98%      Body mass index is 38.89 kg/m².      General - A&Ox3, NAD, Calm    ==========================================================================    LAB EVALUATION    Lab Results   Component Value Date    GLUCOSE 97 04/01/2025    BUN 17 04/01/2025    CREATININE 1.06 (H) 04/01/2025    EGFRIFAFRI >60 11/02/2022    BCR 16.0 04/01/2025    K 3.9 04/01/2025    CO2 26.7 04/01/2025    CALCIUM 9.3 04/01/2025    ALBUMIN 4.2 03/30/2025    LABIL2 1.1 11/02/2022    AST 33 (H) 03/30/2025    ALT 20 03/30/2025       Lab Results   Component Value Date    HGBA1C 5.72 (H) 03/31/2025    HGBA1C 5.9 (H) 07/10/2024    HGBA1C 6.1 (H) 08/11/2023     Lab Results   Component Value Date    CREATININE 1.06 (H) 04/01/2025     Results from last 7 days   Lab Units 04/01/25  1652 04/01/25  1608 04/01/25  1203 04/01/25  0734 03/31/25  2058 03/31/25  1838   GLUCOSE mg/dL 102 135* 88 95 92 110*     ==========================================================================    ASSESSMENT AND PLAN    # Postprandial reactive hypoglycemia  - Blood sugar reviews  - Appreciate input from diabetes educator  - Patient is currently off D5W yesterday  - Counseled patient to maintain high-protein low carbohydrate diet  - Patient to follow-up with endocrinology once discharged at [facility], patient had previously establish care    Will follow with you.  Rest as per primary team.    Part of this note may be an electronic transcription/translation of spoken language to printed text using the Dragon Dictation System.     Note: Portions of this note may have been copied from previous notes but documentation have been reviewed and edited as necessary to support clinical decision making for today's visit.  The time of this note does not reflect the time I saw the patient but the time that this note was written.    ==========================================================================  Mauricio Duncan MD  Department of Endocrine, Diabetes and Metabolism  [facility],  [place]  ==========================================================================

## 2025-04-01 NOTE — PLAN OF CARE
Problem: Violence Risk or Actual  Goal: Anger and Impulse Control  Outcome: Progressing     Problem: Adult Inpatient Plan of Care  Goal: Plan of Care Review  Outcome: Progressing  Flowsheets (Taken 4/1/2025 0617)  Progress: improving  Plan of Care Reviewed With: patient  Goal: Patient-Specific Goal (Individualized)  Outcome: Progressing  Goal: Absence of Hospital-Acquired Illness or Injury  Outcome: Progressing  Intervention: Identify and Manage Fall Risk  Recent Flowsheet Documentation  Taken 4/1/2025 0404 by Yarelis Whitley RN  Safety Promotion/Fall Prevention: safety round/check completed  Taken 4/1/2025 0226 by Yarelis Whitley RN  Safety Promotion/Fall Prevention: safety round/check completed  Taken 4/1/2025 0044 by Yarelis Whitley RN  Safety Promotion/Fall Prevention: safety round/check completed  Taken 3/31/2025 2200 by Yarelis Whitley RN  Safety Promotion/Fall Prevention: safety round/check completed  Taken 3/31/2025 2045 by Yarelis Whitley RN  Safety Promotion/Fall Prevention: safety round/check completed  Goal: Optimal Comfort and Wellbeing  Outcome: Progressing  Intervention: Monitor Pain and Promote Comfort  Recent Flowsheet Documentation  Taken 3/31/2025 2325 by Yarelis Whitley RN  Pain Management Interventions: pain medication given  Intervention: Provide Person-Centered Care  Recent Flowsheet Documentation  Taken 3/31/2025 2045 by Yarelis Whitley RN  Trust Relationship/Rapport: care explained  Goal: Readiness for Transition of Care  Outcome: Progressing     Problem: Glycemic Control Impaired  Goal: Blood Glucose Level Within Target Range  Outcome: Progressing  Goal: Minimize Hypoglycemia Risk  Outcome: Progressing     Problem: Electrolyte Imbalance  Goal: Electrolyte Balance  Outcome: Progressing     Problem: Fall Injury Risk  Goal: Absence of Fall and Fall-Related Injury  Outcome: Progressing  Intervention: Promote Injury-Free Environment  Recent Flowsheet Documentation  Taken  4/1/2025 0404 by Yarelis Whitley RN  Safety Promotion/Fall Prevention: safety round/check completed  Taken 4/1/2025 0226 by Yarelis Whitley RN  Safety Promotion/Fall Prevention: safety round/check completed  Taken 4/1/2025 0044 by Yarelis Whitley RN  Safety Promotion/Fall Prevention: safety round/check completed  Taken 3/31/2025 2200 by Yarelis Whitley RN  Safety Promotion/Fall Prevention: safety round/check completed  Taken 3/31/2025 2045 by Yareils Whitley RN  Safety Promotion/Fall Prevention: safety round/check completed   Goal Outcome Evaluation:  Plan of Care Reviewed With: patient        Progress: improving

## 2025-04-01 NOTE — CONSULTS
"Diabetes Education  Assessment/Teaching    Patient Name:  Lisa Jay  YOB: 1970  MRN: [id number]  Admit Date:  3/30/2025      Assessment Date:  4/1/2025  Flowsheet Row Most Recent Value   General Information     Referral From: MD order  [Consult received to teach bs monitoring. A1c this adm 5.72%. BS upon arrival to [facility].]   Height 171.5 cm (67.5\")   Weight 114 kg (252 lb)   Pregnancy Assessment    Diabetes History    What type of diabetes do you have? Type 2   Length of Diabetes Diagnosis --  [Dx about 4 years ago.]   Do you test your blood sugar at home? yes   Frequency of checks wears continuous glucose sensor   Meter type Freestyle Brian 3, pt does not have bs meter for backup   Who performs the test? self   Typical readings <120   Have you had low blood sugar? (<70mg/dl) yes   How often do you have low blood sugar? frequently   Education Preferences    Nutrition Information    Assessment Topics    DM Goals             Flowsheet Row Most Recent Value   DM Education Needs    Meter Needs meter  [Instructed pt in use of Accuchek Guide Me. Pt verbalized understanding of how to insert test strip into meter and where to apply the blood. Pt loaded lancet into lancing device and demonstrated correct use of device.]   Meter Type Accuchek   Frequency of Testing --  [Discussed importance of checking bs when she feels she needs to verify accuracy of sensor reading or when she feels she is dropping low.]   Blood Glucose Target Range Discussed A1c result of 5.72%. Discussed healthy bs range and healthy A1c target. Discussed importance of bs control.   Medication Other injectables  [Pt taking Mounjaro on Tuesdays.]   Problem Solving Hypoglycemia, Signs, Symptoms, Treatment  [Discussed using protein and long-acting CHO to help get bs back in healthy range if feeling like she is dropping.]   Reducing Risks A1C testing  [Gave A1c info sheet.]   Healthy Eating --  [Discussed importance of eating small, " frequent meals. Discussed importance of following higher protein, lower CHO meal plan. Discussed increasing fiber to help with maintaining bs in healthy range.]   Physical Activity --  [Pt states she goes to gym and exercises for 4-5 hours per day Monday through Friday. Pt swims and does some weight training.]   Motivation Engaged   Teaching Method Explanation, Discussion, Demonstration, Handouts   Patient Response Verbalized understanding, Demonstrates adequately              Other Comments:  Met with pt at bedside. Pt very sleepy during conversation. Pt states she gets sleepy when bs low. BS per sensor reading is 69. Pt states she recently ate omelette and had juice. Discussed importance of always having protein with CHO. Pt does have peanut butter/crackers at bedside. Discussed that educator would notify pt's nurse that she is feeling sleepy. Pt agreeable to checking her bs some in addition to wearing the continuous glucose sensor. Rxs started for Accuchek Guide Me meter, test strips and lancets. Pt states she has lost about 130 lbs in has 1.5 years. Pt is being followed by endocrinology this adm. Pt with no additional questions for educator at this time.        Electronically signed by:  Aspen Wells RN  04/01/25 11:58 EDT

## 2025-04-02 ENCOUNTER — READMISSION MANAGEMENT (OUTPATIENT)
Dept: CALL CENTER | Facility: HOSPITAL | Age: 55
End: 2025-04-02
Payer: MEDICARE

## 2025-04-02 ENCOUNTER — TELEPHONE (OUTPATIENT)
Dept: DIABETES SERVICES | Facility: HOSPITAL | Age: 55
End: 2025-04-02
Payer: MEDICARE

## 2025-04-02 NOTE — OUTREACH NOTE
Prep Survey      Flowsheet Row Responses   [facility] facility patient discharged from? [facility]   Is LACE score < 7 ? No   Eligibility Readm Mgmt   Discharge diagnosis Hypoglycemia   Does the patient have one of the following disease processes/diagnoses(primary or secondary)? Other   Does the patient have Home health ordered? Yes   [facility]   Is there a DME ordered? No   Prep survey completed? Yes            [place] - Registered Nurse

## 2025-04-02 NOTE — CASE MANAGEMENT/SOCIAL WORK
Case Management Discharge Note      Final Note: Home with [facility]              Home Medical Care Coordination complete.      Service Provider Services Address Phone Fax Patient Preferred    [facility], [facility] [address] [id number] [phone] --                    Transportation Services  Private: Car    Final Discharge Disposition Code: 06 - home with home health care

## 2025-04-08 ENCOUNTER — READMISSION MANAGEMENT (OUTPATIENT)
Dept: CALL CENTER | Facility: HOSPITAL | Age: 55
End: 2025-04-08
Payer: MEDICARE

## 2025-04-08 NOTE — OUTREACH NOTE
Medical Week 1 Survey      Flowsheet Row Responses   Religious facility patient discharged from? [facility]   Does the patient have one of the following disease processes/diagnoses(primary or secondary)? Other   Week 1 attempt successful? No   Unsuccessful attempts Attempt 1            Galilea PANDEY - Registered Nurse

## 2025-04-17 ENCOUNTER — READMISSION MANAGEMENT (OUTPATIENT)
Dept: CALL CENTER | Facility: HOSPITAL | Age: 55
End: 2025-04-17
Payer: MEDICARE

## 2025-04-17 NOTE — OUTREACH NOTE
Medical Week 3 Survey      Flowsheet Row Responses   [facility] patient discharged from? [facility]   Does the patient have one of the following disease processes/diagnoses(primary or secondary)? Other   Week 3 attempt successful? Yes   Call start time 1456   Call end time 1503   Discharge diagnosis Hypoglycemia   Is patient permission given to speak with other caregiver? Yes   Person spoke with today (if not patient) and relationship sister, Cintia Bolanos reviewed with patient/caregiver? Yes   Does the patient have all medications ordered at discharge? Yes   Is the patient taking all medications as directed (includes completed medication regime)? Yes   Comments regarding appointments Patient following with endocrinology   Does the patient have a primary care provider?  Yes   Has the patient kept scheduled appointments due by today? Yes   [facility]   Psychosocial issues? No   Did the patient receive a copy of their discharge instructions? Yes   Nursing interventions Reviewed instructions with patient   What is the patient's perception of their health status since discharge? Improving   Is the patient/caregiver able to teach back signs and symptoms related to disease process for when to call PCP? Yes   Is the patient/caregiver able to teach back signs and symptoms related to disease process for when to call 911? Yes   Is the patient/caregiver able to teach back the hierarchy of who to call/visit for symptoms/problems? PCP, Specialist, Home health nurse, Urgent Care, ED, 911 Yes   If the patient is a current smoker, are they able to teach back resources for cessation? Not a smoker   Week 3 Call Completed? Yes   Graduated Yes   Is the patient interested in additional calls from an ambulatory ? No   Would this patient benefit from a Referral to Amb Social Work? No   Call end time 1503            MADI HUNTER - Registered Nurse

## 2025-07-13 ENCOUNTER — HOSPITAL ENCOUNTER (OUTPATIENT)
Facility: HOSPITAL | Age: 55
Setting detail: OBSERVATION
Discharge: HOME | End: 2025-07-17
Attending: EMERGENCY MEDICINE | Admitting: EMERGENCY MEDICINE
Payer: MEDICARE

## 2025-07-13 ENCOUNTER — APPOINTMENT (OUTPATIENT)
Dept: CT IMAGING | Facility: HOSPITAL | Age: 55
End: 2025-07-13
Payer: MEDICARE

## 2025-07-13 DIAGNOSIS — E16.2 HYPOGLYCEMIA: Primary | ICD-10-CM

## 2025-07-13 LAB
ALBUMIN SERPL-MCNC: 4.4 G/DL (ref 3.5–5.2)
ALBUMIN/GLOB SERPL: 1.2 G/DL
ALP SERPL-CCNC: 105 U/L (ref 39–117)
ALT SERPL W P-5'-P-CCNC: 23 U/L (ref 1–33)
AMMONIA BLD-SCNC: 42 UMOL/L (ref 11–51)
AMPHET+METHAMPHET UR QL: NEGATIVE
AMPHETAMINES UR QL: NEGATIVE
ANION GAP SERPL CALCULATED.3IONS-SCNC: 13 MMOL/L (ref 10–20)
ANION GAP SERPL CALCULATED.3IONS-SCNC: 14.4 MMOL/L (ref 5–15)
AST SERPL-CCNC: 29 U/L (ref 1–32)
BACTERIA UR QL AUTO: NORMAL /HPF
BARBITURATES UR QL SCN: NEGATIVE
BASOPHILS # BLD AUTO: 0.08 10*3/MM3 (ref 0–0.2)
BASOPHILS NFR BLD AUTO: 0.7 % (ref 0–1.5)
BENZODIAZ UR QL SCN: NEGATIVE
BILIRUB SERPL-MCNC: 0.3 MG/DL (ref 0–1.2)
BILIRUB UR QL STRIP: NEGATIVE
BUN BLDA-MCNC: 20 MG/DL (ref 8–26)
BUN SERPL-MCNC: 20.3 MG/DL (ref 6–20)
BUN/CREAT SERPL: 19.3 (ref 7–25)
BUPRENORPHINE SERPL-MCNC: NEGATIVE NG/ML
CA-I BLDA-SCNC: 1.04 MMOL/L (ref 1.12–1.32)
CALCIUM SPEC-SCNC: 9.8 MG/DL (ref 8.6–10.5)
CANNABINOIDS SERPL QL: NEGATIVE
CHLORIDE BLDA-SCNC: 101 MMOL/L (ref 98–109)
CHLORIDE SERPL-SCNC: 97 MMOL/L (ref 98–107)
CLARITY UR: CLEAR
CO2 BLDA-SCNC: 29 MMOL/L (ref 24–29)
CO2 SERPL-SCNC: 27.6 MMOL/L (ref 22–29)
COCAINE UR QL: NEGATIVE
COLOR UR: YELLOW
CREAT BLDA-MCNC: 1 MG/DL (ref 0.6–1.3)
CREAT SERPL-MCNC: 1.05 MG/DL (ref 0.57–1)
DEPRECATED RDW RBC AUTO: 40.4 FL (ref 37–54)
EGFRCR SERPLBLD CKD-EPI 2021: 62.1 ML/MIN/1.73
EGFRCR SERPLBLD CKD-EPI 2021: 65.8 ML/MIN/1.73
EOSINOPHIL # BLD AUTO: 0.2 10*3/MM3 (ref 0–0.4)
EOSINOPHIL NFR BLD AUTO: 1.8 % (ref 0.3–6.2)
ERYTHROCYTE [DISTWIDTH] IN BLOOD BY AUTOMATED COUNT: 12.3 % (ref 12.3–15.4)
ETHANOL UR QL: <0.01 %
GLOBULIN UR ELPH-MCNC: 3.6 GM/DL
GLUCOSE BLDC GLUCOMTR-MCNC: 104 MG/DL (ref 70–105)
GLUCOSE BLDC GLUCOMTR-MCNC: 157 MG/DL (ref 70–105)
GLUCOSE BLDC GLUCOMTR-MCNC: 80 MG/DL (ref 70–105)
GLUCOSE BLDC GLUCOMTR-MCNC: 91 MG/DL (ref 70–105)
GLUCOSE BLDC GLUCOMTR-MCNC: 91 MG/DL (ref 70–105)
GLUCOSE BLDC GLUCOMTR-MCNC: 95 MG/DL (ref 70–105)
GLUCOSE SERPL-MCNC: 142 MG/DL (ref 65–99)
GLUCOSE UR STRIP-MCNC: ABNORMAL MG/DL
HCT VFR BLD AUTO: 41.7 % (ref 34–46.6)
HCT VFR BLDA CALC: 36 % (ref 38–51)
HGB BLD-MCNC: 13.5 G/DL (ref 12–15.9)
HGB BLDA-MCNC: 12.2 G/DL (ref 12–17)
HGB UR QL STRIP.AUTO: NEGATIVE
HOLD SPECIMEN: NORMAL
HYALINE CASTS UR QL AUTO: NORMAL /LPF
IMM GRANULOCYTES # BLD AUTO: 0.08 10*3/MM3 (ref 0–0.05)
IMM GRANULOCYTES NFR BLD AUTO: 0.7 % (ref 0–0.5)
KETONES UR QL STRIP: ABNORMAL
LEUKOCYTE ESTERASE UR QL STRIP.AUTO: ABNORMAL
LYMPHOCYTES # BLD AUTO: 2.59 10*3/MM3 (ref 0.7–3.1)
LYMPHOCYTES NFR BLD AUTO: 23.8 % (ref 19.6–45.3)
MAGNESIUM SERPL-MCNC: 2.1 MG/DL (ref 1.6–2.6)
MCH RBC QN AUTO: 29.3 PG (ref 26.6–33)
MCHC RBC AUTO-ENTMCNC: 32.4 G/DL (ref 31.5–35.7)
MCV RBC AUTO: 90.7 FL (ref 79–97)
METHADONE UR QL SCN: NEGATIVE
MONOCYTES # BLD AUTO: 0.54 10*3/MM3 (ref 0.1–0.9)
MONOCYTES NFR BLD AUTO: 5 % (ref 5–12)
NEUTROPHILS NFR BLD AUTO: 68 % (ref 42.7–76)
NEUTROPHILS NFR BLD AUTO: 7.41 10*3/MM3 (ref 1.7–7)
NITRITE UR QL STRIP: NEGATIVE
NRBC BLD AUTO-RTO: 0 /100 WBC (ref 0–0.2)
OPIATES UR QL: NEGATIVE
OXYCODONE UR QL SCN: POSITIVE
PCP UR QL SCN: NEGATIVE
PH UR STRIP.AUTO: 5.5 [PH] (ref 5–8)
PLATELET # BLD AUTO: 340 10*3/MM3 (ref 140–450)
PMV BLD AUTO: 9.7 FL (ref 6–12)
POTASSIUM BLDA-SCNC: 2.6 MMOL/L (ref 3.5–4.9)
POTASSIUM SERPL-SCNC: 2.6 MMOL/L (ref 3.5–5.2)
PROT SERPL-MCNC: 8 G/DL (ref 6–8.5)
PROT UR QL STRIP: ABNORMAL
RBC # BLD AUTO: 4.6 10*6/MM3 (ref 3.77–5.28)
RBC # UR STRIP: NORMAL /HPF
REF LAB TEST METHOD: NORMAL
SODIUM BLD-SCNC: 140 MMOL/L (ref 138–146)
SODIUM SERPL-SCNC: 139 MMOL/L (ref 136–145)
SP GR UR STRIP: 1.02 (ref 1–1.03)
SQUAMOUS #/AREA URNS HPF: NORMAL /HPF
TRICYCLICS UR QL SCN: POSITIVE
UROBILINOGEN UR QL STRIP: ABNORMAL
WBC # UR STRIP: NORMAL /HPF
WBC NRBC COR # BLD AUTO: 10.9 10*3/MM3 (ref 3.4–10.8)
WHOLE BLOOD HOLD COAG: NORMAL

## 2025-07-13 PROCEDURE — G0378 HOSPITAL OBSERVATION PER HR: HCPCS

## 2025-07-13 PROCEDURE — 82948 REAGENT STRIP/BLOOD GLUCOSE: CPT

## 2025-07-13 PROCEDURE — 85025 COMPLETE CBC W/AUTO DIFF WBC: CPT | Performed by: NURSE PRACTITIONER

## 2025-07-13 PROCEDURE — 82077 ASSAY SPEC XCP UR&BREATH IA: CPT | Performed by: NURSE PRACTITIONER

## 2025-07-13 PROCEDURE — 96365 THER/PROPH/DIAG IV INF INIT: CPT

## 2025-07-13 PROCEDURE — 70450 CT HEAD/BRAIN W/O DYE: CPT

## 2025-07-13 PROCEDURE — 94799 UNLISTED PULMONARY SVC/PX: CPT

## 2025-07-13 PROCEDURE — 99285 EMERGENCY DEPT VISIT HI MDM: CPT

## 2025-07-13 PROCEDURE — 94640 AIRWAY INHALATION TREATMENT: CPT

## 2025-07-13 PROCEDURE — 83735 ASSAY OF MAGNESIUM: CPT | Performed by: NURSE PRACTITIONER

## 2025-07-13 PROCEDURE — 85014 HEMATOCRIT: CPT

## 2025-07-13 PROCEDURE — 80047 BASIC METABLC PNL IONIZED CA: CPT

## 2025-07-13 PROCEDURE — 96361 HYDRATE IV INFUSION ADD-ON: CPT

## 2025-07-13 PROCEDURE — 80053 COMPREHEN METABOLIC PANEL: CPT | Performed by: NURSE PRACTITIONER

## 2025-07-13 PROCEDURE — 96366 THER/PROPH/DIAG IV INF ADDON: CPT

## 2025-07-13 PROCEDURE — 96375 TX/PRO/DX INJ NEW DRUG ADDON: CPT

## 2025-07-13 PROCEDURE — 82140 ASSAY OF AMMONIA: CPT | Performed by: NURSE PRACTITIONER

## 2025-07-13 PROCEDURE — 81001 URINALYSIS AUTO W/SCOPE: CPT | Performed by: NURSE PRACTITIONER

## 2025-07-13 PROCEDURE — 80306 DRUG TEST PRSMV INSTRMNT: CPT | Performed by: NURSE PRACTITIONER

## 2025-07-13 RX ORDER — SODIUM CHLORIDE 0.9 % (FLUSH) 0.9 %
10 SYRINGE (ML) INJECTION AS NEEDED
Status: DISCONTINUED | OUTPATIENT
Start: 2025-07-13 | End: 2025-07-17 | Stop reason: HOSPADM

## 2025-07-13 RX ORDER — VENLAFAXINE HYDROCHLORIDE 37.5 MG/1
37.5 CAPSULE, EXTENDED RELEASE ORAL DAILY
Status: DISCONTINUED | OUTPATIENT
Start: 2025-07-14 | End: 2025-07-17 | Stop reason: HOSPADM

## 2025-07-13 RX ORDER — BISACODYL 10 MG
10 SUPPOSITORY, RECTAL RECTAL DAILY PRN
Status: DISCONTINUED | OUTPATIENT
Start: 2025-07-13 | End: 2025-07-17 | Stop reason: HOSPADM

## 2025-07-13 RX ORDER — UBIDECARENONE 100 MG
100 CAPSULE ORAL DAILY
COMMUNITY

## 2025-07-13 RX ORDER — BUDESONIDE AND FORMOTEROL FUMARATE DIHYDRATE 160; 4.5 UG/1; UG/1
2 AEROSOL RESPIRATORY (INHALATION)
Status: DISCONTINUED | OUTPATIENT
Start: 2025-07-13 | End: 2025-07-17 | Stop reason: HOSPADM

## 2025-07-13 RX ORDER — DEXTROSE MONOHYDRATE AND SODIUM CHLORIDE 5; .45 G/100ML; G/100ML
100 INJECTION, SOLUTION INTRAVENOUS CONTINUOUS
Status: DISPENSED | OUTPATIENT
Start: 2025-07-13 | End: 2025-07-14

## 2025-07-13 RX ORDER — SODIUM CHLORIDE 9 MG/ML
40 INJECTION, SOLUTION INTRAVENOUS AS NEEDED
Status: DISCONTINUED | OUTPATIENT
Start: 2025-07-13 | End: 2025-07-17 | Stop reason: HOSPADM

## 2025-07-13 RX ORDER — NITROGLYCERIN 0.4 MG/1
0.4 TABLET SUBLINGUAL
Status: DISCONTINUED | OUTPATIENT
Start: 2025-07-13 | End: 2025-07-17

## 2025-07-13 RX ORDER — AMOXICILLIN 250 MG
2 CAPSULE ORAL 2 TIMES DAILY PRN
Status: DISCONTINUED | OUTPATIENT
Start: 2025-07-13 | End: 2025-07-17 | Stop reason: HOSPADM

## 2025-07-13 RX ORDER — ALBUTEROL SULFATE 0.83 MG/ML
2.5 SOLUTION RESPIRATORY (INHALATION) EVERY 4 HOURS PRN
Status: DISCONTINUED | OUTPATIENT
Start: 2025-07-13 | End: 2025-07-17 | Stop reason: HOSPADM

## 2025-07-13 RX ORDER — LIDOCAINE 4 G/G
1 PATCH TOPICAL
Status: DISCONTINUED | OUTPATIENT
Start: 2025-07-13 | End: 2025-07-17 | Stop reason: HOSPADM

## 2025-07-13 RX ORDER — ERGOCALCIFEROL (VITAMIN D2) 10 MCG
400 TABLET ORAL DAILY
COMMUNITY

## 2025-07-13 RX ORDER — SODIUM CHLORIDE 0.9 % (FLUSH) 0.9 %
10 SYRINGE (ML) INJECTION EVERY 12 HOURS SCHEDULED
Status: DISCONTINUED | OUTPATIENT
Start: 2025-07-13 | End: 2025-07-17 | Stop reason: HOSPADM

## 2025-07-13 RX ORDER — POTASSIUM CHLORIDE 1500 MG/1
40 TABLET, EXTENDED RELEASE ORAL ONCE
Status: COMPLETED | OUTPATIENT
Start: 2025-07-13 | End: 2025-07-13

## 2025-07-13 RX ORDER — ONDANSETRON 4 MG/1
4 TABLET, ORALLY DISINTEGRATING ORAL EVERY 6 HOURS PRN
Status: DISCONTINUED | OUTPATIENT
Start: 2025-07-13 | End: 2025-07-17 | Stop reason: HOSPADM

## 2025-07-13 RX ORDER — POLYETHYLENE GLYCOL 3350 17 G/17G
17 POWDER, FOR SOLUTION ORAL DAILY PRN
Status: DISCONTINUED | OUTPATIENT
Start: 2025-07-13 | End: 2025-07-17 | Stop reason: HOSPADM

## 2025-07-13 RX ORDER — CETIRIZINE HYDROCHLORIDE 10 MG/1
10 TABLET ORAL DAILY PRN
Status: DISCONTINUED | OUTPATIENT
Start: 2025-07-13 | End: 2025-07-17 | Stop reason: HOSPADM

## 2025-07-13 RX ORDER — VIT C/B6/B5/MAGNESIUM/HERB 173 50-5-6-5MG
1 CAPSULE ORAL DAILY
COMMUNITY

## 2025-07-13 RX ORDER — MAGNESIUM 200 MG
1 TABLET ORAL DAILY
COMMUNITY

## 2025-07-13 RX ORDER — OXYCODONE HYDROCHLORIDE 5 MG/1
15 TABLET ORAL EVERY 4 HOURS
Status: DISCONTINUED | OUTPATIENT
Start: 2025-07-13 | End: 2025-07-17 | Stop reason: HOSPADM

## 2025-07-13 RX ORDER — CYCLOBENZAPRINE HCL 10 MG
10 TABLET ORAL 3 TIMES DAILY PRN
Status: DISCONTINUED | OUTPATIENT
Start: 2025-07-13 | End: 2025-07-17 | Stop reason: HOSPADM

## 2025-07-13 RX ORDER — DEXTROSE MONOHYDRATE 25 G/50ML
25 INJECTION, SOLUTION INTRAVENOUS ONCE
Status: COMPLETED | OUTPATIENT
Start: 2025-07-13 | End: 2025-07-13

## 2025-07-13 RX ORDER — ATORVASTATIN CALCIUM 10 MG/1
10 TABLET, FILM COATED ORAL EVERY EVENING
Status: DISCONTINUED | OUTPATIENT
Start: 2025-07-13 | End: 2025-07-17 | Stop reason: HOSPADM

## 2025-07-13 RX ORDER — METOPROLOL TARTRATE 25 MG/1
25 TABLET, FILM COATED ORAL 2 TIMES DAILY
Status: DISCONTINUED | OUTPATIENT
Start: 2025-07-13 | End: 2025-07-17 | Stop reason: HOSPADM

## 2025-07-13 RX ORDER — ALUMINA, MAGNESIA, AND SIMETHICONE 2400; 2400; 240 MG/30ML; MG/30ML; MG/30ML
15 SUSPENSION ORAL EVERY 6 HOURS PRN
Status: DISCONTINUED | OUTPATIENT
Start: 2025-07-13 | End: 2025-07-17 | Stop reason: HOSPADM

## 2025-07-13 RX ORDER — CETIRIZINE HYDROCHLORIDE 10 MG/1
10 TABLET ORAL DAILY PRN
COMMUNITY

## 2025-07-13 RX ORDER — TIRZEPATIDE 10 MG/.5ML
10 INJECTION, SOLUTION SUBCUTANEOUS
COMMUNITY

## 2025-07-13 RX ORDER — ALBUTEROL SULFATE 90 UG/1
2 INHALANT RESPIRATORY (INHALATION) 2 TIMES DAILY
COMMUNITY

## 2025-07-13 RX ORDER — CLOPIDOGREL BISULFATE 75 MG/1
75 TABLET ORAL DAILY
Status: DISCONTINUED | OUTPATIENT
Start: 2025-07-14 | End: 2025-07-17 | Stop reason: HOSPADM

## 2025-07-13 RX ORDER — ONDANSETRON 2 MG/ML
4 INJECTION INTRAMUSCULAR; INTRAVENOUS EVERY 6 HOURS PRN
Status: DISCONTINUED | OUTPATIENT
Start: 2025-07-13 | End: 2025-07-17 | Stop reason: HOSPADM

## 2025-07-13 RX ORDER — BISACODYL 5 MG/1
5 TABLET, DELAYED RELEASE ORAL DAILY PRN
Status: DISCONTINUED | OUTPATIENT
Start: 2025-07-13 | End: 2025-07-17 | Stop reason: HOSPADM

## 2025-07-13 RX ADMIN — POTASSIUM CHLORIDE 40 MEQ: 1500 TABLET, EXTENDED RELEASE ORAL at 12:13

## 2025-07-13 RX ADMIN — OXYCODONE 15 MG: 5 TABLET ORAL at 18:09

## 2025-07-13 RX ADMIN — OXYCODONE 15 MG: 5 TABLET ORAL at 21:34

## 2025-07-13 RX ADMIN — METOPROLOL TARTRATE 25 MG: 25 TABLET, FILM COATED ORAL at 21:34

## 2025-07-13 RX ADMIN — DEXTROSE AND SODIUM CHLORIDE 100 ML/HR: 5; 450 INJECTION, SOLUTION INTRAVENOUS at 13:30

## 2025-07-13 RX ADMIN — DEXTROSE MONOHYDRATE 25 G: 25 INJECTION, SOLUTION INTRAVENOUS at 10:38

## 2025-07-13 RX ADMIN — LIDOCAINE 1 PATCH: 4 PATCH TOPICAL at 22:02

## 2025-07-13 RX ADMIN — OXYCODONE 15 MG: 5 TABLET ORAL at 14:04

## 2025-07-13 RX ADMIN — CYCLOBENZAPRINE HYDROCHLORIDE 10 MG: 10 TABLET, FILM COATED ORAL at 16:12

## 2025-07-13 RX ADMIN — Medication 10 ML: at 14:06

## 2025-07-13 RX ADMIN — BUDESONIDE AND FORMOTEROL FUMARATE DIHYDRATE 2 PUFF: 160; 4.5 AEROSOL RESPIRATORY (INHALATION) at 18:03

## 2025-07-13 RX ADMIN — ATORVASTATIN CALCIUM 10 MG: 10 TABLET ORAL at 18:09

## 2025-07-13 NOTE — ED PROVIDER NOTES
Subjective   Chief Complaint   Patient presents with    Hypoglycemia     Patient reports low BG at home, c/o general weakness. Reports BG at home was 90 then 79         History provided by:  Patient    Patient is a 55-year-old female presents the ED for evaluation of altered mental status, possible hypoglycemia.    Patient reports she does not recall getting here to the hospital, she does remember going to [employer].  She reports she thought she was going home.    Patient reports she gets symptomatic when her blood sugar is below 110.  She tried oral glucose tablets.  Review of Systems    Past Medical History:   Diagnosis Date    Asthma     Diabetes mellitus     DVT (deep venous thrombosis) 08/23/2024    right lower leg    Fibromyalgia     Lupus        Allergies   Allergen Reactions    Metformin Nausea And Vomiting    Morphine Unknown - High Severity     Pt sates only in Pill form.       Past Surgical History:   Procedure Laterality Date    APPENDECTOMY      CHOLECYSTECTOMY N/A 8/23/2024    Procedure: CHOLECYSTECTOMY LAPAROSCOPIC WITH [employer] ROBOT;  Surgeon: Bryce Huizar MD;  Location: [facility] OR;  Service: Robotics - DaVinci;  Laterality: N/A;    HYSTERECTOMY      JOINT REPLACEMENT Right     KNEE SURGERY      SHOULDER ARTHROSCOPY         No family history on file.    Social History     Socioeconomic History    Marital status: Single   Tobacco Use    Smoking status: Never     Passive exposure: Never    Smokeless tobacco: Never   Vaping Use    Vaping status: Never Used   Substance and Sexual Activity    Alcohol use: Never    Drug use: Never    Sexual activity: Never           Objective   Physical Exam  Vitals and nursing note reviewed.   Constitutional:       Appearance: Normal appearance. She is not toxic-appearing.   HENT:      Head: Normocephalic and atraumatic.      Mouth/Throat:      Mouth: Mucous membranes are moist.      Pharynx: Oropharynx is clear.   Eyes:      Extraocular Movements:  Extraocular movements intact.      Conjunctiva/sclera: Conjunctivae normal.      Pupils: Pupils are equal, round, and reactive to light.   Cardiovascular:      Rate and Rhythm: Normal rate and regular rhythm.      Pulses:           Dorsalis pedis pulses are 2+ on the right side and 2+ on the left side.      Heart sounds: Normal heart sounds, S1 normal and S2 normal. No murmur heard.     No gallop.   Pulmonary:      Effort: Pulmonary effort is normal.      Breath sounds: Normal breath sounds.   Abdominal:      Tenderness: There is no abdominal tenderness. There is no guarding.   Musculoskeletal:         General: Normal range of motion.      Cervical back: Normal range of motion and neck supple.   Skin:     General: Skin is warm and dry.      Capillary Refill: Capillary refill takes less than 2 seconds.      Findings: No erythema or rash.   Neurological:      General: No focal deficit present.      Mental Status: She is alert.      GCS: GCS eye subscore is 4. GCS verbal subscore is 5. GCS motor subscore is 6.      Cranial Nerves: No dysarthria or facial asymmetry.      Sensory: Sensation is intact.      Comments: Patient will arouse to touch.  She does easily fall back asleep at times during conversation.  She has no focal weakness         Procedures           ED Course      CT Head Without Contrast   Final Result   No acute intracranial abnormality          Brain MRI is more sensitive to evaluate for acute or subacute infarcts and to evaluate for intracranial metastatic disease.         Electronically Signed: Francisco Durham MD     7/13/2025 12:06 PM EDT     Workstation ID: [id number]                                                         Medical Decision Making  Problems Addressed:  Hypoglycemia: complicated acute illness or injury    Amount and/or Complexity of Data Reviewed  Labs: ordered.  Radiology: ordered.    Risk  OTC drugs.  Prescription drug management.  Decision regarding hospitalization.    Appropriate  PPE worn during patient interactions.    Chart Review: Discharge summary ED observation 4/1/2025 hypoglycemia, syncopal episode.    Differential diagnoses considered for patient chief complaint and presentation, this list is not all inclusive of diagnoses considered: Hypoglycemia, electrolyte disturbance, infection, intercranial hemorrhage.  Patient had the above exam and workup.  Patient was given D50, given food, she is more awake, but she reports that she does not feel like eating any further.  She does report that she gets symptomatic with her blood sugar when it gets below 110.  Her potassium was noted to be 2.6 initiated replacement here in the ED.  No signs of infection in the urine.  White blood cell count 10.9, ammonia is normal.  Ethanol negative.  CT head was obtained and reveals no acute abnormality.  Patient has no focal deficit or weakness.  All her symptoms are related to her blood sugar she reports this is similar to previous.  I initiated her on D5 half-normal saline at 1 hour given her continued symptomatic hypoglycemia.  Will place in ED observation for discussed with ED observation provider Ant, and agrees with current treatment plan    Note Disclaimer: At [facility], we believe that sharing information builds trust and better relationships. You are receiving this note because you recently visited [facility]. It is possible you will see health information before a provider has talked with you about it. This kind of information can be easy to misunderstand. To help you fully understand what it means for your health, we urge you to discuss this note with your provider  Note dictated utilizing Dragon Dictation.          Final diagnoses:   Hypoglycemia       ED Disposition  ED Disposition       ED Disposition   Decision to Admit    Condition   --    Comment   --               No follow-up provider specified.       Medication List        ASK your doctor about these medications      *  albuterol (2.5 MG/3ML) 0.083% nebulizer solution  Commonly known as: PROVENTIL  Take 2.5 mg by nebulization Every 4 (Four) Hours As Needed for Wheezing.  Ask about: Which instructions should I use?     * albuterol sulfate  (90 Base) MCG/ACT inhaler  Commonly known as: PROVENTIL HFA;VENTOLIN HFA;PROAIR HFA  Ask about: Which instructions should I use?     cetirizine 10 MG tablet  Commonly known as: zyrTEC  Ask about: Which instructions should I use?           * This list has 2 medication(s) that are the same as other medications prescribed for you. Read the directions carefully, and ask your doctor or other care provider to review them with you.                     Mignon March, SAAD  07/13/25 1753       Mignon March, SAAD  07/13/25 1816       Mignon March, SAAD  07/13/25 1828

## 2025-07-13 NOTE — PLAN OF CARE
Problem: Adult Inpatient Plan of Care  Goal: Plan of Care Review  Outcome: Progressing  Goal: Patient-Specific Goal (Individualized)  Outcome: Progressing  Goal: Absence of Hospital-Acquired Illness or Injury  Outcome: Progressing  Intervention: Identify and Manage Fall Risk  Recent Flowsheet Documentation  Taken 7/13/2025 1800 by Barbie Zapata RN  Safety Promotion/Fall Prevention: safety round/check completed  Taken 7/13/2025 1529 by Barbie Zapata RN  Safety Promotion/Fall Prevention: safety round/check completed  Intervention: Prevent Skin Injury  Recent Flowsheet Documentation  Taken 7/13/2025 1800 by Barbie Zapata RN  Body Position: position changed independently  Taken 7/13/2025 1529 by Barbie Zapata RN  Body Position: position changed independently  Goal: Optimal Comfort and Wellbeing  Outcome: Progressing  Goal: Readiness for Transition of Care  Outcome: Progressing  Intervention: Mutually Develop Transition Plan  Recent Flowsheet Documentation  Taken 7/13/2025 1828 by Barbie Zapata RN  Equipment Currently Used at Home: rollator  Transportation Anticipated: family or friend will provide  Patient/Family Anticipated Services at Transition: none  Patient/Family Anticipates Transition to: home   Goal Outcome Evaluation:

## 2025-07-14 LAB
ANION GAP SERPL CALCULATED.3IONS-SCNC: 10.6 MMOL/L (ref 5–15)
BASOPHILS # BLD AUTO: 0.07 10*3/MM3 (ref 0–0.2)
BASOPHILS NFR BLD AUTO: 0.8 % (ref 0–1.5)
BUN SERPL-MCNC: 14.8 MG/DL (ref 6–20)
BUN/CREAT SERPL: 17.6 (ref 7–25)
CALCIUM SPEC-SCNC: 9 MG/DL (ref 8.6–10.5)
CHLORIDE SERPL-SCNC: 102 MMOL/L (ref 98–107)
CO2 SERPL-SCNC: 26.4 MMOL/L (ref 22–29)
CREAT SERPL-MCNC: 0.84 MG/DL (ref 0.57–1)
DEPRECATED RDW RBC AUTO: 40.4 FL (ref 37–54)
EGFRCR SERPLBLD CKD-EPI 2021: 81.2 ML/MIN/1.73
EOSINOPHIL # BLD AUTO: 0.19 10*3/MM3 (ref 0–0.4)
EOSINOPHIL NFR BLD AUTO: 2.1 % (ref 0.3–6.2)
ERYTHROCYTE [DISTWIDTH] IN BLOOD BY AUTOMATED COUNT: 12.4 % (ref 12.3–15.4)
GLUCOSE BLDC GLUCOMTR-MCNC: 109 MG/DL (ref 70–105)
GLUCOSE BLDC GLUCOMTR-MCNC: 70 MG/DL (ref 70–105)
GLUCOSE BLDC GLUCOMTR-MCNC: 80 MG/DL (ref 70–105)
GLUCOSE BLDC GLUCOMTR-MCNC: 94 MG/DL (ref 70–105)
GLUCOSE BLDC GLUCOMTR-MCNC: 95 MG/DL (ref 70–105)
GLUCOSE BLDC GLUCOMTR-MCNC: 95 MG/DL (ref 70–105)
GLUCOSE SERPL-MCNC: 98 MG/DL (ref 65–99)
HBA1C MFR BLD: 5.77 % (ref 4.8–5.6)
HCT VFR BLD AUTO: 37.2 % (ref 34–46.6)
HGB BLD-MCNC: 12.1 G/DL (ref 12–15.9)
IMM GRANULOCYTES # BLD AUTO: 0.04 10*3/MM3 (ref 0–0.05)
IMM GRANULOCYTES NFR BLD AUTO: 0.4 % (ref 0–0.5)
LYMPHOCYTES # BLD AUTO: 3.25 10*3/MM3 (ref 0.7–3.1)
LYMPHOCYTES NFR BLD AUTO: 35.6 % (ref 19.6–45.3)
MAGNESIUM SERPL-MCNC: 2.1 MG/DL (ref 1.6–2.6)
MCH RBC QN AUTO: 29.2 PG (ref 26.6–33)
MCHC RBC AUTO-ENTMCNC: 32.5 G/DL (ref 31.5–35.7)
MCV RBC AUTO: 89.6 FL (ref 79–97)
MONOCYTES # BLD AUTO: 0.78 10*3/MM3 (ref 0.1–0.9)
MONOCYTES NFR BLD AUTO: 8.5 % (ref 5–12)
NEUTROPHILS NFR BLD AUTO: 4.8 10*3/MM3 (ref 1.7–7)
NEUTROPHILS NFR BLD AUTO: 52.6 % (ref 42.7–76)
NRBC BLD AUTO-RTO: 0 /100 WBC (ref 0–0.2)
PLATELET # BLD AUTO: 330 10*3/MM3 (ref 140–450)
PMV BLD AUTO: 9.7 FL (ref 6–12)
POTASSIUM SERPL-SCNC: 3.4 MMOL/L (ref 3.5–5.2)
RBC # BLD AUTO: 4.15 10*6/MM3 (ref 3.77–5.28)
SODIUM SERPL-SCNC: 139 MMOL/L (ref 136–145)
TSH SERPL DL<=0.05 MIU/L-ACNC: 2.1 UIU/ML (ref 0.27–4.2)
WBC NRBC COR # BLD AUTO: 9.13 10*3/MM3 (ref 3.4–10.8)

## 2025-07-14 PROCEDURE — 84443 ASSAY THYROID STIM HORMONE: CPT | Performed by: INTERNAL MEDICINE

## 2025-07-14 PROCEDURE — 94799 UNLISTED PULMONARY SVC/PX: CPT

## 2025-07-14 PROCEDURE — 94664 DEMO&/EVAL PT USE INHALER: CPT

## 2025-07-14 PROCEDURE — 94761 N-INVAS EAR/PLS OXIMETRY MLT: CPT

## 2025-07-14 PROCEDURE — 80048 BASIC METABOLIC PNL TOTAL CA: CPT | Performed by: PHYSICIAN ASSISTANT

## 2025-07-14 PROCEDURE — 85025 COMPLETE CBC W/AUTO DIFF WBC: CPT | Performed by: PHYSICIAN ASSISTANT

## 2025-07-14 PROCEDURE — G0378 HOSPITAL OBSERVATION PER HR: HCPCS

## 2025-07-14 PROCEDURE — 83735 ASSAY OF MAGNESIUM: CPT | Performed by: PHYSICIAN ASSISTANT

## 2025-07-14 PROCEDURE — 82948 REAGENT STRIP/BLOOD GLUCOSE: CPT

## 2025-07-14 PROCEDURE — 99214 OFFICE O/P EST MOD 30 MIN: CPT | Performed by: INTERNAL MEDICINE

## 2025-07-14 PROCEDURE — 96361 HYDRATE IV INFUSION ADD-ON: CPT

## 2025-07-14 PROCEDURE — 83036 HEMOGLOBIN GLYCOSYLATED A1C: CPT | Performed by: NURSE PRACTITIONER

## 2025-07-14 RX ORDER — INSULIN LISPRO 100 [IU]/ML
2-7 INJECTION, SOLUTION INTRAVENOUS; SUBCUTANEOUS
Status: DISCONTINUED | OUTPATIENT
Start: 2025-07-14 | End: 2025-07-14

## 2025-07-14 RX ORDER — DEXTROSE MONOHYDRATE AND SODIUM CHLORIDE 5; .45 G/100ML; G/100ML
100 INJECTION, SOLUTION INTRAVENOUS CONTINUOUS
Status: DISPENSED | OUTPATIENT
Start: 2025-07-14 | End: 2025-07-14

## 2025-07-14 RX ORDER — DEXTROSE MONOHYDRATE 25 G/50ML
25 INJECTION, SOLUTION INTRAVENOUS
Status: DISCONTINUED | OUTPATIENT
Start: 2025-07-14 | End: 2025-07-17 | Stop reason: HOSPADM

## 2025-07-14 RX ORDER — POTASSIUM CHLORIDE 1500 MG/1
20 TABLET, EXTENDED RELEASE ORAL ONCE
Status: COMPLETED | OUTPATIENT
Start: 2025-07-14 | End: 2025-07-14

## 2025-07-14 RX ORDER — POTASSIUM CHLORIDE 1500 MG/1
20 TABLET, EXTENDED RELEASE ORAL DAILY
Status: DISCONTINUED | OUTPATIENT
Start: 2025-07-14 | End: 2025-07-14

## 2025-07-14 RX ORDER — HYDROXYCHLOROQUINE SULFATE 200 MG/1
400 TABLET, FILM COATED ORAL EVERY MORNING
Status: DISCONTINUED | OUTPATIENT
Start: 2025-07-14 | End: 2025-07-17 | Stop reason: HOSPADM

## 2025-07-14 RX ORDER — IBUPROFEN 600 MG/1
1 TABLET ORAL
Status: DISCONTINUED | OUTPATIENT
Start: 2025-07-14 | End: 2025-07-17 | Stop reason: HOSPADM

## 2025-07-14 RX ORDER — NICOTINE POLACRILEX 4 MG
15 LOZENGE BUCCAL
Status: DISCONTINUED | OUTPATIENT
Start: 2025-07-14 | End: 2025-07-17 | Stop reason: HOSPADM

## 2025-07-14 RX ADMIN — Medication 10 ML: at 08:10

## 2025-07-14 RX ADMIN — OXYCODONE 15 MG: 5 TABLET ORAL at 18:16

## 2025-07-14 RX ADMIN — METOPROLOL TARTRATE 25 MG: 25 TABLET, FILM COATED ORAL at 21:41

## 2025-07-14 RX ADMIN — BUDESONIDE AND FORMOTEROL FUMARATE DIHYDRATE 2 PUFF: 160; 4.5 AEROSOL RESPIRATORY (INHALATION) at 19:00

## 2025-07-14 RX ADMIN — Medication 10 ML: at 21:42

## 2025-07-14 RX ADMIN — ATORVASTATIN CALCIUM 10 MG: 10 TABLET ORAL at 18:17

## 2025-07-14 RX ADMIN — BUDESONIDE AND FORMOTEROL FUMARATE DIHYDRATE 2 PUFF: 160; 4.5 AEROSOL RESPIRATORY (INHALATION) at 08:17

## 2025-07-14 RX ADMIN — DEXTROSE MONOHYDRATE AND SODIUM CHLORIDE 100 ML/HR: 5; .45 INJECTION, SOLUTION INTRAVENOUS at 10:51

## 2025-07-14 RX ADMIN — OXYCODONE 15 MG: 5 TABLET ORAL at 02:25

## 2025-07-14 RX ADMIN — CYCLOBENZAPRINE HYDROCHLORIDE 10 MG: 10 TABLET, FILM COATED ORAL at 18:17

## 2025-07-14 RX ADMIN — POTASSIUM CHLORIDE 20 MEQ: 1500 TABLET, EXTENDED RELEASE ORAL at 06:11

## 2025-07-14 RX ADMIN — OXYCODONE 15 MG: 5 TABLET ORAL at 14:17

## 2025-07-14 RX ADMIN — OXYCODONE 15 MG: 5 TABLET ORAL at 06:11

## 2025-07-14 RX ADMIN — HYDROXYCHLOROQUINE SULFATE 400 MG: 200 TABLET, FILM COATED ORAL at 08:09

## 2025-07-14 RX ADMIN — VENLAFAXINE HYDROCHLORIDE 37.5 MG: 37.5 CAPSULE, EXTENDED RELEASE ORAL at 08:09

## 2025-07-14 RX ADMIN — CLOPIDOGREL BISULFATE 75 MG: 75 TABLET, FILM COATED ORAL at 08:09

## 2025-07-14 RX ADMIN — CYCLOBENZAPRINE HYDROCHLORIDE 10 MG: 10 TABLET, FILM COATED ORAL at 02:25

## 2025-07-14 RX ADMIN — OXYCODONE 15 MG: 5 TABLET ORAL at 22:39

## 2025-07-14 RX ADMIN — OXYCODONE 15 MG: 5 TABLET ORAL at 10:22

## 2025-07-14 RX ADMIN — METOPROLOL TARTRATE 25 MG: 25 TABLET, FILM COATED ORAL at 08:09

## 2025-07-14 RX ADMIN — CYCLOBENZAPRINE HYDROCHLORIDE 10 MG: 10 TABLET, FILM COATED ORAL at 10:22

## 2025-07-14 NOTE — H&P
[facility] Observation Unit H&P    Patient Name: Lisa Jay  : 1970  MRN: [id number]  Primary Care Physician: Brian Gonzalez MD  Date of admission: 2025     Patient Care Team:  Brian Gonzalez MD as PCP - General (Family Medicine)          Subjective   History Present Illness     Chief Complaint:   Chief Complaint   Patient presents with    Hypoglycemia     Patient reports low BG at home, c/o general weakness. Reports BG at home was 90 then 79     Hypoglycemia     Ms. Jay is a 55 y.o.  presents to [facility] complaining of hypoglycemia       History of Present Illness    ED 25: Patient is a 55-year-old female presents the ED for evaluation of altered mental status, possible hypoglycemia.     Patient reports she does not recall getting here to the hospital, she does remember going to [employer].  She reports she thought she was going home.     Patient reports she gets symptomatic when her blood sugar is below 110.  She tried oral glucose tablets.    Observation 25: Patient is a 55-year-old female presented to the hospital with complaints of hypoglycemia.  Patient states that she has been recently admitted due to low blood sugars and has followed up with endocrinology.  Patient states she does take Mounjaro every Tuesday but recently was not able to pain medication do not be in stock in pharmacy.  Patient states she does have freestyle lisa with no hypoglycemic events.  Patient states her glucoses drop below 70 overnight.  Patient states she eats small frequent meals throughout the day to keep her glucose up.  Patient states she does workout the [employer] from 2 to 7 PM.  Patient states she becomes dizzy lightheaded confused when she noticed her glucose drop below 100.  Patient states she normally sees endocrinology within [place].    Review of Systems   Constitutional: Positive for malaise/fatigue. Negative for fever.   HENT: Negative.     Eyes: Negative.    Cardiovascular:  Negative for chest  pain and dyspnea on exertion.   Respiratory:  Negative for shortness of breath.    Endocrine: Negative.    Skin: Negative.    Musculoskeletal:  Positive for back pain and joint pain.   Gastrointestinal: Negative.    Genitourinary: Negative.    Neurological:  Positive for weakness.   Psychiatric/Behavioral: Negative.             Personal History     Past Medical History:   Past Medical History:   Diagnosis Date    Asthma     Diabetes mellitus     DVT (deep venous thrombosis) 08/23/2024    right lower leg    Fibromyalgia     Lupus        Surgical History:      Past Surgical History:   Procedure Laterality Date    APPENDECTOMY      CHOLECYSTECTOMY N/A 8/23/2024    Procedure: CHOLECYSTECTOMY LAPAROSCOPIC WITH [employer] ROBOT;  Surgeon: Bryce Huizar MD;  [facility]: [facility] MAIN OR;  Service: Robotics - DaVinci;  Laterality: N/A;    HYSTERECTOMY      JOINT REPLACEMENT Right     KNEE SURGERY      SHOULDER ARTHROSCOPY             Family History: family history is not on file. Otherwise pertinent FHx was reviewed and unremarkable.     Social History:  reports that she has never smoked. She has never been exposed to tobacco smoke. She has never used smokeless tobacco. She reports that she does not drink alcohol and does not use drugs.      Medications:  Prior to Admission medications    Medication Sig Start Date End Date Taking? Authorizing Provider   albuterol (PROVENTIL) (2.5 MG/3ML) 0.083% nebulizer solution Take 2.5 mg by nebulization Every 4 (Four) Hours As Needed for Wheezing. 9/20/24  Yes Baldemar Ibarra MD   albuterol sulfate  (90 Base) MCG/ACT inhaler Inhale 2 puffs 2 (Two) Times a Day.   Yes ProviderJorge MD   atorvastatin (LIPITOR) 10 MG tablet Take 1 tablet by mouth Every Evening.   Yes ProviderJorge MD   budesonide-formoterol (Symbicort) 160-4.5 MCG/ACT inhaler Inhale 2 puffs 2 (Two) Times a Day. 9/20/24  Yes Baldemar Ibarra MD   cetirizine (zyrTEC) 10 MG tablet Take 1 tablet by  mouth Daily As Needed for Allergies.   Yes Jorge Ordonez MD   clopidogrel (PLAVIX) 75 MG tablet Take 1 tablet by mouth Daily. 12/3/23  Yes Sima Arenas PA-C   coenzyme Q10 100 MG capsule Take 1 capsule by mouth Daily.   Yes Jorge Ordonez MD   cyclobenzaprine (FLEXERIL) 10 MG tablet Take 1 tablet by mouth Every 6 (Six) Hours As Needed for Muscle Spasms.   Yes Jorge Ordonez MD   dicyclomine (BENTYL) 10 MG capsule Take 1 capsule by mouth 4 (Four) Times a Day As Needed for Abdominal Cramping. 4/1/25  Yes Katarina Hall APRN   gabapentin (NEURONTIN) 800 MG tablet Take 1 tablet by mouth 3 (Three) Times a Day.   Yes Jorge Ordonez MD   hydroCHLOROthiazide 25 MG tablet Take 1 tablet by mouth Daily.   Yes Jorge Ordonez MD   hydroxychloroquine (PLAQUENIL) 200 MG tablet Take 2 tablets by mouth Every Morning.   Yes Jorge Ordonez MD   Magnesium 200 MG tablet Take 1 tablet by mouth Daily.   Yes Jorge Ordonez MD   metoprolol tartrate (LOPRESSOR) 25 MG tablet Take 1 tablet by mouth 2 (Two) Times a Day.   Yes Jorge Ordonez MD   ondansetron ODT (ZOFRAN-ODT) 4 MG disintegrating tablet Place 1 tablet on the tongue Every 8 (Eight) Hours As Needed for Nausea or Vomiting.   Yes Jorge Ordonez MD   oxyCODONE (ROXICODONE) 15 MG immediate release tablet Take 1 tablet by mouth Every 4 (Four) Hours.   Yes Jorge Ordonez MD   Potassium 99 MG tablet Take 1 tablet by mouth Daily.   Yes Jorge Ordonez MD   Tirzepatide (Mounjaro) 10 MG/0.5ML solution auto-injector Inject 10 mg under the skin into the appropriate area as directed Every 7 (Seven) Days. Tuesdays   Yes Jorge Ordonez MD   triamcinolone (KENALOG) 0.1 % cream Apply 1 Application topically to the appropriate area as directed 2 (Two) Times a Day As Needed.   Yes Jorge Ordonez MD   Turmeric (QC Tumeric Complex) 500 MG capsule Take 1 capsule by mouth Daily.   Yes Jorge Ordonez  MD   venlafaxine XR (EFFEXOR-XR) 37.5 MG 24 hr capsule Take 1 capsule by mouth Daily.   Yes Provider, Historical, MD   Vitamin D, Cholecalciferol, (CHOLECALCIFEROL) 10 MCG (400 UNIT) tablet Take 1 tablet by mouth Daily.   Yes Provider, Historical, MD       Allergies:    Allergies   Allergen Reactions    Metformin Nausea And Vomiting    Morphine Unknown - High Severity     Pt sates only in Pill form.       Objective   Objective     Vital Signs  Temp:  [97.3 °F (36.3 °C)-98 °F (36.7 °C)] 97.8 °F (36.6 °C)  Heart Rate:  [62-76] 71  Resp:  [12-16] 14  BP: (101-149)/(58-89) 112/58  SpO2:  [96 %-100 %] 96 %  on   ;   Device (Oxygen Therapy): room air  Body mass index is 38.89 kg/m².    Physical Exam  Vitals and nursing note reviewed.   Constitutional:       Appearance: Normal appearance. She is obese.   HENT:      Head: Normocephalic and atraumatic.      Right Ear: External ear normal.      Left Ear: External ear normal.      Nose: Nose normal.      Mouth/Throat:      Pharynx: Oropharynx is clear.   Eyes:      Extraocular Movements: Extraocular movements intact.      Conjunctiva/sclera: Conjunctivae normal.      Pupils: Pupils are equal, round, and reactive to light.   Cardiovascular:      Rate and Rhythm: Normal rate and regular rhythm.      Pulses: Normal pulses.   Pulmonary:      Effort: Pulmonary effort is normal.   Abdominal:      General: Bowel sounds are normal.   Musculoskeletal:         General: Tenderness present.      Cervical back: Normal range of motion.   Skin:     General: Skin is warm.      Capillary Refill: Capillary refill takes less than 2 seconds.   Neurological:      Mental Status: She is alert and oriented to person, place, and time.      Motor: Weakness present.   Psychiatric:         Mood and Affect: Mood normal.         Behavior: Behavior normal.         Thought Content: Thought content normal.         Judgment: Judgment normal.           Results Review:  I have personally reviewed most recent lab  results, microbiology results, and radiology images and interpretations and agree with findings, most notably: CBC, CMP.    Results from last 7 days   Lab Units 07/14/25  0301   WBC 10*3/mm3 9.13   HEMOGLOBIN g/dL 12.1   HEMATOCRIT % 37.2   PLATELETS 10*3/mm3 330     Results from last 7 days   Lab Units 07/14/25  0301 07/13/25  1159 07/13/25  1100   SODIUM mmol/L 139  --  139   POTASSIUM mmol/L 3.4*  --  2.6*   CHLORIDE mmol/L 102  --  97*   CO2 mmol/L 26.4  --  27.6   BUN mg/dL 14.8  --  20.3*   CREATININE mg/dL 0.84   < > 1.05*   GLUCOSE mg/dL 98  --  142*   CALCIUM mg/dL 9.0  --  9.8   ALK PHOS U/L  --   --  105   ALT (SGPT) U/L  --   --  23   AST (SGOT) U/L  --   --  29    < > = values in this interval not displayed.     CrCl cannot be calculated (Unknown ideal weight.).  Brief Urine Lab Results  (Last result in the past 365 days)        Color   Clarity   Blood   Leuk Est   Nitrite   Protein   CREAT   Urine HCG        07/13/25 1119 Yellow   Clear   Negative   Trace   Negative   Trace                   Microbiology Results (last 10 days)       ** No results found for the last 240 hours. **            ECG/EMG Results (most recent)       Procedure Component Value Units Date/Time    Telemetry Scan [[id number]] Resulted: 07/13/25 1011     Updated: 07/13/25 1035            Results for orders placed during the hospital encounter of 09/18/24    Duplex Venous Lower Extremity - Right CAR 09/20/2024 10:42 AM    Interpretation Summary    Normal right lower extremity venous duplex scan.      Results for orders placed during the hospital encounter of 03/30/25    Adult Transthoracic Echo Complete w/ Color, Spectral and Contrast if Necessary Per Protocol    Interpretation Summary    Left ventricular systolic function is normal. Calculated left ventricular EF = 66% Left ventricular ejection fraction appears to be 66 - 70%.    Left ventricular diastolic function is consistent with (grade I) impaired relaxation.    The right  ventricular cavity is mildly dilated.    The left atrial cavity is mildly dilated.    Estimated right ventricular systolic pressure from tricuspid regurgitation is mildly elevated (35-45 mmHg).      CT Head Without Contrast  Result Date: 7/13/2025  No acute intracranial abnormality Brain MRI is more sensitive to evaluate for acute or subacute infarcts and to evaluate for intracranial metastatic disease. Electronically Signed: Francisco Durham MD  7/13/2025 12:06 PM EDT  Workstation ID: [id number]        CrCl cannot be calculated (Unknown ideal weight.).    Assessment & Plan   Assessment/Plan       Active Hospital Problems    Diagnosis  POA    **Hypoglycemia [E16.2]  Yes      Resolved Hospital Problems   No resolved problems to display.     Hypoglycemia   Lab Results   Component Value Date    GLUCOSE 98 07/14/2025    GLUCOSE 142 (H) 07/13/2025    GLUCOSE 97 04/01/2025    GLUCOSE 91 03/31/2025   -Hypoglycemia protocol  - Continue D5W half-normal saline  - A1c 5.7  -Diabetic diet  -Mounjaro weekly at home  -Monitor glucose before meals and at bedtime  -Recheck cortisol and ACTH  -Endocrinology consulted    Hypokalemia  - Potassium 2.6, repeat 3.4  - Potassium replacement protocol    Hypertension/CAD  BP Readings from Last 1 Encounters:   07/14/25 112/58   - Continue Lopressor, Plavix   - Monitor while admitted    Lupus/fibromyalgia  - Continue Plaquenil, oxycodone and gabapentin    Dyslipidemia  - Continue statin    Mood Disorder  - Continue Effexor    Obesity   - BMI 28.8  - Lifestyle modifications    VTE Prophylaxis - Active VTE Prophylaxis  Mechanical:        Start        07/13/25 1358  Maintain Sequential Compression Device  Continuous                          Select Pharmacologic VTE Prophylaxis if Desired & Appropriate      CODE STATUS:    Code Status and Medical Interventions: CPR (Attempt to Resuscitate); Full Support   Ordered at: 07/13/25 1343     Code Status (Patient has no pulse and is not breathing):    CPR  (Attempt to Resuscitate)     Medical Interventions (Patient has pulse or is breathing):    Full Support       This patient has been examined wearing personal protective equipment.     I discussed the patient's findings and my recommendations with patient, family, nursing staff, primary care team, and consulting provider.      Signature:Electronically signed by SAAD Rolle, 07/14/25, 2:49 PM EDT.      I spent 35 minutes caring for Lisa on this date of service. This time includes time spent by me in the following activities: reviewing tests, performing a medically appropriate examination and/or evaluation, counseling and educating the patient/family/caregiver, referring and communicating with other health care professionals, documenting information in the medical record, independently interpreting results and communicating that information with the patient/family/caregiver, care coordination, ordering medications, ordering test(s), ordering procedure(s), obtaining a separately obtained history, and reviewing a separately obtained history.

## 2025-07-14 NOTE — CASE MANAGEMENT/SOCIAL WORK
Social Work Assessment   Mahesh     Patient Name: Lisa aJy  MRN: [id number]  Today's Date: 7/14/2025    Admit Date: 7/13/2025     Demographic Summary       Row Name 07/14/25 7794       General Information    Reason for Consult community resources    General Information Comments SW was notified that pt is aware of local food pantries (insufficient to meet diabetes diet compliance) and cumulative med cost is difficult, pt denied by [facility]. SW placed resource for [facility] Extra Help program on AVS for pt to call and apply.   Britta Snow MSW, W  Medical Social Worker  Ph [phone]  Fax [phone]  [email or web address]

## 2025-07-14 NOTE — CONSULTS
Inpatient Endocrine Consult  Consultation requested by observation team for hypoglycemia  Patient Care Team:  Brian Gonzalez MD as PCP - General (Family Medicine)    Chief Complaint: Hypoglycemia    HPI: This is a 55-year-old female with history of type 2 diabetes follows with Dr. Lizette GARRETT and takes Mounjaro 10 mg subcu weekly apparently her blood sugar was 90 and then went to 79 and she felt generalized weakness and came to the emergency room for evaluation.  I have reviewed the chart and no low blood sugar of less than 70 documented.  Patient tells me verbally the blood sugar was in the 40s.  Patient is only on Mounjaro 510 mg subcu weekly which does not cause hypoglycemia.    In the past her TSH and ACTH stimulation test were normal.    Past Medical History:   Diagnosis Date    Asthma     Diabetes mellitus     DVT (deep venous thrombosis) 08/23/2024    right lower leg    Fibromyalgia     Lupus        Social History     Socioeconomic History    Marital status: Single   Tobacco Use    Smoking status: Never     Passive exposure: Never    Smokeless tobacco: Never   Vaping Use    Vaping status: Never Used   Substance and Sexual Activity    Alcohol use: Never    Drug use: Never    Sexual activity: Never       History reviewed. No pertinent family history.    Allergies   Allergen Reactions    Metformin Nausea And Vomiting    Morphine Unknown - High Severity     Pt sates only in Pill form.       ROS:   Constitutional:  Denies fatigue, tiredness.    Eyes:  Denies change in visual acuity   HENT:  Denies nasal congestion or sore throat   Respiratory: Denies cough, shortness of breath.   Cardiovascular:  Denies chest pain, edema   GI:  Denies abdominal pain, nausea, vomiting.   :  Denies polyuria and polydipsia  Musculoskeletal:  Denies back pain or joint pain   Integument:  Denies dry skin, rash   Neurologic:  Denies headache, focal weakness or sensory changes   Endocrine:  Denies polyuria or polydipsia   Psychiatric:   Denies depression or anxiety      Vitals:    07/14/25 1138   BP: 112/58   Pulse: 71   Resp: 14   Temp:    SpO2: 96%      Body mass index is 38.89 kg/m².     Physical Exam:  GEN: NAD, conversant  EYES: EOMI, PERRL  NECK: no thyromegaly  CV: RRR  LUNG: CTA  PSYCH: Awake and coherent      Results Review:     I reviewed the patient's new clinical results.    Lab Results   Component Value Date    GLUCOSE 98 07/14/2025    BUN 14.8 07/14/2025    CREATININE 0.84 07/14/2025    EGFRIFAFRI >60 11/02/2022    BCR 17.6 07/14/2025    K 3.4 (L) 07/14/2025    CO2 26.4 07/14/2025    CALCIUM 9.0 07/14/2025    ALBUMIN 4.4 07/13/2025    LABIL2 1.1 11/02/2022    AST 29 07/13/2025    ALT 23 07/13/2025       Lab Results   Component Value Date    HGBA1C 5.77 (H) 07/14/2025    HGBA1C 5.72 (H) 03/31/2025    HGBA1C 5.9 (H) 07/10/2024     Lab Results   Component Value Date    CREATININE 0.84 07/14/2025     Results from last 7 days   Lab Units 07/14/25  1136 07/14/25  0751 07/14/25  0305 07/13/25  2126 07/13/25  1222 07/13/25  1159   GLUCOSE mg/dL 95 94 80 91 91 80      Latest Reference Range & Units 12/03/23 09:49 12/03/23 11:15 12/03/23 12:02 07/10/24 08:41 03/30/25 12:01 03/31/25 14:48 07/14/25 03:01   Cortisol mcg/dL 17.58 20.91 22.21       Hemoglobin A1C 4.80 - 5.60 %    5.9 (H) (E)  5.72 (H) 5.77 (H)   TSH Baseline 0.270 - 4.200 uIU/mL     1.580     Free T4 0.93 - 1.70 ng/dL    1.32 (E) 1.17     (H): Data is abnormally high  (E): External lab result  Medication Review: Reviewed.       Current Facility-Administered Medications:     albuterol (PROVENTIL) nebulizer solution 0.083% 2.5 mg/3mL, 2.5 mg, Nebulization, Q4H PRN, Quincy Melissa PA-C    aluminum-magnesium hydroxide-simethicone (MAALOX MAX) 400-400-40 MG/5ML suspension 15 mL, 15 mL, Oral, Q6H PRN, Quincy Melissa PA-C    atorvastatin (LIPITOR) tablet 10 mg, 10 mg, Oral, Q PM, Quincy Melissa PA-C, 10 mg at 07/13/25 1807    sennosides-docusate (PERICOLACE) 8.6-50 MG  per tablet 2 tablet, 2 tablet, Oral, BID PRN **AND** polyethylene glycol (MIRALAX) packet 17 g, 17 g, Oral, Daily PRN **AND** bisacodyl (DULCOLAX) EC tablet 5 mg, 5 mg, Oral, Daily PRN **AND** bisacodyl (DULCOLAX) suppository 10 mg, 10 mg, Rectal, Daily PRN, Quincy Melissa PA-C    budesonide-formoterol (SYMBICORT) 160-4.5 MCG/ACT inhaler 2 puff, 2 puff, Inhalation, BID - RT, Quincy Melissa PA-C, 2 puff at 07/14/25 0817    Calcium Replacement - Follow Nurse / BPA Driven Protocol, , Not Applicable, PRN, Mignon March APRN    cetirizine (zyrTEC) tablet 10 mg, 10 mg, Oral, Daily PRN, Quincy Melissa PA-C    clopidogrel (PLAVIX) tablet 75 mg, 75 mg, Oral, Daily, Quincy Melissa PA-C, 75 mg at 07/14/25 0809    cyclobenzaprine (FLEXERIL) tablet 10 mg, 10 mg, Oral, TID PRN, Quincy Melissa PA-C, 10 mg at 07/14/25 1022    dextrose (D50W) (25 g/50 mL) IV injection 25 g, 25 g, Intravenous, Q15 Min PRN, Kimmy Wallace APRN    dextrose (GLUTOSE) oral gel 15 g, 15 g, Oral, Q15 Min PRN, Kimmy Wallace APRN    dextrose 5 % and sodium chloride 0.45 % infusion, 100 mL/hr, Intravenous, Continuous, Kimmy Wallace APRN, Last Rate: 100 mL/hr at 07/14/25 1051, 100 mL/hr at 07/14/25 1051    glucagon (GLUCAGEN) injection 1 mg, 1 mg, Intramuscular, Q15 Min PRN, Kimmy Wallace APRN    hydroxychloroquine (PLAQUENIL) tablet 400 mg, 400 mg, Oral, QAM, Kimmy Wallace APRN, 400 mg at 07/14/25 0809    Lidocaine 4 % 1 patch, 1 patch, Transdermal, Q24H, Claudia Springer APRN, 1 patch at 07/13/25 2202    Magnesium Standard Dose Replacement - Follow Nurse / BPA Driven Protocol, , Not Applicable, PRN, Mignon March, SAAD    melatonin tablet 5 mg, 5 mg, Oral, Nightly PRN, Quincy Melissa PA-C    metoprolol tartrate (LOPRESSOR) tablet 25 mg, 25 mg, Oral, BID, Quincy Melissa PA-C, 25 mg at 07/14/25 0809    nitroglycerin (NITROSTAT) SL tablet 0.4 mg, 0.4 mg, Sublingual, Q5 Min PRN, Shin  JAYE Mathew    ondansetron ODT (ZOFRAN-ODT) disintegrating tablet 4 mg, 4 mg, Oral, Q6H PRN **OR** ondansetron (ZOFRAN) injection 4 mg, 4 mg, Intravenous, Q6H PRN, Quincy Melissa PA-C    oxyCODONE (ROXICODONE) immediate release tablet 15 mg, 15 mg, Oral, Q4H, Quincy Melissa PA-C, 15 mg at 07/14/25 1022    Phosphorus Replacement - Follow Nurse / BPA Driven Protocol, , Not Applicable, PRN, Mignon March, APRN    Potassium Replacement - Follow Nurse / BPA Driven Protocol, , Not Applicable, PRN, Mignon March, SAAD    [COMPLETED] Insert Peripheral IV, , , Once **AND** sodium chloride 0.9 % flush 10 mL, 10 mL, Intravenous, PRN, Mignon March, APRN    sodium chloride 0.9 % flush 10 mL, 10 mL, Intravenous, Q12H, Quincy Melissa PA-C, 10 mL at 07/14/25 0810    sodium chloride 0.9 % flush 10 mL, 10 mL, Intravenous, PRN, Quincy Melissa PA-C    sodium chloride 0.9 % infusion 40 mL, 40 mL, Intravenous, PRN, Quincy Melissa PA-C    venlafaxine XR (EFFEXOR-XR) 24 hr capsule 37.5 mg, 37.5 mg, Oral, Daily, Quincy Melissa PA-C, 37.5 mg at 07/14/25 0809          Assessment and plan:  Hypoglycemia: This is a 55-year-old female with history of type 2 diabetes on Mounjaro at 10 mg subcu weekly.  I do not see any documented low blood sugar in the chart.  Per H&P she had a blood sugar of 90 at home and then it was 79.  Previous ACTH stimulation test as well as thyroid function tests were normal.  I am not sure exactly with that she is having true low blood sugars or not.  Recommend to follow blood sugars and if she does develop less than 70 then we will need to do further workup.  I will recheck a.m. cortisol and ACTH level.    Diabetes mellitus type 2 with hyperglycemia: On Mounjaro at home and A1c is good at 5.77%.    Recommend to follow-up with Dr. Gaytan post discharge.    Thank you very much for the consultation.      Steffi Anglin MD FACE.

## 2025-07-14 NOTE — DISCHARGE INSTR - OTHER ORDERS
For financial assistance with your Medicare premium, call the [employer] at [phone] and request to apply for the Extra Help program.    Dr. Brian Gonzalez's office will be calling in the next 2-3 days to schedule a follow  up appointment. If you have not heard from the office by 7/21/25, please call  [phone], option 1 to schedule 7-10 days post discharge.

## 2025-07-15 LAB
ANION GAP SERPL CALCULATED.3IONS-SCNC: 7.9 MMOL/L (ref 5–15)
BASOPHILS # BLD AUTO: 0.05 10*3/MM3 (ref 0–0.2)
BASOPHILS NFR BLD AUTO: 0.6 % (ref 0–1.5)
BUN SERPL-MCNC: 15 MG/DL (ref 6–20)
BUN/CREAT SERPL: 19 (ref 7–25)
CALCIUM SPEC-SCNC: 9 MG/DL (ref 8.6–10.5)
CHLORIDE SERPL-SCNC: 105 MMOL/L (ref 98–107)
CO2 SERPL-SCNC: 26.1 MMOL/L (ref 22–29)
CORTIS SERPL-MCNC: 0.58 MCG/DL
CREAT SERPL-MCNC: 0.79 MG/DL (ref 0.57–1)
DEPRECATED RDW RBC AUTO: 41 FL (ref 37–54)
EGFRCR SERPLBLD CKD-EPI 2021: 87.4 ML/MIN/1.73
EOSINOPHIL # BLD AUTO: 0.2 10*3/MM3 (ref 0–0.4)
EOSINOPHIL NFR BLD AUTO: 2.3 % (ref 0.3–6.2)
ERYTHROCYTE [DISTWIDTH] IN BLOOD BY AUTOMATED COUNT: 12.5 % (ref 12.3–15.4)
GLUCOSE BLDC GLUCOMTR-MCNC: 104 MG/DL (ref 70–105)
GLUCOSE BLDC GLUCOMTR-MCNC: 134 MG/DL (ref 70–105)
GLUCOSE BLDC GLUCOMTR-MCNC: 76 MG/DL (ref 70–105)
GLUCOSE BLDC GLUCOMTR-MCNC: 86 MG/DL (ref 70–105)
GLUCOSE BLDC GLUCOMTR-MCNC: 99 MG/DL (ref 70–105)
GLUCOSE SERPL-MCNC: 97 MG/DL (ref 65–99)
HCT VFR BLD AUTO: 36.2 % (ref 34–46.6)
HGB BLD-MCNC: 11.4 G/DL (ref 12–15.9)
IMM GRANULOCYTES # BLD AUTO: 0.03 10*3/MM3 (ref 0–0.05)
IMM GRANULOCYTES NFR BLD AUTO: 0.3 % (ref 0–0.5)
LYMPHOCYTES # BLD AUTO: 3.08 10*3/MM3 (ref 0.7–3.1)
LYMPHOCYTES NFR BLD AUTO: 35.9 % (ref 19.6–45.3)
MAGNESIUM SERPL-MCNC: 2.1 MG/DL (ref 1.6–2.6)
MCH RBC QN AUTO: 28.4 PG (ref 26.6–33)
MCHC RBC AUTO-ENTMCNC: 31.5 G/DL (ref 31.5–35.7)
MCV RBC AUTO: 90.3 FL (ref 79–97)
MONOCYTES # BLD AUTO: 0.69 10*3/MM3 (ref 0.1–0.9)
MONOCYTES NFR BLD AUTO: 8 % (ref 5–12)
NEUTROPHILS NFR BLD AUTO: 4.53 10*3/MM3 (ref 1.7–7)
NEUTROPHILS NFR BLD AUTO: 52.9 % (ref 42.7–76)
NRBC BLD AUTO-RTO: 0 /100 WBC (ref 0–0.2)
PLATELET # BLD AUTO: 326 10*3/MM3 (ref 140–450)
PMV BLD AUTO: 9.7 FL (ref 6–12)
POTASSIUM SERPL-SCNC: 3.5 MMOL/L (ref 3.5–5.2)
RBC # BLD AUTO: 4.01 10*6/MM3 (ref 3.77–5.28)
SODIUM SERPL-SCNC: 139 MMOL/L (ref 136–145)
WBC NRBC COR # BLD AUTO: 8.58 10*3/MM3 (ref 3.4–10.8)

## 2025-07-15 PROCEDURE — 94761 N-INVAS EAR/PLS OXIMETRY MLT: CPT

## 2025-07-15 PROCEDURE — 96375 TX/PRO/DX INJ NEW DRUG ADDON: CPT

## 2025-07-15 PROCEDURE — 83735 ASSAY OF MAGNESIUM: CPT | Performed by: PHYSICIAN ASSISTANT

## 2025-07-15 PROCEDURE — 85025 COMPLETE CBC W/AUTO DIFF WBC: CPT | Performed by: PHYSICIAN ASSISTANT

## 2025-07-15 PROCEDURE — 82948 REAGENT STRIP/BLOOD GLUCOSE: CPT | Performed by: NURSE PRACTITIONER

## 2025-07-15 PROCEDURE — G0378 HOSPITAL OBSERVATION PER HR: HCPCS

## 2025-07-15 PROCEDURE — C1751 CATH, INF, PER/CENT/MIDLINE: HCPCS

## 2025-07-15 PROCEDURE — 82948 REAGENT STRIP/BLOOD GLUCOSE: CPT

## 2025-07-15 PROCEDURE — 94799 UNLISTED PULMONARY SVC/PX: CPT

## 2025-07-15 PROCEDURE — 94664 DEMO&/EVAL PT USE INHALER: CPT

## 2025-07-15 PROCEDURE — 25010000002 DIPHENHYDRAMINE PER 50 MG: Performed by: EMERGENCY MEDICINE

## 2025-07-15 PROCEDURE — 80048 BASIC METABOLIC PNL TOTAL CA: CPT | Performed by: PHYSICIAN ASSISTANT

## 2025-07-15 PROCEDURE — 82533 TOTAL CORTISOL: CPT | Performed by: INTERNAL MEDICINE

## 2025-07-15 PROCEDURE — 99214 OFFICE O/P EST MOD 30 MIN: CPT | Performed by: INTERNAL MEDICINE

## 2025-07-15 RX ORDER — COSYNTROPIN 0.25 MG/ML
0.25 INJECTION, POWDER, FOR SOLUTION INTRAMUSCULAR; INTRAVENOUS ONCE
Status: COMPLETED | OUTPATIENT
Start: 2025-07-16 | End: 2025-07-16

## 2025-07-15 RX ORDER — DIPHENHYDRAMINE HYDROCHLORIDE 50 MG/ML
25 INJECTION, SOLUTION INTRAMUSCULAR; INTRAVENOUS ONCE
Status: COMPLETED | OUTPATIENT
Start: 2025-07-15 | End: 2025-07-15

## 2025-07-15 RX ORDER — POTASSIUM CHLORIDE 1500 MG/1
20 TABLET, EXTENDED RELEASE ORAL ONCE
Status: COMPLETED | OUTPATIENT
Start: 2025-07-15 | End: 2025-07-15

## 2025-07-15 RX ORDER — GABAPENTIN 400 MG/1
800 CAPSULE ORAL EVERY 8 HOURS SCHEDULED
Status: DISCONTINUED | OUTPATIENT
Start: 2025-07-15 | End: 2025-07-17 | Stop reason: HOSPADM

## 2025-07-15 RX ADMIN — VENLAFAXINE HYDROCHLORIDE 37.5 MG: 37.5 CAPSULE, EXTENDED RELEASE ORAL at 09:18

## 2025-07-15 RX ADMIN — CLOPIDOGREL BISULFATE 75 MG: 75 TABLET, FILM COATED ORAL at 09:18

## 2025-07-15 RX ADMIN — OXYCODONE 15 MG: 5 TABLET ORAL at 14:45

## 2025-07-15 RX ADMIN — METOPROLOL TARTRATE 25 MG: 25 TABLET, FILM COATED ORAL at 09:17

## 2025-07-15 RX ADMIN — BUDESONIDE AND FORMOTEROL FUMARATE DIHYDRATE 2 PUFF: 160; 4.5 AEROSOL RESPIRATORY (INHALATION) at 07:18

## 2025-07-15 RX ADMIN — OXYCODONE 15 MG: 5 TABLET ORAL at 17:31

## 2025-07-15 RX ADMIN — CYCLOBENZAPRINE HYDROCHLORIDE 10 MG: 10 TABLET, FILM COATED ORAL at 17:31

## 2025-07-15 RX ADMIN — Medication 10 ML: at 09:17

## 2025-07-15 RX ADMIN — CYCLOBENZAPRINE HYDROCHLORIDE 10 MG: 10 TABLET, FILM COATED ORAL at 01:30

## 2025-07-15 RX ADMIN — HYDROXYCHLOROQUINE SULFATE 400 MG: 200 TABLET, FILM COATED ORAL at 06:15

## 2025-07-15 RX ADMIN — CYCLOBENZAPRINE HYDROCHLORIDE 10 MG: 10 TABLET, FILM COATED ORAL at 09:17

## 2025-07-15 RX ADMIN — GABAPENTIN 800 MG: 400 CAPSULE ORAL at 22:51

## 2025-07-15 RX ADMIN — METOPROLOL TARTRATE 25 MG: 25 TABLET, FILM COATED ORAL at 20:25

## 2025-07-15 RX ADMIN — OXYCODONE 15 MG: 5 TABLET ORAL at 22:51

## 2025-07-15 RX ADMIN — OXYCODONE 15 MG: 5 TABLET ORAL at 02:27

## 2025-07-15 RX ADMIN — OXYCODONE 15 MG: 5 TABLET ORAL at 06:15

## 2025-07-15 RX ADMIN — POTASSIUM CHLORIDE 20 MEQ: 1500 TABLET, EXTENDED RELEASE ORAL at 05:05

## 2025-07-15 RX ADMIN — GABAPENTIN 800 MG: 400 CAPSULE ORAL at 14:45

## 2025-07-15 RX ADMIN — ATORVASTATIN CALCIUM 10 MG: 10 TABLET ORAL at 17:32

## 2025-07-15 RX ADMIN — BUDESONIDE AND FORMOTEROL FUMARATE DIHYDRATE 2 PUFF: 160; 4.5 AEROSOL RESPIRATORY (INHALATION) at 19:31

## 2025-07-15 RX ADMIN — LIDOCAINE 1 PATCH: 4 PATCH TOPICAL at 09:16

## 2025-07-15 RX ADMIN — DIPHENHYDRAMINE HYDROCHLORIDE 25 MG: 50 INJECTION, SOLUTION INTRAMUSCULAR; INTRAVENOUS at 05:05

## 2025-07-15 RX ADMIN — OXYCODONE 15 MG: 5 TABLET ORAL at 10:45

## 2025-07-15 NOTE — PROGRESS NOTES
Daily Progress Note    Patient Care Team:  Brian Gonzalez MD as PCP - General (Family Medicine)    Chief Complaint: Follow-up hypoglycemia    HPI: Patient seen, admitted with low blood sugars even though none documented.  Found to have low cortisol.  Patient has been on steroids on and off both orally as well as intra-articular.  Patient tells me that she had a last steroid injection about a month ago.  She has lost almost 100 pounds of weight in the last year year and a half which is intentional and with Mounjaro.  She does have some dizziness.  She denies any significant issues with abdominal pain nausea or vomiting.    ROS:   Constitutional:  Denies fatigue, tiredness.    Respiratory: denies cough, shortness of breath.   Cardiovascular:  denies chest pain, edema   GI:  Denies abdominal pain, nausea, vomiting.         Vitals:    07/15/25 1149   BP: 123/79   Pulse: 65   Resp: 11   Temp: 97.5 °F (36.4 °C)   SpO2: 93%     Body mass index is 38.89 kg/m².    Physical Exam:  GEN: NAD, conversant  PSYCH: Awake and coherent      Results Review:     I reviewed the patient's new clinical results.    Glucose   Date Value Ref Range Status   07/15/2025 97 65 - 99 mg/dL Final     Sodium   Date Value Ref Range Status   07/15/2025 139 136 - 145 mmol/L Final     Potassium   Date Value Ref Range Status   07/15/2025 3.5 3.5 - 5.2 mmol/L Final     CO2   Date Value Ref Range Status   07/15/2025 26.1 22.0 - 29.0 mmol/L Final     Chloride   Date Value Ref Range Status   07/15/2025 105 98 - 107 mmol/L Final     Anion Gap   Date Value Ref Range Status   07/15/2025 7.9 5.0 - 15.0 mmol/L Final   07/13/2025 13.0 10.0 - 20.0 mmol/L Final     Comment:     Serial Number: [id number]:  [id number]     Creatinine   Date Value Ref Range Status   07/15/2025 0.79 0.57 - 1.00 mg/dL Final   07/13/2025 1.00 0.60 - 1.30 mg/dL Final     BUN   Date Value Ref Range Status   07/15/2025 15.0 6.0 - 20.0 mg/dL Final     BUN/Creatinine Ratio   Date Value  "Ref Range Status   07/15/2025 19.0 7.0 - 25.0 Final     Calcium   Date Value Ref Range Status   07/15/2025 9.0 8.6 - 10.5 mg/dL Final     Alkaline Phosphatase   Date Value Ref Range Status   07/13/2025 105 39 - 117 U/L Final     Total Protein   Date Value Ref Range Status   07/13/2025 8.0 6.0 - 8.5 g/dL Final     ALT (SGPT)   Date Value Ref Range Status   07/13/2025 23 1 - 33 U/L Final     AST (SGOT)   Date Value Ref Range Status   07/13/2025 29 1 - 32 U/L Final     Total Bilirubin   Date Value Ref Range Status   07/13/2025 0.3 0.0 - 1.2 mg/dL Final     Albumin   Date Value Ref Range Status   07/13/2025 4.4 3.5 - 5.2 g/dL Final     Globulin   Date Value Ref Range Status   07/13/2025 3.6 gm/dL Final     Magnesium   Date Value Ref Range Status   07/15/2025 2.1 1.6 - 2.6 mg/dL Final     Lab Results   Component Value Date    HGBA1C 5.77 (H) 07/14/2025    HGBA1C 5.72 (H) 03/31/2025    HGBA1C 5.9 (H) 07/10/2024     No results found for: \"GLUF\", \"MICROALBUR\"  Results from last 7 days   Lab Units 07/15/25  1147 07/15/25  0743 07/14/25  1932 07/14/25  1627 07/14/25  1514 07/14/25  1136   GLUCOSE mg/dL 76 86 70 109* 95 95        Latest Reference Range & Units 07/15/25 02:41   Cortisol mcg/dL 0.58         Medication Review: Reviewed.     atorvastatin, 10 mg, Oral, Q PM  budesonide-formoterol, 2 puff, Inhalation, BID - RT  clopidogrel, 75 mg, Oral, Daily  gabapentin, 800 mg, Oral, Q8H  hydroxychloroquine, 400 mg, Oral, QAM  Lidocaine, 1 patch, Transdermal, Q24H  metoprolol tartrate, 25 mg, Oral, BID  oxyCODONE, 15 mg, Oral, Q4H  sodium chloride, 10 mL, Intravenous, Q12H  venlafaxine XR, 37.5 mg, Oral, Daily        Assessment and plan:  Low cortisol: Rule out glucocorticoid deficiency.  Will check ACTH stimulation test.    Hypoglycemia: Not sure if this is due to low cortisol, she is on Mounjaro and has lost 100+ pounds of weight.  Mounjaro usually does not cause low blood sugars.    Obesity: Recommend to continue to work on " diet and activity.    Steffi Anglin MD. FACE

## 2025-07-15 NOTE — PROGRESS NOTES
Formerly [facility], [facility] Observation Unit Progress Note     Patient Name: Lisa Jay  : 1970  MRN: [id number]  Primary Care Physician: Brian Gonzalez MD  Date of admission: 2025     Patient Care Team:  Brian Gonzalez MD as PCP - General (Family Medicine)          Subjective   History Present Illness     Chief Complaint:   Chief Complaint   Patient presents with    Hypoglycemia     Patient reports low BG at home, c/o general weakness. Reports BG at home was 90 then 79     Hypoglycemia    Ms. Jay is a 55 y.o.  presents to [facility] complaining of hypoglycemia       History of Present Illness    ED 25: Patient is a 55-year-old female presents the ED for evaluation of altered mental status, possible hypoglycemia.     Patient reports she does not recall getting here to the hospital, she does remember going to [employer].  She reports she thought she was going home.     Patient reports she gets symptomatic when her blood sugar is below 110.  She tried oral glucose tablets.     Observation 25: Patient is a 55-year-old female presented to the hospital with complaints of hypoglycemia.  Patient states that she has been recently admitted due to low blood sugars and has followed up with endocrinology.  Patient states she does take Mounjaro every Tuesday but recently was not able to pain medication do not be in stock in pharmacy.  Patient states she does have freestyle lisa with no hypoglycemic events.  Patient states her glucoses drop below 70 overnight.  Patient states she eats small frequent meals throughout the day to keep her glucose up.  Patient states she does workout the [employer] from 2 to 7 PM.  Patient states she becomes dizzy lightheaded confused when she noticed her glucose drop below 100.  Patient states she normally sees endocrinology within [place].    7/15/25: Patient reports some weakness and increased fatigue.  Patient ambulated in hallway with rollator.  Patient states that her symptoms did seem to  be better on Mounjaro.  Patient today overnight for a ACTH testing.       Review of Systems   Constitutional: Positive for malaise/fatigue.   HENT: Negative.     Eyes: Negative.    Cardiovascular:  Positive for chest pain. Negative for dyspnea on exertion.   Respiratory: Negative.     Endocrine: Negative.    Skin: Negative.    Gastrointestinal: Negative.    Genitourinary: Negative.    Neurological:  Positive for weakness.   Psychiatric/Behavioral: Negative.             Personal History     Past Medical History:   Past Medical History:   Diagnosis Date    Asthma     Diabetes mellitus     DVT (deep venous thrombosis) 08/23/2024    right lower leg    Fibromyalgia     Lupus        Surgical History:      Past Surgical History:   Procedure Laterality Date    APPENDECTOMY      CHOLECYSTECTOMY N/A 8/23/2024    Procedure: CHOLECYSTECTOMY LAPAROSCOPIC WITH HALFPOPSINCI ROBOT;  Surgeon: Bryce Huizar MD;  Location: [facility];  Service: Robotics - DaVinci;  Laterality: N/A;    HYSTERECTOMY      JOINT REPLACEMENT Right     KNEE SURGERY      SHOULDER ARTHROSCOPY             Family History: family history is not on file. Otherwise pertinent FHx was reviewed and unremarkable.     Social History:  reports that she has never smoked. She has never been exposed to tobacco smoke. She has never used smokeless tobacco. She reports that she does not drink alcohol and does not use drugs.      Medications:  Prior to Admission medications    Medication Sig Start Date End Date Taking? Authorizing Provider   albuterol (PROVENTIL) (2.5 MG/3ML) 0.083% nebulizer solution Take 2.5 mg by nebulization Every 4 (Four) Hours As Needed for Wheezing. 9/20/24  Yes Baldemar Ibarra MD   albuterol sulfate  (90 Base) MCG/ACT inhaler Inhale 2 puffs 2 (Two) Times a Day.   Yes ProviderJorge MD   atorvastatin (LIPITOR) 10 MG tablet Take 1 tablet by mouth Every Evening.   Yes Jorge Ordonez MD   budesonide-formoterol (Symbicort)  160-4.5 MCG/ACT inhaler Inhale 2 puffs 2 (Two) Times a Day. 9/20/24  Yes Baldemar Ibarra MD   cetirizine (zyrTEC) 10 MG tablet Take 1 tablet by mouth Daily As Needed for Allergies.   Yes Jorge Ordonez MD   clopidogrel (PLAVIX) 75 MG tablet Take 1 tablet by mouth Daily. 12/3/23  Yes Sima Arenas PA-C   coenzyme Q10 100 MG capsule Take 1 capsule by mouth Daily.   Yes Jorge Ordonez MD   cyclobenzaprine (FLEXERIL) 10 MG tablet Take 1 tablet by mouth Every 6 (Six) Hours As Needed for Muscle Spasms.   Yes Jorge Ordonez MD   dicyclomine (BENTYL) 10 MG capsule Take 1 capsule by mouth 4 (Four) Times a Day As Needed for Abdominal Cramping. 4/1/25  Yes Katarina Hall APRN   gabapentin (NEURONTIN) 800 MG tablet Take 1 tablet by mouth 3 (Three) Times a Day.   Yes Jorge Ordonez MD   hydroCHLOROthiazide 25 MG tablet Take 1 tablet by mouth Daily.   Yes Jorge Ordonez MD   hydroxychloroquine (PLAQUENIL) 200 MG tablet Take 2 tablets by mouth Every Morning.   Yes Jorge Ordonez MD   Magnesium 200 MG tablet Take 1 tablet by mouth Daily.   Yes Jorge Ordonez MD   metoprolol tartrate (LOPRESSOR) 25 MG tablet Take 1 tablet by mouth 2 (Two) Times a Day.   Yes Jorge Ordonez MD   ondansetron ODT (ZOFRAN-ODT) 4 MG disintegrating tablet Place 1 tablet on the tongue Every 8 (Eight) Hours As Needed for Nausea or Vomiting.   Yes Jorge Ordonez MD   oxyCODONE (ROXICODONE) 15 MG immediate release tablet Take 1 tablet by mouth Every 4 (Four) Hours.   Yes Jorge Ordonez MD   Potassium 99 MG tablet Take 1 tablet by mouth Daily.   Yes Jorge Ordonez MD   Tirzepatide (Mounjaro) 10 MG/0.5ML solution auto-injector Inject 10 mg under the skin into the appropriate area as directed Every 7 (Seven) Days. Tuesdays   Yes Jorge Ordonez MD   triamcinolone (KENALOG) 0.1 % cream Apply 1 Application topically to the appropriate area as directed 2 (Two) Times a Day As  Needed.   Yes Provider, Historical, MD   Turmeric (QC Tumeric Complex) 500 MG capsule Take 1 capsule by mouth Daily.   Yes Provider, Historical, MD   venlafaxine XR (EFFEXOR-XR) 37.5 MG 24 hr capsule Take 1 capsule by mouth Daily.   Yes Provider, MD Jorge   Vitamin D, Cholecalciferol, (CHOLECALCIFEROL) 10 MCG (400 UNIT) tablet Take 1 tablet by mouth Daily.   Yes Provider, MD Jorge       Allergies:    Allergies   Allergen Reactions    Metformin Nausea And Vomiting    Morphine Unknown - High Severity     Pt sates only in Pill form.       Objective   Objective     Vital Signs  Temp:  [97.4 °F (36.3 °C)-97.7 °F (36.5 °C)] 97.5 °F (36.4 °C)  Heart Rate:  [60-74] 65  Resp:  [11-20] 11  BP: (102-130)/(64-80) 123/79  SpO2:  [93 %-100 %] 93 %  on   ;   Device (Oxygen Therapy): room air  Body mass index is 38.89 kg/m².    Physical Exam  Vitals and nursing note reviewed.   Constitutional:       Appearance: Normal appearance. She is obese.   HENT:      Head: Normocephalic and atraumatic.      Right Ear: External ear normal.      Left Ear: External ear normal.      Nose: Nose normal.      Mouth/Throat:      Pharynx: Oropharynx is clear.   Eyes:      Conjunctiva/sclera: Conjunctivae normal.      Pupils: Pupils are equal, round, and reactive to light.   Cardiovascular:      Rate and Rhythm: Normal rate and regular rhythm.      Pulses: Normal pulses.   Pulmonary:      Effort: Pulmonary effort is normal.   Abdominal:      General: Bowel sounds are normal.   Musculoskeletal:         General: Normal range of motion.      Cervical back: Normal range of motion.   Skin:     General: Skin is warm.      Capillary Refill: Capillary refill takes less than 2 seconds.   Neurological:      Mental Status: She is alert and oriented to person, place, and time.      Motor: Weakness present.   Psychiatric:         Mood and Affect: Mood normal.         Behavior: Behavior normal.         Thought Content: Thought content normal.          Judgment: Judgment normal.           Results Review:  I have personally reviewed most recent cardiac tracings, lab results, microbiology results, and radiology images and interpretations and agree with findings, most notably: CBC, CMP, .    Results from last 7 days   Lab Units 07/15/25  0241   WBC 10*3/mm3 8.58   HEMOGLOBIN g/dL 11.4*   HEMATOCRIT % 36.2   PLATELETS 10*3/mm3 326     Results from last 7 days   Lab Units 07/15/25  0241 07/13/25  1159 07/13/25  1100   SODIUM mmol/L 139   < > 139   POTASSIUM mmol/L 3.5   < > 2.6*   CHLORIDE mmol/L 105   < > 97*   CO2 mmol/L 26.1   < > 27.6   BUN mg/dL 15.0   < > 20.3*   CREATININE mg/dL 0.79   < > 1.05*   GLUCOSE mg/dL 97   < > 142*   CALCIUM mg/dL 9.0   < > 9.8   ALK PHOS U/L  --   --  105   ALT (SGPT) U/L  --   --  23   AST (SGOT) U/L  --   --  29    < > = values in this interval not displayed.     CrCl cannot be calculated (Unknown ideal weight.).  Brief Urine Lab Results  (Last result in the past 365 days)        Color   Clarity   Blood   Leuk Est   Nitrite   Protein   CREAT   Urine HCG        07/13/25 1119 Yellow   Clear   Negative   Trace   Negative   Trace                   Microbiology Results (last 10 days)       ** No results found for the last 240 hours. **            ECG/EMG Results (most recent)       Procedure Component Value Units Date/Time    Telemetry Scan [[id number]] Resulted: 07/13/25 1011     Updated: 07/13/25 1035            Results for orders placed during the hospital encounter of 09/18/24    Duplex Venous Lower Extremity - Right CAR 09/20/2024 10:42 AM    Interpretation Summary    Normal right lower extremity venous duplex scan.      Results for orders placed during the hospital encounter of 03/30/25    Adult Transthoracic Echo Complete w/ Color, Spectral and Contrast if Necessary Per Protocol    Interpretation Summary    Left ventricular systolic function is normal. Calculated left ventricular EF = 66% Left ventricular ejection fraction appears  to be 66 - 70%.    Left ventricular diastolic function is consistent with (grade I) impaired relaxation.    The right ventricular cavity is mildly dilated.    The left atrial cavity is mildly dilated.    Estimated right ventricular systolic pressure from tricuspid regurgitation is mildly elevated (35-45 mmHg).      CT Head Without Contrast  Result Date: 7/13/2025  No acute intracranial abnormality Brain MRI is more sensitive to evaluate for acute or subacute infarcts and to evaluate for intracranial metastatic disease. Electronically Signed: Francisco Durham MD  7/13/2025 12:06 PM EDT  Workstation ID: [id number]        CrCl cannot be calculated (Unknown ideal weight.).    Assessment & Plan   Assessment/Plan       Active Hospital Problems    Diagnosis  POA    **Hypoglycemia [E16.2]  Yes      Resolved Hospital Problems   No resolved problems to display.     Hypoglycemia         Lab Results   Component Value Date     GLUCOSE 98 07/14/2025     GLUCOSE 142 (H) 07/13/2025     GLUCOSE 97 04/01/2025     GLUCOSE 91 03/31/2025   -Hypoglycemia protocol  - Continue D5W half-normal saline  - A1c 5.7  -Diabetic diet  -Mounjaro weekly at home  -Monitor glucose before meals and at bedtime  -Cortisol wnl  - ACTH in AM  -Endocrinology consulted     Hypokalemia  - Potassium 2.6, repeat 3.4  - Potassium replacement protocol     Hypertension/CAD      BP Readings from Last 1 Encounters:   07/15/25 123/79   - Continue Lopressor, Plavix   - Monitor while admitted     Lupus/fibromyalgia  - Continue Plaquenil, oxycodone and gabapentin     Dyslipidemia  - Continue statin     Mood Disorder  - Continue Effexor     Obesity   - BMI 28.8  - Lifestyle modifications        VTE Prophylaxis - Active VTE Prophylaxis  Mechanical:        Start        07/13/25 1358  Maintain Sequential Compression Device  Continuous                          Select Pharmacologic VTE Prophylaxis if Desired & Appropriate      CODE STATUS:    Code Status and Medical  Interventions: CPR (Attempt to Resuscitate); Full Support   Ordered at: 07/13/25 1343     Code Status (Patient has no pulse and is not breathing):    CPR (Attempt to Resuscitate)     Medical Interventions (Patient has pulse or is breathing):    Full Support       This patient has been examined wearing personal protective equipment.     I discussed the patient's findings and my recommendations with patient, family, nursing staff, primary care team, and consulting provider.      Signature:Electronically signed by SAAD Rolle, 07/15/25, 3:00 PM EDT.          I spent 35 minutes caring for Lisa on this date of service. This time includes time spent by me in the following activities: reviewing tests, performing a medically appropriate examination and/or evaluation, counseling and educating the patient/family/caregiver, referring and communicating with other health care professionals, documenting information in the medical record, independently interpreting results and communicating that information with the patient/family/caregiver, care coordination, ordering medications, ordering test(s), ordering procedure(s), obtaining a separately obtained history, and reviewing a separately obtained history.

## 2025-07-15 NOTE — PLAN OF CARE
Problem: Adult Inpatient Plan of Care  Goal: Plan of Care Review  Outcome: Progressing  Goal: Patient-Specific Goal (Individualized)  Outcome: Progressing  Goal: Absence of Hospital-Acquired Illness or Injury  Outcome: Progressing  Intervention: Identify and Manage Fall Risk  Recent Flowsheet Documentation  Taken 7/15/2025 0355 by Christy Rouse RN  Safety Promotion/Fall Prevention: safety round/check completed  Taken 7/15/2025 0200 by Christy Rouse RN  Safety Promotion/Fall Prevention: safety round/check completed  Taken 7/15/2025 0000 by Christy Rouse RN  Safety Promotion/Fall Prevention: safety round/check completed  Taken 7/14/2025 2200 by Christy Rouse RN  Safety Promotion/Fall Prevention: safety round/check completed  Taken 7/14/2025 2000 by Christy Rouse RN  Safety Promotion/Fall Prevention: safety round/check completed  Intervention: Prevent Skin Injury  Recent Flowsheet Documentation  Taken 7/14/2025 2000 by Christy Rouse RN  Body Position: position changed independently  Intervention: Prevent Infection  Recent Flowsheet Documentation  Taken 7/14/2025 2000 by Christy Rouse RN  Infection Prevention: single patient room provided  Goal: Optimal Comfort and Wellbeing  Outcome: Progressing  Intervention: Monitor Pain and Promote Comfort  Recent Flowsheet Documentation  Taken 7/14/2025 2000 by Christy Rouse RN  Pain Management Interventions: care clustered  Intervention: Provide Person-Centered Care  Recent Flowsheet Documentation  Taken 7/14/2025 2000 by Christy Rouse RN  Trust Relationship/Rapport:   care explained   choices provided   emotional support provided   empathic listening provided   thoughts/feelings acknowledged  Goal: Readiness for Transition of Care  Outcome: Progressing   Goal Outcome Evaluation:

## 2025-07-15 NOTE — CASE MANAGEMENT/SOCIAL WORK
Continued Stay Note  ZAYRA Aragon     Patient Name: Lisa Jay  MRN: [id number]  Today's Date: 7/15/2025    Admit Date: 7/13/2025    Plan: DC Plan: Anticipate routine home alone. Sister Cintia assists as needed.   Discharge Plan       Row Name 07/15/25 0926       Plan    Plan Comments DC barriers: Endocrinology following, possible DC pending Endo recs.             Etienne Macias RN     Cell number [phone]  Office number [phone]

## 2025-07-15 NOTE — PLAN OF CARE
Problem: Adult Inpatient Plan of Care  Goal: Plan of Care Review  Outcome: Progressing  Flowsheets (Taken 7/15/2025 1810)  Plan of Care Reviewed With: patient  Goal: Patient-Specific Goal (Individualized)  Outcome: Progressing  Goal: Absence of Hospital-Acquired Illness or Injury  Outcome: Progressing  Intervention: Identify and Manage Fall Risk  Recent Flowsheet Documentation  Taken 7/15/2025 1600 by Myrtle Case RN  Safety Promotion/Fall Prevention:   safety round/check completed   clutter free environment maintained   assistive device/personal items within reach   lighting adjusted  Taken 7/15/2025 1400 by Myrtle Case RN  Safety Promotion/Fall Prevention:   safety round/check completed   room organization consistent   clutter free environment maintained   assistive device/personal items within reach  Taken 7/15/2025 1200 by Myrtle Case RN  Safety Promotion/Fall Prevention:   safety round/check completed   room organization consistent   lighting adjusted   clutter free environment maintained   assistive device/personal items within reach  Taken 7/15/2025 1000 by Myrtle Case RN  Safety Promotion/Fall Prevention: safety round/check completed  Taken 7/15/2025 0800 by Myrtle Case RN  Safety Promotion/Fall Prevention: safety round/check completed  Intervention: Prevent Skin Injury  Recent Flowsheet Documentation  Taken 7/15/2025 1600 by Myrtle Case RN  Body Position: position changed independently  Taken 7/15/2025 0800 by Myrtle Case RN  Body Position: position changed independently  Skin Protection: transparent dressing maintained  Intervention: Prevent Infection  Recent Flowsheet Documentation  Taken 7/15/2025 1600 by Myrtle Case RN  Infection Prevention:   single patient room provided   rest/sleep promoted   cohorting utilized  Taken 7/15/2025 1400 by Myrtle Case RN  Infection Prevention:   single patient room provided   rest/sleep promoted   cohorting  utilized  Taken 7/15/2025 1200 by Myrtle Case RN  Infection Prevention:   single patient room provided   rest/sleep promoted   cohorting utilized  Taken 7/15/2025 0800 by Myrtle Case RN  Infection Prevention:   single patient room provided   rest/sleep promoted   cohorting utilized  Goal: Optimal Comfort and Wellbeing  Outcome: Progressing  Intervention: Provide Person-Centered Care  Recent Flowsheet Documentation  Taken 7/15/2025 0800 by Myrtle Case RN  Trust Relationship/Rapport:   care explained   thoughts/feelings acknowledged  Goal: Readiness for Transition of Care  Outcome: Progressing     Problem: Fall Injury Risk  Goal: Absence of Fall and Fall-Related Injury  Outcome: Progressing  Intervention: Identify and Manage Contributors  Recent Flowsheet Documentation  Taken 7/15/2025 1600 by Myrtle Case RN  Medication Review/Management: medications reviewed  Taken 7/15/2025 1400 by Myrtle Case RN  Medication Review/Management: medications reviewed  Taken 7/15/2025 1200 by Myrtle Case RN  Medication Review/Management: medications reviewed  Taken 7/15/2025 0800 by Myrtle Case RN  Medication Review/Management: medications reviewed  Intervention: Promote Injury-Free Environment  Recent Flowsheet Documentation  Taken 7/15/2025 1600 by Myrtle Case RN  Safety Promotion/Fall Prevention:   safety round/check completed   clutter free environment maintained   assistive device/personal items within reach   lighting adjusted  Taken 7/15/2025 1400 by Myrtle Case RN  Safety Promotion/Fall Prevention:   safety round/check completed   room organization consistent   clutter free environment maintained   assistive device/personal items within reach  Taken 7/15/2025 1200 by Myrtle Case RN  Safety Promotion/Fall Prevention:   safety round/check completed   room organization consistent   lighting adjusted   clutter free environment maintained   assistive device/personal items  within reach  Taken 7/15/2025 1000 by Myrtle Case, RN  Safety Promotion/Fall Prevention: safety round/check completed  Taken 7/15/2025 0800 by Myrtle Case, RN  Safety Promotion/Fall Prevention: safety round/check completed   Goal Outcome Evaluation:  Plan of Care Reviewed With: patient

## 2025-07-16 LAB
ANION GAP SERPL CALCULATED.3IONS-SCNC: 10.8 MMOL/L (ref 5–15)
BASOPHILS # BLD AUTO: 0.07 10*3/MM3 (ref 0–0.2)
BASOPHILS NFR BLD AUTO: 0.7 % (ref 0–1.5)
BUN SERPL-MCNC: 17.6 MG/DL (ref 6–20)
BUN/CREAT SERPL: 20.7 (ref 7–25)
CALCIUM SPEC-SCNC: 9.3 MG/DL (ref 8.6–10.5)
CHLORIDE SERPL-SCNC: 105 MMOL/L (ref 98–107)
CO2 SERPL-SCNC: 25.2 MMOL/L (ref 22–29)
CORTIS SERPL-MCNC: 0.66 MCG/DL
CORTIS SERPL-MCNC: 10.9 MCG/DL
CORTIS SERPL-MCNC: 5.51 MCG/DL
CREAT SERPL-MCNC: 0.85 MG/DL (ref 0.57–1)
DEPRECATED RDW RBC AUTO: 42.2 FL (ref 37–54)
EGFRCR SERPLBLD CKD-EPI 2021: 80 ML/MIN/1.73
EOSINOPHIL # BLD AUTO: 0.27 10*3/MM3 (ref 0–0.4)
EOSINOPHIL NFR BLD AUTO: 2.8 % (ref 0.3–6.2)
ERYTHROCYTE [DISTWIDTH] IN BLOOD BY AUTOMATED COUNT: 12.6 % (ref 12.3–15.4)
GLUCOSE BLDC GLUCOMTR-MCNC: 106 MG/DL (ref 70–105)
GLUCOSE BLDC GLUCOMTR-MCNC: 110 MG/DL (ref 70–105)
GLUCOSE BLDC GLUCOMTR-MCNC: 89 MG/DL (ref 70–105)
GLUCOSE BLDC GLUCOMTR-MCNC: 93 MG/DL (ref 70–105)
GLUCOSE BLDC GLUCOMTR-MCNC: 97 MG/DL (ref 70–105)
GLUCOSE BLDC GLUCOMTR-MCNC: 98 MG/DL (ref 70–105)
GLUCOSE SERPL-MCNC: 99 MG/DL (ref 65–99)
HCT VFR BLD AUTO: 37.5 % (ref 34–46.6)
HGB BLD-MCNC: 12 G/DL (ref 12–15.9)
IMM GRANULOCYTES # BLD AUTO: 0.05 10*3/MM3 (ref 0–0.05)
IMM GRANULOCYTES NFR BLD AUTO: 0.5 % (ref 0–0.5)
LYMPHOCYTES # BLD AUTO: 3.68 10*3/MM3 (ref 0.7–3.1)
LYMPHOCYTES NFR BLD AUTO: 37.5 % (ref 19.6–45.3)
MAGNESIUM SERPL-MCNC: 2.1 MG/DL (ref 1.6–2.6)
MCH RBC QN AUTO: 29.3 PG (ref 26.6–33)
MCHC RBC AUTO-ENTMCNC: 32 G/DL (ref 31.5–35.7)
MCV RBC AUTO: 91.5 FL (ref 79–97)
MONOCYTES # BLD AUTO: 0.84 10*3/MM3 (ref 0.1–0.9)
MONOCYTES NFR BLD AUTO: 8.6 % (ref 5–12)
NEUTROPHILS NFR BLD AUTO: 4.9 10*3/MM3 (ref 1.7–7)
NEUTROPHILS NFR BLD AUTO: 49.9 % (ref 42.7–76)
NRBC BLD AUTO-RTO: 0 /100 WBC (ref 0–0.2)
PLATELET # BLD AUTO: 340 10*3/MM3 (ref 140–450)
PMV BLD AUTO: 9.5 FL (ref 6–12)
POTASSIUM SERPL-SCNC: 4.1 MMOL/L (ref 3.5–5.2)
POTASSIUM SERPL-SCNC: 4.1 MMOL/L (ref 3.5–5.2)
RBC # BLD AUTO: 4.1 10*6/MM3 (ref 3.77–5.28)
SODIUM SERPL-SCNC: 141 MMOL/L (ref 136–145)
WBC NRBC COR # BLD AUTO: 9.81 10*3/MM3 (ref 3.4–10.8)

## 2025-07-16 PROCEDURE — 82948 REAGENT STRIP/BLOOD GLUCOSE: CPT | Performed by: NURSE PRACTITIONER

## 2025-07-16 PROCEDURE — 83735 ASSAY OF MAGNESIUM: CPT | Performed by: PHYSICIAN ASSISTANT

## 2025-07-16 PROCEDURE — 94761 N-INVAS EAR/PLS OXIMETRY MLT: CPT

## 2025-07-16 PROCEDURE — 82533 TOTAL CORTISOL: CPT | Performed by: INTERNAL MEDICINE

## 2025-07-16 PROCEDURE — 94799 UNLISTED PULMONARY SVC/PX: CPT

## 2025-07-16 PROCEDURE — 25010000002 DIPHENHYDRAMINE PER 50 MG: Performed by: PHYSICIAN ASSISTANT

## 2025-07-16 PROCEDURE — 25010000002 COSYNTROPIN PER 0.25 MG: Performed by: INTERNAL MEDICINE

## 2025-07-16 PROCEDURE — 82948 REAGENT STRIP/BLOOD GLUCOSE: CPT

## 2025-07-16 PROCEDURE — 85025 COMPLETE CBC W/AUTO DIFF WBC: CPT | Performed by: PHYSICIAN ASSISTANT

## 2025-07-16 PROCEDURE — G0378 HOSPITAL OBSERVATION PER HR: HCPCS

## 2025-07-16 PROCEDURE — 80048 BASIC METABOLIC PNL TOTAL CA: CPT | Performed by: PHYSICIAN ASSISTANT

## 2025-07-16 PROCEDURE — 96375 TX/PRO/DX INJ NEW DRUG ADDON: CPT

## 2025-07-16 PROCEDURE — 94664 DEMO&/EVAL PT USE INHALER: CPT

## 2025-07-16 PROCEDURE — 99214 OFFICE O/P EST MOD 30 MIN: CPT | Performed by: INTERNAL MEDICINE

## 2025-07-16 PROCEDURE — 84132 ASSAY OF SERUM POTASSIUM: CPT | Performed by: EMERGENCY MEDICINE

## 2025-07-16 PROCEDURE — 96376 TX/PRO/DX INJ SAME DRUG ADON: CPT

## 2025-07-16 PROCEDURE — 82024 ASSAY OF ACTH: CPT | Performed by: INTERNAL MEDICINE

## 2025-07-16 RX ORDER — DIPHENHYDRAMINE HYDROCHLORIDE 50 MG/ML
25 INJECTION, SOLUTION INTRAMUSCULAR; INTRAVENOUS ONCE
Status: COMPLETED | OUTPATIENT
Start: 2025-07-16 | End: 2025-07-16

## 2025-07-16 RX ORDER — DIPHENHYDRAMINE HYDROCHLORIDE 50 MG/ML
25 INJECTION, SOLUTION INTRAMUSCULAR; INTRAVENOUS EVERY 6 HOURS PRN
Status: DISCONTINUED | OUTPATIENT
Start: 2025-07-16 | End: 2025-07-17 | Stop reason: HOSPADM

## 2025-07-16 RX ORDER — COSYNTROPIN 0.25 MG/ML
0.25 INJECTION, POWDER, FOR SOLUTION INTRAMUSCULAR; INTRAVENOUS ONCE
Status: COMPLETED | OUTPATIENT
Start: 2025-07-17 | End: 2025-07-17

## 2025-07-16 RX ADMIN — CYCLOBENZAPRINE HYDROCHLORIDE 10 MG: 10 TABLET, FILM COATED ORAL at 21:14

## 2025-07-16 RX ADMIN — COSYNTROPIN 0.25 MG: 0.25 INJECTION, POWDER, LYOPHILIZED, FOR SOLUTION INTRAVENOUS at 08:22

## 2025-07-16 RX ADMIN — METOPROLOL TARTRATE 25 MG: 25 TABLET, FILM COATED ORAL at 08:22

## 2025-07-16 RX ADMIN — OXYCODONE 15 MG: 5 TABLET ORAL at 10:56

## 2025-07-16 RX ADMIN — OXYCODONE 15 MG: 5 TABLET ORAL at 14:17

## 2025-07-16 RX ADMIN — Medication 10 ML: at 21:14

## 2025-07-16 RX ADMIN — Medication 10 ML: at 00:32

## 2025-07-16 RX ADMIN — LIDOCAINE 1 PATCH: 4 PATCH TOPICAL at 08:21

## 2025-07-16 RX ADMIN — METOPROLOL TARTRATE 25 MG: 25 TABLET, FILM COATED ORAL at 21:13

## 2025-07-16 RX ADMIN — ATORVASTATIN CALCIUM 10 MG: 10 TABLET ORAL at 17:19

## 2025-07-16 RX ADMIN — OXYCODONE 15 MG: 5 TABLET ORAL at 06:14

## 2025-07-16 RX ADMIN — CLOPIDOGREL BISULFATE 75 MG: 75 TABLET, FILM COATED ORAL at 08:22

## 2025-07-16 RX ADMIN — Medication 10 ML: at 08:23

## 2025-07-16 RX ADMIN — DIPHENHYDRAMINE HYDROCHLORIDE 25 MG: 50 INJECTION, SOLUTION INTRAMUSCULAR; INTRAVENOUS at 07:35

## 2025-07-16 RX ADMIN — CYCLOBENZAPRINE HYDROCHLORIDE 10 MG: 10 TABLET, FILM COATED ORAL at 12:29

## 2025-07-16 RX ADMIN — HYDROXYCHLOROQUINE SULFATE 400 MG: 200 TABLET, FILM COATED ORAL at 08:22

## 2025-07-16 RX ADMIN — GABAPENTIN 800 MG: 400 CAPSULE ORAL at 06:14

## 2025-07-16 RX ADMIN — OXYCODONE 15 MG: 5 TABLET ORAL at 18:15

## 2025-07-16 RX ADMIN — BUDESONIDE AND FORMOTEROL FUMARATE DIHYDRATE 2 PUFF: 160; 4.5 AEROSOL RESPIRATORY (INHALATION) at 08:52

## 2025-07-16 RX ADMIN — CYCLOBENZAPRINE HYDROCHLORIDE 10 MG: 10 TABLET, FILM COATED ORAL at 02:27

## 2025-07-16 RX ADMIN — OXYCODONE 15 MG: 5 TABLET ORAL at 02:27

## 2025-07-16 RX ADMIN — GABAPENTIN 800 MG: 400 CAPSULE ORAL at 14:17

## 2025-07-16 RX ADMIN — BUDESONIDE AND FORMOTEROL FUMARATE DIHYDRATE 2 PUFF: 160; 4.5 AEROSOL RESPIRATORY (INHALATION) at 19:12

## 2025-07-16 RX ADMIN — OXYCODONE 15 MG: 5 TABLET ORAL at 22:45

## 2025-07-16 RX ADMIN — VENLAFAXINE HYDROCHLORIDE 37.5 MG: 37.5 CAPSULE, EXTENDED RELEASE ORAL at 08:22

## 2025-07-16 RX ADMIN — GABAPENTIN 800 MG: 400 CAPSULE ORAL at 21:13

## 2025-07-16 RX ADMIN — DIPHENHYDRAMINE HYDROCHLORIDE 25 MG: 50 INJECTION, SOLUTION INTRAMUSCULAR; INTRAVENOUS at 17:19

## 2025-07-16 NOTE — PLAN OF CARE
Problem: Adult Inpatient Plan of Care  Goal: Plan of Care Review  Outcome: Progressing  Goal: Patient-Specific Goal (Individualized)  Outcome: Progressing  Goal: Absence of Hospital-Acquired Illness or Injury  Outcome: Progressing  Intervention: Identify and Manage Fall Risk  Recent Flowsheet Documentation  Taken 7/16/2025 0400 by Christy Rouse RN  Safety Promotion/Fall Prevention: safety round/check completed  Taken 7/16/2025 0300 by Christy Rouse RN  Safety Promotion/Fall Prevention: safety round/check completed  Taken 7/16/2025 0227 by Christy Rouse RN  Safety Promotion/Fall Prevention: safety round/check completed  Taken 7/16/2025 0100 by Christy Rouse RN  Safety Promotion/Fall Prevention: safety round/check completed  Taken 7/16/2025 0000 by Christy Rouse RN  Safety Promotion/Fall Prevention: safety round/check completed  Taken 7/15/2025 2300 by Christy Rouse RN  Safety Promotion/Fall Prevention: safety round/check completed  Taken 7/15/2025 2200 by Christy Rouse RN  Safety Promotion/Fall Prevention: safety round/check completed  Taken 7/15/2025 2100 by Christy Rouse RN  Safety Promotion/Fall Prevention: safety round/check completed  Taken 7/15/2025 2056 by Christy Rouse RN  Safety Promotion/Fall Prevention: safety round/check completed  Taken 7/15/2025 1931 by Christy Rouse RN  Safety Promotion/Fall Prevention: safety round/check completed  Intervention: Prevent Skin Injury  Recent Flowsheet Documentation  Taken 7/15/2025 2056 by Christy Rouse RN  Skin Protection: transparent dressing maintained  Intervention: Prevent Infection  Recent Flowsheet Documentation  Taken 7/15/2025 2056 by Christy Rouse RN  Infection Prevention:   single patient room provided   rest/sleep promoted   hand hygiene promoted  Goal: Optimal Comfort and Wellbeing  Outcome: Progressing  Intervention: Provide Person-Centered Care  Recent Flowsheet Documentation  Taken 7/15/2025 2056 by Christy Rouse RN  Trust  Relationship/Rapport:   care explained   choices provided   empathic listening provided   emotional support provided   questions answered   reassurance provided   thoughts/feelings acknowledged  Goal: Readiness for Transition of Care  Outcome: Progressing     Problem: Fall Injury Risk  Goal: Absence of Fall and Fall-Related Injury  Outcome: Progressing  Intervention: Promote Injury-Free Environment  Recent Flowsheet Documentation  Taken 7/16/2025 0400 by Christy Rouse RN  Safety Promotion/Fall Prevention: safety round/check completed  Taken 7/16/2025 0300 by Christy Rouse RN  Safety Promotion/Fall Prevention: safety round/check completed  Taken 7/16/2025 0227 by Christy Rouse RN  Safety Promotion/Fall Prevention: safety round/check completed  Taken 7/16/2025 0100 by Christy Rouse RN  Safety Promotion/Fall Prevention: safety round/check completed  Taken 7/16/2025 0000 by Christy Rouse RN  Safety Promotion/Fall Prevention: safety round/check completed  Taken 7/15/2025 2300 by Christy Rouse RN  Safety Promotion/Fall Prevention: safety round/check completed  Taken 7/15/2025 2200 by Christy Rouse RN  Safety Promotion/Fall Prevention: safety round/check completed  Taken 7/15/2025 2100 by Christy Rouse RN  Safety Promotion/Fall Prevention: safety round/check completed  Taken 7/15/2025 2056 by Christy Rouse RN  Safety Promotion/Fall Prevention: safety round/check completed  Taken 7/15/2025 1931 by Christy Rouse RN  Safety Promotion/Fall Prevention: safety round/check completed   Goal Outcome Evaluation:

## 2025-07-16 NOTE — CASE MANAGEMENT/SOCIAL WORK
Continued Stay Note  ZAYRA Aragon     Patient Name: Lisa Jay  MRN: [id number]  Today's Date: 7/16/2025    Admit Date: 7/13/2025    Plan: DC Plan: Anticipate routine home alone. Sister Cintia assists as needed.   Discharge Plan       Row Name 07/16/25 1401       Plan    Plan DC Plan: Anticipate routine home alone. Sister Cintia assists as needed.    Patient/Family in Agreement with Plan yes    Provided Post Acute Provider List? N/A    Provided Post Acute Provider Quality & Resource List? N/A    Plan Comments CM reviewed chart documentation for clinical updates. Pending Cortisol testing results. Ant CARDENAS anticipates patient will DC home later today. Blood glucose has remained stable. No barriers.                 Expected Discharge Date and Time       Expected Discharge Date Expected Discharge Time    Jul 16, 2025               Misty Hopper RN     Office Phone: [phone]  Office Cell:     [phone]

## 2025-07-16 NOTE — PLAN OF CARE
Goal Outcome Evaluation:   A/O x4 with drowiness R/T benadryl given for generalized body itching without rash, RA, adlib with walker. Doing Cortisol testing this am. No C/O pain no n,v noted.

## 2025-07-16 NOTE — PROGRESS NOTES
[facility] Observation Unit Progress Note     Patient Name: Lisa Jay  : 1970  MRN: [id number]  Primary Care Physician: Brian Gonzalez MD  Date of admission: 2025     Patient Care Team:  Brian Gonzalez MD as PCP - General (Family Medicine)          Subjective   History Present Illness     Chief Complaint:   Chief Complaint   Patient presents with    Hypoglycemia     Patient reports low BG at home, c/o general weakness. Reports BG at home was 90 then 79     Hypoglycemia    Ms. Jay is a 55 y.o.  presents to [facility] complaining of hypoglycemia       History of Present Illness    ED 25: Patient is a 55-year-old female presents the ED for evaluation of altered mental status, possible hypoglycemia.     Patient reports she does not recall getting here to the hospital, she does remember going to [facility].  She reports she thought she was going home.     Patient reports she gets symptomatic when her blood sugar is below 110.  She tried oral glucose tablets.     Observation 25: Patient is a 55-year-old female presented to the hospital with complaints of hypoglycemia.  Patient states that she has been recently admitted due to low blood sugars and has followed up with endocrinology.  Patient states she does take Mounjaro every Tuesday but recently was not able to pain medication do not be in stock in pharmacy.  Patient states she does have freestyle lisa with no hypoglycemic events.  Patient states her glucoses drop below 70 overnight.  Patient states she eats small frequent meals throughout the day to keep her glucose up.  Patient states she does workout the [employer] from 2 to 7 PM.  Patient states she becomes dizzy lightheaded confused when she noticed her glucose drop below 100.  Patient states she normally sees endocrinology within [place].    7/15/25: Patient reports some weakness and increased fatigue.  Patient ambulated in hallway with rollator.  Patient states that her symptoms did seem to  be better on Mounjaro.  Patient today overnight for a ACTH testing.     7/16/2025: Patient reports being somewhat drowsy this morning though she did receive Benadryl for some itching. No other new or acute symptoms are noted.       Review of Systems   Constitutional: Positive for malaise/fatigue.   HENT: Negative.     Eyes: Negative.    Cardiovascular:  Positive for chest pain. Negative for dyspnea on exertion.   Respiratory: Negative.     Endocrine: Negative.    Skin: Negative.    Gastrointestinal: Negative.    Genitourinary: Negative.    Neurological:  Positive for weakness.   Psychiatric/Behavioral: Negative.             Personal History     Past Medical History:   Past Medical History:   Diagnosis Date    Asthma     Diabetes mellitus     DVT (deep venous thrombosis) 08/23/2024    right lower leg    Fibromyalgia     Lupus        Surgical History:      Past Surgical History:   Procedure Laterality Date    APPENDECTOMY      CHOLECYSTECTOMY N/A 8/23/2024    Procedure: CHOLECYSTECTOMY LAPAROSCOPIC WITH [employer];  Surgeon: Bryce Huizar MD;  Location: [facility];  Service: Robotics - DaVinci;  Laterality: N/A;    HYSTERECTOMY      JOINT REPLACEMENT Right     KNEE SURGERY      SHOULDER ARTHROSCOPY             Family History: family history is not on file. Otherwise pertinent FHx was reviewed and unremarkable.     Social History:  reports that she has never smoked. She has never been exposed to tobacco smoke. She has never used smokeless tobacco. She reports that she does not drink alcohol and does not use drugs.      Medications:  Prior to Admission medications    Medication Sig Start Date End Date Taking? Authorizing Provider   albuterol (PROVENTIL) (2.5 MG/3ML) 0.083% nebulizer solution Take 2.5 mg by nebulization Every 4 (Four) Hours As Needed for Wheezing. 9/20/24  Yes Baldemar Ibarra MD   albuterol sulfate  (90 Base) MCG/ACT inhaler Inhale 2 puffs 2 (Two) Times a Day.   Yes Provider,  MD Jorge   atorvastatin (LIPITOR) 10 MG tablet Take 1 tablet by mouth Every Evening.   Yes Jorge Ordonez MD   budesonide-formoterol (Symbicort) 160-4.5 MCG/ACT inhaler Inhale 2 puffs 2 (Two) Times a Day. 9/20/24  Yes Baldemar Ibarra MD   cetirizine (zyrTEC) 10 MG tablet Take 1 tablet by mouth Daily As Needed for Allergies.   Yes Jorge Ordonez MD   clopidogrel (PLAVIX) 75 MG tablet Take 1 tablet by mouth Daily. 12/3/23  Yes Sima Arenas PA-C   coenzyme Q10 100 MG capsule Take 1 capsule by mouth Daily.   Yes Jorge Ordonez MD   cyclobenzaprine (FLEXERIL) 10 MG tablet Take 1 tablet by mouth Every 6 (Six) Hours As Needed for Muscle Spasms.   Yes Jorge Ordonez MD   dicyclomine (BENTYL) 10 MG capsule Take 1 capsule by mouth 4 (Four) Times a Day As Needed for Abdominal Cramping. 4/1/25  Yes Katarina Hall APRN   gabapentin (NEURONTIN) 800 MG tablet Take 1 tablet by mouth 3 (Three) Times a Day.   Yes Jorge Ordonez MD   hydroCHLOROthiazide 25 MG tablet Take 1 tablet by mouth Daily.   Yes Jorge Ordonez MD   hydroxychloroquine (PLAQUENIL) 200 MG tablet Take 2 tablets by mouth Every Morning.   Yes Jorge Ordonez MD   Magnesium 200 MG tablet Take 1 tablet by mouth Daily.   Yes Jorge Ordonez MD   metoprolol tartrate (LOPRESSOR) 25 MG tablet Take 1 tablet by mouth 2 (Two) Times a Day.   Yes Jorge Ordonez MD   ondansetron ODT (ZOFRAN-ODT) 4 MG disintegrating tablet Place 1 tablet on the tongue Every 8 (Eight) Hours As Needed for Nausea or Vomiting.   Yes Jorge Ordonez MD   oxyCODONE (ROXICODONE) 15 MG immediate release tablet Take 1 tablet by mouth Every 4 (Four) Hours.   Yes Jorge Ordonez MD   Potassium 99 MG tablet Take 1 tablet by mouth Daily.   Yes Jorge Ordonez MD   Tirzepatide (Mounjaro) 10 MG/0.5ML solution auto-injector Inject 10 mg under the skin into the appropriate area as directed Every 7 (Seven) Days. Tuesdays    Yes Provider, MD Jorge   triamcinolone (KENALOG) 0.1 % cream Apply 1 Application topically to the appropriate area as directed 2 (Two) Times a Day As Needed.   Yes Jorge Ordonez MD   Turmeric (QC Tumeric Complex) 500 MG capsule Take 1 capsule by mouth Daily.   Yes Jorge Ordonez MD   venlafaxine XR (EFFEXOR-XR) 37.5 MG 24 hr capsule Take 1 capsule by mouth Daily.   Yes Jorge Ordonez MD   Vitamin D, Cholecalciferol, (CHOLECALCIFEROL) 10 MCG (400 UNIT) tablet Take 1 tablet by mouth Daily.   Yes Provider, MD Jorge       Allergies:    Allergies   Allergen Reactions    Metformin Nausea And Vomiting    Morphine Unknown - High Severity     Pt sates only in Pill form.       Objective   Objective     Vital Signs  Temp:  [97.3 °F (36.3 °C)-98.2 °F (36.8 °C)] 97.4 °F (36.3 °C)  Heart Rate:  [63-76] 74  Resp:  [14-17] 17  BP: (113-137)/(67-94) 113/81  SpO2:  [96 %-100 %] 96 %  on   ;   Device (Oxygen Therapy): room air  Body mass index is 38.89 kg/m².    Physical Exam  Vitals and nursing note reviewed.   Constitutional:       Appearance: Normal appearance. She is obese.   HENT:      Head: Normocephalic and atraumatic.      Right Ear: External ear normal.      Left Ear: External ear normal.      Nose: Nose normal.      Mouth/Throat:      Pharynx: Oropharynx is clear.   Eyes:      Conjunctiva/sclera: Conjunctivae normal.      Pupils: Pupils are equal, round, and reactive to light.   Cardiovascular:      Rate and Rhythm: Normal rate and regular rhythm.      Pulses: Normal pulses.   Pulmonary:      Effort: Pulmonary effort is normal.   Abdominal:      General: Bowel sounds are normal.   Musculoskeletal:         General: Normal range of motion.      Cervical back: Normal range of motion.   Skin:     General: Skin is warm.      Capillary Refill: Capillary refill takes less than 2 seconds.   Neurological:      Mental Status: She is alert and oriented to person, place, and time.      Motor: Weakness  present.   Psychiatric:         Mood and Affect: Mood normal.         Behavior: Behavior normal.         Thought Content: Thought content normal.         Judgment: Judgment normal.           Results Review:  I have personally reviewed most recent cardiac tracings, lab results, microbiology results, and radiology images and interpretations and agree with findings, most notably: CBC, CMP, .    Results from last 7 days   Lab Units 07/16/25  0751   WBC 10*3/mm3 9.81   HEMOGLOBIN g/dL 12.0   HEMATOCRIT % 37.5   PLATELETS 10*3/mm3 340     Results from last 7 days   Lab Units 07/16/25  0751 07/13/25  1159 07/13/25  1100   SODIUM mmol/L 141   < > 139   POTASSIUM mmol/L 4.1  4.1   < > 2.6*   CHLORIDE mmol/L 105   < > 97*   CO2 mmol/L 25.2   < > 27.6   BUN mg/dL 17.6   < > 20.3*   CREATININE mg/dL 0.85   < > 1.05*   GLUCOSE mg/dL 99   < > 142*   CALCIUM mg/dL 9.3   < > 9.8   ALK PHOS U/L  --   --  105   ALT (SGPT) U/L  --   --  23   AST (SGOT) U/L  --   --  29    < > = values in this interval not displayed.     CrCl cannot be calculated (Unknown ideal weight.).  Brief Urine Lab Results  (Last result in the past 365 days)        Color   Clarity   Blood   Leuk Est   Nitrite   Protein   CREAT   Urine HCG        07/13/25 1119 Yellow   Clear   Negative   Trace   Negative   Trace                   Microbiology Results (last 10 days)       ** No results found for the last 240 hours. **            ECG/EMG Results (most recent)       Procedure Component Value Units Date/Time    Telemetry Scan [[id number]] Resulted: 07/13/25 1011     Updated: 07/13/25 1035    Telemetry Scan [[id number]] Resulted: 07/16/25 1117     Updated: 07/16/25 1137            Results for orders placed during the hospital encounter of 09/18/24    Duplex Venous Lower Extremity - Right CAR 09/20/2024 10:42 AM    Interpretation Summary    Normal right lower extremity venous duplex scan.      Results for orders placed during the hospital encounter of 03/30/25    Adult  Transthoracic Echo Complete w/ Color, Spectral and Contrast if Necessary Per Protocol    Interpretation Summary    Left ventricular systolic function is normal. Calculated left ventricular EF = 66% Left ventricular ejection fraction appears to be 66 - 70%.    Left ventricular diastolic function is consistent with (grade I) impaired relaxation.    The right ventricular cavity is mildly dilated.    The left atrial cavity is mildly dilated.    Estimated right ventricular systolic pressure from tricuspid regurgitation is mildly elevated (35-45 mmHg).      CT Head Without Contrast  Result Date: 7/13/2025  No acute intracranial abnormality Brain MRI is more sensitive to evaluate for acute or subacute infarcts and to evaluate for intracranial metastatic disease. Electronically Signed: Francisco Durham MD  7/13/2025 12:06 PM EDT  Workstation ID: [id number]        CrCl cannot be calculated (Unknown ideal weight.).    Assessment & Plan   Assessment/Plan       Active Hospital Problems    Diagnosis  POA    **Hypoglycemia [E16.2]  Yes      Resolved Hospital Problems   No resolved problems to display.     Hypoglycemia         Lab Results   Component Value Date     GLUCOSE 98 07/14/2025     GLUCOSE 142 (H) 07/13/2025     GLUCOSE 97 04/01/2025     GLUCOSE 91 03/31/2025   -Hypoglycemia protocol  - Continue D5W half-normal saline  - A1c 5.7  -Diabetic diet  -Mounjaro weekly at home  -Monitor glucose before meals and at bedtime  -Cortisol 0.58  - Repeat ACTH in AM  -Endocrinology consulted     Hypokalemia  - Potassium 2.6, repeat 3.4  - Potassium replacement protocol     Hypertension/CAD      BP Readings from Last 1 Encounters:   07/15/25 123/79   - Continue Lopressor, Plavix   - Monitor while admitted     Lupus/fibromyalgia  - Continue Plaquenil, oxycodone and gabapentin     Dyslipidemia  - Continue statin     Mood Disorder  - Continue Effexor     Obesity   - BMI 28.8  - Lifestyle modifications        VTE Prophylaxis - Active VTE  Prophylaxis  Mechanical:        Start        07/13/25 1358  Maintain Sequential Compression Device  Continuous                          Select Pharmacologic VTE Prophylaxis if Desired & Appropriate      CODE STATUS:    Code Status and Medical Interventions: CPR (Attempt to Resuscitate); Full Support   Ordered at: 07/13/25 1343     Code Status (Patient has no pulse and is not breathing):    CPR (Attempt to Resuscitate)     Medical Interventions (Patient has pulse or is breathing):    Full Support       This patient has been examined wearing personal protective equipment.     I discussed the patient's findings and my recommendations with patient, family, nursing staff, primary care team, and consulting provider.      Signature:Electronically signed by SAAD Rolle, 07/15/25, 3:00 PM EDT.          I spent 35 minutes caring for Lisa on this date of service. This time includes time spent by me in the following activities: reviewing tests, performing a medically appropriate examination and/or evaluation, counseling and educating the patient/family/caregiver, referring and communicating with other health care professionals, documenting information in the medical record, independently interpreting results and communicating that information with the patient/family/caregiver, care coordination, ordering medications, ordering test(s), ordering procedure(s), obtaining a separately obtained history, and reviewing a separately obtained history.

## 2025-07-17 VITALS
DIASTOLIC BLOOD PRESSURE: 87 MMHG | TEMPERATURE: 97.7 F | OXYGEN SATURATION: 100 % | BODY MASS INDEX: 44.41 KG/M2 | HEIGHT: 68 IN | WEIGHT: 293 LBS | RESPIRATION RATE: 15 BRPM | SYSTOLIC BLOOD PRESSURE: 118 MMHG | HEART RATE: 73 BPM

## 2025-07-17 LAB
ACTH PLAS-MCNC: 6 PG/ML (ref 7.2–63.3)
CORTIS SERPL-MCNC: 0.8 MCG/DL
CORTIS SERPL-MCNC: 11.8 MCG/DL
CORTIS SERPL-MCNC: 9.18 MCG/DL
GLUCOSE BLDC GLUCOMTR-MCNC: 111 MG/DL (ref 70–105)
GLUCOSE BLDC GLUCOMTR-MCNC: 125 MG/DL (ref 70–105)
GLUCOSE BLDC GLUCOMTR-MCNC: 87 MG/DL (ref 70–105)
GLUCOSE BLDC GLUCOMTR-MCNC: 95 MG/DL (ref 70–105)

## 2025-07-17 PROCEDURE — 82948 REAGENT STRIP/BLOOD GLUCOSE: CPT

## 2025-07-17 PROCEDURE — 94761 N-INVAS EAR/PLS OXIMETRY MLT: CPT

## 2025-07-17 PROCEDURE — 96376 TX/PRO/DX INJ SAME DRUG ADON: CPT

## 2025-07-17 PROCEDURE — 94664 DEMO&/EVAL PT USE INHALER: CPT

## 2025-07-17 PROCEDURE — 94799 UNLISTED PULMONARY SVC/PX: CPT

## 2025-07-17 PROCEDURE — 82533 TOTAL CORTISOL: CPT | Performed by: INTERNAL MEDICINE

## 2025-07-17 PROCEDURE — 25010000002 COSYNTROPIN PER 0.25 MG: Performed by: INTERNAL MEDICINE

## 2025-07-17 PROCEDURE — 99214 OFFICE O/P EST MOD 30 MIN: CPT | Performed by: INTERNAL MEDICINE

## 2025-07-17 PROCEDURE — 82948 REAGENT STRIP/BLOOD GLUCOSE: CPT | Performed by: NURSE PRACTITIONER

## 2025-07-17 PROCEDURE — G0378 HOSPITAL OBSERVATION PER HR: HCPCS

## 2025-07-17 RX ORDER — HYDROCORTISONE 10 MG/1
10 TABLET ORAL
Status: DISCONTINUED | OUTPATIENT
Start: 2025-07-17 | End: 2025-07-17 | Stop reason: HOSPADM

## 2025-07-17 RX ORDER — HYDROCORTISONE 10 MG/1
10 TABLET ORAL
Qty: 90 TABLET | Refills: 0 | Status: SHIPPED | OUTPATIENT
Start: 2025-07-17 | End: 2025-07-27 | Stop reason: HOSPADM

## 2025-07-17 RX ORDER — HYDROCORTISONE 10 MG/1
10 TABLET ORAL
Qty: 90 TABLET | Refills: 0 | Status: SHIPPED | OUTPATIENT
Start: 2025-07-17 | End: 2025-07-17

## 2025-07-17 RX ADMIN — HYDROCORTISONE 10 MG: 10 TABLET ORAL at 12:38

## 2025-07-17 RX ADMIN — COSYNTROPIN 0.25 MG: 0.25 INJECTION, POWDER, LYOPHILIZED, FOR SOLUTION INTRAMUSCULAR; INTRAVENOUS at 08:12

## 2025-07-17 RX ADMIN — HYDROCORTISONE 10 MG: 10 TABLET ORAL at 17:34

## 2025-07-17 RX ADMIN — METOPROLOL TARTRATE 25 MG: 25 TABLET, FILM COATED ORAL at 08:11

## 2025-07-17 RX ADMIN — VENLAFAXINE HYDROCHLORIDE 37.5 MG: 37.5 CAPSULE, EXTENDED RELEASE ORAL at 08:11

## 2025-07-17 RX ADMIN — CLOPIDOGREL BISULFATE 75 MG: 75 TABLET, FILM COATED ORAL at 08:12

## 2025-07-17 RX ADMIN — Medication 10 ML: at 09:00

## 2025-07-17 RX ADMIN — BUDESONIDE AND FORMOTEROL FUMARATE DIHYDRATE 2 PUFF: 160; 4.5 AEROSOL RESPIRATORY (INHALATION) at 18:41

## 2025-07-17 RX ADMIN — BUDESONIDE AND FORMOTEROL FUMARATE DIHYDRATE 2 PUFF: 160; 4.5 AEROSOL RESPIRATORY (INHALATION) at 08:54

## 2025-07-17 RX ADMIN — OXYCODONE 15 MG: 5 TABLET ORAL at 10:37

## 2025-07-17 RX ADMIN — CYCLOBENZAPRINE HYDROCHLORIDE 10 MG: 10 TABLET, FILM COATED ORAL at 06:11

## 2025-07-17 RX ADMIN — OXYCODONE 15 MG: 5 TABLET ORAL at 02:34

## 2025-07-17 RX ADMIN — OXYCODONE 15 MG: 5 TABLET ORAL at 14:13

## 2025-07-17 RX ADMIN — GABAPENTIN 800 MG: 400 CAPSULE ORAL at 06:07

## 2025-07-17 RX ADMIN — HYDROXYCHLOROQUINE SULFATE 400 MG: 200 TABLET, FILM COATED ORAL at 08:11

## 2025-07-17 RX ADMIN — LIDOCAINE 1 PATCH: 4 PATCH TOPICAL at 08:17

## 2025-07-17 RX ADMIN — OXYCODONE 15 MG: 5 TABLET ORAL at 06:07

## 2025-07-17 RX ADMIN — OXYCODONE 15 MG: 5 TABLET ORAL at 19:04

## 2025-07-17 RX ADMIN — GABAPENTIN 800 MG: 400 CAPSULE ORAL at 14:13

## 2025-07-17 RX ADMIN — ATORVASTATIN CALCIUM 10 MG: 10 TABLET ORAL at 17:34

## 2025-07-17 NOTE — DISCHARGE SUMMARY
[facility]    Brian Gonzalez MD    CHIEF COMPLAINT:     Hypoglycemia    HISTORY OF PRESENT ILLNESS:    Ms. Jay is a 55 y.o.  presents to [facility] complaining of hypoglycemia         History of Present Illness     ED 7/13/25: Patient is a 55-year-old female presents the ED for evaluation of altered mental status, possible hypoglycemia.     Patient reports she does not recall getting here to the hospital, she does remember going to [facility].  She reports she thought she was going home.     Patient reports she gets symptomatic when her blood sugar is below 110.  She tried oral glucose tablets.     Observation 7/14/25: Patient is a 55-year-old female presented to the hospital with complaints of hypoglycemia.  Patient states that she has been recently admitted due to low blood sugars and has followed up with endocrinology.  Patient states she does take Mounjaro every Tuesday but recently was not able to pain medication do not be in stock in pharmacy.  Patient states she does have freestyle lisa with no hypoglycemic events.  Patient states her glucoses drop below 70 overnight.  Patient states she eats small frequent meals throughout the day to keep her glucose up.  Patient states she does workout the [employer] from 2 to 7 PM.  Patient states she becomes dizzy lightheaded confused when she noticed her glucose drop below 100.  Patient states she normally sees endocrinology within [facility].     7/15/25: Patient reports some weakness and increased fatigue.  Patient ambulated in hallway with rollator.  Patient states that her symptoms did seem to be better on Mounjaro.  Patient today overnight for a ACTH testing.      7/16/2025: Patient reports being somewhat drowsy this morning though she did receive Benadryl for some itching.  No other new or acute symptoms are noted.    7/17/2025: No new or acute symptoms noted overnight          Past Medical History:   Diagnosis Date    Asthma     Diabetes  mellitus     DVT (deep venous thrombosis) 08/23/2024    right lower leg    Fibromyalgia     Lupus      Past Surgical History:   Procedure Laterality Date    APPENDECTOMY      CHOLECYSTECTOMY N/A 8/23/2024    Procedure: CHOLECYSTECTOMY LAPAROSCOPIC WITH DAVINCI ROBOT;  Surgeon: Bryce Huizar MD;  Location: [facility] MAIN OR;  Service: Robotics - DaVinci;  Laterality: N/A;    HYSTERECTOMY      JOINT REPLACEMENT Right     KNEE SURGERY      SHOULDER ARTHROSCOPY       History reviewed. No pertinent family history.  Social History     Tobacco Use    Smoking status: Never     Passive exposure: Never    Smokeless tobacco: Never   Vaping Use    Vaping status: Never Used   Substance Use Topics    Alcohol use: Never    Drug use: Never     Medications Prior to Admission   Medication Sig Dispense Refill Last Dose/Taking    albuterol (PROVENTIL) (2.5 MG/3ML) 0.083% nebulizer solution Take 2.5 mg by nebulization Every 4 (Four) Hours As Needed for Wheezing. 90 mL 12 Taking As Needed    albuterol sulfate  (90 Base) MCG/ACT inhaler Inhale 2 puffs 2 (Two) Times a Day.   7/12/2025    atorvastatin (LIPITOR) 10 MG tablet Take 1 tablet by mouth Every Evening.   7/12/2025    budesonide-formoterol (Symbicort) 160-4.5 MCG/ACT inhaler Inhale 2 puffs 2 (Two) Times a Day. 10.2 g 12 7/12/2025    cetirizine (zyrTEC) 10 MG tablet Take 1 tablet by mouth Daily As Needed for Allergies.   Taking As Needed    clopidogrel (PLAVIX) 75 MG tablet Take 1 tablet by mouth Daily. 30 tablet 2 7/13/2025 Morning    coenzyme Q10 100 MG capsule Take 1 capsule by mouth Daily.   7/12/2025    cyclobenzaprine (FLEXERIL) 10 MG tablet Take 1 tablet by mouth Every 6 (Six) Hours As Needed for Muscle Spasms.   Taking As Needed    dicyclomine (BENTYL) 10 MG capsule Take 1 capsule by mouth 4 (Four) Times a Day As Needed for Abdominal Cramping. 30 capsule 0 Taking As Needed    gabapentin (NEURONTIN) 800 MG tablet Take 1 tablet by mouth 3 (Three) Times a Day.    7/12/2025    hydroCHLOROthiazide 25 MG tablet Take 1 tablet by mouth Daily.   7/12/2025    hydroxychloroquine (PLAQUENIL) 200 MG tablet Take 2 tablets by mouth Every Morning.   7/13/2025 Morning    Magnesium 200 MG tablet Take 1 tablet by mouth Daily.   7/12/2025    metoprolol tartrate (LOPRESSOR) 25 MG tablet Take 1 tablet by mouth 2 (Two) Times a Day.   7/12/2025    ondansetron ODT (ZOFRAN-ODT) 4 MG disintegrating tablet Place 1 tablet on the tongue Every 8 (Eight) Hours As Needed for Nausea or Vomiting.   Taking As Needed    oxyCODONE (ROXICODONE) 15 MG immediate release tablet Take 1 tablet by mouth Every 4 (Four) Hours.   7/13/2025 Morning    Potassium 99 MG tablet Take 1 tablet by mouth Daily.   7/12/2025    Tirzepatide (Mounjaro) 10 MG/0.5ML solution auto-injector Inject 10 mg under the skin into the appropriate area as directed Every 7 (Seven) Days. Tuesdays 7/8/2025    triamcinolone (KENALOG) 0.1 % cream Apply 1 Application topically to the appropriate area as directed 2 (Two) Times a Day As Needed.   Taking As Needed    Turmeric (QC Tumeric Complex) 500 MG capsule Take 1 capsule by mouth Daily.   7/12/2025    venlafaxine XR (EFFEXOR-XR) 37.5 MG 24 hr capsule Take 1 capsule by mouth Daily.   7/13/2025 Morning    Vitamin D, Cholecalciferol, (CHOLECALCIFEROL) 10 MCG (400 UNIT) tablet Take 1 tablet by mouth Daily.   7/12/2025     Allergies:  Metformin and Morphine    Immunization History   Administered Date(s) Administered    COVID-19 (PFIZER) 12YRS+ (COMIRNATY) 10/04/2023    COVID-19 (PFIZER) BIVALENT 12+YRS 04/26/2023    COVID-19 (PFIZER) Purple Cap Monovalent 03/15/2021, 04/05/2021, 04/23/2021, 10/18/2021           REVIEW OF SYSTEMS:    Review of Systems   Constitutional: Positive for malaise/fatigue.   HENT: Negative.     Eyes: Negative.    Cardiovascular:  Positive for chest pain. Negative for dyspnea on exertion.   Respiratory: Negative.     Endocrine: Negative.    Skin: Negative.    Gastrointestinal:  Negative.    Genitourinary: Negative.    Neurological:  Positive for weakness.   Psychiatric/Behavioral: Negative.       Vital Signs  Temp:  [97.1 °F (36.2 °C)-98.9 °F (37.2 °C)] 97.2 °F (36.2 °C)  Heart Rate:  [64-86] 69  Resp:  [16-18] 16  BP: (113-127)/(68-88) 118/68          Physical Exam:  Physical Exam  Vitals reviewed.   Constitutional:       General: She is not in acute distress.     Appearance: Normal appearance. She is obese. She is not ill-appearing, toxic-appearing or diaphoretic.   HENT:      Head: Normocephalic.      Right Ear: External ear normal.      Left Ear: External ear normal.      Nose: Nose normal.      Mouth/Throat:      Mouth: Mucous membranes are moist.   Eyes:      Extraocular Movements: Extraocular movements intact.   Cardiovascular:      Rate and Rhythm: Normal rate and regular rhythm.      Pulses: Normal pulses.   Pulmonary:      Effort: Pulmonary effort is normal.      Breath sounds: Normal breath sounds.   Abdominal:      General: Bowel sounds are normal.      Palpations: Abdomen is soft.   Musculoskeletal:         General: Normal range of motion.      Cervical back: Normal range of motion.      Right lower leg: No edema.      Left lower leg: No edema.   Skin:     General: Skin is warm and dry.      Capillary Refill: Capillary refill takes less than 2 seconds.      Findings: No rash.   Neurological:      General: No focal deficit present.      Mental Status: She is alert.   Psychiatric:         Mood and Affect: Mood normal.         Behavior: Behavior normal.         Thought Content: Thought content normal.         Judgment: Judgment normal.       Emotional Behavior:   Normal   Debilities:  None  Results Review:    I reviewed the patient's new clinical results.  Lab Results (most recent)       Procedure Component Value Units Date/Time    Cortisol [[id number]] Collected: 07/16/25 0921    Specimen: Blood from Arm, Left Updated: 07/16/25 1027     Cortisol 10.90 mcg/dL     Narrative:       Cortisol Reference Ranges:    Cortisol 6AM - 10AM Range: 6.02-18.40 mcg/dl  Cortisol 4PM - 8PM Range: 2.68-10.50 mcg/dl      Results may be falsely increased if patient taking Biotin.      Cortisol [[id number]] Collected: 07/16/25 0751    Specimen: Blood Updated: 07/16/25 1027     Cortisol 0.66 mcg/dL     Narrative:      Cortisol Reference Ranges:    Cortisol 6AM - 10AM Range: 6.02-18.40 mcg/dl  Cortisol 4PM - 8PM Range: 2.68-10.50 mcg/dl      Results may be falsely increased if patient taking Biotin.      Basic Metabolic Panel [[id number]]  (Normal) Collected: 07/16/25 0751    Specimen: Blood Updated: 07/16/25 0821     Glucose 99 mg/dL      BUN 17.6 mg/dL      Creatinine 0.85 mg/dL      Sodium 141 mmol/L      Potassium 4.1 mmol/L      Chloride 105 mmol/L      CO2 25.2 mmol/L      Calcium 9.3 mg/dL      BUN/Creatinine Ratio 20.7     Anion Gap 10.8 mmol/L      eGFR 80.0 mL/min/1.73     Narrative:      GFR Categories in Chronic Kidney Disease (CKD)              GFR Category          GFR (mL/min/1.73)    Interpretation  G1                    90 or greater        Normal or high (1)  G2                    60-89                Mild decrease (1)  G3a                   45-59                Mild to moderate decrease  G3b                   30-44                Moderate to severe decrease  G4                    15-29                Severe decrease  G5                    14 or less           Kidney failure    (1)In the absence of evidence of kidney disease, neither GFR category G1 or G2 fulfill the criteria for CKD.    eGFR calculation 2021 CKD-EPI creatinine equation, which does not include race as a factor    Magnesium [[id number]]  (Normal) Collected: 07/16/25 0751    Specimen: Blood Updated: 07/16/25 0821     Magnesium 2.1 mg/dL     Potassium [[id number]]  (Normal) Collected: 07/16/25 0751    Specimen: Blood Updated: 07/16/25 0814     Potassium 4.1 mmol/L     POC Glucose 4x Daily Before Meals & at Bedtime [[id number]]   (Abnormal) Collected: 07/16/25 0806    Specimen: Blood Updated: 07/16/25 0811     Glucose 110 mg/dL      Comment: Serial Number: [id number]:  [id number]       CBC & Differential [[id number]]  (Abnormal) Collected: 07/16/25 0751    Specimen: Blood Updated: 07/16/25 0759    Narrative:      The following orders were created for panel order CBC & Differential.  Procedure                               Abnormality         Status                     ---------                               -----------         ------                     CBC Auto Differential[[id number]]        Abnormal            Final result                 Please view results for these tests on the individual orders.    CBC Auto Differential [[id number]]  (Abnormal) Collected: 07/16/25 0751    Specimen: Blood Updated: 07/16/25 0759     WBC 9.81 10*3/mm3      RBC 4.10 10*6/mm3      Hemoglobin 12.0 g/dL      Hematocrit 37.5 %      MCV 91.5 fL      MCH 29.3 pg      MCHC 32.0 g/dL      RDW 12.6 %      RDW-SD 42.2 fl      MPV 9.5 fL      Platelets 340 10*3/mm3      Neutrophil % 49.9 %      Lymphocyte % 37.5 %      Monocyte % 8.6 %      Eosinophil % 2.8 %      Basophil % 0.7 %      Immature Grans % 0.5 %      Neutrophils, Absolute 4.90 10*3/mm3      Lymphocytes, Absolute 3.68 10*3/mm3      Monocytes, Absolute 0.84 10*3/mm3      Eosinophils, Absolute 0.27 10*3/mm3      Basophils, Absolute 0.07 10*3/mm3      Immature Grans, Absolute 0.05 10*3/mm3      nRBC 0.0 /100 WBC     ACTH [[id number]] Collected: 07/16/25 0751    Specimen: Blood Updated: 07/16/25 0756    POC Glucose Once [[id number]]  (Normal) Collected: 07/16/25 0401    Specimen: Blood Updated: 07/16/25 0402     Glucose 93 mg/dL      Comment: Serial Number: [id number]:  [id number]       Basic Metabolic Panel [[id number]]  (Normal) Collected: 07/15/25 0241    Specimen: Blood from Arm, Left Updated: 07/15/25 0408     Glucose 97 mg/dL      BUN 15.0 mg/dL      Creatinine 0.79 mg/dL      Sodium 139 mmol/L       Potassium 3.5 mmol/L      Chloride 105 mmol/L      CO2 26.1 mmol/L      Calcium 9.0 mg/dL      BUN/Creatinine Ratio 19.0     Anion Gap 7.9 mmol/L      eGFR 87.4 mL/min/1.73     Narrative:      GFR Categories in Chronic Kidney Disease (CKD)              GFR Category          GFR (mL/min/1.73)    Interpretation  G1                    90 or greater        Normal or high (1)  G2                    60-89                Mild decrease (1)  G3a                   45-59                Mild to moderate decrease  G3b                   30-44                Moderate to severe decrease  G4                    15-29                Severe decrease  G5                    14 or less           Kidney failure    (1)In the absence of evidence of kidney disease, neither GFR category G1 or G2 fulfill the criteria for CKD.    eGFR calculation 2021 CKD-EPI creatinine equation, which does not include race as a factor    Magnesium [[id number]]  (Normal) Collected: 07/15/25 0241    Specimen: Blood from Arm, Left Updated: 07/15/25 0408     Magnesium 2.1 mg/dL     CBC & Differential [[id number]]  (Abnormal) Collected: 07/15/25 0241    Specimen: Blood from Arm, Left Updated: 07/15/25 0327    Narrative:      The following orders were created for panel order CBC & Differential.  Procedure                               Abnormality         Status                     ---------                               -----------         ------                     CBC Auto Differential[[id number]]        Abnormal            Final result                 Please view results for these tests on the individual orders.    CBC Auto Differential [[id number]]  (Abnormal) Collected: 07/15/25 0241    Specimen: Blood from Arm, Left Updated: 07/15/25 0327     WBC 8.58 10*3/mm3      RBC 4.01 10*6/mm3      Hemoglobin 11.4 g/dL      Hematocrit 36.2 %      MCV 90.3 fL      MCH 28.4 pg      MCHC 31.5 g/dL      RDW 12.5 %      RDW-SD 41.0 fl      MPV 9.7 fL      Platelets 326  10*3/mm3      Neutrophil % 52.9 %      Lymphocyte % 35.9 %      Monocyte % 8.0 %      Eosinophil % 2.3 %      Basophil % 0.6 %      Immature Grans % 0.3 %      Neutrophils, Absolute 4.53 10*3/mm3      Lymphocytes, Absolute 3.08 10*3/mm3      Monocytes, Absolute 0.69 10*3/mm3      Eosinophils, Absolute 0.20 10*3/mm3      Basophils, Absolute 0.05 10*3/mm3      Immature Grans, Absolute 0.03 10*3/mm3      nRBC 0.0 /100 WBC     TSH [[id number]]  (Normal) Collected: 07/14/25 0301    Specimen: Blood from Arm, Left Updated: 07/14/25 1418     TSH 2.100 uIU/mL     Hemoglobin A1c [[id number]]  (Abnormal) Collected: 07/14/25 0301    Specimen: Blood from Arm, Left Updated: 07/14/25 0809     Hemoglobin A1C 5.77 %     Narrative:      Hemoglobin A1C Ranges:    Increased Risk for Diabetes  5.7% to 6.4%  Diabetes                     >= 6.5%  Diabetic Goal                < 7.0%    POC CHEM 8 [[id number]]  (Abnormal) Collected: 07/13/25 1159    Specimen: Blood Updated: 07/13/25 1201     Glucose 80 mg/dL      BUN 20 mg/dL      Creatinine 1.00 mg/dL      Sodium 140 mmol/L      POC Potassium 2.6 mmol/L      Chloride 101 mmol/L      Total CO2 29 mmol/L      Anion Gap 13.0 mmol/L      Comment: Serial Number: [id number]:  [id number]        Hemoglobin 12.2 g/dL      Hematocrit 36 %      Ionized Calcium 1.04 mmol/L      eGFR 65.8 mL/min/1.73     Comprehensive Metabolic Panel [[id number]]  (Abnormal) Collected: 07/13/25 1100    Specimen: Blood Updated: 07/13/25 1137     Glucose 142 mg/dL      BUN 20.3 mg/dL      Creatinine 1.05 mg/dL      Sodium 139 mmol/L      Potassium 2.6 mmol/L      Chloride 97 mmol/L      CO2 27.6 mmol/L      Calcium 9.8 mg/dL      Total Protein 8.0 g/dL      Albumin 4.4 g/dL      ALT (SGPT) 23 U/L      AST (SGOT) 29 U/L      Alkaline Phosphatase 105 U/L      Total Bilirubin 0.3 mg/dL      Globulin 3.6 gm/dL      A/G Ratio 1.2 g/dL      BUN/Creatinine Ratio 19.3     Anion Gap 14.4 mmol/L      eGFR 62.1 mL/min/1.73      Narrative:      GFR Categories in Chronic Kidney Disease (CKD)              GFR Category          GFR (mL/min/1.73)    Interpretation  G1                    90 or greater        Normal or high (1)  G2                    60-89                Mild decrease (1)  G3a                   45-59                Mild to moderate decrease  G3b                   30-44                Moderate to severe decrease  G4                    15-29                Severe decrease  G5                    14 or less           Kidney failure    (1)In the absence of evidence of kidney disease, neither GFR category G1 or G2 fulfill the criteria for CKD.    eGFR calculation 2021 CKD-EPI creatinine equation, which does not include race as a factor    Urine Drug Screen - Urine, Clean Catch [[id number]]  (Abnormal) Collected: 07/13/25 1119    Specimen: Urine, Clean Catch Updated: 07/13/25 1134     THC, Screen, Urine Negative     Phencyclidine (PCP), Urine Negative     Cocaine Screen, Urine Negative     Methamphetamine, Ur Negative     Opiate Screen Negative     Amphetamine Screen, Urine Negative     Benzodiazepine Screen, Urine Negative     Tricyclic Antidepressants Screen Positive     Methadone Screen, Urine Negative     Barbiturates Screen, Urine Negative     Oxycodone Screen, Urine Positive     Buprenorphine, Screen, Urine Negative    Narrative:      Cutoff For Drugs Screened:    Amphetamines               500 ng/ml  Barbiturates               200 ng/ml  Benzodiazepines            150 ng/ml  Cocaine                    150 ng/ml  Methadone                  200 ng/ml  Opiates                    100 ng/ml  Phencyclidine               25 ng/ml  THC                         50 ng/ml  Methamphetamine            500 ng/ml  Tricyclic Antidepressants  300 ng/ml  Oxycodone                  100 ng/ml  Buprenorphine               10 ng/ml    The normal value for all drugs tested is negative. This report includes unconfirmed screening results, with the cutoff  values listed, to be used for medical treatment purposes only.  Unconfirmed results must not be used for non-medical purposes such as employment or legal testing.  Clinical consideration should be applied to any drug of abuse test, particularly when unconfirmed results are used.    All urine drugs of abuse requests without chain of custody are for medical screening purposes only.  False positives are possible.      Ethanol [[id number]] Collected: 07/13/25 1100    Specimen: Blood Updated: 07/13/25 1132     Ethanol % <0.010 %     Narrative:      Not for legal purposes.    Urinalysis With Culture If Indicated - Urine, Clean Catch [[id number]]  (Abnormal) Collected: 07/13/25 1119    Specimen: Urine, Clean Catch Updated: 07/13/25 1127     Color, UA Yellow     Appearance, UA Clear     pH, UA 5.5     Specific Gravity, UA 1.022     Glucose,  mg/dL (Trace)     Ketones, UA Trace     Bilirubin, UA Negative     Blood, UA Negative     Protein, UA Trace     Leuk Esterase, UA Trace     Nitrite, UA Negative     Urobilinogen, UA 0.2 E.U./dL    Narrative:      In absence of clinical symptoms, the presence of pyuria, bacteria, and/or nitrites on the urinalysis result does not correlate with infection.    Urinalysis, Microscopic Only - Urine, Clean Catch [[id number]] Collected: 07/13/25 1119    Specimen: Urine, Clean Catch Updated: 07/13/25 1127     RBC, UA 0-2 /HPF      WBC, UA 0-2 /HPF      Comment: Urine culture not indicated.        Bacteria, UA None Seen /HPF      Squamous Epithelial Cells, UA 0-2 /HPF      Hyaline Casts, UA 3-6 /LPF      Methodology Automated Microscopy    Ammonia [[id number]]  (Normal) Collected: 07/13/25 1100    Specimen: Blood Updated: 07/13/25 1126     Ammonia 42 umol/L     Extra Tubes [[id number]] Collected: 07/13/25 1100    Specimen: Blood, Venous Line Updated: 07/13/25 1115    Narrative:      The following orders were created for panel order Extra Tubes.  Procedure                                Abnormality         Status                     ---------                               -----------         ------                     Gold Top - SST[[id number]]                                   Final result               Light Blue Top[[id number]]                                   Final result                 Please view results for these tests on the individual orders.    Gold Top - SST [[id number]] Collected: 07/13/25 1100    Specimen: Blood Updated: 07/13/25 1115     Extra Tube Hold for add-ons.     Comment: Auto resulted.       Light Blue Top [[id number]] Collected: 07/13/25 1100    Specimen: Blood Updated: 07/13/25 1115     Extra Tube Hold for add-ons.     Comment: Auto resulted               Imaging Results (Most Recent)       Procedure Component Value Units Date/Time    CT Head Without Contrast [[id number]] Collected: 07/13/25 1201     Updated: 07/13/25 1208    Narrative:      CT HEAD WO CONTRAST    Date of Exam: 7/13/2025 11:54 AM EDT    Indication: altered mental status.    Comparison: March 2025    Technique: Axial CT images were obtained of the head without contrast administration.  Coronal reconstructions were performed.  Automated exposure control and iterative reconstruction methods were used.      Findings:      No evidence of intracranial hemorrhage, mass, or midline shift.  Sulci and ventricles are symmetric.  Brain volume is appropriate for patient's age.  The gray white matter differentiation is intact. No focal hypodensities to suggest acute or subacute   infarct.     Calcified meningiomata at the high right parietal lobe are unchanged.    The mastoid air cells and paranasal sinuses are well aerated.  Globes and extraocular muscles are normal.  No displaced or depressed skull fractures.  No suspicious lytic or sclerotic osseous lesions.      Impression:      No acute intracranial abnormality       Brain MRI is more sensitive to evaluate for acute or subacute infarcts and to evaluate for intracranial  metastatic disease.      Electronically Signed: Francisco Durham MD    7/13/2025 12:06 PM EDT    Workstation ID: [id number]          reviewed    ECG/EMG Results (most recent)       Procedure Component Value Units Date/Time    Telemetry Scan [[id number]] Resulted: 07/13/25 1011     Updated: 07/13/25 1035    Telemetry Scan [[id number]] Resulted: 07/16/25 1117     Updated: 07/16/25 1137          reviewed    Results for orders placed during the hospital encounter of 09/18/24    Duplex Venous Lower Extremity - Right CAR 09/20/2024 10:42 AM    Interpretation Summary    Normal right lower extremity venous duplex scan.      Results for orders placed during the hospital encounter of 03/30/25    Adult Transthoracic Echo Complete w/ Color, Spectral and Contrast if Necessary Per Protocol    Interpretation Summary    Left ventricular systolic function is normal. Calculated left ventricular EF = 66% Left ventricular ejection fraction appears to be 66 - 70%.    Left ventricular diastolic function is consistent with (grade I) impaired relaxation.    The right ventricular cavity is mildly dilated.    The left atrial cavity is mildly dilated.    Estimated right ventricular systolic pressure from tricuspid regurgitation is mildly elevated (35-45 mmHg).      Microbiology Results (last 10 days)       ** No results found for the last 240 hours. **            Assessment & Plan     Hypoglycemia       Hypoglycemia             Lab Results   Component Value Date     GLUCOSE 98 07/14/2025     GLUCOSE 142 (H) 07/13/2025     GLUCOSE 97 04/01/2025     GLUCOSE 91 03/31/2025   -Hypoglycemia protocol  - Continue D5W half-normal saline  - A1c 5.7  -Diabetic diet  -Mounjaro weekly at home  -Monitor glucose before meals and at bedtime  -Cortisol 0.58  - ACTH in AM  -Endocrinology consulted     Hypokalemia  - Potassium 2.6, repeat 3.4  - Potassium replacement protocol     Hypertension/CAD        BP Readings from Last 1 Encounters:   07/15/25 123/79   -  Continue Lopressor, Plavix   - Monitor while admitted     Lupus/fibromyalgia  - Continue Plaquenil, oxycodone and gabapentin     Dyslipidemia  - Continue statin     Mood Disorder  - Continue Effexor     Obesity   - BMI 28.8  - Lifestyle modifications    I discussed the patients findings and my recommendations with patient and nursing staff.     Discharge Diagnosis:      Hypoglycemia      Hospital Course  Patient is a 55 y.o. female presented with altered mental status with hypoglycemia in HPI noted above.  Patient initially found to have glucose of 95 with potassium of 2.6, ammonia was within normal limits at 40 and mild leukocytosis was noted with WBCs of 10.90 with an increased absolute neutrophil count noted on differential.  CT of head was obtained which showed no acute process and A1c was ordered which was found to be 5.7.  She was started on dextrose infusion in the ED and cortisol level was obtained which was found to be 0.58.  Endocrinology was consulted who evaluated patient and ordered ACTH stim test which showed adrenal insufficiency and endocrinologist recommended 10 mg hydrocortisone 3 times daily which will be started at discharge.  Patient is instructed to monitor and log blood sugars to discuss with PCP and endocrinology at next visit.  At this time patient is felt to be in good condition for discharge with close follow-up with her PCP as well as endocrinology on an outpatient basis.  Her full testing/results and plan were discussed with patient along with concerning/alarm symptoms for which to call 911/return to the ED.  All questions were answered and she verbalizes her understanding and agreement.    Past Medical History:     Past Medical History:   Diagnosis Date    Asthma     Diabetes mellitus     DVT (deep venous thrombosis) 08/23/2024    right lower leg    Fibromyalgia     Lupus        Past Surgical History:     Past Surgical History:   Procedure Laterality Date    APPENDECTOMY       CHOLECYSTECTOMY N/A 8/23/2024    Procedure: CHOLECYSTECTOMY LAPAROSCOPIC WITH DAVINCI ROBOT;  Surgeon: Bryce Huizar MD;  Location: [facility] MAIN OR;  Service: Robotics - DaVinci;  Laterality: N/A;    HYSTERECTOMY      JOINT REPLACEMENT Right     KNEE SURGERY      SHOULDER ARTHROSCOPY         Social History:   Social History     Socioeconomic History    Marital status: Single   Tobacco Use    Smoking status: Never     Passive exposure: Never    Smokeless tobacco: Never   Vaping Use    Vaping status: Never Used   Substance and Sexual Activity    Alcohol use: Never    Drug use: Never    Sexual activity: Never       Procedures Performed         Consults:   Consults       Date and Time Order Name Status Description    7/14/2025 10:36 AM Inpatient Endocrinology Consult              Condition on Discharge:     Stable    Discharge Disposition  Home or Self Care    Discharge Medications     Discharge Medications        New Medications        Instructions Start Date   hydrocortisone 10 MG tablet  Commonly known as: CORTEF   10 mg, Oral, 3 Times Daily With Meals             Continue These Medications        Instructions Start Date   albuterol (2.5 MG/3ML) 0.083% nebulizer solution  Commonly known as: PROVENTIL   2.5 mg, Nebulization, Every 4 Hours PRN      albuterol sulfate  (90 Base) MCG/ACT inhaler  Commonly known as: PROVENTIL HFA;VENTOLIN HFA;PROAIR HFA   2 puffs, 2 Times Daily      atorvastatin 10 MG tablet  Commonly known as: LIPITOR   10 mg, Every Evening      cetirizine 10 MG tablet  Commonly known as: zyrTEC   10 mg, Daily PRN      clopidogrel 75 MG tablet  Commonly known as: PLAVIX   75 mg, Oral, Daily      coenzyme Q10 100 MG capsule   100 mg, Daily      cyclobenzaprine 10 MG tablet  Commonly known as: FLEXERIL   10 mg, Every 6 Hours PRN      dicyclomine 10 MG capsule  Commonly known as: BENTYL   10 mg, Oral, 4 Times Daily PRN      gabapentin 800 MG tablet  Commonly known as: NEURONTIN   800 mg, 3  Times Daily      hydroCHLOROthiazide 25 MG tablet   25 mg, Daily      hydroxychloroquine 200 MG tablet  Commonly known as: PLAQUENIL   400 mg, Every Morning      Magnesium 200 MG tablet   1 tablet, Daily      metoprolol tartrate 25 MG tablet  Commonly known as: LOPRESSOR   25 mg, 2 Times Daily      Mounjaro 10 MG/0.5ML solution auto-injector  Generic drug: Tirzepatide   10 mg, Every 7 Days      ondansetron ODT 4 MG disintegrating tablet  Commonly known as: ZOFRAN-ODT   4 mg, Every 8 Hours PRN      oxyCODONE 15 MG immediate release tablet  Commonly known as: ROXICODONE   15 mg, Every 4 Hours      Potassium 99 MG tablet   1 tablet, Daily      QC Tumeric Complex 500 MG capsule  Generic drug: Turmeric   1 capsule, Daily      Symbicort 160-4.5 MCG/ACT inhaler  Generic drug: budesonide-formoterol   2 puffs, Inhalation, 2 Times Daily - RT      triamcinolone 0.1 % cream  Commonly known as: KENALOG   1 Application, 2 Times Daily PRN      venlafaxine XR 37.5 MG 24 hr capsule  Commonly known as: EFFEXOR-XR   37.5 mg, Daily      Vitamin D (Cholecalciferol) 10 MCG (400 UNIT) tablet  Commonly known as: CHOLECALCIFEROL   400 Units, Daily               Discharge Diet:     Activity at Discharge:     Follow-up Appointments  No future appointments.  Additional Instructions for the Follow-ups that You Need to Schedule       Discharge Follow-up with PCP   As directed       Currently Documented PCP:    Brian Gonzalez MD    PCP Phone Number:    [phone]     Follow Up Details: 5 to 7 days        Discharge Follow-up with Specified Provider: Endocrinology; 2 Weeks   As directed      To: Endocrinology   Follow Up: 2 Weeks                Test Results Pending at Discharge  Pending Results       Procedure [Order ID] Specimen - Date/Time    ACTH [[id number]] Collected: 07/16/25 0751    Specimen: Blood Updated: 07/16/25 0756    Potassium [[id number]]     Specimen: Blood              Risk for Readmission (LACE) Score: 10 (7/17/2025  6:00  AM)      Greater than 30 minutes spent in discharge activities for this patient    Signature:Electronically signed by Quincy Melissa PA-C, 07/17/25, 3:02 PM EDT.

## 2025-07-17 NOTE — PROGRESS NOTES
"       Daily Progress Note    Patient Care Team:  Brian Gonzalez MD as PCP - General (Family Medicine)    Chief Complaint: Follow up Hypoglycemia/Low cortisol    HPI:  Patient seen, admitted with low BS. On Mounjaro at home, has lost over 100lbs with lifestyle and Mounjaro.    Her ACTh stim test has failed with peak cortisol of 11.8.     ROS:   Constitutional:  Denies fatigue, tiredness.    Respiratory: denies cough, shortness of breath.   Cardiovascular:  denies chest pain, edema   GI:  Denies abdominal pain, nausea, vomiting.         Vitals:    07/17/25 0855   BP:    Pulse: 64   Resp: 16   Temp:    SpO2: 100%     Body mass index is 49.33 kg/m².    Physical Exam:  GEN: NAD, conversant  PSYCH: Awake and coherent      Results Review:     I reviewed the patient's new clinical results.    Glucose   Date Value Ref Range Status   07/16/2025 99 65 - 99 mg/dL Final     Sodium   Date Value Ref Range Status   07/16/2025 141 136 - 145 mmol/L Final     Potassium   Date Value Ref Range Status   07/16/2025 4.1 3.5 - 5.2 mmol/L Final   07/16/2025 4.1 3.5 - 5.2 mmol/L Final     CO2   Date Value Ref Range Status   07/16/2025 25.2 22.0 - 29.0 mmol/L Final     Chloride   Date Value Ref Range Status   07/16/2025 105 98 - 107 mmol/L Final     Anion Gap   Date Value Ref Range Status   07/16/2025 10.8 5.0 - 15.0 mmol/L Final     Creatinine   Date Value Ref Range Status   07/16/2025 0.85 0.57 - 1.00 mg/dL Final     BUN   Date Value Ref Range Status   07/16/2025 17.6 6.0 - 20.0 mg/dL Final     BUN/Creatinine Ratio   Date Value Ref Range Status   07/16/2025 20.7 7.0 - 25.0 Final     Calcium   Date Value Ref Range Status   07/16/2025 9.3 8.6 - 10.5 mg/dL Final     Magnesium   Date Value Ref Range Status   07/16/2025 2.1 1.6 - 2.6 mg/dL Final     Lab Results   Component Value Date    HGBA1C 5.77 (H) 07/14/2025    HGBA1C 5.72 (H) 03/31/2025    HGBA1C 5.9 (H) 07/10/2024     No results found for: \"GLUF\", \"MICROALBUR\"  Results from last 7 days "   Lab Units 07/17/25  1149 07/17/25  0744 07/17/25  0116 07/16/25  2253 07/16/25  1959 07/16/25  1722   GLUCOSE mg/dL 125* 111* 95 89 97 106*        Latest Reference Range & Units 07/17/25 08:11 07/17/25 08:47 07/17/25 09:24   Cortisol mcg/dL 0.80 9.18 11.80     Medication Review: Reviewed.     atorvastatin, 10 mg, Oral, Q PM  budesonide-formoterol, 2 puff, Inhalation, BID - RT  clopidogrel, 75 mg, Oral, Daily  gabapentin, 800 mg, Oral, Q8H  hydroxychloroquine, 400 mg, Oral, QAM  Lidocaine, 1 patch, Transdermal, Q24H  metoprolol tartrate, 25 mg, Oral, BID  oxyCODONE, 15 mg, Oral, Q4H  sodium chloride, 10 mL, Intravenous, Q12H  venlafaxine XR, 37.5 mg, Oral, Daily      Assessment and plan:  Glucocorticoid deficiency: She has  failed ACTH stim test. She denies receiving steroids recently in any way. Diagnosis explained to patient. Add Hydrocortisone 10 mg po tid and she follow with Dr. Gaytan as outpatient. Recommend to continue to follow with him.     Diabetes Mellitus Type 2 : Well controlled. Takes Mounjaro at home.     Hyperlipidemia: On Atorvastatin.     Ok to DC from endocrine perspective.     Steffi Anglin MD. FACE

## 2025-07-17 NOTE — PLAN OF CARE
Goal Outcome Evaluation:  Plan of Care Reviewed With: patient      D/C to home

## 2025-07-17 NOTE — PROGRESS NOTES
Stop by to answer patient's question regarding glucocorticoid deficiency.  Explained to the patient different types of steroids, low potency, mid potency and hypotensive.  Dosing was discussed.  Patient is agreeable to start hydrocortisone 10 mg p.o. 3 times daily and will follow-up with her outpatient endocrinologist Dr. Yan in the next few weeks.  Dose can be adjusted further as an outpatient.

## 2025-07-17 NOTE — DISCHARGE INSTR - APPOINTMENTS
Dr. Brian Gonzalez's office will be calling in the next 2-3 days to schedule a follow  up appointment. If you have not heard from the office by 7/21/25, please call  [phone], option 1 to schedule 7-10 days post discharge.

## 2025-07-17 NOTE — NURSING NOTE
"Unable to do cortisol test and lab draws at 4 am.  [facility] emphatically stated they cannot run the cortisol tests on blood before 8am   Bree Boone-phlebotomist also  messaged me saying--  \"It cannot be run.I was talking with the techs and have been told once again that we cannot draw that test before 8am. If you have questions please call down and talk to etta in this early am, \"    Charge nurse also informed of this situation. She will put in her report, advised me to reschedule med and test for after 8 am  "

## 2025-07-17 NOTE — PLAN OF CARE
Goal Outcome Evaluation:  Plan of Care Reviewed With: patient      A&ox4, RA ad suhas w walker declines standby assist and gait belt. Waiting on cortisol results. No complaints of pain no nvd.

## 2025-07-17 NOTE — CASE MANAGEMENT/SOCIAL WORK
Continued Stay Note  [facility]     Patient Name: Lisa Jay  MRN: [id number]  Today's Date: 7/17/2025    Admit Date: 7/13/2025     Discharge Plan       Row Name 07/17/25 1109       Plan    Plan Comments Discharge  called to obtain a hospital follow up appointment with PCP Dr. Brian Gonzalez's office. The office stated that there are no openings in the next 7-10 days, and a message was being sent to the nurse navigator to call the patient direclty when an appointment can be made. AVS was updated and CM made aware.           Bari Montgomery, Discharge   [facility]  Care Coordination  [address] [place] [id number]  Office: [phone]  Fax: [phone]

## 2025-07-18 ENCOUNTER — READMISSION MANAGEMENT (OUTPATIENT)
Dept: CALL CENTER | Facility: HOSPITAL | Age: 55
End: 2025-07-18
Payer: MEDICARE

## 2025-07-18 NOTE — CASE MANAGEMENT/SOCIAL WORK
Case Management Discharge Note           Provided Post Acute Provider List?: N/A  Provided Post Acute Provider Quality & Resource List?: N/A      Transportation Services  Transportation: Private Transportation  Private: Car    Final Discharge Disposition Code: 01 - home or self-care

## 2025-07-18 NOTE — OUTREACH NOTE
Prep Survey      Flowsheet Row Responses   [facility] facility patient discharged from? [facility]   Is LACE score < 7 ? No   Eligibility Readm Mgmt   Discharge diagnosis hypoglycemia   Does the patient have one of the following disease processes/diagnoses(primary or secondary)? Other   Does the patient have Home health ordered? No   Is there a DME ordered? No   Prep survey completed? Yes            Cintia STEWARD - Registered Nurse

## 2025-07-25 ENCOUNTER — READMISSION MANAGEMENT (OUTPATIENT)
Dept: CALL CENTER | Facility: HOSPITAL | Age: 55
End: 2025-07-25
Payer: MEDICARE

## 2025-07-25 NOTE — OUTREACH NOTE
Medical Week 1 Survey      Flowsheet Row Responses   [facility] facility patient discharged from? [facility]   Does the patient have one of the following disease processes/diagnoses(primary or secondary)? Other   Week 1 attempt successful? No   Unsuccessful attempts Attempt 1            Nuvia WILCOX - Registered Nurse

## 2025-07-26 ENCOUNTER — HOSPITAL ENCOUNTER (OUTPATIENT)
Facility: HOSPITAL | Age: 55
Setting detail: OBSERVATION
Discharge: HOME | End: 2025-07-27
Attending: STUDENT IN AN ORGANIZED HEALTH CARE EDUCATION/TRAINING PROGRAM | Admitting: STUDENT IN AN ORGANIZED HEALTH CARE EDUCATION/TRAINING PROGRAM
Payer: MEDICARE

## 2025-07-26 DIAGNOSIS — D72.829 LEUKOCYTOSIS, UNSPECIFIED TYPE: ICD-10-CM

## 2025-07-26 DIAGNOSIS — E16.2 HYPOGLYCEMIA: Primary | ICD-10-CM

## 2025-07-26 DIAGNOSIS — R51.9 ACUTE NONINTRACTABLE HEADACHE, UNSPECIFIED HEADACHE TYPE: ICD-10-CM

## 2025-07-26 LAB
ALBUMIN SERPL-MCNC: 4.3 G/DL (ref 3.5–5.2)
ALBUMIN/GLOB SERPL: 1.3 G/DL
ALP SERPL-CCNC: 85 U/L (ref 39–117)
ALT SERPL W P-5'-P-CCNC: 25 U/L (ref 1–33)
ANION GAP SERPL CALCULATED.3IONS-SCNC: 12.4 MMOL/L (ref 5–15)
AST SERPL-CCNC: 18 U/L (ref 1–32)
BASOPHILS # BLD AUTO: 0.08 10*3/MM3 (ref 0–0.2)
BASOPHILS NFR BLD AUTO: 0.5 % (ref 0–1.5)
BILIRUB SERPL-MCNC: 0.3 MG/DL (ref 0–1.2)
BILIRUB UR QL STRIP: NEGATIVE
BUN SERPL-MCNC: 26 MG/DL (ref 6–20)
BUN/CREAT SERPL: 28.9 (ref 7–25)
CALCIUM SPEC-SCNC: 9.6 MG/DL (ref 8.6–10.5)
CHLORIDE SERPL-SCNC: 102 MMOL/L (ref 98–107)
CLARITY UR: CLEAR
CO2 SERPL-SCNC: 26.6 MMOL/L (ref 22–29)
COLOR UR: YELLOW
CREAT SERPL-MCNC: 0.9 MG/DL (ref 0.57–1)
DEPRECATED RDW RBC AUTO: 42.3 FL (ref 37–54)
EGFRCR SERPLBLD CKD-EPI 2021: 74.7 ML/MIN/1.73
EOSINOPHIL # BLD AUTO: 0.15 10*3/MM3 (ref 0–0.4)
EOSINOPHIL NFR BLD AUTO: 1 % (ref 0.3–6.2)
ERYTHROCYTE [DISTWIDTH] IN BLOOD BY AUTOMATED COUNT: 12.7 % (ref 12.3–15.4)
GLOBULIN UR ELPH-MCNC: 3.3 GM/DL
GLUCOSE BLDC GLUCOMTR-MCNC: 115 MG/DL (ref 70–105)
GLUCOSE BLDC GLUCOMTR-MCNC: 115 MG/DL (ref 70–105)
GLUCOSE BLDC GLUCOMTR-MCNC: 137 MG/DL (ref 70–105)
GLUCOSE BLDC GLUCOMTR-MCNC: 140 MG/DL (ref 70–105)
GLUCOSE BLDC GLUCOMTR-MCNC: 144 MG/DL (ref 70–105)
GLUCOSE BLDC GLUCOMTR-MCNC: 150 MG/DL (ref 70–105)
GLUCOSE BLDC GLUCOMTR-MCNC: 188 MG/DL (ref 70–105)
GLUCOSE BLDC GLUCOMTR-MCNC: 92 MG/DL (ref 70–105)
GLUCOSE SERPL-MCNC: 95 MG/DL (ref 65–99)
GLUCOSE UR STRIP-MCNC: NEGATIVE MG/DL
HBA1C MFR BLD: 5.76 % (ref 4.8–5.6)
HCT VFR BLD AUTO: 40.2 % (ref 34–46.6)
HGB BLD-MCNC: 12.8 G/DL (ref 12–15.9)
HGB UR QL STRIP.AUTO: NEGATIVE
IMM GRANULOCYTES # BLD AUTO: 0.12 10*3/MM3 (ref 0–0.05)
IMM GRANULOCYTES NFR BLD AUTO: 0.8 % (ref 0–0.5)
KETONES UR QL STRIP: ABNORMAL
LEUKOCYTE ESTERASE UR QL STRIP.AUTO: NEGATIVE
LIPASE SERPL-CCNC: 33 U/L (ref 13–60)
LYMPHOCYTES # BLD AUTO: 3.27 10*3/MM3 (ref 0.7–3.1)
LYMPHOCYTES NFR BLD AUTO: 21.1 % (ref 19.6–45.3)
Lab: ABNORMAL
Lab: ABNORMAL
MCH RBC QN AUTO: 29 PG (ref 26.6–33)
MCHC RBC AUTO-ENTMCNC: 31.8 G/DL (ref 31.5–35.7)
MCV RBC AUTO: 91.2 FL (ref 79–97)
MONOCYTES # BLD AUTO: 1.44 10*3/MM3 (ref 0.1–0.9)
MONOCYTES NFR BLD AUTO: 9.3 % (ref 5–12)
NEUTROPHILS NFR BLD AUTO: 10.41 10*3/MM3 (ref 1.7–7)
NEUTROPHILS NFR BLD AUTO: 67.3 % (ref 42.7–76)
NITRITE UR QL STRIP: NEGATIVE
NRBC BLD AUTO-RTO: 0 /100 WBC (ref 0–0.2)
PH UR STRIP.AUTO: 5.5 [PH] (ref 5–8)
PLATELET # BLD AUTO: 300 10*3/MM3 (ref 140–450)
PMV BLD AUTO: 9.3 FL (ref 6–12)
POTASSIUM SERPL-SCNC: 3.5 MMOL/L (ref 3.5–5.2)
PROT SERPL-MCNC: 7.6 G/DL (ref 6–8.5)
PROT UR QL STRIP: NEGATIVE
RBC # BLD AUTO: 4.41 10*6/MM3 (ref 3.77–5.28)
SODIUM SERPL-SCNC: 141 MMOL/L (ref 136–145)
SP GR UR STRIP: 1.03 (ref 1–1.03)
TSH SERPL DL<=0.05 MIU/L-ACNC: 0.93 UIU/ML (ref 0.27–4.2)
UROBILINOGEN UR QL STRIP: ABNORMAL
WBC NRBC COR # BLD AUTO: 15.47 10*3/MM3 (ref 3.4–10.8)

## 2025-07-26 PROCEDURE — 82948 REAGENT STRIP/BLOOD GLUCOSE: CPT | Performed by: STUDENT IN AN ORGANIZED HEALTH CARE EDUCATION/TRAINING PROGRAM

## 2025-07-26 PROCEDURE — 94799 UNLISTED PULMONARY SVC/PX: CPT

## 2025-07-26 PROCEDURE — 94640 AIRWAY INHALATION TREATMENT: CPT

## 2025-07-26 PROCEDURE — 83036 HEMOGLOBIN GLYCOSYLATED A1C: CPT | Performed by: OCCUPATIONAL THERAPIST

## 2025-07-26 PROCEDURE — 81003 URINALYSIS AUTO W/O SCOPE: CPT | Performed by: OCCUPATIONAL THERAPIST

## 2025-07-26 PROCEDURE — 85025 COMPLETE CBC W/AUTO DIFF WBC: CPT | Performed by: OCCUPATIONAL THERAPIST

## 2025-07-26 PROCEDURE — 84443 ASSAY THYROID STIM HORMONE: CPT

## 2025-07-26 PROCEDURE — 94761 N-INVAS EAR/PLS OXIMETRY MLT: CPT

## 2025-07-26 PROCEDURE — 99214 OFFICE O/P EST MOD 30 MIN: CPT | Performed by: INTERNAL MEDICINE

## 2025-07-26 PROCEDURE — 80053 COMPREHEN METABOLIC PANEL: CPT | Performed by: OCCUPATIONAL THERAPIST

## 2025-07-26 PROCEDURE — 83690 ASSAY OF LIPASE: CPT | Performed by: OCCUPATIONAL THERAPIST

## 2025-07-26 PROCEDURE — 25810000003 SODIUM CHLORIDE 0.9 % SOLUTION: Performed by: OCCUPATIONAL THERAPIST

## 2025-07-26 PROCEDURE — 25010000002 PROCHLORPERAZINE 10 MG/2ML SOLUTION: Performed by: OCCUPATIONAL THERAPIST

## 2025-07-26 PROCEDURE — G0378 HOSPITAL OBSERVATION PER HR: HCPCS

## 2025-07-26 PROCEDURE — 82948 REAGENT STRIP/BLOOD GLUCOSE: CPT

## 2025-07-26 PROCEDURE — 96374 THER/PROPH/DIAG INJ IV PUSH: CPT

## 2025-07-26 PROCEDURE — 25010000002 ONDANSETRON PER 1 MG: Performed by: OCCUPATIONAL THERAPIST

## 2025-07-26 PROCEDURE — 96375 TX/PRO/DX INJ NEW DRUG ADDON: CPT

## 2025-07-26 PROCEDURE — 36415 COLL VENOUS BLD VENIPUNCTURE: CPT

## 2025-07-26 PROCEDURE — 84681 ASSAY OF C-PEPTIDE: CPT

## 2025-07-26 PROCEDURE — 82088 ASSAY OF ALDOSTERONE: CPT

## 2025-07-26 PROCEDURE — 25010000002 KETOROLAC TROMETHAMINE PER 15 MG: Performed by: OCCUPATIONAL THERAPIST

## 2025-07-26 PROCEDURE — 82948 REAGENT STRIP/BLOOD GLUCOSE: CPT | Performed by: OCCUPATIONAL THERAPIST

## 2025-07-26 PROCEDURE — 99285 EMERGENCY DEPT VISIT HI MDM: CPT

## 2025-07-26 PROCEDURE — 94664 DEMO&/EVAL PT USE INHALER: CPT

## 2025-07-26 RX ORDER — ATORVASTATIN CALCIUM 10 MG/1
10 TABLET, FILM COATED ORAL NIGHTLY
Status: DISCONTINUED | OUTPATIENT
Start: 2025-07-26 | End: 2025-07-27 | Stop reason: HOSPADM

## 2025-07-26 RX ORDER — CYCLOBENZAPRINE HCL 10 MG
10 TABLET ORAL 3 TIMES DAILY PRN
Status: DISCONTINUED | OUTPATIENT
Start: 2025-07-26 | End: 2025-07-27 | Stop reason: HOSPADM

## 2025-07-26 RX ORDER — NICOTINE POLACRILEX 4 MG
15 LOZENGE BUCCAL
Status: DISCONTINUED | OUTPATIENT
Start: 2025-07-26 | End: 2025-07-27 | Stop reason: HOSPADM

## 2025-07-26 RX ORDER — ALBUTEROL SULFATE 0.83 MG/ML
2.5 SOLUTION RESPIRATORY (INHALATION)
Status: DISCONTINUED | OUTPATIENT
Start: 2025-07-26 | End: 2025-07-26

## 2025-07-26 RX ORDER — IBUPROFEN 600 MG/1
1 TABLET ORAL
Status: DISCONTINUED | OUTPATIENT
Start: 2025-07-26 | End: 2025-07-27 | Stop reason: HOSPADM

## 2025-07-26 RX ORDER — CHOLECALCIFEROL (VITAMIN D3) 25 MCG
500 TABLET ORAL DAILY
Status: DISCONTINUED | OUTPATIENT
Start: 2025-07-26 | End: 2025-07-27 | Stop reason: HOSPADM

## 2025-07-26 RX ORDER — BISACODYL 10 MG
10 SUPPOSITORY, RECTAL RECTAL DAILY PRN
Status: DISCONTINUED | OUTPATIENT
Start: 2025-07-26 | End: 2025-07-27 | Stop reason: HOSPADM

## 2025-07-26 RX ORDER — BISACODYL 5 MG/1
5 TABLET, DELAYED RELEASE ORAL DAILY PRN
Status: DISCONTINUED | OUTPATIENT
Start: 2025-07-26 | End: 2025-07-27 | Stop reason: HOSPADM

## 2025-07-26 RX ORDER — DICYCLOMINE HYDROCHLORIDE 10 MG/1
10 CAPSULE ORAL 3 TIMES DAILY PRN
COMMUNITY
Start: 2025-07-23

## 2025-07-26 RX ORDER — HYDROCORTISONE 10 MG/1
10 TABLET ORAL 2 TIMES DAILY WITH MEALS
Status: DISCONTINUED | OUTPATIENT
Start: 2025-07-27 | End: 2025-07-27 | Stop reason: HOSPADM

## 2025-07-26 RX ORDER — SODIUM CHLORIDE 0.9 % (FLUSH) 0.9 %
10 SYRINGE (ML) INJECTION AS NEEDED
Status: DISCONTINUED | OUTPATIENT
Start: 2025-07-26 | End: 2025-07-27 | Stop reason: HOSPADM

## 2025-07-26 RX ORDER — ACETAMINOPHEN 500 MG
1000 TABLET ORAL ONCE
Status: COMPLETED | OUTPATIENT
Start: 2025-07-26 | End: 2025-07-26

## 2025-07-26 RX ORDER — ACETAMINOPHEN 325 MG/1
650 TABLET ORAL EVERY 6 HOURS PRN
Status: DISCONTINUED | OUTPATIENT
Start: 2025-07-26 | End: 2025-07-27 | Stop reason: HOSPADM

## 2025-07-26 RX ORDER — VENLAFAXINE HYDROCHLORIDE 37.5 MG/1
37.5 CAPSULE, EXTENDED RELEASE ORAL DAILY
Status: DISCONTINUED | OUTPATIENT
Start: 2025-07-26 | End: 2025-07-27 | Stop reason: HOSPADM

## 2025-07-26 RX ORDER — GABAPENTIN 400 MG/1
800 CAPSULE ORAL EVERY 8 HOURS SCHEDULED
Status: DISCONTINUED | OUTPATIENT
Start: 2025-07-26 | End: 2025-07-27 | Stop reason: HOSPADM

## 2025-07-26 RX ORDER — ONDANSETRON 2 MG/ML
4 INJECTION INTRAMUSCULAR; INTRAVENOUS ONCE
Status: COMPLETED | OUTPATIENT
Start: 2025-07-26 | End: 2025-07-26

## 2025-07-26 RX ORDER — LIDOCAINE 4 G/G
1 PATCH TOPICAL
Status: DISCONTINUED | OUTPATIENT
Start: 2025-07-26 | End: 2025-07-27 | Stop reason: HOSPADM

## 2025-07-26 RX ORDER — POTASSIUM CHLORIDE 1500 MG/1
40 TABLET, EXTENDED RELEASE ORAL ONCE
Status: COMPLETED | OUTPATIENT
Start: 2025-07-26 | End: 2025-07-26

## 2025-07-26 RX ORDER — DEXTROSE MONOHYDRATE 25 G/50ML
25 INJECTION, SOLUTION INTRAVENOUS
Status: DISCONTINUED | OUTPATIENT
Start: 2025-07-26 | End: 2025-07-27 | Stop reason: HOSPADM

## 2025-07-26 RX ORDER — AMOXICILLIN 250 MG
2 CAPSULE ORAL 2 TIMES DAILY PRN
Status: DISCONTINUED | OUTPATIENT
Start: 2025-07-26 | End: 2025-07-27 | Stop reason: HOSPADM

## 2025-07-26 RX ORDER — NITROGLYCERIN 0.4 MG/1
0.4 TABLET SUBLINGUAL
Status: DISCONTINUED | OUTPATIENT
Start: 2025-07-26 | End: 2025-07-27 | Stop reason: HOSPADM

## 2025-07-26 RX ORDER — PROCHLORPERAZINE EDISYLATE 5 MG/ML
10 INJECTION INTRAMUSCULAR; INTRAVENOUS ONCE
Status: COMPLETED | OUTPATIENT
Start: 2025-07-26 | End: 2025-07-26

## 2025-07-26 RX ORDER — OXYCODONE HYDROCHLORIDE 5 MG/1
15 TABLET ORAL EVERY 4 HOURS PRN
Refills: 0 | Status: DISCONTINUED | OUTPATIENT
Start: 2025-07-26 | End: 2025-07-27 | Stop reason: HOSPADM

## 2025-07-26 RX ORDER — IPRATROPIUM BROMIDE AND ALBUTEROL SULFATE 2.5; .5 MG/3ML; MG/3ML
3 SOLUTION RESPIRATORY (INHALATION) EVERY 6 HOURS PRN
Status: DISCONTINUED | OUTPATIENT
Start: 2025-07-26 | End: 2025-07-27 | Stop reason: HOSPADM

## 2025-07-26 RX ORDER — CLOPIDOGREL BISULFATE 75 MG/1
75 TABLET ORAL DAILY
Status: DISCONTINUED | OUTPATIENT
Start: 2025-07-26 | End: 2025-07-27 | Stop reason: HOSPADM

## 2025-07-26 RX ORDER — HYDROCORTISONE 10 MG/1
10 TABLET ORAL
Status: DISCONTINUED | OUTPATIENT
Start: 2025-07-26 | End: 2025-07-26

## 2025-07-26 RX ORDER — KETOROLAC TROMETHAMINE 30 MG/ML
30 INJECTION, SOLUTION INTRAMUSCULAR; INTRAVENOUS ONCE
Status: COMPLETED | OUTPATIENT
Start: 2025-07-26 | End: 2025-07-26

## 2025-07-26 RX ORDER — HYDROCHLOROTHIAZIDE 12.5 MG/1
12.5 TABLET ORAL DAILY
Status: DISCONTINUED | OUTPATIENT
Start: 2025-07-27 | End: 2025-07-27 | Stop reason: HOSPADM

## 2025-07-26 RX ORDER — METOPROLOL TARTRATE 25 MG/1
25 TABLET, FILM COATED ORAL 2 TIMES DAILY
Status: DISCONTINUED | OUTPATIENT
Start: 2025-07-26 | End: 2025-07-27 | Stop reason: HOSPADM

## 2025-07-26 RX ORDER — SODIUM CHLORIDE 9 MG/ML
40 INJECTION, SOLUTION INTRAVENOUS AS NEEDED
Status: DISCONTINUED | OUTPATIENT
Start: 2025-07-26 | End: 2025-07-27 | Stop reason: HOSPADM

## 2025-07-26 RX ORDER — BUDESONIDE AND FORMOTEROL FUMARATE DIHYDRATE 160; 4.5 UG/1; UG/1
2 AEROSOL RESPIRATORY (INHALATION)
Status: DISCONTINUED | OUTPATIENT
Start: 2025-07-26 | End: 2025-07-27 | Stop reason: HOSPADM

## 2025-07-26 RX ORDER — HYDROCHLOROTHIAZIDE 25 MG/1
25 TABLET ORAL DAILY
Status: DISCONTINUED | OUTPATIENT
Start: 2025-07-26 | End: 2025-07-26

## 2025-07-26 RX ORDER — CETIRIZINE HYDROCHLORIDE 10 MG/1
10 TABLET ORAL DAILY PRN
Status: DISCONTINUED | OUTPATIENT
Start: 2025-07-26 | End: 2025-07-27 | Stop reason: HOSPADM

## 2025-07-26 RX ORDER — POLYETHYLENE GLYCOL 3350 17 G/17G
17 POWDER, FOR SOLUTION ORAL DAILY PRN
Status: DISCONTINUED | OUTPATIENT
Start: 2025-07-26 | End: 2025-07-27 | Stop reason: HOSPADM

## 2025-07-26 RX ORDER — SODIUM CHLORIDE 0.9 % (FLUSH) 0.9 %
10 SYRINGE (ML) INJECTION EVERY 12 HOURS SCHEDULED
Status: DISCONTINUED | OUTPATIENT
Start: 2025-07-26 | End: 2025-07-27 | Stop reason: HOSPADM

## 2025-07-26 RX ORDER — HYDROXYCHLOROQUINE SULFATE 200 MG/1
400 TABLET, FILM COATED ORAL EVERY MORNING
Status: DISCONTINUED | OUTPATIENT
Start: 2025-07-26 | End: 2025-07-27 | Stop reason: HOSPADM

## 2025-07-26 RX ADMIN — ACETAMINOPHEN 1000 MG: 500 TABLET, FILM COATED ORAL at 02:10

## 2025-07-26 RX ADMIN — OXYCODONE 15 MG: 5 TABLET ORAL at 21:34

## 2025-07-26 RX ADMIN — Medication 500 UNITS: at 09:29

## 2025-07-26 RX ADMIN — HYDROXYCHLOROQUINE SULFATE 400 MG: 200 TABLET, FILM COATED ORAL at 09:29

## 2025-07-26 RX ADMIN — GABAPENTIN 800 MG: 400 CAPSULE ORAL at 21:34

## 2025-07-26 RX ADMIN — BUDESONIDE AND FORMOTEROL FUMARATE DIHYDRATE 2 PUFF: 160; 4.5 AEROSOL RESPIRATORY (INHALATION) at 11:13

## 2025-07-26 RX ADMIN — ATORVASTATIN CALCIUM 10 MG: 10 TABLET ORAL at 20:24

## 2025-07-26 RX ADMIN — OXYCODONE 15 MG: 5 TABLET ORAL at 17:13

## 2025-07-26 RX ADMIN — Medication 10 ML: at 20:24

## 2025-07-26 RX ADMIN — CYCLOBENZAPRINE HYDROCHLORIDE 10 MG: 10 TABLET, FILM COATED ORAL at 20:24

## 2025-07-26 RX ADMIN — KETOROLAC TROMETHAMINE 30 MG: 30 INJECTION INTRAMUSCULAR; INTRAVENOUS at 04:04

## 2025-07-26 RX ADMIN — LIDOCAINE 1 PATCH: 4 PATCH TOPICAL at 18:51

## 2025-07-26 RX ADMIN — HYDROCHLOROTHIAZIDE 25 MG: 25 TABLET ORAL at 09:29

## 2025-07-26 RX ADMIN — GABAPENTIN 800 MG: 400 CAPSULE ORAL at 09:29

## 2025-07-26 RX ADMIN — METOPROLOL TARTRATE 25 MG: 25 TABLET, FILM COATED ORAL at 20:24

## 2025-07-26 RX ADMIN — CLOPIDOGREL BISULFATE 75 MG: 75 TABLET, FILM COATED ORAL at 09:29

## 2025-07-26 RX ADMIN — BUDESONIDE AND FORMOTEROL FUMARATE DIHYDRATE 2 PUFF: 160; 4.5 AEROSOL RESPIRATORY (INHALATION) at 21:20

## 2025-07-26 RX ADMIN — PROCHLORPERAZINE EDISYLATE 10 MG: 5 INJECTION INTRAMUSCULAR; INTRAVENOUS at 04:04

## 2025-07-26 RX ADMIN — SODIUM CHLORIDE 500 ML: 9 INJECTION, SOLUTION INTRAVENOUS at 02:37

## 2025-07-26 RX ADMIN — METOPROLOL TARTRATE 25 MG: 25 TABLET, FILM COATED ORAL at 09:29

## 2025-07-26 RX ADMIN — ONDANSETRON 4 MG: 2 INJECTION, SOLUTION INTRAMUSCULAR; INTRAVENOUS at 02:37

## 2025-07-26 RX ADMIN — HYDROCORTISONE 10 MG: 10 TABLET ORAL at 17:12

## 2025-07-26 RX ADMIN — HYDROCORTISONE 10 MG: 10 TABLET ORAL at 11:37

## 2025-07-26 RX ADMIN — CYCLOBENZAPRINE HYDROCHLORIDE 10 MG: 10 TABLET, FILM COATED ORAL at 12:06

## 2025-07-26 RX ADMIN — GABAPENTIN 800 MG: 400 CAPSULE ORAL at 14:15

## 2025-07-26 RX ADMIN — HYDROCORTISONE 10 MG: 10 TABLET ORAL at 09:29

## 2025-07-26 RX ADMIN — OXYCODONE 15 MG: 5 TABLET ORAL at 11:37

## 2025-07-26 RX ADMIN — VENLAFAXINE HYDROCHLORIDE 37.5 MG: 37.5 CAPSULE, EXTENDED RELEASE ORAL at 09:29

## 2025-07-26 RX ADMIN — POTASSIUM CHLORIDE 40 MEQ: 1500 TABLET, EXTENDED RELEASE ORAL at 09:29

## 2025-07-26 NOTE — PLAN OF CARE
Goal Outcome Evaluation:              Outcome Evaluation: patient resting comfortably in bed, vs stable, achs, last bs 115, endocrinology consulted.

## 2025-07-26 NOTE — Clinical Note
Level of Care: Med/Surg [1]   Admitting Physician: KOBY JENSEN [[id number]]   Attending Physician: KOBY JENSEN [[id number]]

## 2025-07-26 NOTE — H&P
[facility]  History & Physical    Patient Name: Lisa Jay  : 1970  MRN: [id number]  Primary Care Physician:  Brian Gonzalez MD  Date of admission: 2025  Date and Time of Service: 2025 at 900    Subjective      Chief Complaint: hypoglycemia, headache    History of Present Illness: Lisa Jay is a 55 y.o. female with a CMH of T2DM on mounjaro, fibromyalgia, HTN, HLD, SLE, Asthma, arthritis, and opioid dependence, and recently diagnosed adrenal insufficiency who presented to [facility] on 2025 with recurrent hypoglycemia. Patient reports that at home her blood glucose was reading 50 to 60s since about 1500 yesterday. She ate but was having trouble bringing it up. She reports that she was here last week with the same complaint. She called her endocrinologist and they are trying to adjust her medications. She reports that she took her Mounjaro on Thursday but it did not dispense the full amount. Patient has been started on hydrocortisone 10 mg 3 times daily for her adrenal insufficiency, she reports compliance with the steroids.  Denies any additional stress, but does state that her lupus recently flared a month ago.  She denies any other exogenous steroid use except for cortisol shots in her knees every 2 to 3 months.  She reports that she has had a headache and some nausea. Headache is typical of headaches that she has when her blood sugar is abnormal.  Patient states she is symptomatic from her hypoglycemia when her glucose is less than 110, endorses some lightheadedness, blurred vision, nausea, and fatigue, and yawning. Denies an cp, soa, sick contacts, abd pain, V/D, fevers, or chills.      Review of Systems 10 point review of systems neg except for above     Personal History     Past Medical History:   Diagnosis Date    Arthritis     Asthma     Diabetes mellitus     DVT (deep venous thrombosis) 2024    right lower leg    Fibromyalgia      History of transfusion     Hypertension     Lupus        Past Surgical History:   Procedure Laterality Date    APPENDECTOMY      CHOLECYSTECTOMY N/A 8/23/2024    Procedure: CHOLECYSTECTOMY LAPAROSCOPIC WITH DAVINCI ROBOT;  Surgeon: Bryce Huizar MD;  Location: [facility] MAIN OR;  Service: Robotics - DaVinci;  Laterality: N/A;    HYSTERECTOMY      JOINT REPLACEMENT Right     KNEE SURGERY      SHOULDER ARTHROSCOPY         Family History: family history is not on file. Otherwise pertinent FHx was reviewed and not pertinent to current issue.    Social History:  reports that she has never smoked. She has never been exposed to tobacco smoke. She has never used smokeless tobacco. She reports that she does not drink alcohol and does not use drugs.    Home Medications:  Prior to Admission Medications       Prescriptions Last Dose Informant Patient Reported? Taking?    albuterol (PROVENTIL) (2.5 MG/3ML) 0.083% nebulizer solution   No Yes    Take 2.5 mg by nebulization Every 4 (Four) Hours As Needed for Wheezing.    albuterol sulfate  (90 Base) MCG/ACT inhaler   Yes Yes    Inhale 2 puffs 2 (Two) Times a Day.    atorvastatin (LIPITOR) 10 MG tablet 7/25/2025  Yes Yes    Take 1 tablet by mouth Every Evening.    budesonide-formoterol (Symbicort) 160-4.5 MCG/ACT inhaler 7/25/2025  No Yes    Inhale 2 puffs 2 (Two) Times a Day.    cetirizine (zyrTEC) 10 MG tablet   Yes Yes    Take 1 tablet by mouth Daily As Needed for Allergies.    clopidogrel (PLAVIX) 75 MG tablet 7/25/2025  No Yes    Take 1 tablet by mouth Daily.    coenzyme Q10 100 MG capsule 7/25/2025  Yes Yes    Take 1 capsule by mouth Daily.    cyclobenzaprine (FLEXERIL) 10 MG tablet 7/25/2025 Self Yes Yes    Take 1 tablet by mouth Every 6 (Six) Hours As Needed for Muscle Spasms.    dicyclomine (BENTYL) 10 MG capsule   Yes Yes    Take 1 capsule by mouth 3 (Three) Times a Day As Needed.    gabapentin (NEURONTIN) 800 MG tablet 7/25/2025 Self Yes Yes    Take 1  tablet by mouth 3 (Three) Times a Day.    hydroCHLOROthiazide 25 MG tablet 7/25/2025  Yes Yes    Take 1 tablet by mouth Daily.    hydrocortisone (CORTEF) 10 MG tablet 7/25/2025  No Yes    Take 1 tablet by mouth 3 (Three) Times a Day With Meals for 30 days.    hydroxychloroquine (PLAQUENIL) 200 MG tablet 7/25/2025  Yes Yes    Take 2 tablets by mouth Every Morning.    Magnesium 200 MG tablet 7/25/2025  Yes Yes    Take 1 tablet by mouth Daily.    metoprolol tartrate (LOPRESSOR) 25 MG tablet 7/25/2025  Yes Yes    Take 1 tablet by mouth 2 (Two) Times a Day.    ondansetron ODT (ZOFRAN-ODT) 4 MG disintegrating tablet   Yes Yes    Place 1 tablet on the tongue Every 8 (Eight) Hours As Needed for Nausea or Vomiting.    oxyCODONE (ROXICODONE) 15 MG immediate release tablet 7/25/2025 Pharmacy Yes Yes    Take 1 tablet by mouth Every 4 (Four) Hours.    Potassium 99 MG tablet 7/25/2025  Yes Yes    Take 1 tablet by mouth Daily.    Tirzepatide (Mounjaro) 10 MG/0.5ML solution auto-injector   Yes Yes    Inject 10 mg under the skin into the appropriate area as directed Every 7 (Seven) Days. Tuesdays    triamcinolone (KENALOG) 0.1 % cream   Yes Yes    Apply 1 Application topically to the appropriate area as directed 2 (Two) Times a Day As Needed.    Turmeric (QC Tumeric Complex) 500 MG capsule 7/25/2025  Yes Yes    Take 1 capsule by mouth Daily.    venlafaxine XR (EFFEXOR-XR) 37.5 MG 24 hr capsule 7/25/2025  Yes Yes    Take 1 capsule by mouth Daily.    Vitamin D, Cholecalciferol, (CHOLECALCIFEROL) 10 MCG (400 UNIT) tablet 7/25/2025  Yes Yes    Take 1 tablet by mouth Daily.              Allergies:  Allergies   Allergen Reactions    Metformin Nausea And Vomiting    Morphine Unknown - High Severity     Pt sates only in Pill form.       Objective      Vitals:   Temp:  [98.1 °F (36.7 °C)] 98.1 °F (36.7 °C)  Heart Rate:  [67-78] 67  Resp:  [16-18] 18  BP: (112-118)/(75-76) 118/75  Body mass index is 35.08 kg/m².  Physical Exam  Vitals  reviewed.   Constitutional:       Appearance: Normal appearance. She is obese.   HENT:      Head: Normocephalic and atraumatic.   Eyes:      Extraocular Movements: Extraocular movements intact.      Pupils: Pupils are equal, round, and reactive to light.   Cardiovascular:      Rate and Rhythm: Normal rate and regular rhythm.      Pulses: Normal pulses.      Heart sounds: Normal heart sounds.   Pulmonary:      Effort: Pulmonary effort is normal.      Breath sounds: Normal breath sounds.   Abdominal:      General: Abdomen is flat. Bowel sounds are normal.      Palpations: Abdomen is soft.   Musculoskeletal:         General: Normal range of motion.      Cervical back: Normal range of motion.   Skin:     General: Skin is warm and dry.   Neurological:      General: No focal deficit present.      Mental Status: She is alert and oriented to person, place, and time. Mental status is at baseline.   Psychiatric:         Mood and Affect: Mood normal.         Behavior: Behavior normal.         Diagnostic Data:  Lab Results (last 24 hours)       Procedure Component Value Units Date/Time    POC Glucose Q3H [[id number]]  (Abnormal) Collected: 07/26/25 0727    Specimen: Blood Updated: 07/26/25 0730     Glucose 115 mg/dL      Comment: Serial Number: [id number]:  [id number]       Urinalysis With Culture If Indicated - Urine, Clean Catch [[id number]]  (Abnormal) Collected: 07/26/25 0426    Specimen: Urine, Clean Catch Updated: 07/26/25 0434     Color, UA Yellow     Appearance, UA Clear     pH, UA 5.5     Specific Gravity, UA 1.031     Glucose, UA Negative     Ketones, UA Trace     Bilirubin, UA Negative     Blood, UA Negative     Protein, UA Negative     Leuk Esterase, UA Negative     Nitrite, UA Negative     Urobilinogen, UA 1.0 E.U./dL    Narrative:      In absence of clinical symptoms, the presence of pyuria, bacteria, and/or nitrites on the urinalysis result does not correlate with infection.  Urine microscopic not indicated.     POC Glucose Once [[id number]]  (Abnormal) Collected: 07/26/25 0358    Specimen: Blood Updated: 07/26/25 0401     Glucose 115 mg/dL      Comment: Serial Number: [id number]:  [id number]        Maryam Comment 1 Notified Physician     Maryam Comment 2 Notified Patients RN    Comprehensive Metabolic Panel [[id number]]  (Abnormal) Collected: 07/26/25 0307    Specimen: Blood Updated: 07/26/25 0337     Glucose 95 mg/dL      BUN 26.0 mg/dL      Creatinine 0.90 mg/dL      Sodium 141 mmol/L      Potassium 3.5 mmol/L      Chloride 102 mmol/L      CO2 26.6 mmol/L      Calcium 9.6 mg/dL      Total Protein 7.6 g/dL      Albumin 4.3 g/dL      ALT (SGPT) 25 U/L      AST (SGOT) 18 U/L      Alkaline Phosphatase 85 U/L      Total Bilirubin 0.3 mg/dL      Globulin 3.3 gm/dL      A/G Ratio 1.3 g/dL      BUN/Creatinine Ratio 28.9     Anion Gap 12.4 mmol/L      eGFR 74.7 mL/min/1.73     Narrative:      GFR Categories in Chronic Kidney Disease (CKD)              GFR Category          GFR (mL/min/1.73)    Interpretation  G1                    90 or greater        Normal or high (1)  G2                    60-89                Mild decrease (1)  G3a                   45-59                Mild to moderate decrease  G3b                   30-44                Moderate to severe decrease  G4                    15-29                Severe decrease  G5                    14 or less           Kidney failure    (1)In the absence of evidence of kidney disease, neither GFR category G1 or G2 fulfill the criteria for CKD.    eGFR calculation 2021 CKD-EPI creatinine equation, which does not include race as a factor    Lipase [[id number]]  (Normal) Collected: 07/26/25 0307    Specimen: Blood Updated: 07/26/25 0337     Lipase 33 U/L     Hemoglobin A1c [[id number]]  (Abnormal) Collected: 07/26/25 0216    Specimen: Blood Updated: 07/26/25 0237     Hemoglobin A1C 5.76 %     Narrative:      Hemoglobin A1C Ranges:    Increased Risk for Diabetes  5.7% to  6.4%  Diabetes                     >= 6.5%  Diabetic Goal                < 7.0%    POC Glucose Once [[id number]]  (Abnormal) Collected: 07/26/25 0223    Specimen: Blood Updated: 07/26/25 0225     Glucose 137 mg/dL      Comment: Serial Number: [id number]:  [id number]       CBC & Differential [[id number]]  (Abnormal) Collected: 07/26/25 0216    Specimen: Blood Updated: 07/26/25 0222    Narrative:      The following orders were created for panel order CBC & Differential.  Procedure                               Abnormality         Status                     ---------                               -----------         ------                     CBC Auto Differential[[id number]]        Abnormal            Final result                 Please view results for these tests on the individual orders.    CBC Auto Differential [[id number]]  (Abnormal) Collected: 07/26/25 0216    Specimen: Blood Updated: 07/26/25 0222     WBC 15.47 10*3/mm3      RBC 4.41 10*6/mm3      Hemoglobin 12.8 g/dL      Hematocrit 40.2 %      MCV 91.2 fL      MCH 29.0 pg      MCHC 31.8 g/dL      RDW 12.7 %      RDW-SD 42.3 fl      MPV 9.3 fL      Platelets 300 10*3/mm3      Neutrophil % 67.3 %      Lymphocyte % 21.1 %      Monocyte % 9.3 %      Eosinophil % 1.0 %      Basophil % 0.5 %      Immature Grans % 0.8 %      Neutrophils, Absolute 10.41 10*3/mm3      Lymphocytes, Absolute 3.27 10*3/mm3      Monocytes, Absolute 1.44 10*3/mm3      Eosinophils, Absolute 0.15 10*3/mm3      Basophils, Absolute 0.08 10*3/mm3      Immature Grans, Absolute 0.12 10*3/mm3      nRBC 0.0 /100 WBC     POC Glucose Once [[id number]]  (Abnormal) Collected: 07/26/25 0144    Specimen: Blood Updated: 07/26/25 0146     Glucose 150 mg/dL      Comment: Serial Number: [id number]:  [id number]                Imaging Results (Last 24 Hours)       ** No results found for the last 24 hours. **              Assessment & Plan        This is a 55 y.o. female with:    Active and Resolved  Problems  Active Hospital Problems    Diagnosis  POA    **Hypoglycemia [E16.2]  Yes      Resolved Hospital Problems   No resolved problems to display.       Impression: Recurrent hypoglycemia  Adrenal insufficiency  T2DM on mounjaro  Fibromyalgia  HTN  HLD  SLE  Asthma  Arthritis  opioid dependence    Plan: Admit to [facility]. ED records, labs, and imaging reviewed.     Recurrent hypoglycemia  Adrenal insufficiency  T2DM on mounjaro  -recently admitted for similar issue, underwent ACTH stim test and failed, started on hydrocortisone 10mg PO TID by endocrine  -Suspect secondary adrenal insufficiency, patient's BP has remained stable, Sodium 141, potassium 3.5 (although she is on HCTZ)  -aldosterone and C peptide pending  -orthostatics pending  -Not in adrenal crisis, denies any particular stressful events or recent sick contacts, although lupus could be playing a factor  -Accuchecks ACHS for now, she has remained normoglycemic  -Patient denies any exogenous insulin use, only Mounjaro for her diabetes  -endocrine reconsulted this admission  -check home glucose monitoring device and ensure proper calibration  -A1c 5.76, pt has lost over 100lbs from mounjaro and lifestyle changes    HTN: continue home HCTZ and metoprolol    HLD: continue home statin    SLE: Continue home plaquenil    Asthma: not in exacerbation  -albuterol prn    Fibromyalgia  Arthritis  opioid dependence  -On oxycodone 15mg q4h, will order prn for severe pain  -continue home gabapentin and flexeril  -tylenol for mild pain    VTE Prophylaxis:  No VTE prophylaxis order currently exists.        The patient desires to be as follows:    CODE STATUS:    Code Status (Patient has no pulse and is not breathing): CPR (Attempt to Resuscitate)  Medical Interventions (Patient has pulse or is breathing): Full Support        sister Chamberlain, who can be contacted at [phone], is the designated person to make medical decisions on the patient's behalf if She is  incapable of doing so. This was clarified with patient and/or next of kin on 7/26/2025 during the course of this H&P.    Admission Status:  I believe this patient meets inpatient status.    Expected Length of Stay: 3 days    PDMP and Medication Dispenses via Sidebar reviewed and consistent with patient reported medications.    I discussed the patient's findings and my recommendations with patient.      Signature:     This document has been electronically signed by Deedee Mcgill PA-C on July 26, 2025 07:35 EDT   [facility]

## 2025-07-26 NOTE — ED PROVIDER NOTES
Subjective   History of Present Illness  Patient is a 55-year-old female with past medical history significant for asthma, DVT, fibromyalgia, lupus, and diabetes presenting to the ED for hypoglycemia.  Patient reports that at home her blood glucose was reading 50 to 60s since about 1500 today.  She ate but was having trouble bringing it up.  She reports that she was here last week with the same complaint.  She called her endocrinologist and they are trying to adjust her medications.  She reports that she took her Mounjaro last night but it did not dispense the full amount.  Patient has been started on steroids recently secondary to hypoglycemia.  She reports that she has had a headache and some nausea.  Headache is typical of headaches that she has when her blood sugar is abnormal.          Review of Systems   Constitutional:  Negative for fever.   Genitourinary:  Positive for frequency.       Past Medical History:   Diagnosis Date    Asthma     Diabetes mellitus     DVT (deep venous thrombosis) 08/23/2024    right lower leg    Fibromyalgia     Lupus        Allergies   Allergen Reactions    Metformin Nausea And Vomiting    Morphine Unknown - High Severity     Pt sates only in Pill form.       Past Surgical History:   Procedure Laterality Date    APPENDECTOMY      CHOLECYSTECTOMY N/A 8/23/2024    Procedure: CHOLECYSTECTOMY LAPAROSCOPIC WITH DAVINCI ROBOT;  Surgeon: Bryce Huizar MD;  Location: [facility] OR;  Service: Robotics - DaVinci;  Laterality: N/A;    HYSTERECTOMY      JOINT REPLACEMENT Right     KNEE SURGERY      SHOULDER ARTHROSCOPY         No family history on file.    Social History     Socioeconomic History    Marital status: Single   Tobacco Use    Smoking status: Never     Passive exposure: Never    Smokeless tobacco: Never   Vaping Use    Vaping status: Never Used   Substance and Sexual Activity    Alcohol use: Never    Drug use: Never    Sexual activity: Never           Objective    Physical Exam  Vitals and nursing note reviewed.   Constitutional:       General: She is not in acute distress.     Appearance: Normal appearance. She is obese. She is not ill-appearing, toxic-appearing or diaphoretic.   HENT:      Head: Normocephalic and atraumatic.      Right Ear: External ear normal.      Left Ear: External ear normal.      Nose: Nose normal.      Mouth/Throat:      Mouth: Mucous membranes are moist.   Eyes:      Extraocular Movements: Extraocular movements intact.      Conjunctiva/sclera: Conjunctivae normal.      Pupils: Pupils are equal, round, and reactive to light.   Cardiovascular:      Rate and Rhythm: Normal rate and regular rhythm.      Heart sounds: Normal heart sounds. No murmur heard.     No friction rub. No gallop.   Pulmonary:      Effort: Pulmonary effort is normal. No respiratory distress.      Breath sounds: Normal breath sounds. No stridor. No wheezing, rhonchi or rales.   Abdominal:      General: Bowel sounds are normal. There is no distension.      Palpations: Abdomen is soft.      Tenderness: There is no abdominal tenderness. There is no guarding.   Musculoskeletal:         General: Normal range of motion.      Cervical back: Normal range of motion and neck supple. No rigidity or tenderness.      Right lower leg: No edema.      Left lower leg: No edema.   Lymphadenopathy:      Cervical: No cervical adenopathy.   Skin:     General: Skin is warm and dry.      Capillary Refill: Capillary refill takes 2 to 3 seconds.      Coloration: Skin is not jaundiced.   Neurological:      General: No focal deficit present.      Mental Status: She is alert.   Psychiatric:         Mood and Affect: Mood normal.         Behavior: Behavior normal.         Procedures           ED Course  ED Course as of 07/26/25 0440   Sat Jul 26, 2025   0330 Waiting on labs   [ME]      ED Course User Index  [ME] nAnika Suarez PA-C      Labs Reviewed   COMPREHENSIVE METABOLIC PANEL - Abnormal; Notable for  the following components:       Result Value    BUN 26.0 (*)     BUN/Creatinine Ratio 28.9 (*)     All other components within normal limits    Narrative:     GFR Categories in Chronic Kidney Disease (CKD)              GFR Category          GFR (mL/min/1.73)    Interpretation  G1                    90 or greater        Normal or high (1)  G2                    60-89                Mild decrease (1)  G3a                   45-59                Mild to moderate decrease  G3b                   30-44                Moderate to severe decrease  G4                    15-29                Severe decrease  G5                    14 or less           Kidney failure    (1)In the absence of evidence of kidney disease, neither GFR category G1 or G2 fulfill the criteria for CKD.    eGFR calculation 2021 CKD-EPI creatinine equation, which does not include race as a factor   URINALYSIS W/ CULTURE IF INDICATED - Abnormal; Notable for the following components:    Specific Gravity, UA 1.031 (*)     Ketones, UA Trace (*)     All other components within normal limits    Narrative:     In absence of clinical symptoms, the presence of pyuria, bacteria, and/or nitrites on the urinalysis result does not correlate with infection.  Urine microscopic not indicated.   HEMOGLOBIN A1C - Abnormal; Notable for the following components:    Hemoglobin A1C 5.76 (*)     All other components within normal limits    Narrative:     Hemoglobin A1C Ranges:    Increased Risk for Diabetes  5.7% to 6.4%  Diabetes                     >= 6.5%  Diabetic Goal                < 7.0%   CBC WITH AUTO DIFFERENTIAL - Abnormal; Notable for the following components:    WBC 15.47 (*)     Immature Grans % 0.8 (*)     Neutrophils, Absolute 10.41 (*)     Lymphocytes, Absolute 3.27 (*)     Monocytes, Absolute 1.44 (*)     Immature Grans, Absolute 0.12 (*)     All other components within normal limits   POCT GLUCOSE FINGERSTICK - Abnormal; Notable for the following components:     Glucose 150 (*)     All other components within normal limits   POCT GLUCOSE FINGERSTICK - Abnormal; Notable for the following components:    Glucose 137 (*)     All other components within normal limits   POCT GLUCOSE FINGERSTICK - Abnormal; Notable for the following components:    Glucose 115 (*)     All other components within normal limits   LIPASE - Normal   POCT GLUCOSE FINGERSTICK   POCT GLUCOSE FINGERSTICK   POCT GLUCOSE FINGERSTICK   POCT GLUCOSE FINGERSTICK   CBC AND DIFFERENTIAL    Narrative:     The following orders were created for panel order CBC & Differential.  Procedure                               Abnormality         Status                     ---------                               -----------         ------                     CBC Auto Differential[[id number]]        Abnormal            Final result                 Please view results for these tests on the individual orders.     No radiology results for the last day  Medications   sodium chloride 0.9 % flush 10 mL (has no administration in time range)   sodium chloride 0.9 % flush 10 mL (has no administration in time range)   sodium chloride 0.9 % infusion 40 mL (has no administration in time range)   nitroglycerin (NITROSTAT) SL tablet 0.4 mg (has no administration in time range)   Potassium Replacement - Follow Nurse / BPA Driven Protocol (has no administration in time range)   Magnesium Standard Dose Replacement - Follow Nurse / BPA Driven Protocol (has no administration in time range)   Phosphorus Replacement - Follow Nurse / BPA Driven Protocol (has no administration in time range)   Calcium Replacement - Follow Nurse / BPA Driven Protocol (has no administration in time range)   sennosides-docusate (PERICOLACE) 8.6-50 MG per tablet 2 tablet (has no administration in time range)     And   polyethylene glycol (MIRALAX) packet 17 g (has no administration in time range)     And   bisacodyl (DULCOLAX) EC tablet 5 mg (has no administration in  time range)     And   bisacodyl (DULCOLAX) suppository 10 mg (has no administration in time range)   dextrose (GLUTOSE) oral gel 15 g (has no administration in time range)   dextrose (D50W) (25 g/50 mL) IV injection 25 g (has no administration in time range)   glucagon (GLUCAGEN) injection 1 mg (has no administration in time range)   acetaminophen (TYLENOL) tablet 1,000 mg (1,000 mg Oral Given 7/26/25 0210)   ondansetron (ZOFRAN) injection 4 mg (4 mg Intravenous Given 7/26/25 0237)   sodium chloride 0.9 % bolus 500 mL (500 mL Intravenous New Bag 7/26/25 0237)   prochlorperazine (COMPAZINE) injection 10 mg (10 mg Intravenous Given 7/26/25 0404)   ketorolac (TORADOL) injection 30 mg (30 mg Intravenous Given 7/26/25 0404)                                                      Medical Decision Making  Patient is a 55-year-old female who presented with the above complaint.  She had the above evaluation and exam.  Patient was placed on monitor and IV was established.    Imaging was considered but not deemed emergently warranted.    Patient's POC glucose on arrival was 150 with a repeat of 115.  Lipase was normal.  CMP unremarkable.  White count 15.47.  Patient has been on some oral steroids.  Hemoglobin A1c 5.76%.  Urinalysis unremarkable.    Patient was given Toradol, Compazine, Zofran, and Tylenol in the ED for headache and nausea.  At reassessment, patient reports some improvement.  This is not a thunderclap onset, and this is not the worst headache she has ever had.  Patient's diabetic monitoring device is not reading the same blood glucose levels level as POC fingersticks.  She was here about a week ago with the same complaints.  Patient was discussed with Dr. Adame of hospitalist team, who agrees to admit for further management    Problems Addressed:  Hypoglycemia: complicated acute illness or injury  Leukocytosis, unspecified type: complicated acute illness or injury    Amount and/or Complexity of Data  Reviewed  Labs: ordered.    Risk  OTC drugs.  Prescription drug management.  Decision regarding hospitalization.        Final diagnoses:   Leukocytosis, unspecified type   Hypoglycemia   Acute nonintractable headache, unspecified headache type       ED Disposition  ED Disposition       ED Disposition   Decision to Admit    Condition   --    Comment   Level of Care: Med/Surg [1]   Diagnosis: Hypoglycemia [881098]   Admitting Physician: KOBY JENSEN [[id number]]                 No follow-up provider specified.       Medication List        ASK your doctor about these medications      dicyclomine 10 MG capsule  Commonly known as: BENTYL  Ask about: Which instructions should I use?                 Annika Suarez PA-C  07/26/25 5080

## 2025-07-27 VITALS
TEMPERATURE: 98.3 F | HEIGHT: 67 IN | DIASTOLIC BLOOD PRESSURE: 80 MMHG | OXYGEN SATURATION: 100 % | SYSTOLIC BLOOD PRESSURE: 127 MMHG | WEIGHT: 224 LBS | RESPIRATION RATE: 14 BRPM | HEART RATE: 69 BPM | BODY MASS INDEX: 35.16 KG/M2

## 2025-07-27 LAB
ANION GAP SERPL CALCULATED.3IONS-SCNC: 13 MMOL/L (ref 5–15)
BUN SERPL-MCNC: 22.8 MG/DL (ref 6–20)
BUN/CREAT SERPL: 26.8 (ref 7–25)
CALCIUM SPEC-SCNC: 9.1 MG/DL (ref 8.6–10.5)
CHLORIDE SERPL-SCNC: 105 MMOL/L (ref 98–107)
CO2 SERPL-SCNC: 22 MMOL/L (ref 22–29)
CREAT SERPL-MCNC: 0.85 MG/DL (ref 0.57–1)
DEPRECATED RDW RBC AUTO: 45.1 FL (ref 37–54)
EGFRCR SERPLBLD CKD-EPI 2021: 80 ML/MIN/1.73
ERYTHROCYTE [DISTWIDTH] IN BLOOD BY AUTOMATED COUNT: 13.2 % (ref 12.3–15.4)
GLUCOSE BLDC GLUCOMTR-MCNC: 118 MG/DL (ref 70–105)
GLUCOSE BLDC GLUCOMTR-MCNC: 120 MG/DL (ref 70–105)
GLUCOSE BLDC GLUCOMTR-MCNC: 125 MG/DL (ref 70–105)
GLUCOSE SERPL-MCNC: 143 MG/DL (ref 65–99)
HCT VFR BLD AUTO: 36.3 % (ref 34–46.6)
HGB BLD-MCNC: 11.4 G/DL (ref 12–15.9)
MAGNESIUM SERPL-MCNC: 2.3 MG/DL (ref 1.6–2.6)
MCH RBC QN AUTO: 29.2 PG (ref 26.6–33)
MCHC RBC AUTO-ENTMCNC: 31.4 G/DL (ref 31.5–35.7)
MCV RBC AUTO: 92.8 FL (ref 79–97)
PHOSPHATE SERPL-MCNC: 3.2 MG/DL (ref 2.5–4.5)
PLATELET # BLD AUTO: 284 10*3/MM3 (ref 140–450)
PMV BLD AUTO: 9.5 FL (ref 6–12)
POTASSIUM SERPL-SCNC: 3.8 MMOL/L (ref 3.5–5.2)
RBC # BLD AUTO: 3.91 10*6/MM3 (ref 3.77–5.28)
SODIUM SERPL-SCNC: 140 MMOL/L (ref 136–145)
WBC NRBC COR # BLD AUTO: 11.98 10*3/MM3 (ref 3.4–10.8)

## 2025-07-27 PROCEDURE — 94799 UNLISTED PULMONARY SVC/PX: CPT

## 2025-07-27 PROCEDURE — 85027 COMPLETE CBC AUTOMATED: CPT

## 2025-07-27 PROCEDURE — G0378 HOSPITAL OBSERVATION PER HR: HCPCS

## 2025-07-27 PROCEDURE — 82948 REAGENT STRIP/BLOOD GLUCOSE: CPT

## 2025-07-27 PROCEDURE — 80048 BASIC METABOLIC PNL TOTAL CA: CPT

## 2025-07-27 PROCEDURE — 94761 N-INVAS EAR/PLS OXIMETRY MLT: CPT

## 2025-07-27 PROCEDURE — 84100 ASSAY OF PHOSPHORUS: CPT

## 2025-07-27 PROCEDURE — 94664 DEMO&/EVAL PT USE INHALER: CPT

## 2025-07-27 PROCEDURE — 83735 ASSAY OF MAGNESIUM: CPT

## 2025-07-27 PROCEDURE — 99213 OFFICE O/P EST LOW 20 MIN: CPT | Performed by: INTERNAL MEDICINE

## 2025-07-27 RX ORDER — HYDROCHLOROTHIAZIDE 12.5 MG/1
12.5 TABLET ORAL DAILY
Qty: 30 TABLET | Refills: 0 | Status: SHIPPED | OUTPATIENT
Start: 2025-07-28

## 2025-07-27 RX ORDER — HYDROCORTISONE 10 MG/1
10 TABLET ORAL 2 TIMES DAILY WITH MEALS
Qty: 60 TABLET | Refills: 0 | Status: SHIPPED | OUTPATIENT
Start: 2025-07-27

## 2025-07-27 RX ADMIN — HYDROCHLOROTHIAZIDE 12.5 MG: 12.5 TABLET ORAL at 08:45

## 2025-07-27 RX ADMIN — HYDROXYCHLOROQUINE SULFATE 400 MG: 200 TABLET, FILM COATED ORAL at 08:44

## 2025-07-27 RX ADMIN — HYDROCORTISONE 10 MG: 10 TABLET ORAL at 17:50

## 2025-07-27 RX ADMIN — Medication 500 UNITS: at 08:45

## 2025-07-27 RX ADMIN — BUDESONIDE AND FORMOTEROL FUMARATE DIHYDRATE 2 PUFF: 160; 4.5 AEROSOL RESPIRATORY (INHALATION) at 10:41

## 2025-07-27 RX ADMIN — HYDROCORTISONE 10 MG: 10 TABLET ORAL at 08:45

## 2025-07-27 RX ADMIN — GABAPENTIN 800 MG: 400 CAPSULE ORAL at 13:50

## 2025-07-27 RX ADMIN — VENLAFAXINE HYDROCHLORIDE 37.5 MG: 37.5 CAPSULE, EXTENDED RELEASE ORAL at 08:45

## 2025-07-27 RX ADMIN — OXYCODONE 15 MG: 5 TABLET ORAL at 13:49

## 2025-07-27 RX ADMIN — BUDESONIDE AND FORMOTEROL FUMARATE DIHYDRATE 2 PUFF: 160; 4.5 AEROSOL RESPIRATORY (INHALATION) at 19:06

## 2025-07-27 RX ADMIN — GABAPENTIN 800 MG: 400 CAPSULE ORAL at 05:15

## 2025-07-27 RX ADMIN — DICLOFENAC SODIUM TOPICAL GEL, 1% 4 G: 10 GEL TOPICAL at 00:10

## 2025-07-27 RX ADMIN — OXYCODONE 15 MG: 5 TABLET ORAL at 17:50

## 2025-07-27 RX ADMIN — CLOPIDOGREL BISULFATE 75 MG: 75 TABLET, FILM COATED ORAL at 08:45

## 2025-07-27 RX ADMIN — Medication 10 ML: at 08:44

## 2025-07-27 RX ADMIN — METOPROLOL TARTRATE 25 MG: 25 TABLET, FILM COATED ORAL at 08:45

## 2025-07-27 RX ADMIN — CYCLOBENZAPRINE HYDROCHLORIDE 10 MG: 10 TABLET, FILM COATED ORAL at 13:50

## 2025-07-27 NOTE — PLAN OF CARE
Goal Outcome Evaluation:         Pain treated per MAR. Will continue with current plan of care.

## 2025-07-27 NOTE — DISCHARGE SUMMARY
"             [facility] Medicine Services  Discharge Summary    Date of Service: 2025  Patient Name: Lisa Jay  : 1970  MRN: [id number]    Date of Admission: 2025  Discharge Diagnosis: Hypoglycemia  Date of Discharge: 2025  Primary Care Physician: Brian Gonzalez MD      Presenting Problem:   Hypoglycemia [E16.2]  Acute nonintractable headache, unspecified headache type [R51.9]  Leukocytosis, unspecified type [D72.829]    Active and Resolved Hospital Problems:  Active Hospital Problems    Diagnosis POA    **Hypoglycemia [E16.2] Yes      Resolved Hospital Problems   No resolved problems to display.         Hospital Course     HPI:    \"Lisa Jay is a 55 y.o. female with a CMH of T2DM on mounjaro, fibromyalgia, HTN, HLD, SLE, Asthma, arthritis, and opioid dependence, and recently diagnosed adrenal insufficiency who presented to [facility] on 2025 with recurrent hypoglycemia. Patient reports that at home her blood glucose was reading 50 to 60s since about 1500 yesterday. She ate but was having trouble bringing it up. She reports that she was here last week with the same complaint. She called her endocrinologist and they are trying to adjust her medications. She reports that she took her Mounjaro on Thursday but it did not dispense the full amount. Patient has been started on hydrocortisone 10 mg 3 times daily for her adrenal insufficiency, she reports compliance with the steroids.  Denies any additional stress, but does state that her lupus recently flared a month ago.  She denies any other exogenous steroid use except for cortisol shots in her knees every 2 to 3 months.  She reports that she has had a headache and some nausea. Headache is typical of headaches that she has when her blood sugar is abnormal.  Patient states she is symptomatic from her hypoglycemia when her glucose is less than 110, endorses some lightheadedness, blurred vision, nausea, and fatigue, and " "yawning. Denies an cp, soa, sick contacts, abd pain, V/D, fevers, or chills. \"    Hospital Course:    Patient hospitalized for reports of hypoglycemia at home. Monitored inpatient and evaluated by endocrinology with adjustments made to her home hydrocortisone dosing, HCTZ and lifestyle recommendations given. Blood sugar stable inpatient and patient discharged home.        DISCHARGE Follow Up Recommendations for labs and diagnostics:     PCP, Endocrinology        Day of Discharge     Vital Signs:  Temp:  [97.6 °F (36.4 °C)-98.3 °F (36.8 °C)] 98.3 °F (36.8 °C)  Heart Rate:  [69-81] 69  Resp:  [13-18] 14  BP: (113-127)/(80-90) 127/80    Physical Exam:  Physical Exam  Constitutional:       Appearance: Normal appearance.   Cardiovascular:      Rate and Rhythm: Normal rate and regular rhythm.      Pulses: Normal pulses.      Heart sounds: Normal heart sounds.   Pulmonary:      Effort: Pulmonary effort is normal.      Breath sounds: Normal breath sounds.   Abdominal:      General: Abdomen is flat.      Palpations: Abdomen is soft.   Neurological:      Mental Status: She is alert.            Pertinent  and/or Most Recent Results     LAB RESULTS:      Lab 07/27/25  0523 07/26/25  0216   WBC 11.98* 15.47*   HEMOGLOBIN 11.4* 12.8   HEMATOCRIT 36.3 40.2   PLATELETS 284 300   NEUTROS ABS  --  10.41*   IMMATURE GRANS (ABS)  --  0.12*   LYMPHS ABS  --  3.27*   MONOS ABS  --  1.44*   EOS ABS  --  0.15   MCV 92.8 91.2         Lab 07/27/25  0523 07/26/25  1637 07/26/25  0307 07/26/25  0216   SODIUM 140  --  141  --    POTASSIUM 3.8  --  3.5  --    CHLORIDE 105  --  102  --    CO2 22.0  --  26.6  --    ANION GAP 13.0  --  12.4  --    BUN 22.8*  --  26.0*  --    CREATININE 0.85  --  0.90  --    EGFR 80.0  --  74.7  --    GLUCOSE 143*  --  95  --    CALCIUM 9.1  --  9.6  --    MAGNESIUM 2.3  --   --   --    PHOSPHORUS 3.2  --   --   --    HEMOGLOBIN A1C  --   --   --  5.76*   TSH  --  0.925  --   --          Lab 07/26/25  0307   TOTAL " PROTEIN 7.6   ALBUMIN 4.3   GLOBULIN 3.3   ALT (SGPT) 25   AST (SGOT) 18   BILIRUBIN 0.3   ALK PHOS 85   LIPASE 33                     Brief Urine Lab Results  (Last result in the past 365 days)        Color   Clarity   Blood   Leuk Est   Nitrite   Protein   CREAT   Urine HCG        07/26/25 0426 Yellow   Clear   Negative   Negative   Negative   Negative                 Microbiology Results (last 10 days)       ** No results found for the last 240 hours. **            CT Head Without Contrast  Result Date: 7/13/2025  Impression: No acute intracranial abnormality Brain MRI is more sensitive to evaluate for acute or subacute infarcts and to evaluate for intracranial metastatic disease. Electronically Signed: Francisco Durham MD  7/13/2025 12:06 PM EDT  Workstation ID: [id number]      Results for orders placed during the hospital encounter of 09/18/24    Duplex Venous Lower Extremity - Right CAR 09/20/2024 10:42 AM    Interpretation Summary    Normal right lower extremity venous duplex scan.      Results for orders placed during the hospital encounter of 09/18/24    Duplex Venous Lower Extremity - Right CAR 09/20/2024 10:42 AM    Interpretation Summary    Normal right lower extremity venous duplex scan.      Results for orders placed during the hospital encounter of 03/30/25    Adult Transthoracic Echo Complete w/ Color, Spectral and Contrast if Necessary Per Protocol 03/31/2025  7:08 PM    Interpretation Summary    Left ventricular systolic function is normal. Calculated left ventricular EF = 66% Left ventricular ejection fraction appears to be 66 - 70%.    Left ventricular diastolic function is consistent with (grade I) impaired relaxation.    The right ventricular cavity is mildly dilated.    The left atrial cavity is mildly dilated.    Estimated right ventricular systolic pressure from tricuspid regurgitation is mildly elevated (35-45 mmHg).      Labs Pending at Discharge:  Pending Results       Procedure [Order ID]  Specimen - Date/Time    Aldosterone [[id number]] Collected: 07/26/25 1637    Specimen: Blood Updated: 07/26/25 1637    C-Peptide [[id number]] Collected: 07/26/25 1637    Specimen: Blood Updated: 07/26/25 1637            Procedures Performed           Consults:   Consults       Date and Time Order Name Status Description    7/26/2025  5:08 AM Inpatient Endocrinology Consult Completed               Discharge Details        Discharge Medications        ASK your doctor about these medications        Instructions Start Date   albuterol (2.5 MG/3ML) 0.083% nebulizer solution  Commonly known as: PROVENTIL   2.5 mg, Nebulization, Every 4 Hours PRN      albuterol sulfate  (90 Base) MCG/ACT inhaler  Commonly known as: PROVENTIL HFA;VENTOLIN HFA;PROAIR HFA   2 puffs, 2 Times Daily      atorvastatin 10 MG tablet  Commonly known as: LIPITOR   10 mg, Every Evening      cetirizine 10 MG tablet  Commonly known as: zyrTEC   10 mg, Daily PRN      clopidogrel 75 MG tablet  Commonly known as: PLAVIX   75 mg, Oral, Daily      coenzyme Q10 100 MG capsule   100 mg, Daily      cyclobenzaprine 10 MG tablet  Commonly known as: FLEXERIL   10 mg, Every 6 Hours PRN      dicyclomine 10 MG capsule  Commonly known as: BENTYL  Ask about: Which instructions should I use?   10 mg, 3 Times Daily PRN      gabapentin 800 MG tablet  Commonly known as: NEURONTIN   800 mg, 3 Times Daily      hydroCHLOROthiazide 25 MG tablet   25 mg, Daily      hydrocortisone 10 MG tablet  Commonly known as: CORTEF   10 mg, Oral, 3 Times Daily With Meals      hydroxychloroquine 200 MG tablet  Commonly known as: PLAQUENIL   400 mg, Every Morning      Magnesium 200 MG tablet   1 tablet, Daily      metoprolol tartrate 25 MG tablet  Commonly known as: LOPRESSOR   25 mg, 2 Times Daily      Mounjaro 10 MG/0.5ML solution auto-injector  Generic drug: Tirzepatide   10 mg, Every 7 Days      ondansetron ODT 4 MG disintegrating tablet  Commonly known as: ZOFRAN-ODT   4 mg, Every 8  Hours PRN      oxyCODONE 15 MG immediate release tablet  Commonly known as: ROXICODONE   15 mg, Every 4 Hours      Potassium 99 MG tablet   1 tablet, Daily      QC Tumeric Complex 500 MG capsule  Generic drug: Turmeric   1 capsule, Daily      Symbicort 160-4.5 MCG/ACT inhaler  Generic drug: budesonide-formoterol   2 puffs, Inhalation, 2 Times Daily - RT      triamcinolone 0.1 % cream  Commonly known as: KENALOG   1 Application, 2 Times Daily PRN      venlafaxine XR 37.5 MG 24 hr capsule  Commonly known as: EFFEXOR-XR   37.5 mg, Daily      Vitamin D (Cholecalciferol) 10 MCG (400 UNIT) tablet  Commonly known as: CHOLECALCIFEROL   400 Units, Daily               Allergies   Allergen Reactions    Metformin Nausea And Vomiting    Morphine Unknown - High Severity     Pt sates only in Pill form.         Discharge Disposition:   Home or Self Care    Diet:  Hospital:  Diet Order   Procedures    Diet: Regular/House; Fluid Consistency: Thin (IDDSI 0)         Discharge Activity:         CODE STATUS:  Code Status and Medical Interventions: CPR (Attempt to Resuscitate); Full Support   Ordered at: 07/26/25 0414     Code Status (Patient has no pulse and is not breathing):    CPR (Attempt to Resuscitate)     Medical Interventions (Patient has pulse or is breathing):    Full Support         No future appointments.        Time spent on Discharge including face to face service:  55 minutes    Signature: Electronically signed by Baldemar Ibarra MD, 07/27/25, 12:11 EDT.  [facility] Hospitalist Team

## 2025-07-27 NOTE — CONSULTS
[facility]    Referring Provider: Dr. Raf Adame  Reason for Consultation: Hypoglycemia evaluation & management.    Patient Care Team:  Brian Gonzalez MD as PCP - General (Family Medicine)    Chief complaint: Hypoglycemia      Subjective .     History of present illness:    This is a  55 y.o. female with type 2 Diabetes, initially placed on metformin.  It caused GI side effects. She has been on Mounjaro for >1y & has been able to  lose >100 lb. Followed by Dr. Travis @ [facility].  Goes to the [facility] & exercises in the pool for several hours.   Admitted on 7/13 with the feeling of low bl sugar, weakness, dizziness.  Feels that way when sugar is <110. No documented sugars <50.   Uses [employer] CGM.  ACTH stimulation test 3 weeks ago was abnormal.  Started replacement with 10 mg Cortef 3x/d.   Dose was decreased to 2x/d last week after visit with Dr. Travis.   Again came in early this am after near syncope      Review of Systems  Review of Systems    History  Past Medical History:   Diagnosis Date    Arthritis     Asthma     Diabetes mellitus     DVT (deep venous thrombosis) 08/23/2024    right lower leg    Fibromyalgia     History of transfusion     Hypertension     Lupus      Past Surgical History:   Procedure Laterality Date    APPENDECTOMY      CHOLECYSTECTOMY N/A 8/23/2024    Procedure: CHOLECYSTECTOMY LAPAROSCOPIC WITH DAVINCI ROBOT;  Surgeon: Bryce Huizar MD;  Location: [facility];  Service: Robotics - DaVinci;  Laterality: N/A;    HYSTERECTOMY      JOINT REPLACEMENT Right     KNEE SURGERY      SHOULDER ARTHROSCOPY       History reviewed. No pertinent family history.  Social History     Tobacco Use    Smoking status: Never     Passive exposure: Never    Smokeless tobacco: Never   Vaping Use    Vaping status: Never Used   Substance Use Topics    Alcohol use: Never    Drug use: Never     Medications Prior to Admission   Medication Sig Dispense Refill Last Dose/Taking     albuterol (PROVENTIL) (2.5 MG/3ML) 0.083% nebulizer solution Take 2.5 mg by nebulization Every 4 (Four) Hours As Needed for Wheezing. 90 mL 12 Taking As Needed    albuterol sulfate  (90 Base) MCG/ACT inhaler Inhale 2 puffs 2 (Two) Times a Day.   Taking    atorvastatin (LIPITOR) 10 MG tablet Take 1 tablet by mouth Every Evening.   7/25/2025 Evening    budesonide-formoterol (Symbicort) 160-4.5 MCG/ACT inhaler Inhale 2 puffs 2 (Two) Times a Day. 10.2 g 12 7/25/2025 Evening    cetirizine (zyrTEC) 10 MG tablet Take 1 tablet by mouth Daily As Needed for Allergies.   Taking As Needed    clopidogrel (PLAVIX) 75 MG tablet Take 1 tablet by mouth Daily. 30 tablet 2 7/25/2025    coenzyme Q10 100 MG capsule Take 1 capsule by mouth Daily.   7/25/2025    cyclobenzaprine (FLEXERIL) 10 MG tablet Take 1 tablet by mouth Every 6 (Six) Hours As Needed for Muscle Spasms.   7/25/2025    dicyclomine (BENTYL) 10 MG capsule Take 1 capsule by mouth 3 (Three) Times a Day As Needed.   Taking As Needed    gabapentin (NEURONTIN) 800 MG tablet Take 1 tablet by mouth 3 (Three) Times a Day.   7/25/2025 at 11:00 PM    hydroCHLOROthiazide 25 MG tablet Take 1 tablet by mouth Daily.   7/25/2025    hydrocortisone (CORTEF) 10 MG tablet Take 1 tablet by mouth 3 (Three) Times a Day With Meals for 30 days. 90 tablet 0 7/25/2025 at 11:00 PM    hydroxychloroquine (PLAQUENIL) 200 MG tablet Take 2 tablets by mouth Every Morning.   7/25/2025 Morning    Magnesium 200 MG tablet Take 1 tablet by mouth Daily.   7/25/2025    metoprolol tartrate (LOPRESSOR) 25 MG tablet Take 1 tablet by mouth 2 (Two) Times a Day.   7/25/2025 Evening    ondansetron ODT (ZOFRAN-ODT) 4 MG disintegrating tablet Place 1 tablet on the tongue Every 8 (Eight) Hours As Needed for Nausea or Vomiting.   Taking As Needed    oxyCODONE (ROXICODONE) 15 MG immediate release tablet Take 1 tablet by mouth Every 4 (Four) Hours.   7/25/2025 at 11:00 PM    Potassium 99 MG tablet Take 1 tablet by  mouth Daily.   7/25/2025    Tirzepatide (Mounjaro) 10 MG/0.5ML solution auto-injector Inject 10 mg under the skin into the appropriate area as directed Every 7 (Seven) Days. Tuesdays   Taking    triamcinolone (KENALOG) 0.1 % cream Apply 1 Application topically to the appropriate area as directed 2 (Two) Times a Day As Needed.   Taking As Needed    Turmeric (QC Tumeric Complex) 500 MG capsule Take 1 capsule by mouth Daily.   7/25/2025    venlafaxine XR (EFFEXOR-XR) 37.5 MG 24 hr capsule Take 1 capsule by mouth Daily.   7/25/2025    Vitamin D, Cholecalciferol, (CHOLECALCIFEROL) 10 MCG (400 UNIT) tablet Take 1 tablet by mouth Daily.   7/25/2025     Scheduled Meds:  atorvastatin, 10 mg, Oral, Nightly  budesonide-formoterol, 2 puff, Inhalation, BID - RT  cholecalciferol, 500 Units, Oral, Daily  clopidogrel, 75 mg, Oral, Daily  gabapentin, 800 mg, Oral, Q8H  [START ON 7/27/2025] hydroCHLOROthiazide, 12.5 mg, Oral, Daily  [START ON 7/27/2025] hydrocortisone, 10 mg, Oral, BID With Meals  hydroxychloroquine, 400 mg, Oral, QAM  Lidocaine, 1 patch, Transdermal, Q24H  metoprolol tartrate, 25 mg, Oral, BID  sodium chloride, 10 mL, Intravenous, Q12H  venlafaxine XR, 37.5 mg, Oral, Daily      Continuous Infusions:     PRN Meds:    acetaminophen    senna-docusate sodium **AND** polyethylene glycol **AND** bisacodyl **AND** bisacodyl    Calcium Replacement - Follow Nurse / BPA Driven Protocol    cetirizine    cyclobenzaprine    dextrose    dextrose    glucagon (human recombinant)    ipratropium-albuterol    Magnesium Standard Dose Replacement - Follow Nurse / BPA Driven Protocol    nitroglycerin    oxyCODONE    Phosphorus Replacement - Follow Nurse / BPA Driven Protocol    Potassium Replacement - Follow Nurse / BPA Driven Protocol    sodium chloride    sodium chloride  Allergies:  Metformin and Morphine    Objective     Vital Signs   Temp:  [98 °F (36.7 °C)-98.2 °F (36.8 °C)] 98.2 °F (36.8 °C)  Heart Rate:  [67-81] 81  Resp:   [12-22] 16  BP: (112-118)/(63-85) 116/85    Physical Exam:     General Appearance:    Alert, cooperative, in no acute distress   Head:    Normocephalic, without obvious abnormality, atraumatic   Eyes:            Lids and lashes normal, conjunctivae and sclerae normal, no   icterus, no pallor, corneas clear, PERRLA   Throat:   No oral lesions,  oral mucosa moist   Neck:   Acanthosis nigricans no thyromegaly, no carotid bruit   Lungs:     Clear    Heart:    Regular rhythm and normal rate   Chest Wall:    No abnormalities observed   Abdomen:     Normal bowel sounds, soft                 Extremities:   Moves all extremities well, no edema               Pulses:   Pulses palpable and equal bilaterally   Skin:   Dry   Neurologic:  DTR absent, able to feel the 10g monofilament       Results Review  I have reviewed the patient's new clinical results, labs & imaging.    Lab Results (last 24 hours)       Procedure Component Value Units Date/Time    POC Glucose Once [[id number]]  (Abnormal) Collected: 07/26/25 2153    Specimen: Blood Updated: 07/26/25 2155     Glucose 140 mg/dL      Comment: Serial Number: [id number]:  [id number]       POC Glucose Once [[id number]]  (Abnormal) Collected: 07/26/25 1958    Specimen: Blood Updated: 07/26/25 2001     Glucose 188 mg/dL      Comment: Serial Number: [id number]:  [id number]       POC Glucose 4x Daily Before Meals & at Bedtime [[id number]]  (Abnormal) Collected: 07/26/25 1707    Specimen: Blood Updated: 07/26/25 1711     Glucose 144 mg/dL      Comment: Serial Number: [id number]:  [id number]       TSH Rfx On Abnormal To Free T4 [[id number]]  (Normal) Collected: 07/26/25 1637    Specimen: Blood Updated: 07/26/25 1709     TSH 0.925 uIU/mL     C-Peptide [[id number]] Collected: 07/26/25 1637    Specimen: Blood Updated: 07/26/25 1637    Aldosterone [[id number]] Collected: 07/26/25 1637    Specimen: Blood Updated: 07/26/25 1637    POC Glucose Once [[id number]]  (Normal) Collected:  07/26/25 1126    Specimen: Blood Updated: 07/26/25 1129     Glucose 92 mg/dL      Comment: Serial Number: [id number]:  [id number]       POC Glucose Q3H [[id number]]  (Abnormal) Collected: 07/26/25 0727    Specimen: Blood Updated: 07/26/25 0730     Glucose 115 mg/dL      Comment: Serial Number: [id number]:  [id number]       Urinalysis With Culture If Indicated - Urine, Clean Catch [[id number]]  (Abnormal) Collected: 07/26/25 0426    Specimen: Urine, Clean Catch Updated: 07/26/25 0434     Color, UA Yellow     Appearance, UA Clear     pH, UA 5.5     Specific Gravity, UA 1.031     Glucose, UA Negative     Ketones, UA Trace     Bilirubin, UA Negative     Blood, UA Negative     Protein, UA Negative     Leuk Esterase, UA Negative     Nitrite, UA Negative     Urobilinogen, UA 1.0 E.U./dL    Narrative:      In absence of clinical symptoms, the presence of pyuria, bacteria, and/or nitrites on the urinalysis result does not correlate with infection.  Urine microscopic not indicated.    POC Glucose Once [[id number]]  (Abnormal) Collected: 07/26/25 0358    Specimen: Blood Updated: 07/26/25 0401     Glucose 115 mg/dL      Comment: Serial Number: [id number]:  [id number]        Novjuan antonio Comment 1 Notified Physician     Maryam Comment 2 Notified Patients RN    Comprehensive Metabolic Panel [[id number]]  (Abnormal) Collected: 07/26/25 0307    Specimen: Blood Updated: 07/26/25 0337     Glucose 95 mg/dL      BUN 26.0 mg/dL      Creatinine 0.90 mg/dL      Sodium 141 mmol/L      Potassium 3.5 mmol/L      Chloride 102 mmol/L      CO2 26.6 mmol/L      Calcium 9.6 mg/dL      Total Protein 7.6 g/dL      Albumin 4.3 g/dL      ALT (SGPT) 25 U/L      AST (SGOT) 18 U/L      Alkaline Phosphatase 85 U/L      Total Bilirubin 0.3 mg/dL      Globulin 3.3 gm/dL      A/G Ratio 1.3 g/dL      BUN/Creatinine Ratio 28.9     Anion Gap 12.4 mmol/L      eGFR 74.7 mL/min/1.73     Narrative:      GFR Categories in Chronic Kidney Disease (CKD)               GFR Category          GFR (mL/min/1.73)    Interpretation  G1                    90 or greater        Normal or high (1)  G2                    60-89                Mild decrease (1)  G3a                   45-59                Mild to moderate decrease  G3b                   30-44                Moderate to severe decrease  G4                    15-29                Severe decrease  G5                    14 or less           Kidney failure    (1)In the absence of evidence of kidney disease, neither GFR category G1 or G2 fulfill the criteria for CKD.    eGFR calculation 2021 CKD-EPI creatinine equation, which does not include race as a factor    Lipase [[id number]]  (Normal) Collected: 07/26/25 0307    Specimen: Blood Updated: 07/26/25 0337     Lipase 33 U/L     Hemoglobin A1c [[id number]]  (Abnormal) Collected: 07/26/25 0216    Specimen: Blood Updated: 07/26/25 0237     Hemoglobin A1C 5.76 %     Narrative:      Hemoglobin A1C Ranges:    Increased Risk for Diabetes  5.7% to 6.4%  Diabetes                     >= 6.5%  Diabetic Goal                < 7.0%    POC Glucose Once [[id number]]  (Abnormal) Collected: 07/26/25 0223    Specimen: Blood Updated: 07/26/25 0225     Glucose 137 mg/dL      Comment: Serial Number: [id number]:  [id number]       CBC & Differential [[id number]]  (Abnormal) Collected: 07/26/25 0216    Specimen: Blood Updated: 07/26/25 0222    Narrative:      The following orders were created for panel order CBC & Differential.  Procedure                               Abnormality         Status                     ---------                               -----------         ------                     CBC Auto Differential[[id number]]        Abnormal            Final result                 Please view results for these tests on the individual orders.    CBC Auto Differential [[id number]]  (Abnormal) Collected: 07/26/25 0216    Specimen: Blood Updated: 07/26/25 0222     WBC 15.47 10*3/mm3      RBC 4.41  10*6/mm3      Hemoglobin 12.8 g/dL      Hematocrit 40.2 %      MCV 91.2 fL      MCH 29.0 pg      MCHC 31.8 g/dL      RDW 12.7 %      RDW-SD 42.3 fl      MPV 9.3 fL      Platelets 300 10*3/mm3      Neutrophil % 67.3 %      Lymphocyte % 21.1 %      Monocyte % 9.3 %      Eosinophil % 1.0 %      Basophil % 0.5 %      Immature Grans % 0.8 %      Neutrophils, Absolute 10.41 10*3/mm3      Lymphocytes, Absolute 3.27 10*3/mm3      Monocytes, Absolute 1.44 10*3/mm3      Eosinophils, Absolute 0.15 10*3/mm3      Basophils, Absolute 0.08 10*3/mm3      Immature Grans, Absolute 0.12 10*3/mm3      nRBC 0.0 /100 WBC     POC Glucose Once [[id number]]  (Abnormal) Collected: 07/26/25 0144    Specimen: Blood Updated: 07/26/25 0146     Glucose 150 mg/dL      Comment: Serial Number: [id number]:  [id number]             Lab Results   Component Value Date    HGBA1C 5.76 (H) 07/26/2025     Lab Results   Component Value Date    TSH 0.925 07/26/2025      Latest Reference Range & Units 07/17/25 08:11 07/17/25 08:47 07/17/25 09:24   Cortisol mcg/dL 0.80 9.18 11.80       Assessment & Plan     Glucocorticoid deficiency  Diabetes type 2, controlled    Will decrease HCTZ to 12.5 mg since is contributing to pre renal azotemia & lower K+.  Decrease hydrocortisone to 2x/d, as she takes @ home.  We discussed that her feeling of low bl sugar can be managed with sitting down, keeping good hydration & exercising in shorter sessions 2-3x/d.  She will use peanut butter to help with the feeling of low bl sugar.  Drink boost as meal substitute.  She will not be concentrating so much on the bl sugar # since doesn't affect treatment & are not life threatening.  I discussed the patients findings and my recommendations with patient, sister, mother & bed side nurse.    Pj Quesada MD  07/26/25  22:30 EDT

## 2025-07-27 NOTE — PLAN OF CARE
Problem: Adult Inpatient Plan of Care  Goal: Plan of Care Review  Outcome: Progressing  Flowsheets (Taken 7/27/2025 2193)  Progress: improving  Outcome Evaluation: Patient resting confortably in bed, VSS, blood sugars wnl, c/o no pain. Call light in reach. Will continue to monitor.  Plan of Care Reviewed With: patient   Goal Outcome Evaluation:  Plan of Care Reviewed With: patient        Progress: improving  Outcome Evaluation: Patient resting confortably in bed, VSS, blood sugars wnl, c/o no pain. Call light in reach. Will continue to monitor.

## 2025-07-27 NOTE — PROGRESS NOTES
"Subjective   Lisa Jay is a 55 y.o. female.   Seen for adrenal f/u.  Better, still feels weak.        Objective     /80 (BP Location: Right arm, Patient Position: Lying)   Pulse 69   Temp 98.3 °F (36.8 °C) (Oral)   Resp 14   Ht 170.2 cm (67\")   Wt 102 kg (224 lb)   SpO2 100%   BMI 35.08 kg/m²   Blood sugar  118 this am; K 3.8    ASSESSMENT  Glucocorticoid deficiency  Patient is stable    PLAN    Ok to discharge home from endocrinology standpoint.           Pj Quesada MD  7/27/2025  12:28 EDT    "

## 2025-07-28 ENCOUNTER — READMISSION MANAGEMENT (OUTPATIENT)
Dept: CALL CENTER | Facility: HOSPITAL | Age: 55
End: 2025-07-28
Payer: MEDICARE

## 2025-07-28 NOTE — OUTREACH NOTE
Medical Week 1 Survey      Flowsheet Row Responses   [facility] [place] facility patient discharged from? [facility]   Does the patient have one of the following disease processes/diagnoses(primary or secondary)? Other   Week 1 attempt successful? No   Unsuccessful attempts Attempt 1   Revoke Readmitted            Gertrudis STEWARD - Registered Nurse

## 2025-07-28 NOTE — OUTREACH NOTE
Prep Survey      Flowsheet Row Responses   [facility] patient discharged from? [facility]   Is LACE score < 7 ? No   Eligibility Readm Mgmt   Discharge diagnosis Hypoglycemia   Does the patient have one of the following disease processes/diagnoses(primary or secondary)? Other   Does the patient have Home health ordered? No   Is there a DME ordered? No   Prep survey completed? Yes            Gertrudis STEWARD - Registered Nurse           Statement Selected

## 2025-07-28 NOTE — CASE MANAGEMENT/SOCIAL WORK
Case Management Discharge Note      Final Note: Routine home         Selected Continued Care - Discharged on 7/27/2025 Admission date: 7/26/2025 - Discharge disposition: Home or Self Care         Transportation Services  Transportation: Private Transportation  Private: Car    Final Discharge Disposition Code: 01 - home or self-care

## 2025-07-29 LAB — C PEPTIDE SERPL-MCNC: 11.6 NG/ML (ref 1.1–4.4)

## 2025-07-31 ENCOUNTER — READMISSION MANAGEMENT (OUTPATIENT)
Dept: CALL CENTER | Facility: HOSPITAL | Age: 55
End: 2025-07-31
Payer: MEDICARE

## 2025-07-31 NOTE — OUTREACH NOTE
Medical Week 1 Survey      Flowsheet Row Responses   [facility] facility patient discharged from? [facility]   Does the patient have one of the following disease processes/diagnoses(primary or secondary)? Other   Week 1 attempt successful? No   Unsuccessful attempts Attempt 2            LEIGH ENGLAND - Registered Nurse

## 2025-08-02 LAB — ALDOST SERPL-MCNC: 35.5 NG/DL (ref 0–30)

## 2025-08-07 ENCOUNTER — READMISSION MANAGEMENT (OUTPATIENT)
Dept: CALL CENTER | Facility: HOSPITAL | Age: 55
End: 2025-08-07
Payer: MEDICARE

## (undated) DEVICE — SEAL

## (undated) DEVICE — 3M™ IOBAN™ 2 ANTIMICROBIAL INCISE DRAPE 6650EZ: Brand: IOBAN™ 2

## (undated) DEVICE — UNDERGLV SURG BIOGEL INDICATOR LF PF 7.5

## (undated) DEVICE — PASS SUT PRO BARIATRIC XL W/TROC SWABS

## (undated) DEVICE — APPL HEMO ENDO SURGICEL 2IN1 1P/U STRL

## (undated) DEVICE — LAPAROVUE VISIBILITY SYSTEM LAPAROSCOPIC SOLUTIONS: Brand: LAPAROVUE

## (undated) DEVICE — ADHS SKIN PREMIERPRO EXOFIN TOPICAL HI/VISC .5ML

## (undated) DEVICE — BG RETRV TISS SUPERBAG INTRO RIP/STOP NLY 10MM 240ML MD

## (undated) DEVICE — SOL IRRIG NACL 1000ML

## (undated) DEVICE — BLADELESS OBTURATOR: Brand: WECK VISTA

## (undated) DEVICE — 3M™ STERI-STRIP™ REINFORCED ADHESIVE SKIN CLOSURES, R1547, 1/2 IN X 4 IN (12 MM X 100 MM), 6 STRIPS/ENVELOPE: Brand: 3M™ STERI-STRIP™

## (undated) DEVICE — SUT VIC 0 UR6 27IN VCP603H

## (undated) DEVICE — ANTIBACTERIAL UNDYED BRAIDED (POLYGLACTIN 910), SYNTHETIC ABSORBABLE SUTURE: Brand: COATED VICRYL

## (undated) DEVICE — COLUMN DRAPE

## (undated) DEVICE — KT SURG TURNOVER 050

## (undated) DEVICE — CANNULA SEAL

## (undated) DEVICE — SYR LUERLOK 30CC

## (undated) DEVICE — GENERAL LAPAROSCOPY CDS: Brand: MEDLINE INDUSTRIES, INC.

## (undated) DEVICE — SUT VIC FS2 4/0 27IN J422H

## (undated) DEVICE — BLANKT WARM UPPR/BDY ARM/OUT 57X196CM

## (undated) DEVICE — SUCTION IRRIGATOR: Brand: ENDOWRIST

## (undated) DEVICE — SOLUTION,WATER,IRRIGATION,1000ML,STERILE: Brand: MEDLINE

## (undated) DEVICE — ARM DRAPE

## (undated) DEVICE — GLV SURG BIOGEL LTX PF 7

## (undated) DEVICE — SLV SCD CALF HEMOFORCE DVT THERP REPROC MD

## (undated) DEVICE — TROC BLADLES AIRSEAL/OPTI THRD 8X120MM 1P/U

## (undated) DEVICE — ST TBG AIRSEAL BIF FLTR W/ACT/CHARCOAL/FLTR